# Patient Record
Sex: MALE | Race: BLACK OR AFRICAN AMERICAN | Employment: OTHER | RURAL
[De-identification: names, ages, dates, MRNs, and addresses within clinical notes are randomized per-mention and may not be internally consistent; named-entity substitution may affect disease eponyms.]

---

## 2017-01-11 ENCOUNTER — OFFICE VISIT (OUTPATIENT)
Dept: CARDIOLOGY CLINIC | Age: 77
End: 2017-01-11

## 2017-01-11 VITALS
WEIGHT: 270 LBS | TEMPERATURE: 97.8 F | OXYGEN SATURATION: 97 % | HEIGHT: 74 IN | BODY MASS INDEX: 34.65 KG/M2 | DIASTOLIC BLOOD PRESSURE: 71 MMHG | SYSTOLIC BLOOD PRESSURE: 150 MMHG | HEART RATE: 53 BPM | RESPIRATION RATE: 20 BRPM

## 2017-01-11 DIAGNOSIS — I10 ESSENTIAL HYPERTENSION: Chronic | ICD-10-CM

## 2017-01-11 DIAGNOSIS — E78.00 HYPERCHOLESTEROLEMIA: Chronic | ICD-10-CM

## 2017-01-11 DIAGNOSIS — I73.9 PVD (PERIPHERAL VASCULAR DISEASE) (HCC): Chronic | ICD-10-CM

## 2017-01-11 DIAGNOSIS — I48.0 PAF (PAROXYSMAL ATRIAL FIBRILLATION) (HCC): Primary | Chronic | ICD-10-CM

## 2017-01-11 DIAGNOSIS — N18.3 CKD (CHRONIC KIDNEY DISEASE), STAGE 3 (MODERATE): Chronic | ICD-10-CM

## 2017-01-11 DIAGNOSIS — J44.9 CHRONIC OBSTRUCTIVE PULMONARY DISEASE, UNSPECIFIED COPD TYPE (HCC): Chronic | ICD-10-CM

## 2017-01-11 NOTE — PROGRESS NOTES
Dictation on: 01/11/2017 11:13 AM by: Arminda Vu [[id number]]     Nickolas Romolaly      1940   Mariela Tim MD  Date of Visit- 1/11/2017         [place] Office,  PCP=Vin Kimbrough MD     [facility] [address] [place] [address] [facility] and V[facility] [place]. HPI:   Diego Carpenter is a 67 y.o. male       Assessment/Plans:  1. PAF (paroxysmal atrial fibrillation) ([facility]    2. PVD (peripheral vascular disease) ([facility]    3. Essential hypertension    4. Hypercholesterolemia    5. CKD (chronic kidney disease), stage 3 (moderate)    6. Chronic obstructive pulmonary disease, unspecified COPD type [facility])               Future Appointments  Date Time Provider Elisha Sandoval   5/12/2017 9:30 AM [facility]   7/19/2017 8:00 AM JASPER HASSAN [facility] AMITA SCHED   7/19/2017 9:20 AM Mariela Tim MD [facility] AMITA SCHED       Cardiac History:   No specialty comments available. ROS:Cardiac complete  as above. Respiratory as above with no wheezing or hemoptysis.    He denies  symptoms of unusual weight loss , fevers,  BRBPR, hematuria,  or recent stroke    Past Medical History   Diagnosis Date    Acute on chronic diastolic congestive heart failure ([facility] 4/27/2015    Arthritis 7/11/2011    Blackhead 6/7/2010    Cancer (HCC)      prostate    Chronic diastolic heart failure (HCC) 8/26/2015    CKD (chronic kidney disease) 11/23/2015    Diabetes ([facility]     Gout, unspecified     Hypercholesterolemia     Hypertension     Inguinal lymphadenopathy 2/18/2014    Leg pain     PAF (paroxysmal atrial fibrillation) (HCC)     PAF (paroxysmal atrial fibrillation) (HCC)     PVD (peripheral vascular disease) ([facility])       Exam and Labs:  Visit Vitals    /71 (BP 1 Location: Right arm, BP Patient Position: Sitting)    Pulse (!) 53    Temp 97.8 °F (36.6 °C) (Oral)    Resp 20    Ht 6' 2\" (1.88 m)    Wt 270 lb (122.5 kg)    SpO2 97%    BMI 34.67 kg/m2 Constitutional:  NAD, comfortable , moist mucous membranesHENT: Head: NC,ATEyes: No scleral icterus. Neck:  Neck supple. No JVD present. No tracheal deviation,mass  Chest: Effort normal & normal respiratory excursion     Lungs:breath sounds normal. No stridor. distress, wheezes or  Rales. Heart:normal rate, regular rhythm, normal S1, S2, no murmurs, rubs, clicks or gallops , PMI non displaced. Edema: Edema is none. Extremities:  no clubbing or cyanosis. Abdominal:  no abnormal distension. Neurological: alert, conversant and oriented . Skin: Skin is not cold. No obvious systemic rash noted. Not diaphoretic. No erythema. Psychiatric:  Grossly normal mood and affect.   Behavior appears normal.     Lab Results   Component Value Date/Time    Cholesterol, total 120 12/12/2016 09:42 AM    HDL Cholesterol 34 12/12/2016 09:42 AM    LDL, calculated 74 12/12/2016 09:42 AM    Triglyceride 58 12/12/2016 09:42 AM    CHOL/HDL Ratio 3.7 10/07/2010 09:27 AM     Lab Results   Component Value Date/Time    Sodium 145 12/12/2016 09:42 AM    Potassium 4.1 12/12/2016 09:42 AM    Chloride 100 12/12/2016 09:42 AM    CO2 26 12/12/2016 09:42 AM    Anion gap 12 10/07/2010 09:27 AM    Glucose 116 12/12/2016 09:42 AM    BUN 12 12/12/2016 09:42 AM    Creatinine 1.32 12/12/2016 09:42 AM    BUN/Creatinine ratio 9 12/12/2016 09:42 AM    GFR est AA 60 12/12/2016 09:42 AM    GFR est non-AA 52 12/12/2016 09:42 AM    Calcium 9.7 12/12/2016 09:42 AM      Wt Readings from Last 3 Encounters:   01/11/17 270 lb (122.5 kg)   12/14/16 265 lb 9.6 oz (120.5 kg)   12/12/16 264 lb (119.7 kg)    BP Readings from Last 3 Encounters:   01/11/17 150/71   12/14/16 163/73   12/12/16 113/59        Current Outpatient Prescriptions   Medication Sig    FOLBEE PLUS tab tablet TAKE ONE TABLET BY MOUTH ONCE DAILY    glipiZIDE (GLUCOTROL) 5 mg tablet INCREASE TO 2 TABLETS BY MOUTH 20 MINUTES BEFORE BREAKFAST AND 2 TABLET TWENTY MINUTES BEFORE DINNER    labetalol (NORMODYNE) 200 mg tablet Take 0.5 Tabs by mouth two (2) times a day for 30 days. Indications: Hypertension    cloNIDine HCl (CATAPRES) 0.3 mg tablet Take 1 Tab by mouth three (3) times daily.  diphenhydrAMINE (SLEEP AID, DIPHENHYDRAMINE,) 25 mg tablet Take 1 Tab by mouth every six (6) hours as needed for Sleep.  ferrous sulfate (IRON) 325 mg (65 mg iron) tablet Take 1 Tab by mouth Daily (before breakfast).  fluticasone (FLONASE) 50 mcg/actuation nasal spray 1 spray BL daily    dabigatran etexilate (PRADAXA) 150 mg capsule Take 1 Cap by mouth two (2) times a day.  albuterol (PROVENTIL HFA, VENTOLIN HFA, PROAIR HFA) 90 mcg/actuation inhaler Take 2 Puffs by inhalation every six (6) hours as needed for Wheezing for up to 360 days. Please use generic that is covered    HYDROcodone-acetaminophen (NORCO)  mg tablet Take 1 Tab by mouth every six (6) hours as needed for Pain.  baclofen (LIORESAL) 10 mg tablet Take 1 Tab by mouth three (3) times daily. Muscle relaxer.  tamsulosin (FLOMAX) 0.4 mg capsule TAKE ONE CAPSULE BY MOUTH ONCE DAILY    CALCIUM CARB-MAG OXIDE-ZINC OX PO Take  by mouth.  ezetimibe (ZETIA) 10 mg tablet TAKE ONE TABLET BY MOUTH NIGHTLY    montelukast (SINGULAIR) 10 mg tablet Take 1 Tab by mouth daily.  omeprazole (PRILOSEC) 20 mg capsule Take 1 Cap by mouth daily. [facility]    Fat [employer] TOUCH DELICA 33 gauge misc USE TO CHECK BLOOD SUGAR TWICE DAILY    amLODIPine (NORVASC) 10 mg tablet Take 1 Tab by mouth daily. (Patient taking differently: Take 5 mg by mouth daily.)    bumetanide (BUMEX) 2 mg tablet Take 1 Tab by mouth two (2) times a day.  lovastatin (MEVACOR) 40 mg tablet TAKE 1 TABLET BY MOUTH NIGHTLY    allopurinol (ZYLOPRIM) 100 mg tablet TAKE TWO TABLETS BY MOUTH ONCE DAILY TO PREVENT GOUT    magnesium oxide (MAG-OX) 400 mg tablet Take 1 Tab by mouth daily.  gabapentin (NEURONTIN) 300 mg capsule Take 1 Cap by mouth three (3) times daily.     hydrALAZINE (APRESOLINE) 50 mg tablet Take 1 Tab by mouth three (3) times daily.  ONETOUCH VERIO strip USE TO CHECK BLOOD SUGAR TWICE DAILY    metFORMIN (GLUCOPHAGE) 500 mg tablet TAKE ONE TABLET BY MOUTH THREE TIMES DAILY WITH MEALS    guaiFENesin SR (MUCINEX) 600 mg SR tablet Take 1 Tab by mouth daily.  potassium chloride (K-DUR) 10 mEq tablet Take 1 Tab by mouth three (3) times daily. (Patient taking differently: Take 10 mEq by mouth two (2) times a day.)    colchicine (COLCRYS) 0.6 mg tablet Take 1 Tab by mouth two (2) times a day. For acute gout.  melatonin 3 mg tablet Take 1 Tab by mouth nightly.  aspirin 81 mg chewable tablet Take 81 mg by mouth daily.  benzoyl peroxide 5 % topical gel Apply  to affected area nightly. apply to affected area as directed    metolazone (ZAROXOLYN) 2.5 mg tablet One tablet on Monday and thursday morning to remove excess fluid.  cholecalciferol, vitamin D3, (VITAMIN D3) 2,000 unit Tab Take  by mouth. No current facility-administered medications for this visit. Past Surgical History   Procedure Laterality Date    Hx orthopaedic       back and left shoulder x2    Colonoscopy N/A 9/22/2016     COLONOSCOPY performed by Karl Panchal MD at [address] [facility] Hx=  reports that he quit smoking about 13 years ago. His smoking use included Cigarettes. He has never used smokeless tobacco. He reports that he does not drink alcohol or use illicit drugs. Family Hx= family history includes Cancer in his paternal grandfather and sister; Hypertension in an other family member. Impression see above.

## 2017-01-11 NOTE — PROGRESS NOTES
Patient identified times 2. Informed of needed appointment. Transferred to [facility] for scheduling.  Appointment date will be mailed to verified address per patient request.

## 2017-01-11 NOTE — Clinical Note
Subjective:  Mr. Calixto Londono was seen by me a month ago. He was wheelchair bound with mild shortness of breath and found to have some bradycardia. He has a history of what is now persistent atrial fibrillation. His echo in 2015 had shown normal LV function. He has some kidney disease. At that time a month ago I had him cut back on his Labetalol. He says he feels fine, has no complaints. Denies any chest pain, chest pressure, shortness of breath, swelling in the feet, *** or passing out. He did see his kidney specialist with [facility] in [place]. They recommended he stop his Metolazone, take a half a tablet of the 10 mg of Norvasc daily, take the potassium twice a day as 10 mEq instead of three times a day and stop his vitamin D. He has a history of a dilated IVC and left atrial enlargement on previous echocardiogram and he's had reasonable vascular studies. Physical Examination: 
EXTREMITIES:  1+ lower extremity edema of the mid shins. CHEST:  Clear lungs. CV:  Irregularly irregular rhythm without murmur, rub or gallop. Impression/Plan: 1. Persistent atrial fibrillation, seems to have reasonable rate control and feels pretty well today, is not symptomatic. Had a normal ejection fraction in the past and is continuing on Pradaxa as a blood thinner to prevent any stroke for him. Would continue current management, do not feel he is a candidate to go back to cardioversion unless he becomes more symptomatic. 2. Normal left ventricular systolic function. Suspect some diastolic renal dysfunction related to renal disease. 3. CKD, followed by nephrologist. 
4. Hypertension, slightly elevated. Will defer management to kidney specialist.  Norvasc was decreased. 5. Hypokalemia. Potassium as above. 6. Dyslipidemia, on statin, which is Mevacor.  
7. Overall from a cardiac point of view seems to be doing fairly well, the atrial fibrillation is tolerated, and I will see him back in six months. NAVI BARRON      1940 Felipe Coronado MD  Date of Visit- 1/11/2017 [place] Barber,  PCP=Vin Osborne MD  
 
[facility] and [facility] [place]. HPI:   Penny Contreras is a 67 y.o. male Assessment/Plans: 1. PAF (paroxysmal atrial fibrillation) ([address] 2. PVD (peripheral vascular disease) ([address] 3. Essential hypertension 4. Hypercholesterolemia 5. CKD (chronic kidney disease), stage 3 (moderate) 6. Chronic obstructive pulmonary disease, unspecified COPD type ([address] Future Appointments Date Time Provider Elisha Sandoval 5/12/2017 9:30 AM Fifi Ruby MD [facility] AMITA SCHED  
7/19/2017 8:00 AM JASPER HASSAN AMITA SCHED  
7/19/2017 9:20 AM Felipe Coronado MD [address] [place] [address] Cardiac History: No specialty comments available. ROS:Cardiac complete  as above. Respiratory as above with no wheezing or hemoptysis. He denies  symptoms of unusual weight loss , fevers,  BRBPR, hematuria,  or recent stroke Past Medical History Diagnosis Date  Acute on chronic diastolic congestive heart failure ([address] 4/27/2015  Arthritis 7/11/2011  Blackhead 6/7/2010  Cancer [facility])   
  prostate  Chronic diastolic heart failure ([address] 8/26/2015  CKD (chronic kidney disease) 11/23/2015  Diabetes ([address]  Gout, unspecified  Hypercholesterolemia  Hypertension  Inguinal lymphadenopathy 2/18/2014  Leg pain  PAF (paroxysmal atrial fibrillation) ([address]  PAF (paroxysmal atrial fibrillation) ([address]  PVD (peripheral vascular disease) ([address] Exam and Labs: 
Visit Vitals  /71 (BP 1 Location: Right arm, BP Patient Position: Sitting)  Pulse (!) 53  Temp 97.8 °F (36.6 °C) (Oral)  Resp 20  
 Ht 6' 2\" (1.88 m)  Wt 270 lb (122.5 kg)  SpO2 97%  BMI 34.67 kg/m2 Constitutional:  NAD, comfortable , moist mucous membranesHENT: Head: NC,ATEyes: No scleral icterus. Neck:  Neck supple. No JVD present. No tracheal deviation,mass  Chest: Effort normal & normal respiratory excursion Lungs:breath sounds normal. No stridor. distress, wheezes or  Rales. Heart:normal rate, regular rhythm, normal S1, S2, no murmurs, rubs, clicks or gallops , PMI non displaced. Edema: Edema is none. Extremities:  no clubbing or cyanosis. Abdominal:  no abnormal distension. Neurological: alert, conversant and oriented . Skin: Skin is not cold. No obvious systemic rash noted. Not diaphoretic. No erythema. Psychiatric:  Grossly normal mood and affect. Behavior appears normal.  
 
Lab Results Component Value Date/Time Cholesterol, total 120 12/12/2016 09:42 AM  
 HDL Cholesterol 34 12/12/2016 09:42 AM  
 LDL, calculated 74 12/12/2016 09:42 AM  
 Triglyceride 58 12/12/2016 09:42 AM  
 CHOL/HDL Ratio 3.7 10/07/2010 09:27 AM  
 
Lab Results Component Value Date/Time Sodium 145 12/12/2016 09:42 AM  
 Potassium 4.1 12/12/2016 09:42 AM  
 Chloride 100 12/12/2016 09:42 AM  
 CO2 26 12/12/2016 09:42 AM  
 Anion gap 12 10/07/2010 09:27 AM  
 Glucose 116 12/12/2016 09:42 AM  
 BUN 12 12/12/2016 09:42 AM  
 Creatinine 1.32 12/12/2016 09:42 AM  
 BUN/Creatinine ratio 9 12/12/2016 09:42 AM  
 GFR est AA 60 12/12/2016 09:42 AM  
 GFR est non-AA 52 12/12/2016 09:42 AM  
 Calcium 9.7 12/12/2016 09:42 AM  
  
Wt Readings from Last 3 Encounters:  
01/11/17 270 lb (122.5 kg) 12/14/16 265 lb 9.6 oz (120.5 kg) 12/12/16 264 lb (119.7 kg) BP Readings from Last 3 Encounters:  
01/11/17 150/71  
12/14/16 163/73  
12/12/16 113/59 Current Outpatient Prescriptions Medication Sig  FOLBEE PLUS tab tablet TAKE ONE TABLET BY MOUTH ONCE DAILY  glipiZIDE (GLUCOTROL) 5 mg tablet INCREASE TO 2 TABLETS BY MOUTH 20 MINUTES BEFORE BREAKFAST AND 2 TABLET TWENTY MINUTES BEFORE DINNER  
  labetalol (NORMODYNE) 200 mg tablet Take 0.5 Tabs by mouth two (2) times a day for 30 days. Indications: Hypertension  cloNIDine HCl (CATAPRES) 0.3 mg tablet Take 1 Tab by mouth three (3) times daily.  diphenhydrAMINE (SLEEP AID, DIPHENHYDRAMINE,) 25 mg tablet Take 1 Tab by mouth every six (6) hours as needed for Sleep.  ferrous sulfate (IRON) 325 mg (65 mg iron) tablet Take 1 Tab by mouth Daily (before breakfast).  fluticasone (FLONASE) 50 mcg/actuation nasal spray 1 spray BL daily  dabigatran etexilate (PRADAXA) 150 mg capsule Take 1 Cap by mouth two (2) times a day.  albuterol (PROVENTIL HFA, VENTOLIN HFA, PROAIR HFA) 90 mcg/actuation inhaler Take 2 Puffs by inhalation every six (6) hours as needed for Wheezing for up to 360 days. Please use generic that is covered  HYDROcodone-acetaminophen (NORCO)  mg tablet Take 1 Tab by mouth every six (6) hours as needed for Pain.  baclofen (LIORESAL) 10 mg tablet Take 1 Tab by mouth three (3) times daily. Muscle relaxer.  tamsulosin (FLOMAX) 0.4 mg capsule TAKE ONE CAPSULE BY MOUTH ONCE DAILY  CALCIUM CARB-MAG OXIDE-ZINC OX PO Take  by mouth.  ezetimibe (ZETIA) 10 mg tablet TAKE ONE TABLET BY MOUTH NIGHTLY  montelukast (SINGULAIR) 10 mg tablet Take 1 Tab by mouth daily.  omeprazole (PRILOSEC) 20 mg capsule Take 1 Cap by mouth daily. [facility] TOUCH DELICA 33 gauge misc USE TO CHECK BLOOD SUGAR TWICE DAILY  amLODIPine (NORVASC) 10 mg tablet Take 1 Tab by mouth daily. (Patient taking differently: Take 5 mg by mouth daily.)  bumetanide (BUMEX) 2 mg tablet Take 1 Tab by mouth two (2) times a day.  lovastatin (MEVACOR) 40 mg tablet TAKE 1 TABLET BY MOUTH NIGHTLY  allopurinol (ZYLOPRIM) 100 mg tablet TAKE TWO TABLETS BY MOUTH ONCE DAILY TO PREVENT GOUT  
 magnesium oxide (MAG-OX) 400 mg tablet Take 1 Tab by mouth daily.   
 gabapentin (NEURONTIN) 300 mg capsule Take 1 Cap by mouth three (3) times daily.  hydrALAZINE (APRESOLINE) 50 mg tablet Take 1 Tab by mouth three (3) times daily.  ONETOUCH VERIO strip USE TO CHECK BLOOD SUGAR TWICE DAILY  metFORMIN (GLUCOPHAGE) 500 mg tablet TAKE ONE TABLET BY MOUTH THREE TIMES DAILY WITH MEALS  guaiFENesin SR (MUCINEX) 600 mg SR tablet Take 1 Tab by mouth daily.  potassium chloride (K-DUR) 10 mEq tablet Take 1 Tab by mouth three (3) times daily. (Patient taking differently: Take 10 mEq by mouth two (2) times a day.)  colchicine (COLCRYS) 0.6 mg tablet Take 1 Tab by mouth two (2) times a day. For acute gout.  melatonin 3 mg tablet Take 1 Tab by mouth nightly.  aspirin 81 mg chewable tablet Take 81 mg by mouth daily.  benzoyl peroxide 5 % topical gel Apply  to affected area nightly. apply to affected area as directed  metolazone (ZAROXOLYN) 2.5 mg tablet One tablet on Monday and thursday morning to remove excess fluid.  cholecalciferol, vitamin D3, (VITAMIN D3) 2,000 unit Tab Take  by mouth. No current facility-administered medications for this visit. Past Surgical History Procedure Laterality Date  Hx orthopaedic    
  back and left shoulder x2  Colonoscopy N/A 9/22/2016 COLONOSCOPY performed by Pj Sultana MD at [address] [place] [address] Social Hx=  reports that he quit smoking about 13 years ago. His smoking use included Cigarettes. He has never used smokeless tobacco. He reports that he does not drink alcohol or use illicit drugs. Family Hx= family history includes Cancer in his paternal grandfather and sister; Hypertension in an other family member. Impression see above.

## 2017-01-11 NOTE — PROGRESS NOTES
Reviewed record in preparation for visit and have obtained necessary documentation. Patient did not bring medications to visit for review. Information provided on Advanced Directive, Living Will. Health Maintenance Due   Topic Date Due    EYE EXAM RETINAL OR DILATED Q1  12/15/2016   Body mass index is 34.67 kg/(m^2).

## 2017-01-11 NOTE — PROGRESS NOTES
Subjective:  Mr. Karin Ma was seen by me a month ago. He was wheelchair bound with mild shortness of breath and found to have some bradycardia. He has a history of what is now persistent atrial fibrillation. His echo in 2015 had shown normal LV function. He has some kidney disease. At that time a month ago I had him cut back on his Labetalol. He says he feels fine, has no complaints. Denies any chest pain, chest pressure, shortness of breath, swelling in the feet, *** or passing out. He did see his kidney specialist with [facility] in [place]. They recommended he stop his Metolazone, take a half a tablet of the 10 mg of Norvasc daily, take the potassium twice a day as 10 mEq instead of three times a day and stop his vitamin D. He has a history of a dilated IVC and left atrial enlargement on previous echocardiogram and he's had reasonable vascular studies. Physical Examination:  EXTREMITIES:  1+ lower extremity edema of the mid shins. CHEST:  Clear lungs. CV:  Irregularly irregular rhythm without murmur, rub or gallop. Impression/Plan:  1. Persistent atrial fibrillation, seems to have reasonable rate control and feels pretty well today, is not symptomatic. Had a normal ejection fraction in the past and is continuing on Pradaxa as a blood thinner to prevent any stroke for him. Would continue current management, do not feel he is a candidate to go back to cardioversion unless he becomes more symptomatic. 2. Normal left ventricular systolic function. Suspect some diastolic renal dysfunction related to renal disease. 3. CKD, followed by nephrologist.  4. Hypertension, slightly elevated. Will defer management to kidney specialist.  Norvasc was decreased. 5. Hypokalemia. Potassium as above. 6. Dyslipidemia, on statin, which is Mevacor.   7. Overall from a cardiac point of view seems to be doing fairly well, the atrial fibrillation is tolerated, and I will see him back in six months.

## 2017-01-24 ENCOUNTER — TELEPHONE (OUTPATIENT)
Dept: FAMILY MEDICINE CLINIC | Age: 77
End: 2017-01-24

## 2017-01-24 DIAGNOSIS — G89.29 CHRONIC LEFT-SIDED LOW BACK PAIN WITHOUT SCIATICA: ICD-10-CM

## 2017-01-24 DIAGNOSIS — M54.50 CHRONIC LEFT-SIDED LOW BACK PAIN WITHOUT SCIATICA: ICD-10-CM

## 2017-01-24 DIAGNOSIS — M51.36 DDD (DEGENERATIVE DISC DISEASE), LUMBAR: Chronic | ICD-10-CM

## 2017-01-24 RX ORDER — HYDROCODONE BITARTRATE AND ACETAMINOPHEN 10; 325 MG/1; MG/1
1 TABLET ORAL
Qty: 90 TAB | Refills: 0 | Status: SHIPPED | OUTPATIENT
Start: 2017-01-24 | End: 2017-02-28 | Stop reason: SDUPTHER

## 2017-01-25 RX ORDER — LANCETS
EACH MISCELLANEOUS
Qty: 100 EACH | Refills: 6 | Status: SHIPPED | OUTPATIENT
Start: 2017-01-25 | End: 2019-07-18 | Stop reason: SDUPTHER

## 2017-01-25 RX ORDER — BLOOD-GLUCOSE METER
EACH MISCELLANEOUS
Qty: 1 EACH | Refills: 0 | Status: SHIPPED | OUTPATIENT
Start: 2017-01-25 | End: 2019-07-18 | Stop reason: SDUPTHER

## 2017-02-07 ENCOUNTER — OFFICE VISIT (OUTPATIENT)
Dept: FAMILY MEDICINE CLINIC | Age: 77
End: 2017-02-07

## 2017-02-07 VITALS
TEMPERATURE: 98 F | DIASTOLIC BLOOD PRESSURE: 76 MMHG | WEIGHT: 269 LBS | BODY MASS INDEX: 34.52 KG/M2 | OXYGEN SATURATION: 97 % | HEIGHT: 74 IN | RESPIRATION RATE: 16 BRPM | SYSTOLIC BLOOD PRESSURE: 174 MMHG | HEART RATE: 67 BPM

## 2017-02-07 DIAGNOSIS — M25.572 ACUTE LEFT ANKLE PAIN: Primary | ICD-10-CM

## 2017-02-07 DIAGNOSIS — E13.42 OTHER SPECIFIED DIABETES MELLITUS WITH DIABETIC POLYNEUROPATHY (HCC): Chronic | ICD-10-CM

## 2017-02-07 DIAGNOSIS — I10 ESSENTIAL HYPERTENSION: Chronic | ICD-10-CM

## 2017-02-07 LAB — HBA1C MFR BLD HPLC: 7.2 %

## 2017-02-07 RX ORDER — PREDNISONE 10 MG/1
TABLET ORAL
Qty: 21 TAB | Refills: 0 | Status: SHIPPED | OUTPATIENT
Start: 2017-02-07 | End: 2017-05-12 | Stop reason: ALTCHOICE

## 2017-02-07 NOTE — PROGRESS NOTES
Patient: Hammad Abbasi MRN: [id number]  SSN: [id number]    YOB: 1940  Age: 67 y.o. Sex: male      Chief Complaint   Patient presents with    Ankle Pain     Jean Pierre Dubon is a 67 y.o. male who complains of pain of the left ankle several day(s) ago. Denies acute injury. Symptoms have been constant since onset. Prior history of related problems: no prior problems with this area in the past. Patient with hx of DM2, HTN and gout. Says pain different from that experienced with gout. BP elevated today. Patient describes pain as 10/10. He is taking daily opioid for chronic back pain. BP Readings from Last 3 Encounters:   02/07/17 174/76   01/11/17 150/71   12/14/16 163/73       Medications:     Current Outpatient Prescriptions   Medication Sig    predniSONE (STERAPRED DS) 10 mg dose pack See administration instruction per 10mg dose pack    Blood-Glucose Meter (TRUE METRIX GLUCOSE METER) misc Use as Directed    glucose blood VI test strips (TRUE METRIX GLUCOSE TEST STRIP) strip Test 2 times daily Dx Coed E11.65    Lancets misc Test 2 times daily Dx Code E11.65    HYDROcodone-acetaminophen (NORCO)  mg tablet Take 1 Tab by mouth every six (6) hours as needed for Pain.  FOLBEE PLUS tab tablet TAKE ONE TABLET BY MOUTH ONCE DAILY    glipiZIDE (GLUCOTROL) 5 mg tablet INCREASE TO 2 TABLETS BY MOUTH 20 MINUTES BEFORE BREAKFAST AND 2 TABLET TWENTY MINUTES BEFORE DINNER    cloNIDine HCl (CATAPRES) 0.3 mg tablet Take 1 Tab by mouth three (3) times daily.  diphenhydrAMINE (SLEEP AID, DIPHENHYDRAMINE,) 25 mg tablet Take 1 Tab by mouth every six (6) hours as needed for Sleep.  ferrous sulfate (IRON) 325 mg (65 mg iron) tablet Take 1 Tab by mouth Daily (before breakfast).  fluticasone (FLONASE) 50 mcg/actuation nasal spray 1 spray BL daily    dabigatran etexilate (PRADAXA) 150 mg capsule Take 1 Cap by mouth two (2) times a day.     albuterol (PROVENTIL HFA, VENTOLIN HFA, PROAIR HFA) 90 mcg/actuation inhaler Take 2 Puffs by inhalation every six (6) hours as needed for Wheezing for up to 360 days. Please use generic that is covered    baclofen (LIORESAL) 10 mg tablet Take 1 Tab by mouth three (3) times daily. Muscle relaxer.  tamsulosin (FLOMAX) 0.4 mg capsule TAKE ONE CAPSULE BY MOUTH ONCE DAILY    CALCIUM CARB-MAG OXIDE-ZINC OX PO Take  by mouth.  ezetimibe (ZETIA) 10 mg tablet TAKE ONE TABLET BY MOUTH NIGHTLY    montelukast (SINGULAIR) 10 mg tablet Take 1 Tab by mouth daily.  omeprazole (PRILOSEC) 20 mg capsule Take 1 Cap by mouth daily. Fax to [facility]    amLODIPine (NORVASC) 10 mg tablet Take 1 Tab by mouth daily. (Patient taking differently: Take 5 mg by mouth daily.)    bumetanide (BUMEX) 2 mg tablet Take 1 Tab by mouth two (2) times a day.  lovastatin (MEVACOR) 40 mg tablet TAKE 1 TABLET BY MOUTH NIGHTLY    allopurinol (ZYLOPRIM) 100 mg tablet TAKE TWO TABLETS BY MOUTH ONCE DAILY TO PREVENT GOUT    magnesium oxide (MAG-OX) 400 mg tablet Take 1 Tab by mouth daily.  gabapentin (NEURONTIN) 300 mg capsule Take 1 Cap by mouth three (3) times daily.  hydrALAZINE (APRESOLINE) 50 mg tablet Take 1 Tab by mouth three (3) times daily.  metFORMIN (GLUCOPHAGE) 500 mg tablet TAKE ONE TABLET BY MOUTH THREE TIMES DAILY WITH MEALS    guaiFENesin SR (MUCINEX) 600 mg SR tablet Take 1 Tab by mouth daily.  metolazone (ZAROXOLYN) 2.5 mg tablet One tablet on Monday and thursday morning to remove excess fluid.  potassium chloride (K-DUR) 10 mEq tablet Take 1 Tab by mouth three (3) times daily. (Patient taking differently: Take 10 mEq by mouth two (2) times a day.)    colchicine (COLCRYS) 0.6 mg tablet Take 1 Tab by mouth two (2) times a day. For acute gout.  melatonin 3 mg tablet Take 1 Tab by mouth nightly.  aspirin 81 mg chewable tablet Take 81 mg by mouth daily.  cholecalciferol, vitamin D3, (VITAMIN D3) 2,000 unit Tab Take  by mouth.     benzoyl peroxide 5 % topical gel Apply  to affected area nightly. apply to affected area as directed     No current facility-administered medications for this visit. Problem List:     Patient Active Problem List    Diagnosis Date Noted    History of colon polyps 05/02/2016    Prostate cancer ([address] 05/02/2016    Idiopathic gout 05/02/2016    Essential hypertension with goal blood pressure less than 130/85 05/02/2016    CKD (chronic kidney disease) 11/23/2015    Chronic diastolic heart failure ([address] 08/26/2015    COPD (chronic obstructive pulmonary disease) ([address] 04/27/2015    Left upper lobe pneumonia 04/27/2015    Diabetes with neurologic complications ([address] 09/01/6683    Chronic radicular pain of lower back 07/21/2014    Neuropathy 07/16/2013    DDD (degenerative disc disease), lumbar 07/16/2013    Prostate CA ([address] 07/16/2013    Tubulovillous adenoma polyp of colon 07/16/2013    Hoarse voice quality 06/20/2012    Edema 01/12/2012    PAF (paroxysmal atrial fibrillation) ([address]     Unspecified arthropathy, ankle and foot 07/14/2011    Arthritis 07/11/2011    Gout, unspecified     PVD (peripheral vascular disease) ([address]     Hypertension     Hypercholesterolemia     Leg pain     Chronic pain 05/06/2010       Medical History:     Past Medical History   Diagnosis Date    Acute on chronic diastolic congestive heart failure ([address] 4/27/2015    Arthritis 7/11/2011    Blackhead 6/7/2010    Cancer (HCC)      prostate    Chronic diastolic heart failure ([address] 8/26/2015    CKD (chronic kidney disease) 11/23/2015    Diabetes ([address]     Gout, unspecified     Hypercholesterolemia     Hypertension     Inguinal lymphadenopathy 2/18/2014    Leg pain     PAF (paroxysmal atrial fibrillation) (HCC)     PAF (paroxysmal atrial fibrillation) (HCC)     PVD (peripheral vascular disease) (HCC)        Allergies:      Allergies   Allergen Reactions    Indocin [Indomethacin Sodium] Unknown (comments)    Lisinopril Angioedema     Dxed in hospital.    Losartan Other (comments)     Face and throat swelling. Surgical History:     Past Surgical History   Procedure Laterality Date    Hx orthopaedic       back and left shoulder x2    Colonoscopy N/A 9/22/2016     COLONOSCOPY performed by Mickey Michel MD at [address] [place] [address] History:     Social History     Social History    Marital status: SINGLE     Spouse name: N/A    Number of children: N/A    Years of education: N/A     Social History Main Topics    Smoking status: Former Smoker     Types: Cigarettes     Quit date: 6/20/2003    Smokeless tobacco: Never Used    Alcohol use No    Drug use: No    Sexual activity: Not Asked     Other Topics Concern    None     Social History Narrative       Review of Symptoms:  Constitutional: Negative for fever, chills, fatigue, malaise  Skin: Negative for rash or lesion  CV: Negative for chest pain or palpitations  Resp: Negative for cough, wheezing or SOB  Gastrointestinal: Negative for nausea or abdominal pain  Musculoskeletal: see HPI  Neurological: Negative for weakness or paresthesia      Vitals:    02/07/17 1003   BP: 174/76   Pulse: 67   Resp: 16   Temp: 98 °F (36.7 °C)   TempSrc: Oral   SpO2: 97%   Weight: 269 lb (122 kg)   Height: 6' 2\" (1.88 m)       Physical Examination:  General: Well developed, well nourished, in no acute distress  Skin: Warm and dry, no rash or lesion appreciated  Head: Normocephalic, atraumatic  Eyes: Sclera clear, EOMI  Neck: Normal range of motion  Respiratory: symmetrical, unlabored effort  Cardiovascular: Regular rate and rhythm, 2+ distal pulses  Extremities: Left ankle: No erythema, edema, or bruising. lateral and anterior TTP  Neurological: Normal strength and sensation. No focal deficits  Psychological: Active, alert and oriented. Affect appropriate     X-ray: Degenerative changes in the midfoot. López Llanes was seen today for ankle pain.     Diagnoses and all orders for this visit:    Acute left ankle pain  -     XR ANKLE LT MIN 3 V; Future  -     predniSONE (STERAPRED DS) 10 mg dose pack; See administration instruction per 10mg dose pack    Essential hypertension    Other specified diabetes mellitus with diabetic polyneuropathy (HCC)  -     AMB POC HEMOGLOBIN A1C  -     COLLECTION CAPILLARY BLOOD SPECIMEN        PLAN:  Rest the injured area as much as practical, apply ice packs, elevate the injured limb  Patient advised to log blood pressures at home 1-2 times daily and bring to next visit. Call office as soon as possible if BP's over 140/90 on multiple occasions or with symptoms of dizziness, chest pain, shortness of breath, headache or ankle swelling. Monitor BS closely while on steroid. Discussed expected course, resolution and possible complications of diagnosis in detail with patient. Goals of treatment  were discussed. All of the patient's questions were addressed. The patient expresses understanding and agreement with our plan of care. The patient has received an after-visit summary and questions were answered concerning future plans. I have discussed medication side effects and warnings with the patient as well. Follow-up Disposition:  Return in about 2 weeks (around 2/21/2017), or if symptoms worsen or fail to improve.

## 2017-02-07 NOTE — PROGRESS NOTES
Reviewed record in preparation for visit and have necessary documentation      Body mass index is 34.54 kg/(m^2). Health Maintenance Due   Topic Date Due    EYE EXAM RETINAL OR DILATED Q1  12/15/2016     Not due til dec.  This yr for eye exam

## 2017-02-07 NOTE — MR AVS SNAPSHOT
Visit Information Date & Time Provider Department Dept. Phone Encounter #  
 2/7/2017 10:00 AM Taty Krause, [address] [facility] [id number] Your Appointments 5/12/2017  9:30 AM  
ROUTINE CARE with Nita Steve MD  
[facility] Appt Note: 4 mnth fu est pt for refills fasting HTn  
 [facility] [address] [facility]   
[phone]  
  
   
 [address] [facility]  
  
    
 7/19/2017  8:00 AM  
ECHO CARDIOGRAMS 2D with ECHOJASPER  
[facility] ([facility] SCHEDULING) Appt Note: 6 month f/u with EKG [per Dr. Lacey Krause [address] [facility] [address] [address] [facility]  
[phone]  
  
   
 [address] [facility] [address] [facility] [address]  
  
    
 7/19/2017  9:20 AM  
ESTABLISHED PATIENT with Vielka Arias MD  
[facility] ([facility]) Appt Note: 6 month f/u with EKG [per Dr. Lacey Krause [address] [facility] [address] [facility]  
[address] [place] [address] [address] Upcoming Health Maintenance Date Due  
 EYE EXAM RETINAL OR DILATED Q1 12/15/2016 MEDICARE YEARLY EXAM 3/2/2017 HEMOGLOBIN A1C Q6M 6/12/2017 FOOT EXAM Q1 9/28/2017 MICROALBUMIN Q1 9/28/2017 LIPID PANEL Q1 12/12/2017 GLAUCOMA SCREENING Q2Y 12/15/2017 DTaP/Tdap/Td series (2 - Td) 8/16/2023 Allergies as of 2/7/2017  Review Complete On: 2/7/2017 By: Lamar Ramirez Severity Noted Reaction Type Reactions Indocin [Indomethacin Sodium]  06/05/2010    Unknown (comments) Lisinopril  04/17/2013    Angioedema Dxed in hospital.  
 Losartan  01/22/2015    Other (comments) Face and throat swelling. Current Immunizations  Reviewed on 12/12/2016 Name Date Influenza Vaccine 10/7/2015, 10/9/2014, 9/26/2013 Influenza Vaccine (Quad) PF 9/28/2016 Influenza Vaccine Split 10/11/2012, 11/23/2011, 10/7/2010 Influenza Vaccine Whole 12/14/2009 Pneumococcal Conjugate (PCV-13) 8/12/2015 Pneumococcal Polysaccharide (PPSV-23) 10/20/2014, 12/7/2012 Tdap 8/16/2013 Zoster Vaccine, Live 5/26/2015 Not reviewed this visit You Were Diagnosed With   
  
 Codes Comments Acute left ankle pain    -  Primary ICD-10-CM: M25.572 ICD-9-CM: 719.47 Other specified diabetes mellitus with diabetic polyneuropathy [facility]     ICD-10-CM: E13.42 
ICD-9-CM: 250.60, 357.2 Vitals BP Pulse Temp Resp Height(growth percentile) Weight(growth percentile) 174/76 67 98 °F (36.7 °C) (Oral) 16 6' 2\" (1.88 m) 269 lb (122 kg) SpO2 BMI Smoking Status 97% 34.54 kg/m2 Former Smoker Vitals History BMI and BSA Data Body Mass Index Body Surface Area 34.54 kg/m 2 2.52 m 2 Preferred Pharmacy Pharmacy Name Phone [facility] [address] [place] [address] [phone] Your Updated Medication List  
  
   
This list is accurate as of: 2/7/17 11:56 AM.  Always use your most recent med list.  
  
  
  
  
 albuterol 90 mcg/actuation inhaler Commonly known as:  PROVENTIL HFA, VENTOLIN HFA, PROAIR HFA Take 2 Puffs by inhalation every six (6) hours as needed for Wheezing for up to 360 days. Please use generic that is covered  
  
 allopurinol 100 mg tablet Commonly known as:  ZYLOPRIM  
TAKE TWO TABLETS BY MOUTH ONCE DAILY TO PREVENT GOUT  
  
 amLODIPine 10 mg tablet Commonly known as:  Guevara Tanmay Take 1 Tab by mouth daily. aspirin 81 mg chewable tablet Take 81 mg by mouth daily. baclofen 10 mg tablet Commonly known as:  LIORESAL Take 1 Tab by mouth three (3) times daily. Muscle relaxer. benzoyl peroxide 5 % topical gel Apply  to affected area nightly. apply to affected area as directed Blood-Glucose Meter Misc Commonly known as:  TRUE METRIX GLUCOSE METER Use as Directed  
  
 bumetanide 2 mg tablet Commonly known as:  Reshma Oas Take 1 Tab by mouth two (2) times a day. CALCIUM CARB-MAG OXIDE-ZINC OX PO Take  by mouth.  
  
 cloNIDine HCl 0.3 mg tablet Commonly known as:  CATAPRES Take 1 Tab by mouth three (3) times daily. colchicine 0.6 mg tablet Commonly known as:  COLCRYS Take 1 Tab by mouth two (2) times a day. For acute gout. dabigatran etexilate 150 mg capsule Commonly known as:  PRADAXA Take 1 Cap by mouth two (2) times a day.  
  
 ezetimibe 10 mg tablet Commonly known as:  Levell Antes TAKE ONE TABLET BY MOUTH NIGHTLY  
  
 fluticasone 50 mcg/actuation nasal spray Commonly known as:  FLONASE  
1 spray BL daily FOLBEE PLUS Tab tablet Generic drug:  b complex-vitamin c-folic acid 5mg TAKE ONE TABLET BY MOUTH ONCE DAILY  
  
 gabapentin 300 mg capsule Commonly known as:  NEURONTIN Take 1 Cap by mouth three (3) times daily. glipiZIDE 5 mg tablet Commonly known as:  Suzanne Weeks INCREASE TO 2 TABLETS BY MOUTH 20 MINUTES BEFORE BREAKFAST AND 2 TABLET TWENTY MINUTES BEFORE DINNER  
  
 glucose blood VI test strips strip Commonly known as:  TRUE METRIX GLUCOSE TEST STRIP Test 2 times daily Dx Coed E11.65  
  
 guaiFENesin  mg SR tablet Commonly known as:  Luis & Luis Take 1 Tab by mouth daily. hydrALAZINE 50 mg tablet Commonly known as:  APRESOLINE Take 1 Tab by mouth three (3) times daily. HYDROcodone-acetaminophen  mg tablet Commonly known as:  Maribeth Oyster Take 1 Tab by mouth every six (6) hours as needed for Pain. Iron 325 mg (65 mg iron) tablet Generic drug:  ferrous sulfate Take 1 Tab by mouth Daily (before breakfast). Lancets Misc Test 2 times daily Dx Code E11.65  
  
 lovastatin 40 mg tablet Commonly known as:  MEVACOR  
TAKE 1 TABLET BY MOUTH NIGHTLY  
  
 magnesium oxide 400 mg tablet Commonly known as:  MAG-OX Take 1 Tab by mouth daily. melatonin 3 mg tablet Take 1 Tab by mouth nightly. metFORMIN 500 mg tablet Commonly known as:  GLUCOPHAGE  
TAKE ONE TABLET BY MOUTH THREE TIMES DAILY WITH MEALS  
  
 metOLazone 2.5 mg tablet Commonly known as:  Zana Rana One tablet on Monday and thursday morning to remove excess fluid. montelukast 10 mg tablet Commonly known as:  SINGULAIR Take 1 Tab by mouth daily. omeprazole 20 mg capsule Commonly known as:  PRILOSEC Take 1 Cap by mouth daily. Fax to [facility]  
  
 potassium chloride 10 mEq tablet Commonly known as:  K-DUR Take 1 Tab by mouth three (3) times daily. predniSONE 10 mg dose pack Commonly known as:  STERAPRED DS See administration instruction per 10mg dose pack Sleep Aid (diphenhydrAMINE) 25 mg tablet Generic drug:  diphenhydrAMINE Take 1 Tab by mouth every six (6) hours as needed for Sleep.  
  
 tamsulosin 0.4 mg capsule Commonly known as:  FLOMAX TAKE ONE CAPSULE BY MOUTH ONCE DAILY  
  
 VITAMIN D3 2,000 unit Tab Generic drug:  cholecalciferol (vitamin D3) Take  by mouth. Prescriptions Sent to Pharmacy Refills  
 predniSONE (STERAPRED DS) 10 mg dose pack 0 Sig: See administration instruction per 10mg dose pack Class: Normal  
 Pharmacy: [address] [place] [address] #: [phone] We Performed the Following AMB POC HEMOGLOBIN A1C [32415 CPT(R)] COLLECTION CAPILLARY BLOOD SPECIMEN [00036 CPT(R)] To-Do List   
 02/07/2017 Imaging:  XR ANKLE LT MIN 3 V Introducing hospitals & HEALTH SERVICES! Kraig Bermudez introduces [employer] patient portal. Now you can access parts of your medical record, email your doctor's office, and request medication refills online.    
 
1. In your internet browser, go to [email or web address] 2. Click on the First Time User? Click Here link in the Sign In box. You will see the New Member Sign Up page. 3. Enter your [employer] Access Code exactly as it appears below. You will not need to use this code after youve completed the sign-up process. If you do not sign up before the expiration date, you must request a new code. · [employer] Access Code: [email or web address] Expires: 3/12/2017  8:38 AM 
 
4. Enter the last four digits of your Social Security Number (xxxx) and Date of Birth (mm/dd/yyyy) as indicated and click Submit. You will be taken to the next sign-up page. 5. Create a Compufirst ID. This will be your [employer] login ID and cannot be changed, so think of one that is secure and easy to remember. 6. Create a [employer] password. You can change your password at any time. 7. Enter your Password Reset Question and Answer. This can be used at a later time if you forget your password. 8. Enter your e-mail address. You will receive e-mail notification when new information is available in [address] 9. Click Sign Up. You can now view and download portions of your medical record. 10. Click the Download Summary menu link to download a portable copy of your medical information. If you have questions, please visit the Frequently Asked Questions section of the Comp[employer] website. Remember, [employer] is NOT to be used for urgent needs. For medical emergencies, dial 911. Now available from your iPhone and Android! Please provide this summary of care documentation to your next provider. Your primary care clinician is listed as [address]. If you have any questions after today's visit, please call [phone].

## 2017-02-28 ENCOUNTER — TELEPHONE (OUTPATIENT)
Dept: FAMILY MEDICINE CLINIC | Age: 77
End: 2017-02-28

## 2017-02-28 DIAGNOSIS — M51.36 DDD (DEGENERATIVE DISC DISEASE), LUMBAR: Chronic | ICD-10-CM

## 2017-02-28 DIAGNOSIS — M54.50 CHRONIC LEFT-SIDED LOW BACK PAIN WITHOUT SCIATICA: ICD-10-CM

## 2017-02-28 DIAGNOSIS — G89.29 CHRONIC LEFT-SIDED LOW BACK PAIN WITHOUT SCIATICA: ICD-10-CM

## 2017-02-28 RX ORDER — HYDROCODONE BITARTRATE AND ACETAMINOPHEN 10; 325 MG/1; MG/1
1 TABLET ORAL
Qty: 90 TAB | Refills: 0 | Status: SHIPPED | OUTPATIENT
Start: 2017-02-28 | End: 2017-04-03 | Stop reason: SDUPTHER

## 2017-02-28 NOTE — TELEPHONE ENCOUNTER
Refill request received from Marjorie. Last office visit was 2/7/17. She can be reached at [phone].

## 2017-04-03 DIAGNOSIS — G89.29 CHRONIC LEFT-SIDED LOW BACK PAIN WITHOUT SCIATICA: ICD-10-CM

## 2017-04-03 DIAGNOSIS — M54.50 CHRONIC LEFT-SIDED LOW BACK PAIN WITHOUT SCIATICA: ICD-10-CM

## 2017-04-03 DIAGNOSIS — M51.36 DDD (DEGENERATIVE DISC DISEASE), LUMBAR: Chronic | ICD-10-CM

## 2017-04-03 NOTE — TELEPHONE ENCOUNTER
Last office visit on 2/7/17 with Dr. Dimple Ferrer. Last labs on 12/12/16. Patient is due for a urine drug screen and for a new pain contract. Please advise.  Thank you

## 2017-04-04 RX ORDER — HYDROCODONE BITARTRATE AND ACETAMINOPHEN 10; 325 MG/1; MG/1
1 TABLET ORAL
Qty: 90 TAB | Refills: 0 | Status: SHIPPED | OUTPATIENT
Start: 2017-04-04 | End: 2017-05-12 | Stop reason: SDUPTHER

## 2017-05-12 ENCOUNTER — OFFICE VISIT (OUTPATIENT)
Dept: FAMILY MEDICINE CLINIC | Age: 77
End: 2017-05-12

## 2017-05-12 VITALS
TEMPERATURE: 97.6 F | DIASTOLIC BLOOD PRESSURE: 77 MMHG | HEART RATE: 49 BPM | RESPIRATION RATE: 20 BRPM | BODY MASS INDEX: 34.98 KG/M2 | HEIGHT: 74 IN | SYSTOLIC BLOOD PRESSURE: 169 MMHG | OXYGEN SATURATION: 97 % | WEIGHT: 272.6 LBS

## 2017-05-12 DIAGNOSIS — G89.29 CHRONIC LEFT-SIDED LOW BACK PAIN WITHOUT SCIATICA: ICD-10-CM

## 2017-05-12 DIAGNOSIS — M51.36 DDD (DEGENERATIVE DISC DISEASE), LUMBAR: Chronic | ICD-10-CM

## 2017-05-12 DIAGNOSIS — I48.19 PERSISTENT ATRIAL FIBRILLATION (HCC): ICD-10-CM

## 2017-05-12 DIAGNOSIS — I10 ESSENTIAL HYPERTENSION: Chronic | ICD-10-CM

## 2017-05-12 DIAGNOSIS — M54.50 CHRONIC LEFT-SIDED LOW BACK PAIN WITHOUT SCIATICA: ICD-10-CM

## 2017-05-12 DIAGNOSIS — E11.42 TYPE 2 DIABETES MELLITUS WITH DIABETIC POLYNEUROPATHY, WITHOUT LONG-TERM CURRENT USE OF INSULIN (HCC): Primary | Chronic | ICD-10-CM

## 2017-05-12 DIAGNOSIS — K59.03 DRUG-INDUCED CONSTIPATION: ICD-10-CM

## 2017-05-12 DIAGNOSIS — I50.32 CHRONIC DIASTOLIC HEART FAILURE (HCC): ICD-10-CM

## 2017-05-12 DIAGNOSIS — E78.00 HYPERCHOLESTEROLEMIA: Chronic | ICD-10-CM

## 2017-05-12 DIAGNOSIS — I10 ESSENTIAL HYPERTENSION WITH GOAL BLOOD PRESSURE LESS THAN 130/85: Chronic | ICD-10-CM

## 2017-05-12 DIAGNOSIS — E87.6 HYPOKALEMIA: ICD-10-CM

## 2017-05-12 RX ORDER — LABETALOL 200 MG/1
100 TABLET, FILM COATED ORAL 2 TIMES DAILY
Qty: 30 TAB | Refills: 0 | Status: SHIPPED | OUTPATIENT
Start: 2017-05-12 | End: 2017-07-07 | Stop reason: SDUPTHER

## 2017-05-12 RX ORDER — HYDROCODONE BITARTRATE AND ACETAMINOPHEN 10; 325 MG/1; MG/1
1 TABLET ORAL
Qty: 90 TAB | Refills: 0 | Status: SHIPPED | OUTPATIENT
Start: 2017-05-12 | End: 2017-06-19 | Stop reason: SDUPTHER

## 2017-05-12 RX ORDER — HYDRALAZINE HYDROCHLORIDE 50 MG/1
50 TABLET, FILM COATED ORAL 3 TIMES DAILY
Qty: 270 TAB | Refills: 3 | Status: SHIPPED | OUTPATIENT
Start: 2017-05-12 | End: 2018-01-15 | Stop reason: SDUPTHER

## 2017-05-12 RX ORDER — LANCETS 33 GAUGE
EACH MISCELLANEOUS
COMMUNITY
Start: 2017-02-05 | End: 2017-08-16 | Stop reason: SDUPTHER

## 2017-05-12 RX ORDER — LANOLIN ALCOHOL/MO/W.PET/CERES
400 CREAM (GRAM) TOPICAL DAILY
Qty: 90 TAB | Refills: 3 | Status: SHIPPED | OUTPATIENT
Start: 2017-05-12

## 2017-05-12 RX ORDER — ASPIRIN 81 MG
1 TABLET, DELAYED RELEASE (ENTERIC COATED) ORAL 2 TIMES DAILY
COMMUNITY
Start: 2017-05-12 | End: 2021-01-01

## 2017-05-12 NOTE — PROGRESS NOTES
Reviewed record in preparation for visit and have necessary documentation  Pt did not bring medication to office visit for review  Information was given to pt on Advanced Directives, Living Will  opportunity was given for questions  Goals that were addressed and/or need to be completed during or after this appointment include   Health Maintenance Due   Topic Date Due    MEDICARE YEARLY EXAM  03/02/2017

## 2017-05-12 NOTE — PATIENT INSTRUCTIONS
Learning About High Blood Pressure  What is high blood pressure? Blood pressure is a measure of how hard the blood pushes against the walls of your arteries. It's normal for blood pressure to go up and down throughout the day, but if it stays up, you have high blood pressure. Another name for high blood pressure is hypertension. Two numbers tell you your blood pressure. The first number is the systolic pressure. It shows how hard the blood pushes when your heart is pumping. The second number is the diastolic pressure. It shows how hard the blood pushes between heartbeats, when your heart is relaxed and filling with blood. A blood pressure of less than 120/80 (say \"120 over 80\") is ideal for an adult. High blood pressure is 140/90 or higher. You have high blood pressure if your top number is 140 or higher or your bottom number is 90 or higher, or both. Many people fall into the category in between, called prehypertension. People with prehypertension need to make lifestyle changes to bring their blood pressure down and help prevent or delay high blood pressure. What happens when you have high blood pressure? · Blood flows through your arteries with too much force. Over time, this damages the walls of your arteries. But you can't feel it. High blood pressure usually doesn't cause symptoms. · Fat and calcium start to build up in your arteries. This buildup is called plaque. Plaque makes your arteries narrower and stiffer. Blood can't flow through them as easily. · This lack of good blood flow starts to damage some of the organs in your body. This can lead to problems such as coronary artery disease and heart attack, heart failure, stroke, kidney failure, and eye damage. How can you prevent high blood pressure? · Stay at a healthy weight. · Try to limit how much sodium you eat to less than 2,300 milligrams (mg) a day.  If you limit your sodium to 1,500 mg a day, you can lower your blood pressure even more.  ¨ Buy foods that are labeled \"unsalted,\" \"sodium-free,\" or \"low-sodium. \" Foods labeled \"reduced-sodium\" and \"light sodium\" may still have too much sodium. ¨ Flavor your food with garlic, lemon juice, onion, vinegar, herbs, and spices instead of salt. Do not use soy sauce, steak sauce, onion salt, garlic salt, mustard, or ketchup on your food. ¨ Use less salt (or none) when recipes call for it. You can often use half the salt a recipe calls for without losing flavor. · Be physically active. Get at least 30 minutes of exercise on most days of the week. Walking is a good choice. You also may want to do other activities, such as running, swimming, cycling, or playing tennis or team sports. · Limit alcohol to 2 drinks a day for men and 1 drink a day for women. · Eat plenty of fruits, vegetables, and low-fat dairy products. Eat less saturated and total fats. How is high blood pressure treated? · Your doctor will suggest making lifestyle changes. For example, your doctor may ask you to eat healthy foods, quit smoking, lose extra weight, and be more active. · If lifestyle changes don't help enough or your blood pressure is very high, you will have to take medicine every day. Follow-up care is a key part of your treatment and safety. Be sure to make and go to all appointments, and call your doctor if you are having problems. It's also a good idea to know your test results and keep a list of the medicines you take. Where can you learn more? Go to http://otto-cindy.info/. Enter P501 in the search box to learn more about \"Learning About High Blood Pressure. \"  Current as of: March 23, 2016  Content Version: 11.2  © 1684-9187 [employer] Care instructions adapted under license by [employer] (which disclaims liability or warranty for this information).  If you have questions about a medical condition or this instruction, always ask your healthcare professional. [employer] disclaims any warranty or liability for your use of this information. Daniel Carl with [facility]  [address] [place] [address],  [address] [place], [address] [place] [address]  [phone]    Monitor blood pressure outside the office several times weekly at different times during the day and evening. Bring the record to me in 3 weeks for review.     Blood Pressure Record     Patient Name:  ______________________ :  ______________________    Date/Time BP Reading Pulse

## 2017-05-12 NOTE — MR AVS SNAPSHOT
Visit Information Date & Time Provider Department Dept. Phone Encounter #  
 5/12/2017  9:30 AM Geovanny Duke MD [address] [place] [id number] Follow-up Instructions Return in about 4 months (around 9/12/2017). Your Appointments 5/16/2017  9:00 AM  
ROUTINE CARE with Oscar Gunderson MD  
[facility] [address] [place] [address]) Appt Note: fu dm; patient confirmed appt; fu dm  
 [address] [facility] [address]  
[phone]  
  
   
 [address] [place] [address]  
  
    
 7/19/2017  8:00 AM  
ECHO CARDIOGRAMS 2D with ECHO, STFRANCIS  
[facility] ([facility]) Appt Note: 6 month f/u with EKG [per Dr. Soha Stauffer [address] [place] [address]  
[phone]  
  
   
 354 [facility] [place] [address] [place] [id number]  
  
    
 7/19/2017  9:20 AM  
ESTABLISHED PATIENT with Swati Dang MD  
[facility] ([address] [place] [address] Road) Appt Note: 6 month f/u with EKG [per Dr. Soha Stauffer [address] [place] [address] [address]  
[phone]  
  
   
 354 [facility] [place] [address] [place] [address] Street [address]  
  
    
 8/11/2017  3:05 PM  
Medicare Physical with Geovanny Duke MD  
704 [facility] ([address]) Appt Note: [facility]  
 2005 [address] [facility] [address] [address]  
[facility] [address] [place] [address] Street [address] Upcoming Health Maintenance Date Due  
 MEDICARE YEARLY EXAM 3/2/2017 INFLUENZA AGE 9 TO ADULT 8/1/2017 HEMOGLOBIN A1C Q6M 8/7/2017 FOOT EXAM Q1 9/28/2017 MICROALBUMIN Q1 9/28/2017 EYE EXAM RETINAL OR DILATED Q1 12/9/2017 LIPID PANEL Q1 12/12/2017 GLAUCOMA SCREENING Q2Y 12/9/2018 DTaP/Tdap/Td series (2 - Td) 8/16/2023 Allergies as of 5/12/2017  Review Complete On: 5/12/2017 By: Danna Carrasco LPN Severity Noted Reaction Type Reactions Indocin [Indomethacin Sodium]  06/05/2010    Unknown (comments) Lisinopril  04/17/2013    Angioedema Dxed in hospital.  
 Losartan  01/22/2015    Other (comments) Face and throat swelling. Current Immunizations  Reviewed on 12/12/2016 Name Date Influenza Vaccine 10/7/2015, 10/9/2014, 9/26/2013 Influenza Vaccine (Quad) PF 9/28/2016 Influenza Vaccine Split 10/11/2012, 11/23/2011, 10/7/2010 Influenza Vaccine Whole 12/14/2009 Pneumococcal Conjugate (PCV-13) 8/12/2015 Pneumococcal Polysaccharide (PPSV-23) 10/20/2014, 12/7/2012 Tdap 8/16/2013 Zoster Vaccine, Live 5/26/2015 Not reviewed this visit You Were Diagnosed With   
  
 Codes Comments Type 2 diabetes mellitus with diabetic polyneuropathy, without long-term current use of insulin (HCC)    -  Primary ICD-10-CM: E11.42 
ICD-9-CM: 250.60, 357.2 Persistent atrial fibrillation (HCC)     ICD-10-CM: I48.1 ICD-9-CM: 427.31 Essential hypertension     ICD-10-CM: I10 
ICD-9-CM: 401.9 Chronic diastolic heart failure (HCC)     ICD-10-CM: I50.32 
ICD-9-CM: 428.32 Essential hypertension with goal blood pressure less than 130/85     ICD-10-CM: I10 
ICD-9-CM: 401.9 Hypokalemia     ICD-10-CM: E87.6 ICD-9-CM: 276.8 Hypercholesterolemia     ICD-10-CM: E78.00 ICD-9-CM: 272.0   
 DDD (degenerative disc disease), lumbar     ICD-10-CM: M51.36 
ICD-9-CM: 722.52 Chronic left-sided low back pain without sciatica     ICD-10-CM: M54.5, G89.29 ICD-9-CM: 724.2, 338.29 Drug-induced constipation     ICD-10-CM: K59.03 
ICD-9-CM: 564.09, E980.5 Vitals BP Pulse Temp Resp Height(growth percentile) Weight(growth percentile) 158/71 (BP 1 Location: Right arm, BP Patient Position: Sitting) (!) 56 97.6 °F (36.4 °C) (Oral) 20 6' 2\" (1.88 m) 272 lb 9.6 oz (123.7 kg) SpO2 BMI Smoking Status 97% 35 kg/m2 Former Smoker Vitals History BMI and BSA Data Body Mass Index Body Surface Area 35 kg/m 2 2.54 m 2 Preferred Pharmacy Pharmacy Name Phone [facility] [address] [place] [address] [address] [place] [address] [phone] Your Updated Medication List  
  
   
This list is accurate as of: 5/12/17 10:08 AM.  Always use your most recent med list.  
  
  
  
  
 albuterol 90 mcg/actuation inhaler Commonly known as:  PROVENTIL HFA, VENTOLIN HFA, PROAIR HFA Take 2 Puffs by inhalation every six (6) hours as needed for Wheezing for up to 360 days. Please use generic that is covered  
  
 allopurinol 100 mg tablet Commonly known as:  ZYLOPRIM  
TAKE TWO TABLETS BY MOUTH ONCE DAILY TO PREVENT GOUT  
  
 amLODIPine 10 mg tablet Commonly known as:  Olga Medici Take 1 Tab by mouth daily. aspirin 81 mg chewable tablet Take 81 mg by mouth daily. baclofen 10 mg tablet Commonly known as:  LIORESAL Take 1 Tab by mouth three (3) times daily. Muscle relaxer. benzoyl peroxide 5 % topical gel Apply  to affected area nightly. apply to affected area as directed  
  
 bisacodyl 5 mg Tab Take  by mouth. Blood-Glucose Meter Misc Commonly known as:  TRUE METRIX GLUCOSE METER Use as Directed  
  
 bumetanide 2 mg tablet Commonly known as:  Terressa Haymaker Take 1 Tab by mouth two (2) times a day. CALCIUM CARB-MAG OXIDE-ZINC OX PO Take  by mouth.  
  
 cloNIDine HCl 0.3 mg tablet Commonly known as:  CATAPRES Take 1 Tab by mouth three (3) times daily. colchicine 0.6 mg tablet Commonly known as:  COLCRYS Take 1 Tab by mouth two (2) times a day. For acute gout. dabigatran etexilate 150 mg capsule Commonly known as:  PRADAXA Take 1 Cap by mouth two (2) times a day. docusate sodium 100 mg Tab Take  by mouth.  
  
 ezetimibe 10 mg tablet Commonly known as:  Sabiha Perez TAKE ONE TABLET BY MOUTH NIGHTLY  
  
 fluticasone 50 mcg/actuation nasal spray Commonly known as:  FLONASE  
1 spray BL daily FOLBEE PLUS Tab tablet Generic drug:  b complex-vitamin c-folic acid 5mg TAKE ONE TABLET BY MOUTH ONCE DAILY  
  
 gabapentin 300 mg capsule Commonly known as:  NEURONTIN  
TAKE ONE CAPSULE BY MOUTH THREE TIMES DAILY  
  
 glipiZIDE 5 mg tablet Commonly known as:  Slade Pears INCREASE TO 2 TABLETS BY MOUTH 20 MINUTES BEFORE BREAKFAST AND 2 TABLET TWENTY MINUTES BEFORE DINNER  
  
 glucose blood VI test strips strip Commonly known as:  TRUE METRIX GLUCOSE TEST STRIP Test 2 times daily Dx Coed E11.65  
  
 guaiFENesin  mg SR tablet Commonly known as:  Luis & Luis Take 1 Tab by mouth daily. hydrALAZINE 50 mg tablet Commonly known as:  APRESOLINE Take 1 Tab by mouth three (3) times daily. HYDROcodone-acetaminophen  mg tablet Commonly known as:  Mifflin Littler Take 1 Tab by mouth every six (6) hours as needed for Pain. Iron 325 mg (65 mg iron) tablet Generic drug:  ferrous sulfate Take 1 Tab by mouth Daily (before breakfast). labetalol 200 mg tablet Commonly known as:  Morene Darragh Take 0.5 Tabs by mouth two (2) times a day. Indications: hypertension * Lancets Misc Test 2 times daily Dx Code E11.65  
  
 * TRUEPLUS LANCETS 33 gauge Misc Generic drug:  lancets  
  
 lovastatin 40 mg tablet Commonly known as:  MEVACOR  
TAKE 1 TABLET BY MOUTH NIGHTLY  
  
 magnesium oxide 400 mg tablet Commonly known as:  MAG-OX Take 1 Tab by mouth daily. melatonin 3 mg tablet Take 1 Tab by mouth nightly. metFORMIN 500 mg tablet Commonly known as:  GLUCOPHAGE  
TAKE ONE TABLET BY MOUTH THREE TIMES DAILY WITH MEALS  
  
 montelukast 10 mg tablet Commonly known as:  SINGULAIR Take 1 Tab by mouth daily. omeprazole 20 mg capsule Commonly known as:  PRILOSEC Take 1 Cap by mouth daily. [facility]ax to [facility] potassium chloride 10 mEq tablet Commonly known as:  K-DUR Take 1 Tab by mouth three (3) times daily. Sleep Aid (diphenhydrAMINE) 25 mg tablet Generic drug:  diphenhydrAMINE Take 1 Tab by mouth every six (6) hours as needed for Sleep.  
  
 tamsulosin 0.4 mg capsule Commonly known as:  FLOMAX TAKE ONE CAPSULE BY MOUTH ONCE DAILY * Notice: This list has 2 medication(s) that are the same as other medications prescribed for you. Read the directions carefully, and ask your doctor or other care provider to review them with you. Prescriptions Printed Refills  
 hydrALAZINE (APRESOLINE) 50 mg tablet 3 Sig: Take 1 Tab by mouth three (3) times daily. Class: Print Route: Oral  
 HYDROcodone-acetaminophen (NORCO)  mg tablet 0 Sig: Take 1 Tab by mouth every six (6) hours as needed for Pain. Class: Print Route: Oral  
  
Prescriptions Sent to Pharmacy Refills  
 labetalol (NORMODYNE) 200 mg tablet 0 Sig: Take 0.5 Tabs by mouth two (2) times a day. Indications: hypertension Class: Normal  
 Pharmacy: [address] [place] [address] Ph #: [phone] Route: Oral  
 magnesium oxide (MAG-OX) 400 mg tablet 3 Sig: Take 1 Tab by mouth daily. Class: Normal  
 Pharmacy: [address] [place] [address] Ph #: [phone] Route: Oral  
  
We Performed the Following [address] [place] [address] MONITORING [[id number] Custom] HEMOGLOBIN A1C WITH EAG [47745 CPT(R)] HEMOGLOBIN [id number] CPT(R)] LIPID PANEL [35388 CPT(R)] METABOLIC PANEL, COMPREHENSIVE [51819 CPT(R)] MICROALBUMIN, UR, RAND W/ MICROALBUMIN/CREA RATIO [id number] CPT(R)] Follow-up Instructions Return in about 4 months (around 9/12/2017). Patient Instructions Learning About High Blood Pressure What is high blood pressure? Blood pressure is a measure of how hard the blood pushes against the walls of your arteries. It's normal for blood pressure to go up and down throughout the day, but if it stays up, you have high blood pressure. Another name for high blood pressure is hypertension. Two numbers tell you your blood pressure. The first number is the systolic pressure. It shows how hard the blood pushes when your heart is pumping. The second number is the diastolic pressure. It shows how hard the blood pushes between heartbeats, when your heart is relaxed and filling with blood. A blood pressure of less than 120/80 (say \"120 over 80\") is ideal for an adult. High blood pressure is 140/90 or higher. You have high blood pressure if your top number is 140 or higher or your bottom number is 90 or higher, or both. Many people fall into the category in between, called prehypertension. People with prehypertension need to make lifestyle changes to bring their blood pressure down and help prevent or delay high blood pressure. What happens when you have high blood pressure? · Blood flows through your arteries with too much force. Over time, this damages the walls of your arteries. But you can't feel it. High blood pressure usually doesn't cause symptoms. · Fat and calcium start to build up in your arteries. This buildup is called plaque. Plaque makes your arteries narrower and stiffer. Blood can't flow through them as easily. · This lack of good blood flow starts to damage some of the organs in your body. This can lead to problems such as coronary artery disease and heart attack, heart failure, stroke, kidney failure, and eye damage. How can you prevent high blood pressure? · Stay at a healthy weight. · Try to limit how much sodium you eat to less than 2,300 milligrams (mg) a day. If you limit your sodium to 1,500 mg a day, you can lower your blood pressure even more. ¨ Buy foods that are labeled \"unsalted,\" \"sodium-free,\" or \"low-sodium. \" Foods labeled \"reduced-sodium\" and \"light sodium\" may still have too much sodium. ¨ Flavor your food with garlic, lemon juice, onion, vinegar, herbs, and spices instead of salt. Do not use soy sauce, steak sauce, onion salt, garlic salt, mustard, or ketchup on your food. ¨ Use less salt (or none) when recipes call for it. You can often use half the salt a recipe calls for without losing flavor. · Be physically active. Get at least 30 minutes of exercise on most days of the week. Walking is a good choice. You also may want to do other activities, such as running, swimming, cycling, or playing tennis or team sports. · Limit alcohol to 2 drinks a day for men and 1 drink a day for women. · Eat plenty of fruits, vegetables, and low-fat dairy products. Eat less saturated and total fats. How is high blood pressure treated? · Your doctor will suggest making lifestyle changes. For example, your doctor may ask you to eat healthy foods, quit smoking, lose extra weight, and be more active. · If lifestyle changes don't help enough or your blood pressure is very high, you will have to take medicine every day. Follow-up care is a key part of your treatment and safety. Be sure to make and go to all appointments, and call your doctor if you are having problems. It's also a good idea to know your test results and keep a list of the medicines you take. Where can you learn more? Go to http://otto-cindy.info/. Enter P501 in the search box to learn more about \"Learning About High Blood Pressure. \" Current as of: March 23, 2016 Content Version: 11.2 © 4476-0475 [employer]. Care instructions adapted under license by [employer] (which disclaims liability or warranty for this information).  If you have questions about a medical condition or this instruction, always ask your healthcare professional. [employer] disclaims any warranty or liability for your use of this information. Cheryl 22 Affiliated with [address] [place] [address], [facility] [address]., Luisa Tobin [facility] 
[phone] Monitor blood pressure outside the office several times weekly at different times during the day and evening. Bring the record to me in 3 weeks for review. Blood Pressure Record Patient Name:  ______________________ :  ______________________ Date/Time BP Reading Pulse Introducing [facility]! Vivienne Cochran introduces [employer] patient portal. Now you can access parts of your medical record, email your doctor's office, and request medication refills online. 1. In your internet browser, go to [email or web address] 2. Click on the First Time User? Click Here link in the Sign In box. You will see the New Member Sign Up page. 3. Enter your [employer] Access Code exactly as it appears below. You will not need to use this code after youve completed the sign-up process. If you do not sign up before the expiration date, you must request a new code. · [employer] Access Code: [email or web address] Expires: 8/10/2017 10:08 AM 
 
4. Enter the last four digits of your Social Security Number (xxxx) and Date of Birth (mm/dd/yyyy) as indicated and click Submit. You will be taken to the next sign-up page. 5. Create a Tripbirdst ID. This will be your [employer]bird[employer] login ID and cannot be changed, so think of one that is secure and easy to remember. 6. Create a [employer] password. You can change your password at any time. 7. Enter your Password Reset Question and Answer. This can be used at a later time if you forget your password. 8. Enter your e-mail address. You will receive e-mail notification when new information is available in [address] 9. Click Sign Up. You can now view and download portions of your medical record. 10. Click the Download Summary menu link to download a portable copy of your medical information. If you have questions, please visit the Frequently Asked Questions section of the [employer] website. Remember, [employer] is NOT to be used for urgent needs. For medical emergencies, dial 911. Now available from your iPhone and Android! Please provide this summary of care documentation to your next provider. Your primary care clinician is listed as Πάν[address] 90. If you have any questions after today's visit, please call [phone].

## 2017-05-13 LAB — Lab: NORMAL

## 2017-05-13 NOTE — PROGRESS NOTES
Progress Note    Patient: Frank Carlos MRN: [id number]  SSN: [id number]    YOB: 1940  Age: 67 y.o. Sex: male        Chief Complaint   Patient presents with    Hypertension    Cholesterol Problem    Diabetes         Subjective:     Encounter Diagnoses   Name Primary?  Type 2 diabetes mellitus with diabetic polyneuropathy, without long-term current use of insulin ([address]): This patient is managed under a comprehensive plan of care for Diabetes. Overall the patient feels well with good energy level. Pertinent Labs:   Lab Results   Component Value Date/Time    Hemoglobin A1c 8.2 12/12/2016 09:42 AM    Hemoglobin A1c 7.5 09/28/2016 03:18 PM    Hemoglobin A1c 7.1 08/22/2016 08:36 AM      Body mass index is 35 kg/(m^2). Lab Results   Component Value Date/Time    LDL, calculated 74 12/12/2016 09:42 AM         Lab Results   Component Value Date/Time    Sodium 145 12/12/2016 09:42 AM    Potassium 4.1 12/12/2016 09:42 AM    Chloride 100 12/12/2016 09:42 AM    CO2 26 12/12/2016 09:42 AM    Anion gap 12 10/07/2010 09:27 AM    Glucose 116 12/12/2016 09:42 AM    BUN 12 12/12/2016 09:42 AM    Creatinine 1.32 12/12/2016 09:42 AM    BUN/Creatinine ratio 9 12/12/2016 09:42 AM    GFR est AA 60 12/12/2016 09:42 AM    GFR est non-AA 52 12/12/2016 09:42 AM    Calcium 9.7 12/12/2016 09:42 AM    AST (SGOT) 32 12/12/2016 09:42 AM    Alk. phosphatase 98 12/12/2016 09:42 AM    Protein, total 6.3 12/12/2016 09:42 AM    Albumin 4.0 12/12/2016 09:42 AM    Globulin 2.7 10/07/2010 09:27 AM    A-G Ratio 1.7 12/12/2016 09:42 AM    ALT (SGPT) 26 12/12/2016 09:42 AM     Lab Results   Component Value Date/Time    Microalb/Creat ratio (ug/mg creat.) 480.3 09/28/2016 03:18 PM      Frequency of home glucose testing:daily   Blood Sugar range at home: 130-160   Last eye exam: In past 12 months. Last foot exam: This year.    Polyuria, polyphagia or polydipsia: No   Retinopathy: No   Neuropathy SX: severe    Low blood sugar symptoms: No   Dietary compliance: poor   Medication compliance:Good   On ASA: Yes   Depression: No   CKD:no     Wt Readings from Last 3 Encounters:   05/12/17 272 lb 9.6 oz (123.7 kg)   02/07/17 269 lb (122 kg)   01/11/17 270 lb (122.5 kg)        History   Smoking Status    Former Smoker    Types: Cigarettes    Quit date: 6/20/2003   Smokeless Tobacco    Never Used       Diabetic Consultants: Dr. Bryant High  All the patient's questions regarding medications, diet and exercise were answered. Goal of A1C of less than 7.5% is our goal.   Our overall goal is to reduce or eliminate the long term consequences of poorly controlled diabetes. Yes    Persistent atrial fibrillation [facility]): Denies chest pain, edema, dyspnea or dizziness. Unaware of irregular heartbeats. No neurologic sx. Avoids caffeine.  Essential hypertension:  BP Readings from Last 3 Encounters:   05/12/17 169/77   02/07/17 174/76   01/11/17 150/71     The patient reports:  taking medications as instructed, no medication side effects noted, no TIA's, no chest pain on exertion, no dyspnea on exertion, no new swelling of ankles. Lab Results   Component Value Date/Time    Sodium 145 12/12/2016 09:42 AM    Potassium 4.1 12/12/2016 09:42 AM    Chloride 100 12/12/2016 09:42 AM    CO2 26 12/12/2016 09:42 AM    Anion gap 12 10/07/2010 09:27 AM    Glucose 116 12/12/2016 09:42 AM    BUN 12 12/12/2016 09:42 AM    Creatinine 1.32 12/12/2016 09:42 AM    BUN/Creatinine ratio 9 12/12/2016 09:42 AM    GFR est AA 60 12/12/2016 09:42 AM    GFR est non-AA 52 12/12/2016 09:42 AM    Calcium 9.7 12/12/2016 09:42 AM    Bilirubin, total 0.3 12/12/2016 09:42 AM    AST (SGOT) 32 12/12/2016 09:42 AM    Alk.  phosphatase 98 12/12/2016 09:42 AM    Protein, total 6.3 12/12/2016 09:42 AM    Albumin 4.0 12/12/2016 09:42 AM    Globulin 2.7 10/07/2010 09:27 AM    A-G Ratio 1.7 12/12/2016 09:42 AM    ALT (SGPT) 26 12/12/2016 09:42 AM     Our goal is to normalize the blood pressure to decrease the risks of strokes and heart attacks. The patient is in agreement with the plan.  Chronic diastolic heart failure ([address]): stable.  Essential hypertension with goal blood pressure less than 130/85:  BP Readings from Last 3 Encounters:   05/12/17 169/77   02/07/17 174/76   01/11/17 150/71     The patient reports:  taking medications as instructed, no medication side effects noted, no TIA's, no chest pain on exertion, notes stable dyspnea on exertion, no change, no swelling of ankles. Lab Results   Component Value Date/Time    Sodium 145 12/12/2016 09:42 AM    Potassium 4.1 12/12/2016 09:42 AM    Chloride 100 12/12/2016 09:42 AM    CO2 26 12/12/2016 09:42 AM    Anion gap 12 10/07/2010 09:27 AM    Glucose 116 12/12/2016 09:42 AM    BUN 12 12/12/2016 09:42 AM    Creatinine 1.32 12/12/2016 09:42 AM    BUN/Creatinine ratio 9 12/12/2016 09:42 AM    GFR est AA 60 12/12/2016 09:42 AM    GFR est non-AA 52 12/12/2016 09:42 AM    Calcium 9.7 12/12/2016 09:42 AM    Bilirubin, total 0.3 12/12/2016 09:42 AM    AST (SGOT) 32 12/12/2016 09:42 AM    Alk. phosphatase 98 12/12/2016 09:42 AM    Protein, total 6.3 12/12/2016 09:42 AM    Albumin 4.0 12/12/2016 09:42 AM    Globulin 2.7 10/07/2010 09:27 AM    A-G Ratio 1.7 12/12/2016 09:42 AM    ALT (SGPT) 26 12/12/2016 09:42 AM     Our goal is to normalize the blood pressure to decrease the risks of strokes and heart attacks. The patient is in agreement with the plan.  Hypokalemia: no sx.  Hypercholesterolemia:At goal.  Cardiovascular risks for him are: LDL goal is under 80  diabetic  hypertension  hyperlipidemia. Currently he takes Mevacor (lovastatin) , 40 mg.   Lab Results   Component Value Date/Time    Cholesterol, total 120 12/12/2016 09:42 AM    HDL Cholesterol 34 12/12/2016 09:42 AM    LDL, calculated 74 12/12/2016 09:42 AM    Triglyceride 58 12/12/2016 09:42 AM    CHOL/HDL Ratio 3.7 10/07/2010 09:27 AM     Lab Results Component Value Date/Time    ALT (SGPT) 26 12/12/2016 09:42 AM    AST (SGOT) 32 12/12/2016 09:42 AM    Alk. phosphatase 98 12/12/2016 09:42 AM    Bilirubin, total 0.3 12/12/2016 09:42 AM      Myalgias: No   Fatigue: No   Other side effects: no  Wt Readings from Last 3 Encounters:   05/12/17 272 lb 9.6 oz (123.7 kg)   02/07/17 269 lb (122 kg)   01/11/17 270 lb (122.5 kg)     The patient is aware of our goal to reduce or eliminate the long term problems (such as strokes and heart attacks) related to poorly controlled hyperlipidemia.  DDD (degenerative disc disease), lumbar:Crime severe pain with severe neuropathy, neuralgia and unstable back.  Chronic left-sided low back pain without sciatica: Chronic daily pain requiring narcotics.  Drug-induced constipation: Recommend stool softener and MiraLAX              Current and past medical information:    Current Medications after this visit[de-identified]   Current Outpatient Prescriptions   Medication Sig    TRUEPLUS LANCETS 33 gauge misc     labetalol (NORMODYNE) 200 mg tablet Take 0.5 Tabs by mouth two (2) times a day. Indications: hypertension    hydrALAZINE (APRESOLINE) 50 mg tablet Take 1 Tab by mouth three (3) times daily.  docusate sodium 100 mg tab Take  by mouth.  bisacodyl 5 mg tab Take  by mouth.  magnesium oxide (MAG-OX) 400 mg tablet Take 1 Tab by mouth daily.  HYDROcodone-acetaminophen (NORCO)  mg tablet Take 1 Tab by mouth every six (6) hours as needed for Pain.     gabapentin (NEURONTIN) 300 mg capsule TAKE ONE CAPSULE BY MOUTH THREE TIMES DAILY    metFORMIN (GLUCOPHAGE) 500 mg tablet TAKE ONE TABLET BY MOUTH THREE TIMES DAILY WITH MEALS    Blood-Glucose Meter (TRUE METRIX GLUCOSE METER) misc Use as Directed    glucose blood VI test strips (TRUE METRIX GLUCOSE TEST STRIP) strip Test 2 times daily Dx Coed E11.65    Lancets misc Test 2 times daily Dx Code E11.65    FOLBEE PLUS tab tablet TAKE ONE TABLET BY MOUTH ONCE DAILY    glipiZIDE (GLUCOTROL) 5 mg tablet INCREASE TO 2 TABLETS BY MOUTH 20 MINUTES BEFORE BREAKFAST AND 2 TABLET TWENTY MINUTES BEFORE DINNER    cloNIDine HCl (CATAPRES) 0.3 mg tablet Take 1 Tab by mouth three (3) times daily.  diphenhydrAMINE (SLEEP AID, DIPHENHYDRAMINE,) 25 mg tablet Take 1 Tab by mouth every six (6) hours as needed for Sleep.  ferrous sulfate (IRON) 325 mg (65 mg iron) tablet Take 1 Tab by mouth Daily (before breakfast).  fluticasone (FLONASE) 50 mcg/actuation nasal spray 1 spray BL daily    dabigatran etexilate (PRADAXA) 150 mg capsule Take 1 Cap by mouth two (2) times a day.  albuterol (PROVENTIL HFA, VENTOLIN HFA, PROAIR HFA) 90 mcg/actuation inhaler Take 2 Puffs by inhalation every six (6) hours as needed for Wheezing for up to 360 days. Please use generic that is covered    baclofen (LIORESAL) 10 mg tablet Take 1 Tab by mouth three (3) times daily. Muscle relaxer.  tamsulosin (FLOMAX) 0.4 mg capsule TAKE ONE CAPSULE BY MOUTH ONCE DAILY    ezetimibe (ZETIA) 10 mg tablet TAKE ONE TABLET BY MOUTH NIGHTLY    montelukast (SINGULAIR) 10 mg tablet Take 1 Tab by mouth daily.  omeprazole (PRILOSEC) 20 mg capsule Take 1 Cap by mouth daily. [facility]ax to [facility]    amLODIPine (NORVASC) 10 mg tablet Take 1 Tab by mouth daily. (Patient taking differently: Take 5 mg by mouth daily.)    bumetanide (BUMEX) 2 mg tablet Take 1 Tab by mouth two (2) times a day.  lovastatin (MEVACOR) 40 mg tablet TAKE 1 TABLET BY MOUTH NIGHTLY    allopurinol (ZYLOPRIM) 100 mg tablet TAKE TWO TABLETS BY MOUTH ONCE DAILY TO PREVENT GOUT    potassium chloride (K-DUR) 10 mEq tablet Take 1 Tab by mouth three (3) times daily. (Patient taking differently: Take 10 mEq by mouth two (2) times a day.)    colchicine (COLCRYS) 0.6 mg tablet Take 1 Tab by mouth two (2) times a day. For acute gout.  melatonin 3 mg tablet Take 1 Tab by mouth nightly.  aspirin 81 mg chewable tablet Take 81 mg by mouth daily.  benzoyl peroxide 5 % topical gel Apply  to affected area nightly. apply to affected area as directed    CALCIUM CARB-MAG OXIDE-ZINC OX PO Take  by mouth.  guaiFENesin SR (MUCINEX) 600 mg SR tablet Take 1 Tab by mouth daily. No current facility-administered medications for this visit.         Patient Active Problem List    Diagnosis Date Noted    History of colon polyps 05/02/2016    Prostate cancer ([facility] 05/02/2016    Idiopathic gout 05/02/2016    Essential hypertension with goal blood pressure less than 130/85 05/02/2016    CKD (chronic kidney disease) 11/23/2015    Chronic diastolic heart failure ([address] 08/26/2015    COPD (chronic obstructive pulmonary disease) ([facility] 04/27/2015    Left upper lobe pneumonia 04/27/2015    Diabetes with neurologic complications ([facility] 22/52/3101    Chronic radicular pain of lower back 07/21/2014    Neuropathy 07/16/2013    DDD (degenerative disc disease), lumbar 07/16/2013    Prostate CA ([facility] 07/16/2013    Tubulovillous adenoma polyp of colon 07/16/2013    Hoarse voice quality 06/20/2012    Edema 01/12/2012    PAF (paroxysmal atrial fibrillation) ([address]     Unspecified arthropathy, ankle and foot 07/14/2011    Arthritis 07/11/2011    Gout, unspecified     PVD (peripheral vascular disease) ([address]     Hypertension     Hypercholesterolemia     Leg pain     Chronic pain 05/06/2010       Past Medical History:   Diagnosis Date    Acute on chronic diastolic congestive heart failure ([address] 4/27/2015    Arthritis 7/11/2011    [place] 6/7/2010    Cancer ([address]     prostate    Chronic diastolic heart failure ([facility] 8/26/2015    CKD (chronic kidney disease) 11/23/2015    Diabetes ([address]     Gout, unspecified     Hypercholesterolemia     Hypertension     Inguinal lymphadenopathy 2/18/2014    Leg pain     PAF (paroxysmal atrial fibrillation) (HCC)     PAF (paroxysmal atrial fibrillation) ([address]     PVD (peripheral vascular disease) ([address] Allergies   Allergen Reactions    Indocin [Indomethacin Sodium] Unknown (comments)    Lisinopril Angioedema     Dxed in hospital.    Losartan Other (comments)     Face and throat swelling. Past Surgical History:   Procedure Laterality Date    COLONOSCOPY N/A 9/22/2016    COLONOSCOPY performed by Rahul [facility] at [facility] HX ORTHOPAEDIC      back and left shoulder x2       Social History     Social History    Marital status: SINGLE     Spouse name: N/A    Number of children: N/A    Years of education: N/A     Social History Main Topics    Smoking status: Former Smoker     Types: Cigarettes     Quit date: 6/20/2003    Smokeless tobacco: Never Used    Alcohol use No    Drug use: No    Sexual activity: Not Asked     Other Topics Concern    None     Social History Narrative       Review of Systems   Constitutional: Negative. Negative for chills, fever, malaise/fatigue and weight loss. HENT: Negative. Negative for hearing loss. Eyes: Negative. Negative for blurred vision and double vision. Respiratory: Negative. Negative for cough, hemoptysis, sputum production and shortness of breath. Cardiovascular: Negative. Negative for chest pain, palpitations and orthopnea. Gastrointestinal: Negative. Negative for abdominal pain, blood in stool, heartburn, nausea and vomiting. Genitourinary: Negative. Negative for dysuria, frequency and urgency. Musculoskeletal: Positive for back pain and joint pain. Negative for myalgias and neck pain. Unstable gait. Skin: Negative. Negative for rash. Neurological: Negative. Negative for dizziness, tingling, tremors, weakness and headaches. Endo/Heme/Allergies: Negative. Psychiatric/Behavioral: Negative. Negative for depression.         Objective:     Vitals:    05/12/17 0932 05/12/17 0954 05/12/17 1014   BP: 160/69 158/71 169/77   Pulse: (!) 56  (!) 49   Resp: 20     Temp: 97.6 °F (36.4 °C)     TempSrc: Oral     SpO2: 97% Weight: 272 lb 9.6 oz (123.7 kg)     Height: 6' 2\" (1.88 m)        Body mass index is 35 kg/(m^2). Physical Exam   Constitutional: He is oriented to person, place, and time and well-developed, well-nourished, and in no distress. Examined in wheelchair. HENT:   Head: Normocephalic and atraumatic. Mouth/Throat: Oropharynx is clear and moist.   Eyes: Right eye exhibits no discharge. Left eye exhibits no discharge. No scleral icterus. Neck: No tracheal deviation present. No thyromegaly present. No bruit. Cardiovascular: Normal rate, regular rhythm and normal heart sounds. Pulmonary/Chest: Effort normal.   Decreased breath sounds   Abdominal: Soft. Neurological: He is alert and oriented to person, place, and time. Skin: No rash noted. No erythema. Psychiatric: Mood and affect normal.   Nursing note and vitals reviewed. Health Maintenance Due   Topic Date Due    MEDICARE YEARLY EXAM  03/02/2017         Assessment and orders:       ICD-10-CM ICD-9-CM    1. Type 2 diabetes mellitus with diabetic polyneuropathy, without long-term current use of insulin (HCC)-Retest E11.42 250.60 TRUEPLUS LANCETS 33 gauge misc     357.2 HEMOGLOBIN A1C WITH EAG      LIPID PANEL      METABOLIC PANEL, COMPREHENSIVE      MICROALBUMIN, UR, RAND W/ MICROALBUMIN/CREA RATIO   2. Persistent atrial fibrillation (HCC)  -No symptoms I48.1 427.31 labetalol (NORMODYNE) 200 mg tablet      LIPID PANEL      METABOLIC PANEL, COMPREHENSIVE   3. Essential hypertension  -Uncontrolled even on hydralazine I10 401.9 labetalol (NORMODYNE) 200 mg tablet      HEMOGLOBIN   4. Chronic diastolic heart failure (HCC)-Compensated     I50.32 428. 32 hydrALAZINE (APRESOLINE) 50 mg tablet   5. Essential hypertension with goal blood pressure less than 130/85-  Not controlled I10 401.9 hydrALAZINE (APRESOLINE) 50 mg tablet      LIPID PANEL      METABOLIC PANEL, COMPREHENSIVE   6.  Hypokalemia E87.6 276.8 magnesium oxide (MAG-OX) 400 mg tablet METABOLIC PANEL, COMPREHENSIVE   7. Hypercholesterolemia E78.00 272.0 LIPID PANEL      METABOLIC PANEL, COMPREHENSIVE   8. DDD (degenerative disc disease), lumbar M51.36 722.52 HYDROcodone-acetaminophen (NORCO)  mg tablet      COMPLIANCE DRUG SCREEN/PRESCRIPTION MONITORING   9. Chronic left-sided low back pain without sciatica M54.5 724.2 HYDROcodone-acetaminophen (NORCO)  mg tablet    G89.29 338.29 COMPLIANCE DRUG SCREEN/PRESCRIPTION MONITORING   10. Drug-induced constipation K59.03 564.09 docusate sodium 100 mg tab     E980.5 bisacodyl 5 mg tab         Plan of care:  Discussed diagnoses in detail with patient. Medication risks/benefits/side effects discussed with patient. All of the patient's questions were addressed. The patient understands and agrees with our plan of care. The patient knows to call back if they are unsure of or forget any changes we discussed today or if the symptoms change. The patient received an After-Visit Summary which contains VS, orders, medication list and allergy list. This can be used as a \"mini-medical record\" should they have to seek medical care while out of town. Patient Care Team:  Eduarda Michelle MD as PCP - General (Family Practice)  ERAN Barroso (Orthopedic Surgery)  Jaz Ngo MD (Otolaryngology)  Nima Trejo MD (Endocrinology)  Monica Cantrell MD as Physician (Internal Medicine)  Treva Talamantes MD (Ophthalmology)  Juan Deleon MD (Podiatry)  Bony Echavarria MD (Cardiology)  Derrick Marrero RN as Nurse Navigator    Follow-up Disposition:  Return in about 4 months (around 9/12/2017).     Future Appointments  Date Time Provider Elisha Reneisti   5/16/2017 9:00 AM Nima Trejo MD E [facility] SCHED   7/19/2017 8:00 AM JASPER HASSAN [facility] SCHED   7/19/2017 9:20 AM MD ASIA Hoover [facility] SCHED   8/11/2017 3:05 PM Eduarda Michelle MD [facility] SCHED   10/11/2017 9:35 AM Eduarda Michelle MD [facility] SCHED       Signed By: Marbella Miller MD     May 12, 2017

## 2017-05-16 ENCOUNTER — OFFICE VISIT (OUTPATIENT)
Dept: ENDOCRINOLOGY | Age: 77
End: 2017-05-16

## 2017-05-16 VITALS
WEIGHT: 274 LBS | TEMPERATURE: 95.8 F | OXYGEN SATURATION: 99 % | RESPIRATION RATE: 20 BRPM | DIASTOLIC BLOOD PRESSURE: 75 MMHG | BODY MASS INDEX: 35.16 KG/M2 | HEART RATE: 57 BPM | SYSTOLIC BLOOD PRESSURE: 181 MMHG | HEIGHT: 74 IN

## 2017-05-16 DIAGNOSIS — R80.1 PERSISTENT PROTEINURIA: ICD-10-CM

## 2017-05-16 DIAGNOSIS — E78.2 MIXED HYPERLIPIDEMIA: ICD-10-CM

## 2017-05-16 DIAGNOSIS — I10 ESSENTIAL HYPERTENSION: ICD-10-CM

## 2017-05-16 NOTE — PROGRESS NOTES
Wt Readings from Last 3 Encounters:   05/16/17 274 lb (124.3 kg)   05/12/17 272 lb 9.6 oz (123.7 kg)   02/07/17 269 lb (122 kg)     Temp Readings from Last 3 Encounters:   05/16/17 95.8 °F (35.4 °C) (Oral)   05/12/17 97.6 °F (36.4 °C) (Oral)   02/07/17 98 °F (36.7 °C) (Oral)     BP Readings from Last 3 Encounters:   05/16/17 181/75   05/12/17 169/77   02/07/17 174/76     Pulse Readings from Last 3 Encounters:   05/16/17 (!) 57   05/12/17 (!) 49   02/07/17 67     Lab Results   Component Value Date/Time    Hemoglobin A1c 8.4 05/12/2017 12:28 PM    Hemoglobin A1c (POC) 7.2 02/07/2017 11:43 AM     Last Podiatry Dec 2016  Last Eye exam Dec 2016

## 2017-05-16 NOTE — PATIENT INSTRUCTIONS
Check blood sugars only twice a day by rotation as follows    First day - before b-fast and - before lunch  Second day- before dinner and  before bed time    After 2 days go back to same rotation      --------------------------------------------------------------------------------------------------    Metformin 500 mg 2 pills  Two  times a day  With meals       glipizide 5 mg  To 2 pills ,  twenty minutes  Before b-fast and to 2 pills  pill before dinner     -------------------------------------------------------------------------------------------------------------------------------------------------------------------------------------------------------------------      Do not skip meals  Do not eat in between meals    Reduce carbs- pasta, rice, potatoes, bread   Do not drink juices or sodas  Donot eat peanut butter     Do not eat sugar free cookies and cakes   Do not eat peaches, grapes, pineapples and oranges

## 2017-05-16 NOTE — PROGRESS NOTES
HISTORY OF PRESENT ILLNESS  Jean Pierre Pollack is a 65 y.o. male. HPI  Patient is here for  F/u after last visit of Type 2 diabetes mellitus from  May 2016   Gained  2 lbs       No hospitalizations    No med changes     Brought the log , has fasting  high sugars   Taking glipizide and metformin      Accompanied by his daughter               Prior history :   Referred : by PCP    H/o diabetes for 10 years     He has hearing issues   accomapnied by his daughter     Current A1C is 8.1 %   From jan 2015  and symptoms/problems include fluctutating sugars     Current diabetic medications include metformin   500 mg TID   He lives alone, manages his own meds .      Current monitoring regimen: home blood tests - daily  Home blood sugar records: trend: fluctuating a bit  Any episodes of hypoglycemia? no    Weight trend: fluctuating a bit  Prior visit with dietician: no  Current diet: \"unhealthy\" diet in general  Current exercise: walking    Known diabetic complications: retinopathy, nephropathy, peripheral neuropathy and cardiovascular disease  Cardiovascular risk factors: dyslipidemia, diabetes mellitus, obesity, sedentary life style, male gender, stress    Eye exam current (within one year): yes  ARA: no     Past Medical History:   Diagnosis Date    Acute on chronic diastolic congestive heart failure ([address] 4/27/2015    Arthritis 7/11/2011    Blackhead 6/7/2010    Cancer ([address]     prostate    Chronic diastolic heart failure ([address] 8/26/2015    CKD (chronic kidney disease) 11/23/2015    Diabetes ([address]     Gout, unspecified     Hypercholesterolemia     Hypertension     Inguinal lymphadenopathy 2/18/2014    Leg pain     PAF (paroxysmal atrial fibrillation) (HCC)     PAF (paroxysmal atrial fibrillation) ([address]     PVD (peripheral vascular disease) ([address]      Past Surgical History:   Procedure Laterality Date    COLONOSCOPY N/A 9/22/2016    COLONOSCOPY performed by Max Desai MD at [facility] back and left shoulder x2     Current Outpatient Prescriptions   Medication Sig    TRUEPLUS LANCETS 33 gauge misc     labetalol (NORMODYNE) 200 mg tablet Take 0.5 Tabs by mouth two (2) times a day. Indications: hypertension    hydrALAZINE (APRESOLINE) 50 mg tablet Take 1 Tab by mouth three (3) times daily.  docusate sodium 100 mg tab Take  by mouth.  bisacodyl 5 mg tab Take  by mouth.  magnesium oxide (MAG-OX) 400 mg tablet Take 1 Tab by mouth daily.  HYDROcodone-acetaminophen (NORCO)  mg tablet Take 1 Tab by mouth every six (6) hours as needed for Pain.  gabapentin (NEURONTIN) 300 mg capsule TAKE ONE CAPSULE BY MOUTH THREE TIMES DAILY    metFORMIN (GLUCOPHAGE) 500 mg tablet TAKE ONE TABLET BY MOUTH THREE TIMES DAILY WITH MEALS    Blood-Glucose Meter (TRUE METRIX GLUCOSE METER) misc Use as Directed    glucose blood VI test strips (TRUE METRIX GLUCOSE TEST STRIP) strip Test 2 times daily Dx Coed E11.65    Lancets misc Test 2 times daily Dx Code E11.65    FOLBEE PLUS tab tablet TAKE ONE TABLET BY MOUTH ONCE DAILY    glipiZIDE (GLUCOTROL) 5 mg tablet INCREASE TO 2 TABLETS BY MOUTH 20 MINUTES BEFORE BREAKFAST AND 2 TABLET TWENTY MINUTES BEFORE DINNER    cloNIDine HCl (CATAPRES) 0.3 mg tablet Take 1 Tab by mouth three (3) times daily.  diphenhydrAMINE (SLEEP AID, DIPHENHYDRAMINE,) 25 mg tablet Take 1 Tab by mouth every six (6) hours as needed for Sleep.  ferrous sulfate (IRON) 325 mg (65 mg iron) tablet Take 1 Tab by mouth Daily (before breakfast).  fluticasone (FLONASE) 50 mcg/actuation nasal spray 1 spray BL daily    dabigatran etexilate (PRADAXA) 150 mg capsule Take 1 Cap by mouth two (2) times a day.  albuterol (PROVENTIL HFA, VENTOLIN HFA, PROAIR HFA) 90 mcg/actuation inhaler Take 2 Puffs by inhalation every six (6) hours as needed for Wheezing for up to 360 days.  Please use generic that is covered    baclofen (LIORESAL) 10 mg tablet Take 1 Tab by mouth three (3) times daily. Muscle relaxer.  tamsulosin (FLOMAX) 0.4 mg capsule TAKE ONE CAPSULE BY MOUTH ONCE DAILY    CALCIUM CARB-MAG OXIDE-ZINC OX PO Take  by mouth.  ezetimibe (ZETIA) 10 mg tablet TAKE ONE TABLET BY MOUTH NIGHTLY    montelukast (SINGULAIR) 10 mg tablet Take 1 Tab by mouth daily.  omeprazole (PRILOSEC) 20 mg capsule Take 1 Cap by mouth daily. [facility]ax to [facility]    amLODIPine (NORVASC) 10 mg tablet Take 1 Tab by mouth daily. (Patient taking differently: Take 5 mg by mouth daily.)    bumetanide (BUMEX) 2 mg tablet Take 1 Tab by mouth two (2) times a day.  lovastatin (MEVACOR) 40 mg tablet TAKE 1 TABLET BY MOUTH NIGHTLY    allopurinol (ZYLOPRIM) 100 mg tablet TAKE TWO TABLETS BY MOUTH ONCE DAILY TO PREVENT GOUT    guaiFENesin SR (MUCINEX) 600 mg SR tablet Take 1 Tab by mouth daily.  potassium chloride (K-DUR) 10 mEq tablet Take 1 Tab by mouth three (3) times daily. (Patient taking differently: Take 10 mEq by mouth two (2) times a day.)    colchicine (COLCRYS) 0.6 mg tablet Take 1 Tab by mouth two (2) times a day. For acute gout.  melatonin 3 mg tablet Take 1 Tab by mouth nightly.  aspirin 81 mg chewable tablet Take 81 mg by mouth daily.  benzoyl peroxide 5 % topical gel Apply  to affected area nightly. apply to affected area as directed     No current facility-administered medications for this visit. Review of Systems   Constitutional: Positive for malaise/fatigue. HENT: Positive for hearing loss. Eyes: Positive for blurred vision. Negative for pain and redness. Respiratory: Negative. Cardiovascular: Negative for chest pain, palpitations and leg swelling. Gastrointestinal: Negative. Negative for constipation. Genitourinary: Negative. Musculoskeletal: Positive for myalgias. Skin: Negative. Neurological: Negative. Endo/Heme/Allergies: Negative. Psychiatric/Behavioral: Negative for depression and memory loss. The patient does not have insomnia. Physical Exam   Vitals reviewed. Constitutional: He is oriented to person, place, and time. He appears well-developed and well-nourished. Obese gentle man    HENT:   Head: Normocephalic. Eyes: Conjunctivae and EOM are normal. Pupils are equal, round, and reactive to light. Neck: Normal range of motion. Neck supple. No JVD present. No tracheal deviation present. No thyromegaly present. Cardiovascular: Normal rate, regular rhythm and normal heart sounds. Pulmonary/Chest: Effort normal and breath sounds normal.   Abdominal: Soft. Bowel sounds are normal.   Musculoskeletal: Normal range of motion. Lymphadenopathy:     He has no cervical adenopathy. Neurological: He is alert and oriented to person, place, and time. He has normal reflexes. Skin: Skin is warm. Psychiatric: He has a normal mood and affect.      Diabetic feet exam : May 2017     Rony Calix podiatrist     H/o partial or complete amputation of foot : n  H/o previous foot ulceration :n  H/o pre - ulcerative callus: yes  H/o peripheral neuropathy and callus: yes  H/o poor circulation     ARA   : yes,   Right side ARA  is less than 7   Foot deformity : crossing over of toes, second onto first toe, third onto second   Flat feet           Lab Results   Component Value Date/Time    Hemoglobin A1c 8.4 05/12/2017 12:28 PM    Hemoglobin A1c 8.2 12/12/2016 09:42 AM    Hemoglobin A1c 7.5 09/28/2016 03:18 PM    Glucose 192 05/12/2017 12:28 PM    Glucose (POC) 132 09/22/2016 09:07 AM    Microalb/Creat ratio (ug/mg creat.) 1073.2 05/12/2017 12:28 PM    LDL, calculated 88 05/12/2017 12:28 PM    Creatinine (POC) 1.4 01/02/2015 10:08 AM    Creatinine 1.27 05/12/2017 12:28 PM      Lab Results   Component Value Date/Time    Cholesterol, total 146 05/12/2017 12:28 PM    HDL Cholesterol 40 05/12/2017 12:28 PM    LDL, calculated 88 05/12/2017 12:28 PM    Triglyceride 89 05/12/2017 12:28 PM    CHOL/HDL Ratio 3.7 10/07/2010 09:27 AM       Lab Results Component Value Date/Time    ALT (SGPT) 31 05/12/2017 12:28 PM    AST (SGOT) 35 05/12/2017 12:28 PM    Alk. phosphatase 95 05/12/2017 12:28 PM    Bilirubin, total 0.5 05/12/2017 12:28 PM       Lab Results   Component Value Date/Time    GFR est AA 63 05/12/2017 12:28 PM    GFR est non-AA 54 05/12/2017 12:28 PM    Creatinine (POC) 1.4 01/02/2015 10:08 AM    Creatinine 1.27 05/12/2017 12:28 PM    BUN 12 05/12/2017 12:28 PM    Sodium 142 05/12/2017 12:28 PM    Potassium 3.9 05/12/2017 12:28 PM    Chloride 98 05/12/2017 12:28 PM    CO2 27 05/12/2017 12:28 PM                ASSESSMENT and PLAN    1. Type 2 DM, uncontrolled : a1c is    8.4 %      From may 2017     Compared to   7.2 %   From feb 2017     compared to   7%    From   May 2016  compared to   7.3 %    From nov 2015  Compared to  6 %  From Aug 2015 compared to 6.6 %   From  May 2015   Compared to 7.8 %  From  Feb 2015     Lost  glycemic control   Reviewed log , sugars are as high as 300 mg fasting     Discussed with daughter that he needs insulin   Gave 6 weeks time to seriously change the dietary habits   [place] is going to be on the family as insulin gets introduced and family's help and understanding is utmost needed     Continue on glucophage at 500 mg TID   and  glipizide to 10 mg and 10  Mg doses   Patient is advised about checking blood sugars 2 times a day and maintaining log book. Hypoglycemia management has been explained to the patient. 2. Hypoglycemia :  Educated on treating the hypoglycemia. Discussed Glipizide use and prescribed    3. HTN : continue on clonidine 0.3 mg tid , hydralazine 25 mg tid  ,  Labetalol  200 mg 2 cap bid,  and norvasc  10 mg a day   He is allergic to Losartan , developed Angioedema   He is allergic to lisinopril also     Proteinuria is worsening from lack of glycemic control   Patient is educated about importance of compliance with anti-hypertensives especially ARB/ACEI    4.   Dyslipidemia : LDL is good , on lovastatin 40 mg and  zetia 10 mg at night   I could nto stop zetia as the lipids are so well controlled    Patient is educated about benefits and adverse effects of statins and explained how benefits outweigh risk. 5. use of aspirin to prevent MI and TIA's discussed       6. Diabetic complications :     A. Retinopathy-YES   educated on this complication,  regular f/u with ophthalmologist encouraged    B. Nephropathy - YES     Stage 3 ckd from diuretic use for CAD and CHF    Proteinuria present   educated on this disease and indicated stages and its prognosis. Regular care with nephrologist encouraged    C. Peripheral Neuropathy - YES  , moderate  educated on this disease and indicated improvement with good and stable glycemic control    D.  PAD : YES    Right side is <7  By ARA /PVR   From  June 2014     E : CAD : YES  F/u with Dr. Edmond Bailey        Did the comprehensive foot exam today   I am treating the patient [facility] comprehensive plan of care for diabetes   The patient would benefit from diabetic foot wear to protect their feet       > 50 % of time is spent on counseling

## 2017-05-16 NOTE — MR AVS SNAPSHOT
Visit Information Date & Time Provider Department Dept. Phone Encounter #  
 5/16/2017 10:30 AM Corey Lopez MD [facility] [phone] [id number] Follow-up Instructions Return in about 6 weeks (around 6/27/2017). Your Appointments 7/19/2017  8:00 AM  
ECHO CARDIOGRAMS 2D with ECHO, STFRANCIS  
[facility] ([facility] SCHEDULING) Appt Note: 6 month f/u with EKG [per Dr. Lucie Choi [address] [place] [address] [facility]  
[phone]  
  
   
 [address] [place] [address] [address]  
  
    
 7/19/2017  9:20 AM  
ESTABLISHED PATIENT with Letty Mejia MD  
[facility] (Author Bertha) Appt Note: 6 month f/u with EKG [per Dr. Lucie Choi [address] [place] [address] [facility] [address] [address]  
[phone]  
  
   
 [address] [place] [address] [address]  
  
    
 8/11/2017  3:05 PM  
Medicare Physical with Lee Vargas MD  
[address] [place] [address] (Author Bertha) Appt Note: [facility]  
 [address] [facility] [address] [facility] [address]  
  
   
 [address] [address]  
  
    
 10/11/2017  9:35 AM  
ROUTINE CARE with Lee Vargas MD  
[address] [place] [address] Author Bertha) Appt Note: 4 month fup possible labs [address] [address]  
[place] [address] [place] [address] [address] [address] Upcoming Health Maintenance Date Due  
 MEDICARE YEARLY EXAM 3/2/2017 INFLUENZA AGE 9 TO ADULT 8/1/2017 FOOT EXAM Q1 9/28/2017 HEMOGLOBIN A1C Q6M 11/12/2017 EYE EXAM RETINAL OR DILATED Q1 12/9/2017 MICROALBUMIN Q1 5/12/2018 LIPID PANEL Q1 5/12/2018 GLAUCOMA SCREENING Q2Y 12/9/2018 DTaP/Tdap/Td series (2 - Td) 8/16/2023 Allergies as of 5/16/2017  Review Complete On: 5/16/2017 By: Genesis Oconnor MD  
  
 Severity Noted Reaction Type Reactions Indocin [Indomethacin Sodium]  06/05/2010    Unknown (comments) Lisinopril  04/17/2013    Angioedema Dxed in hospital.  
 Losartan  01/22/2015    Other (comments) Face and throat swelling. Current Immunizations  Reviewed on 12/12/2016 Name Date Influenza Vaccine 10/7/2015, 10/9/2014, 9/26/2013 Influenza Vaccine (Quad) PF 9/28/2016 Influenza Vaccine Split 10/11/2012, 11/23/2011, 10/7/2010 Influenza Vaccine Whole 12/14/2009 Pneumococcal Conjugate (PCV-13) 8/12/2015 Pneumococcal Polysaccharide (PPSV-23) 10/20/2014, 12/7/2012 Tdap 8/16/2013 Zoster Vaccine, Live 5/26/2015 Not reviewed this visit You Were Diagnosed With   
  
 Codes Comments Type 2 diabetes, uncontrolled, with neuropathy ([facility]    -  Primary ICD-10-CM: E11.40, E11.65 ICD-9-CM: 250.62, 357.2 Essential hypertension     ICD-10-CM: I10 
ICD-9-CM: 401.9 Persistent proteinuria     ICD-10-CM: R80.1 ICD-9-CM: 791.0 Mixed hyperlipidemia     ICD-10-CM: E78.2 ICD-9-CM: 272.2 Vitals BP Pulse Temp Resp Height(growth percentile) Weight(growth percentile) 181/75 (!) 57 95.8 °F (35.4 °C) (Oral) 20 6' 2\" (1.88 m) 274 lb (124.3 kg) SpO2 BMI Smoking Status 99% 35.18 kg/m2 Former Smoker Vitals History BMI and BSA Data Body Mass Index Body Surface Area  
 35.18 kg/m 2 2.55 m 2 Preferred Pharmacy Pharmacy Name Phone [address] [phone] Your Updated Medication List  
  
   
This list is accurate as of: 5/16/17 11:27 AM.  Always use your most recent med list.  
  
  
  
  
 albuterol 90 mcg/actuation inhaler Commonly known as:  PROVENTIL HFA, VENTOLIN HFA, PROAIR HFA Take 2 Puffs by inhalation every six (6) hours as needed for Wheezing for up to 360 days. Please use generic that is covered  
  
 allopurinol 100 mg tablet Commonly known as:  ZYLOPRIM  
TAKE TWO TABLETS BY MOUTH ONCE DAILY TO PREVENT GOUT  
  
 amLODIPine 10 mg tablet Commonly known as:  Ashby Bernard Take 1 Tab by mouth daily. aspirin 81 mg chewable tablet Take 81 mg by mouth daily. baclofen 10 mg tablet Commonly known as:  LIORESAL Take 1 Tab by mouth three (3) times daily. Muscle relaxer. benzoyl peroxide 5 % topical gel Apply  to affected area nightly. apply to affected area as directed  
  
 bisacodyl 5 mg Tab Take  by mouth. Blood-Glucose Meter Misc Commonly known as:  TRUE METRIX GLUCOSE METER Use as Directed  
  
 bumetanide 2 mg tablet Commonly known as:  Doc Epstein Take 1 Tab by mouth two (2) times a day. CALCIUM CARB-MAG OXIDE-ZINC OX PO Take  by mouth.  
  
 cloNIDine HCl 0.3 mg tablet Commonly known as:  CATAPRES Take 1 Tab by mouth three (3) times daily. colchicine 0.6 mg tablet Commonly known as:  COLCRYS Take 1 Tab by mouth two (2) times a day. For acute gout. dabigatran etexilate 150 mg capsule Commonly known as:  PRADAXA Take 1 Cap by mouth two (2) times a day. docusate sodium 100 mg Tab Take  by mouth.  
  
 ezetimibe 10 mg tablet Commonly known as:  [place] TAKE ONE TABLET BY MOUTH NIGHTLY  
  
 fluticasone 50 mcg/actuation nasal spray Commonly known as:  FLONASE  
1 spray BL daily FOLBEE PLUS Tab tablet Generic drug:  b complex-vitamin c-folic acid 5mg TAKE ONE TABLET BY MOUTH ONCE DAILY  
  
 gabapentin 300 mg capsule Commonly known as:  NEURONTIN  
TAKE ONE CAPSULE BY MOUTH THREE TIMES DAILY  
  
 glipiZIDE 5 mg tablet Commonly known as:  Chivo Guajardo INCREASE TO 2 TABLETS BY MOUTH 20 MINUTES BEFORE BREAKFAST AND 2 TABLET TWENTY MINUTES BEFORE DINNER  
  
 glucose blood VI test strips strip Commonly known as:  TRUE METRIX GLUCOSE TEST STRIP  
 Test 2 times daily Dx Coed E11.65  
  
 guaiFENesin  mg SR tablet Commonly known as:  Luis & Luis Take 1 Tab by mouth daily. hydrALAZINE 50 mg tablet Commonly known as:  APRESOLINE Take 1 Tab by mouth three (3) times daily. HYDROcodone-acetaminophen  mg tablet Commonly known as:  Elyn Barley Take 1 Tab by mouth every six (6) hours as needed for Pain. Iron 325 mg (65 mg iron) tablet Generic drug:  ferrous sulfate Take 1 Tab by mouth Daily (before breakfast). labetalol 200 mg tablet Commonly known as:  Lubertha Ashby Take 0.5 Tabs by mouth two (2) times a day. Indications: hypertension * Lancets Misc Test 2 times daily Dx Code E11.65  
  
 * TRUEPLUS LANCETS 33 gauge Misc Generic drug:  lancets  
  
 lovastatin 40 mg tablet Commonly known as:  MEVACOR  
TAKE 1 TABLET BY MOUTH NIGHTLY  
  
 magnesium oxide 400 mg tablet Commonly known as:  MAG-OX Take 1 Tab by mouth daily. melatonin 3 mg tablet Take 1 Tab by mouth nightly. metFORMIN 500 mg tablet Commonly known as:  GLUCOPHAGE  
TAKE ONE TABLET BY MOUTH THREE TIMES DAILY WITH MEALS  
  
 montelukast 10 mg tablet Commonly known as:  SINGULAIR Take 1 Tab by mouth daily. omeprazole 20 mg capsule Commonly known as:  PRILOSEC Take 1 Cap by mouth daily. Fax to [facility]  
  
 potassium chloride 10 mEq tablet Commonly known as:  K-DUR Take 1 Tab by mouth three (3) times daily. Sleep Aid (diphenhydrAMINE) 25 mg tablet Generic drug:  diphenhydrAMINE Take 1 Tab by mouth every six (6) hours as needed for Sleep.  
  
 tamsulosin 0.4 mg capsule Commonly known as:  FLOMAX TAKE ONE CAPSULE BY MOUTH ONCE DAILY * Notice: This list has 2 medication(s) that are the same as other medications prescribed for you. Read the directions carefully, and ask your doctor or other care provider to review them with you. Follow-up Instructions Return in about 6 weeks (around 6/27/2017). Patient Instructions Check blood sugars only twice a day by rotation as follows First day - before b-fast and - before lunch Second day- before dinner and  before bed time After 2 days go back to same rotation 
 
 
-------------------------------------------------------------------------------------------------- Metformin 500 mg 2 pills  Two  times a day  With meals  
 
 
glipizide 5 mg  To 2 pills ,  twenty minutes  Before b-fast and to 2 pills  pill before dinner  
 
------------------------------------------------------------------------------------------------------------------------------------------------------------------------------------------------------------------- Do not skip meals Do not eat in between meals Reduce carbs- pasta, rice, potatoes, bread Do not drink juices or sodas Donot eat peanut butter Do not eat sugar free cookies and cakes Do not eat peaches, grapes, pineapples and oranges Introducing hospitals & HEALTH SERVICES! Pedro Dent introduces [employer] patient portal. Now you can access parts of your medical record, email your doctor's office, and request medication refills online. 1. In your internet browser, go to [email or web address] 2. Click on the First Time User? Click Here link in the Sign In box. You will see the New Member Sign Up page. 3. Enter your [employer] Access Code exactly as it appears below. You will not need to use this code after youve completed the sign-up process. If you do not sign up before the expiration date, you must request a new code. · [employer] Access Code: [email or web address] Expires: 8/10/2017 10:08 AM 
 
4. Enter the last four digits of your Social Security Number (xxxx) and Date of Birth (mm/dd/yyyy) as indicated and click Submit. You will be taken to the next sign-up page. 5. Create a [employer] ID.  This will be your [employer] login ID and cannot be changed, so think of one that is secure and easy to remember. 6. Create a [employer] password. You can change your password at any time. 7. Enter your Password Reset Question and Answer. This can be used at a later time if you forget your password. 8. Enter your e-mail address. You will receive e-mail notification when new information is available in [address] 9. Click Sign Up. You can now view and download portions of your medical record. 10. Click the Download Summary menu link to download a portable copy of your medical information. If you have questions, please visit the Frequently Asked Questions section of the [employer] website. Remember, [employer] is NOT to be used for urgent needs. For medical emergencies, dial 911. Now available from your iPhone and Android! Please provide this summary of care documentation to your next provider. Your primary care clinician is listed as [address]. If you have any questions after today's visit, please call [phone].

## 2017-05-20 LAB
ALBUMIN SERPL-MCNC: 4.1 G/DL (ref 3.5–4.8)
ALBUMIN/CREAT UR: 1073.2 MG/G CREAT (ref 0–30)
ALBUMIN/GLOB SERPL: 1.8 {RATIO} (ref 1.2–2.2)
ALP SERPL-CCNC: 95 IU/L (ref 39–117)
ALT SERPL-CCNC: 31 IU/L (ref 0–44)
AST SERPL-CCNC: 35 IU/L (ref 0–40)
BILIRUB SERPL-MCNC: 0.5 MG/DL (ref 0–1.2)
BUN SERPL-MCNC: 12 MG/DL (ref 8–27)
BUN/CREAT SERPL: 9 (ref 10–24)
CALCIUM SERPL-MCNC: 9.5 MG/DL (ref 8.6–10.2)
CHLORIDE SERPL-SCNC: 98 MMOL/L (ref 96–106)
CHOLEST SERPL-MCNC: 146 MG/DL (ref 100–199)
CO2 SERPL-SCNC: 27 MMOL/L (ref 18–29)
CREAT SERPL-MCNC: 1.27 MG/DL (ref 0.76–1.27)
CREAT UR-MCNC: 273.5 MG/DL
DRUGS UR: NORMAL
EST. AVERAGE GLUCOSE BLD GHB EST-MCNC: 194 MG/DL
GLOBULIN SER CALC-MCNC: 2.3 G/DL (ref 1.5–4.5)
GLUCOSE SERPL-MCNC: 192 MG/DL (ref 65–99)
HBA1C MFR BLD: 8.4 % (ref 4.8–5.6)
HDLC SERPL-MCNC: 40 MG/DL
HGB BLD-MCNC: 13.3 G/DL (ref 12.6–17.7)
LDLC SERPL CALC-MCNC: 88 MG/DL (ref 0–99)
MICROALBUMIN UR-MCNC: 2935.3 UG/ML
POTASSIUM SERPL-SCNC: 3.9 MMOL/L (ref 3.5–5.2)
PROT SERPL-MCNC: 6.4 G/DL (ref 6–8.5)
SODIUM SERPL-SCNC: 142 MMOL/L (ref 134–144)
TRIGL SERPL-MCNC: 89 MG/DL (ref 0–149)
VLDLC SERPL CALC-MCNC: 18 MG/DL (ref 5–40)

## 2017-05-21 ENCOUNTER — DOCUMENTATION ONLY (OUTPATIENT)
Dept: FAMILY MEDICINE CLINIC | Age: 77
End: 2017-05-21

## 2017-06-19 DIAGNOSIS — M54.50 CHRONIC LEFT-SIDED LOW BACK PAIN WITHOUT SCIATICA: ICD-10-CM

## 2017-06-19 DIAGNOSIS — M51.36 DDD (DEGENERATIVE DISC DISEASE), LUMBAR: Chronic | ICD-10-CM

## 2017-06-19 DIAGNOSIS — G89.29 CHRONIC LEFT-SIDED LOW BACK PAIN WITHOUT SCIATICA: ICD-10-CM

## 2017-06-20 RX ORDER — HYDROCODONE BITARTRATE AND ACETAMINOPHEN 10; 325 MG/1; MG/1
1 TABLET ORAL
Qty: 90 TAB | Refills: 0 | Status: SHIPPED | OUTPATIENT
Start: 2017-06-20 | End: 2017-07-27 | Stop reason: SDUPTHER

## 2017-06-20 NOTE — TELEPHONE ENCOUNTER
Refill request for Hydrocodone received while PCP out of office. Pain Contract on file?: YES  Date: 4/7/2017  Provider: [address]: Not listed   reviewed and appropriate?: YES  Date of last UDS: 5/12/2017, appropriate based on pt's med list.     One month rx printed and will be available for pick-up at .

## 2017-06-27 ENCOUNTER — OFFICE VISIT (OUTPATIENT)
Dept: ENDOCRINOLOGY | Age: 77
End: 2017-06-27

## 2017-06-27 VITALS
DIASTOLIC BLOOD PRESSURE: 65 MMHG | TEMPERATURE: 98.1 F | WEIGHT: 279 LBS | HEIGHT: 74 IN | OXYGEN SATURATION: 97 % | RESPIRATION RATE: 20 BRPM | HEART RATE: 57 BPM | SYSTOLIC BLOOD PRESSURE: 156 MMHG | BODY MASS INDEX: 35.81 KG/M2

## 2017-06-27 DIAGNOSIS — E11.42 TYPE 2 DIABETES MELLITUS WITH DIABETIC POLYNEUROPATHY, WITHOUT LONG-TERM CURRENT USE OF INSULIN (HCC): Chronic | ICD-10-CM

## 2017-06-27 DIAGNOSIS — E11.42 TYPE 2 DIABETES MELLITUS WITH DIABETIC POLYNEUROPATHY, WITHOUT LONG-TERM CURRENT USE OF INSULIN (HCC): Primary | Chronic | ICD-10-CM

## 2017-06-27 DIAGNOSIS — I10 ESSENTIAL HYPERTENSION WITH GOAL BLOOD PRESSURE LESS THAN 130/85: Chronic | ICD-10-CM

## 2017-06-27 DIAGNOSIS — E78.2 MIXED HYPERLIPIDEMIA: ICD-10-CM

## 2017-06-27 RX ORDER — METFORMIN HYDROCHLORIDE 500 MG/1
TABLET ORAL
Qty: 360 TAB | Refills: 4 | Status: SHIPPED | OUTPATIENT
Start: 2017-06-27 | End: 2018-09-18 | Stop reason: SDUPTHER

## 2017-06-27 RX ORDER — BLOOD-GLUCOSE CONTROL, NORMAL
EACH MISCELLANEOUS
COMMUNITY
Start: 2017-05-18 | End: 2021-01-01

## 2017-06-27 RX ORDER — BLOOD GLUCOSE CONTROL HIGH,LOW
EACH MISCELLANEOUS
COMMUNITY
Start: 2017-05-18 | End: 2021-01-01

## 2017-06-27 NOTE — PROGRESS NOTES
Wt Readings from Last 3 Encounters:   06/27/17 279 lb (126.6 kg)   05/16/17 274 lb (124.3 kg)   05/12/17 272 lb 9.6 oz (123.7 kg)     Temp Readings from Last 3 Encounters:   06/27/17 98.1 °F (36.7 °C)   05/16/17 95.8 °F (35.4 °C) (Oral)   05/12/17 97.6 °F (36.4 °C) (Oral)     BP Readings from Last 3 Encounters:   06/27/17 156/65   05/16/17 181/75   05/12/17 169/77     Pulse Readings from Last 3 Encounters:   06/27/17 (!) 57   05/16/17 (!) 57   05/12/17 (!) 49     Lab Results   Component Value Date/Time    Hemoglobin A1c 8.4 05/12/2017 12:28 PM    Hemoglobin A1c (POC) 7.2 02/07/2017 11:43 AM     Last Podiatry June 2017  Last Eye exam Dec 2016

## 2017-06-27 NOTE — MR AVS SNAPSHOT
Visit Information Date & Time Provider Department Dept. Phone Encounter #  
 6/27/2017  9:30 AM Steve Carson MD [facility] [phone] [id number] Follow-up Instructions Return in about 2 months (around 8/27/2017). Your Appointments 7/19/2017  8:00 AM  
ECHO CARDIOGRAMS 2D with ECHO, STFRANCIS  
[facility] ([facility] SCHEDULING) Appt Note: 6 month f/u with EKG [per Dr. Elpidio Loya [address] [place] [address] [facility]  
[phone]  
  
   
 [address] [place] [address] [address]  
  
    
 7/19/2017  9:20 AM  
ESTABLISHED PATIENT with Unknown MD Fly  
[facility] ([facility]) Appt Note: 6 month f/u with EKG [per Dr. Elpidio Loya [address] [place] [address] [facility] [address]  
[phone]  
  
   
 [address] [place] [address] [address]  
  
    
 8/11/2017  3:05 PM  
Medicare Physical with Desmond Fraser MD  
[address] [facility] Appt Note: [facility]  
 [facility] [address]  
  
   
 [address] [facility] [address] [facility] [address] [address]  
  
    
 10/11/2017  9:35 AM  
ROUTINE CARE with Desmond Fraser MD  
[address] [place] [address]) Appt Note: 4 month fup possible labs [address] [facility] [address]  
[place] [address] [place] [address] [address] [address] Upcoming Health Maintenance Date Due  
 MEDICARE YEARLY EXAM 3/2/2017 INFLUENZA AGE 9 TO ADULT 8/1/2017 FOOT EXAM Q1 9/28/2017 HEMOGLOBIN A1C Q6M 11/12/2017 EYE EXAM RETINAL OR DILATED Q1 12/9/2017 MICROALBUMIN Q1 5/12/2018 LIPID PANEL Q1 5/12/2018 GLAUCOMA SCREENING Q2Y 12/9/2018 DTaP/Tdap/Td series (2 - Td) 8/16/2023 Allergies as of 6/27/2017  Review Complete On: 6/27/2017 By: Stan Johns MD  
  
 Severity Noted Reaction Type Reactions Indocin [Indomethacin Sodium]  06/05/2010    Unknown (comments) Lisinopril  04/17/2013    Angioedema Dxed in hospital.  
 Losartan  01/22/2015    Other (comments) Face and throat swelling. Current Immunizations  Reviewed on 12/12/2016 Name Date Influenza Vaccine 10/7/2015, 10/9/2014, 9/26/2013 Influenza Vaccine (Quad) PF 9/28/2016 Influenza Vaccine Split 10/11/2012, 11/23/2011, 10/7/2010 Influenza Vaccine Whole 12/14/2009 Pneumococcal Conjugate (PCV-13) 8/12/2015 Pneumococcal Polysaccharide (PPSV-23) 10/20/2014, 12/7/2012 Tdap 8/16/2013 Zoster Vaccine, Live 5/26/2015 Not reviewed this visit You Were Diagnosed With   
  
 Codes Comments Type 2 diabetes mellitus with diabetic polyneuropathy, without long-term current use of insulin (HCC)    -  Primary ICD-10-CM: E11.42 
ICD-9-CM: 250.60, 357.2 Essential hypertension with goal blood pressure less than 130/85     ICD-10-CM: I10 
ICD-9-CM: 401.9 Vitals BP Pulse Temp Resp Height(growth percentile) Weight(growth percentile) 156/65 (!) 57 98.1 °F (36.7 °C) 20 6' 2\" (1.88 m) 279 lb (126.6 kg) SpO2 BMI Smoking Status 97% 35.82 kg/m2 Former Smoker BMI and BSA Data Body Mass Index Body Surface Area  
 35.82 kg/m 2 2.57 m 2 [id number] [address] [facility] [address] [phone] Your Updated Medication List  
  
   
This list is accurate as of: 6/27/17  9:51 AM.  Always use your most recent med list.  
  
  
  
  
 ACCU-CHEK SHAUNA CONTROL SOLN Soln Generic drug:  Blood Glucose Control High&Low  
  
 albuterol 90 mcg/actuation inhaler Commonly known as:  PROVENTIL HFA, VENTOLIN HFA, PROAIR HFA Take 2 Puffs by inhalation every six (6) hours as needed for Wheezing for up to 360 days. Please use generic that is covered  
  
 allopurinol 100 mg tablet Commonly known as:  ZYLOPRIM  
TAKE TWO TABLETS BY MOUTH ONCE DAILY TO PREVENT GOUT  
  
 amLODIPine 10 mg tablet Commonly known as:  Rigoberto Staggers Take 1 Tab by mouth daily. aspirin 81 mg chewable tablet Take 81 mg by mouth daily. baclofen 10 mg tablet Commonly known as:  LIORESAL Take 1 Tab by mouth three (3) times daily. Muscle relaxer. benzoyl peroxide 5 % topical gel Apply  to affected area nightly. apply to affected area as directed  
  
 bisacodyl 5 mg Tab Take  by mouth. Blood-Glucose Meter Misc Commonly known as:  TRUE METRIX GLUCOSE METER Use as Directed  
  
 bumetanide 2 mg tablet Commonly known as:  Sammye Redder Take 1 Tab by mouth two (2) times a day. CALCIUM CARB-MAG OXIDE-ZINC OX PO Take  by mouth.  
  
 cloNIDine HCl 0.3 mg tablet Commonly known as:  CATAPRES Take 1 Tab by mouth three (3) times daily. colchicine 0.6 mg tablet Commonly known as:  COLCRYS Take 1 Tab by mouth two (2) times a day. For acute gout. dabigatran etexilate 150 mg capsule Commonly known as:  PRADAXA Take 1 Cap by mouth two (2) times a day. docusate sodium 100 mg Tab Take  by mouth.  
  
 empagliflozin 10 mg tablet Commonly known as:  Lisha Rice Take 1 Tab by mouth daily. ezetimibe 10 mg tablet Commonly known as:  Rodrickdameon Tian TAKE ONE TABLET BY MOUTH NIGHTLY  
  
 fluticasone 50 mcg/actuation nasal spray Commonly known as:  FLONASE  
1 spray BL daily FOLBEE PLUS Tab tablet Generic drug:  b complex-vitamin c-folic acid 5mg TAKE ONE TABLET BY MOUTH ONCE DAILY  
  
 gabapentin 300 mg capsule Commonly known as:  NEURONTIN  
TAKE ONE CAPSULE BY MOUTH THREE TIMES DAILY  
  
 glipiZIDE 5 mg tablet Commonly known as:  Ace Castalia INCREASE TO 2 TABLETS BY MOUTH 20 MINUTES BEFORE BREAKFAST AND 2 TABLET TWENTY MINUTES BEFORE DINNER  
  
 glucose blood VI test strips strip Commonly known as:  TRUE METRIX GLUCOSE TEST STRIP Test 2 times daily Dx Coed E11.65  
  
 guaiFENesin  mg SR tablet Commonly known as:  Luis & Luis Take 1 Tab by mouth daily. hydrALAZINE 50 mg tablet Commonly known as:  APRESOLINE Take 1 Tab by mouth three (3) times daily. HYDROcodone-acetaminophen  mg tablet Commonly known as:  Mandan Boyd Take 1 Tab by mouth every six (6) hours as needed for Pain. Iron 325 mg (65 mg iron) tablet Generic drug:  ferrous sulfate Take 1 Tab by mouth Daily (before breakfast). labetalol 200 mg tablet Commonly known as:  Rosi Severe Take 0.5 Tabs by mouth two (2) times a day. Indications: hypertension * Lancets Misc Test 2 times daily Dx Code E11.65  
  
 * TRUEPLUS LANCETS 33 gauge Misc Generic drug:  lancets * lancets 30 gauge Misc  
  
 lovastatin 40 mg tablet Commonly known as:  MEVACOR  
TAKE 1 TABLET BY MOUTH NIGHTLY  
  
 magnesium oxide 400 mg tablet Commonly known as:  MAG-OX Take 1 Tab by mouth daily. melatonin 3 mg tablet Take 1 Tab by mouth nightly. metFORMIN 500 mg tablet Commonly known as:  GLUCOPHAGE  
TAKE ONE TABLET BY MOUTH THREE TIMES DAILY WITH MEALS  
  
 montelukast 10 mg tablet Commonly known as:  SINGULAIR Take 1 Tab by mouth daily. omeprazole 20 mg capsule Commonly known as:  PRILOSEC Take 1 Cap by mouth daily. Fax to [facility]  
  
 potassium chloride 10 mEq tablet Commonly known as:  K-DUR Take 1 Tab by mouth three (3) times daily. Sleep Aid (diphenhydrAMINE) 25 mg tablet Generic drug:  diphenhydrAMINE Take 1 Tab by mouth every six (6) hours as needed for Sleep.  
  
 tamsulosin 0.4 mg capsule Commonly known as:  FLOMAX TAKE ONE CAPSULE BY MOUTH ONCE DAILY VIRT-FRANCISCO PLUS 5 mg Tab Generic drug:  folic acid-B complex,C no.17 * Notice:   This list has 3 medication(s) that are the same as other medications prescribed for you. Read the directions carefully, and ask your doctor or other care provider to review them with you. Prescriptions Sent to Pharmacy Refills  
 empagliflozin (JARDIANCE) 10 mg tablet 4 Sig: Take 1 Tab by mouth daily. Class: Normal  
 Pharmacy: [address] [place] [address], [address] [place] [address] #: [phone] Route: Oral  
  
Follow-up Instructions Return in about 2 months (around 8/27/2017). Patient Instructions Check blood sugars only twice a day by rotation as follows First day - before b-fast and - before lunch Second day- before dinner and  before bed time After 2 days go back to same rotation 
 
 
-------------------------------------------------------------------------------------------------- 
 
START on jardiance 10 mg a day Metformin 500 mg 2 pills  Two  times a day  With meals  
 
 
glipizide 5 mg  To 2 pills ,  twenty minutes  Before b-fast and to 2 pills  pill before dinner  
 
------------------------------------------------------------------------------------------------------------------------------------------------------------------------------------------------------------------- Do not skip meals Do not eat in between meals Reduce carbs- pasta, rice, potatoes, bread Do not drink juices or sodas Donot eat peanut butter Do not eat sugar free cookies and cakes Do not eat peaches, grapes, pineapples and oranges Introducing \[facility]\"" HEALTH SERVICES! [facility] introduces [employer] patient portal. Now you can access parts of your medical record, email your doctor's office, and request medication refills online. 1. In your internet browser, go to [email or web address] 2. Click on the First Time User? Click Here link in the Sign In box. You will see the New Member Sign Up page. 3. Enter your [employer] Access Code exactly as it appears below.  You will not need to use this code after youve completed the sign-up process. If you do not sign up before the expiration date, you must request a new code. · [employer] Access Code: [email or web address] Expires: 8/10/2017 10:08 AM 
 
4. Enter the last four digits of your Social Security Number (xxxx) and Date of Birth (mm/dd/yyyy) as indicated and click Submit. You will be taken to the next sign-up page. 5. Create a [employer] ID. This will be your [employer] login ID and cannot be changed, so think of one that is secure and easy to remember. 6. Create a [employer] password. You can change your password at any time. 7. Enter your Password Reset Question and Answer. This can be used at a later time if you forget your password. 8. Enter your e-mail address. You will receive e-mail notification when new information is available in [address] 9. Click Sign Up. You can now view and download portions of your medical record. 10. Click the Download Summary menu link to download a portable copy of your medical information. If you have questions, please visit the Frequently Asked Questions section of the [employer] website. Remember, [employer] is NOT to be used for urgent needs. For medical emergencies, dial 911. Now available from your iPhone and Android! Please provide this summary of care documentation to your next provider. Your primary care clinician is listed as [address]. If you have any questions after today's visit, please call [phone].

## 2017-06-27 NOTE — PATIENT INSTRUCTIONS
Check blood sugars only twice a day by rotation as follows    First day - before b-fast and - before lunch  Second day- before dinner and  before bed time    After 2 days go back to same rotation      --------------------------------------------------------------------------------------------------    START on jardiance 10 mg a day       Metformin 500 mg 2 pills  Two  times a day  With meals       glipizide 5 mg  To 2 pills ,  twenty minutes  Before b-fast and to 2 pills  pill before dinner     -------------------------------------------------------------------------------------------------------------------------------------------------------------------------------------------------------------------      Do not skip meals  Do not eat in between meals    Reduce carbs- pasta, rice, potatoes, bread   Do not drink juices or sodas  Donot eat peanut butter     Do not eat sugar free cookies and cakes   Do not eat peaches, grapes, pineapples and oranges

## 2017-06-27 NOTE — PROGRESS NOTES
HISTORY OF PRESENT ILLNESS  Jean Pierre Godoy is a 65 y.o. male. HPI  Patient is here for  6 week   F/u after last visit of Type 2 diabetes mellitus from  May 2017    He is told to come back with dietary changes while on current meds   No hospitalizations    No med changes     Brought the log , has better sugars, but gained 5 lbs   Not a great historian   Taking glipizide and metformin      NOT Accompanied by his daughter               Prior history :   Referred : by PCP    H/o diabetes for 10 years     He has hearing issues   accomapnied by his daughter     Current A1C is 8.1 %   From jan 2015  and symptoms/problems include fluctutating sugars     Current diabetic medications include metformin   500 mg TID   He lives alone, manages his own meds .      Current monitoring regimen: home blood tests - daily  Home blood sugar records: trend: fluctuating a bit  Any episodes of hypoglycemia? no    Weight trend: fluctuating a bit  Prior visit with dietician: no  Current diet: \"unhealthy\" diet in general  Current exercise: walking    Known diabetic complications: retinopathy, nephropathy, peripheral neuropathy and cardiovascular disease  Cardiovascular risk factors: dyslipidemia, diabetes mellitus, obesity, sedentary life style, male gender, stress    Eye exam current (within one year): yes  ARA: no     Past Medical History:   Diagnosis Date    Acute on chronic diastolic congestive heart failure ([address] 4/27/2015    Arthritis 7/11/2011    [place] 6/7/2010    Cancer (HCC)     prostate    Chronic diastolic heart failure ([address] 8/26/2015    CKD (chronic kidney disease) 11/23/2015    Diabetes ([address]     Gout, unspecified     Hypercholesterolemia     Hypertension     Inguinal lymphadenopathy 2/18/2014    Leg pain     PAF (paroxysmal atrial fibrillation) (HCC)     PAF (paroxysmal atrial fibrillation) (HCC)     PVD (peripheral vascular disease) ([address]      Past Surgical History:   Procedure Laterality Date    COLONOSCOPY N/A 9/22/2016    COLONOSCOPY performed by [facility]helet Torres MD at [facility]      back and left shoulder x2     Current Outpatient Prescriptions   Medication Sig    lancets 30 gauge misc     VIRT-FRANCISCO PLUS 5 mg tab     ACCU-CHEK SHAUNA CONTROL SOLN soln     HYDROcodone-acetaminophen (NORCO)  mg tablet Take 1 Tab by mouth every six (6) hours as needed for Pain.  TRUEPLUS LANCETS 33 gauge misc     labetalol (NORMODYNE) 200 mg tablet Take 0.5 Tabs by mouth two (2) times a day. Indications: hypertension    hydrALAZINE (APRESOLINE) 50 mg tablet Take 1 Tab by mouth three (3) times daily.  docusate sodium 100 mg tab Take  by mouth.  bisacodyl 5 mg tab Take  by mouth.  magnesium oxide (MAG-OX) 400 mg tablet Take 1 Tab by mouth daily.  gabapentin (NEURONTIN) 300 mg capsule TAKE ONE CAPSULE BY MOUTH THREE TIMES DAILY    metFORMIN (GLUCOPHAGE) 500 mg tablet TAKE ONE TABLET BY MOUTH THREE TIMES DAILY WITH MEALS    Blood-Glucose Meter (TRUE METRIX GLUCOSE METER) misc Use as Directed    glucose blood VI test strips (TRUE METRIX GLUCOSE TEST STRIP) strip Test 2 times daily Dx Coed E11.65    Lancets misc Test 2 times daily Dx Code E11.65    FOLBEE PLUS tab tablet TAKE ONE TABLET BY MOUTH ONCE DAILY    glipiZIDE (GLUCOTROL) 5 mg tablet INCREASE TO 2 TABLETS BY MOUTH 20 MINUTES BEFORE BREAKFAST AND 2 TABLET TWENTY MINUTES BEFORE DINNER    cloNIDine HCl (CATAPRES) 0.3 mg tablet Take 1 Tab by mouth three (3) times daily.  diphenhydrAMINE (SLEEP AID, DIPHENHYDRAMINE,) 25 mg tablet Take 1 Tab by mouth every six (6) hours as needed for Sleep.  ferrous sulfate (IRON) 325 mg (65 mg iron) tablet Take 1 Tab by mouth Daily (before breakfast).  fluticasone (FLONASE) 50 mcg/actuation nasal spray 1 spray BL daily    dabigatran etexilate (PRADAXA) 150 mg capsule Take 1 Cap by mouth two (2) times a day.     albuterol (PROVENTIL HFA, VENTOLIN HFA, PROAIR HFA) 90 mcg/actuation inhaler Take 2 Puffs by inhalation every six (6) hours as needed for Wheezing for up to 360 days. Please use generic that is covered    baclofen (LIORESAL) 10 mg tablet Take 1 Tab by mouth three (3) times daily. Muscle relaxer.  tamsulosin (FLOMAX) 0.4 mg capsule TAKE ONE CAPSULE BY MOUTH ONCE DAILY    CALCIUM CARB-MAG OXIDE-ZINC OX PO Take  by mouth.  ezetimibe (ZETIA) 10 mg tablet TAKE ONE TABLET BY MOUTH NIGHTLY    montelukast (SINGULAIR) 10 mg tablet Take 1 Tab by mouth daily.  omeprazole (PRILOSEC) 20 mg capsule Take 1 Cap by mouth daily. Fax to [facility]    amLODIPine (NORVASC) 10 mg tablet Take 1 Tab by mouth daily. (Patient taking differently: Take 5 mg by mouth daily.)    bumetanide (BUMEX) 2 mg tablet Take 1 Tab by mouth two (2) times a day.  lovastatin (MEVACOR) 40 mg tablet TAKE 1 TABLET BY MOUTH NIGHTLY    allopurinol (ZYLOPRIM) 100 mg tablet TAKE TWO TABLETS BY MOUTH ONCE DAILY TO PREVENT GOUT    guaiFENesin SR (MUCINEX) 600 mg SR tablet Take 1 Tab by mouth daily.  potassium chloride (K-DUR) 10 mEq tablet Take 1 Tab by mouth three (3) times daily. (Patient taking differently: Take 10 mEq by mouth two (2) times a day.)    colchicine (COLCRYS) 0.6 mg tablet Take 1 Tab by mouth two (2) times a day. For acute gout.  melatonin 3 mg tablet Take 1 Tab by mouth nightly.  aspirin 81 mg chewable tablet Take 81 mg by mouth daily.  benzoyl peroxide 5 % topical gel Apply  to affected area nightly. apply to affected area as directed     No current facility-administered medications for this visit. Review of Systems   Constitutional: Positive for malaise/fatigue. HENT: Positive for hearing loss. Eyes: Positive for blurred vision. Negative for pain and redness. Respiratory: Negative. Cardiovascular: Negative for chest pain, palpitations and leg swelling. Gastrointestinal: Negative. Negative for constipation. Genitourinary: Negative.     Musculoskeletal: Positive for myalgias. Skin: Negative. Neurological: Negative. Endo/Heme/Allergies: Negative. Psychiatric/Behavioral: Negative for depression and memory loss. The patient does not have insomnia. Physical Exam   Vitals reviewed. Constitutional: He is oriented to person, place, and time. He appears well-developed and well-nourished. Obese gentle man    HENT:   Head: Normocephalic. Eyes: Conjunctivae and EOM are normal. Pupils are equal, round, and reactive to light. Neck: Normal range of motion. Neck supple. No JVD present. No tracheal deviation present. No thyromegaly present. Cardiovascular: Normal rate, regular rhythm and normal heart sounds. Pulmonary/Chest: Effort normal and breath sounds normal.   Abdominal: Soft. Bowel sounds are normal.   Musculoskeletal: Normal range of motion. Lymphadenopathy:     He has no cervical adenopathy. Neurological: He is alert and oriented to person, place, and time. He has normal reflexes. Skin: Skin is warm. Psychiatric: He has a normal mood and affect.      Diabetic feet exam : May 2017     Norbert Stringer podiatrist     H/o partial or complete amputation of foot : n  H/o previous foot ulceration :n  H/o pre - ulcerative callus: yes  H/o peripheral neuropathy and callus: yes  H/o poor circulation     ARA   : yes,   Right side ARA  is less than 7   Foot deformity : crossing over of toes, second onto first toe, third onto second   Flat feet           Lab Results   Component Value Date/Time    Hemoglobin A1c 8.4 05/12/2017 12:28 PM    Hemoglobin A1c 8.2 12/12/2016 09:42 AM    Hemoglobin A1c 7.5 09/28/2016 03:18 PM    Glucose 192 05/12/2017 12:28 PM    Glucose (POC) 132 09/22/2016 09:07 AM    Microalb/Creat ratio (ug/mg creat.) 1073.2 05/12/2017 12:28 PM    LDL, calculated 88 05/12/2017 12:28 PM    Creatinine (POC) 1.4 01/02/2015 10:08 AM    Creatinine 1.27 05/12/2017 12:28 PM      Lab Results   Component Value Date/Time    Cholesterol, total 146 05/12/2017 12:28 PM    HDL Cholesterol 40 05/12/2017 12:28 PM    LDL, calculated 88 05/12/2017 12:28 PM    Triglyceride 89 05/12/2017 12:28 PM    CHOL/HDL Ratio 3.7 10/07/2010 09:27 AM       Lab Results   Component Value Date/Time    ALT (SGPT) 31 05/12/2017 12:28 PM    AST (SGOT) 35 05/12/2017 12:28 PM    Alk. phosphatase 95 05/12/2017 12:28 PM    Bilirubin, total 0.5 05/12/2017 12:28 PM       Lab Results   Component Value Date/Time    GFR est AA 63 05/12/2017 12:28 PM    GFR est non-AA 54 05/12/2017 12:28 PM    Creatinine (POC) 1.4 01/02/2015 10:08 AM    Creatinine 1.27 05/12/2017 12:28 PM    BUN 12 05/12/2017 12:28 PM    Sodium 142 05/12/2017 12:28 PM    Potassium 3.9 05/12/2017 12:28 PM    Chloride 98 05/12/2017 12:28 PM    CO2 27 05/12/2017 12:28 PM                ASSESSMENT and PLAN    1. Type 2 DM, uncontrolled : a1c is    8.4 %      From may 2017     Compared to   7.2 %   From feb 2017     compared to   7%    From   May 2016  compared to   7.3 %    From nov 2015  Compared to  6 %  From Aug 2015 compared to 6.6 %   From  May 2015   Compared to 7.8 %  From  Feb 2015     Losing glycemic control   Reviewed log , sugars are down to 160s compared to 300 mg fasting     Discussed with daughter that he needs insulin   Gave 6 weeks time to seriously change the dietary habits     Will do jardiance 10  Mg a day   [place] is going to be on the family as insulin gets introduced and family's help and understanding is utmost needed     Continue on glucophage at 500 mg TID   and  glipizide to 10 mg bid dose   Patient is advised about checking blood sugars 2 times a day and maintaining log book. Hypoglycemia management has been explained to the patient. 2. Hypoglycemia :  Educated on treating the hypoglycemia. Discussed Glipizide use and prescribed    3.  HTN : continue on clonidine 0.3 mg tid , hydralazine 25 mg tid  ,  Labetalol  200 mg 2 cap bid,  and norvasc  10 mg a day   He is allergic to Losartan , developed Angioedema   He is allergic to lisinopril also     Proteinuria is worsening from lack of glycemic control   Patient is educated about importance of compliance with anti-hypertensives especially ARB/ACEI    4. Dyslipidemia : LDL is good , on lovastatin 40 mg and  zetia 10 mg at night   I could nto stop zetia as the lipids are so well controlled    Patient is educated about benefits and adverse effects of statins and explained how benefits outweigh risk. 5. use of aspirin to prevent MI and TIA's discussed       6. Diabetic complications :     A. Retinopathy-YES   educated on this complication,  regular f/u with ophthalmologist encouraged    B. Nephropathy - YES     Stage 3 ckd from diuretic use for CAD and CHF    Proteinuria present   educated on this disease and indicated stages and its prognosis. Regular care with nephrologist encouraged    C. Peripheral Neuropathy - YES  , moderate  educated on this disease and indicated improvement with good and stable glycemic control    D.  PAD : YES    Right side is <7  By ARA /PVR   From  June 2014     E : CAD : YES  F/u with Dr. Mariah Forbes            > 50 % of time is spent on counseling

## 2017-07-03 DIAGNOSIS — E78.00 HYPERCHOLESTEROLEMIA: Chronic | ICD-10-CM

## 2017-07-04 RX ORDER — EZETIMIBE 10 MG/1
TABLET ORAL
Qty: 30 TAB | Refills: 6 | Status: SHIPPED | OUTPATIENT
Start: 2017-07-04 | End: 2017-07-14 | Stop reason: SDUPTHER

## 2017-07-07 DIAGNOSIS — I48.19 PERSISTENT ATRIAL FIBRILLATION (HCC): ICD-10-CM

## 2017-07-07 DIAGNOSIS — I10 ESSENTIAL HYPERTENSION: Chronic | ICD-10-CM

## 2017-07-08 RX ORDER — LABETALOL 200 MG/1
100 TABLET, FILM COATED ORAL 2 TIMES DAILY
Qty: 30 TAB | Refills: 0 | Status: SHIPPED | OUTPATIENT
Start: 2017-07-08 | End: 2017-08-14 | Stop reason: SDUPTHER

## 2017-07-11 ENCOUNTER — TELEPHONE (OUTPATIENT)
Dept: FAMILY MEDICINE CLINIC | Age: 77
End: 2017-07-11

## 2017-07-11 RX ORDER — LOVASTATIN 40 MG/1
TABLET ORAL
Qty: 90 TAB | Refills: 0 | Status: SHIPPED | OUTPATIENT
Start: 2017-07-11 | End: 2017-10-11 | Stop reason: SDUPTHER

## 2017-07-11 NOTE — TELEPHONE ENCOUNTER
Please contact [address] for [place] Gallegos is contraindicated for bronchial spactic  [place] [phone]

## 2017-07-12 NOTE — TELEPHONE ENCOUNTER
Advised [facility] pharmacist, Seth, per Dr. Mary Schafer, \" He does have a history of COPD listed in the chart but at least per our chart, does not have any medications he is taking for COPD, so I think the benefit of this medication in protecting his heart and blood pressure is much higher than the potential risk of bronchospasm. There is no indication in the chart that he has not been tolerating it well\". Seth expressed understanding.

## 2017-07-14 RX ORDER — EZETIMIBE 10 MG/1
10 TABLET ORAL DAILY
Qty: 30 TAB | Refills: 5 | Status: SHIPPED | OUTPATIENT
Start: 2017-07-14 | End: 2018-07-15 | Stop reason: SDUPTHER

## 2017-07-27 DIAGNOSIS — M54.50 CHRONIC LEFT-SIDED LOW BACK PAIN WITHOUT SCIATICA: ICD-10-CM

## 2017-07-27 DIAGNOSIS — G89.29 CHRONIC LEFT-SIDED LOW BACK PAIN WITHOUT SCIATICA: ICD-10-CM

## 2017-07-27 DIAGNOSIS — M51.36 DDD (DEGENERATIVE DISC DISEASE), LUMBAR: Chronic | ICD-10-CM

## 2017-07-27 NOTE — TELEPHONE ENCOUNTER
Darrian Marquis, pt's daughter, requests refill of Rx \"Hydrocodone\" for pickup at [facility] in am.  Best contact number [phone]. Message taken by the Call Service.   Last office visit was 5/12/17

## 2017-07-28 RX ORDER — HYDROCODONE BITARTRATE AND ACETAMINOPHEN 10; 325 MG/1; MG/1
1 TABLET ORAL
Qty: 90 TAB | Refills: 0 | Status: SHIPPED | OUTPATIENT
Start: 2017-07-28 | End: 2017-09-05 | Stop reason: SDUPTHER

## 2017-07-28 NOTE — TELEPHONE ENCOUNTER
Rx request for Norco received while pt's PCP out of the office. Pain Contract on file?: YES  Date: 4/7/17  Provider: Yadira Fields PMP reviewed and appropriate?: YES  Date of last UDS: 5/12/17, reviewed and appropriate for what he is being prescribed. Rx printed and will be available for pick-up at the .

## 2017-08-07 ENCOUNTER — TELEPHONE (OUTPATIENT)
Dept: FAMILY MEDICINE CLINIC | Age: 77
End: 2017-08-07

## 2017-08-07 NOTE — TELEPHONE ENCOUNTER
Kathi REID [facility] Front Office [place]                     [facility] is requesting a call back in regards to pt back brace authorization. Best contact [phone].  Jessika Navarro)

## 2017-08-10 RX ORDER — ALLOPURINOL 100 MG/1
TABLET ORAL
Qty: 180 TAB | Refills: 3 | Status: SHIPPED | OUTPATIENT
Start: 2017-08-10 | End: 2017-11-22 | Stop reason: SDUPTHER

## 2017-08-14 ENCOUNTER — TELEPHONE (OUTPATIENT)
Dept: FAMILY MEDICINE CLINIC | Age: 77
End: 2017-08-14

## 2017-08-14 DIAGNOSIS — I48.19 PERSISTENT ATRIAL FIBRILLATION (HCC): ICD-10-CM

## 2017-08-14 DIAGNOSIS — I10 ESSENTIAL HYPERTENSION: Chronic | ICD-10-CM

## 2017-08-14 RX ORDER — LABETALOL 200 MG/1
TABLET, FILM COATED ORAL
Qty: 30 TAB | Refills: 0 | Status: SHIPPED | OUTPATIENT
Start: 2017-08-14 | End: 2017-09-18 | Stop reason: SDUPTHER

## 2017-08-14 NOTE — TELEPHONE ENCOUNTER
Received forms and refaxed them to [facility]. Per Dr. Zaria Sheppard: Lidocaine  And Prilocaine Cream 2.5% denied.  Patient can buy stronger patches over the counter

## 2017-08-14 NOTE — TELEPHONE ENCOUNTER
A request for Lidocaine/Prilocaine Creme was faxed on 08/04/2017 and this am.  Please contact [facility] at [phone]

## 2017-08-16 ENCOUNTER — OFFICE VISIT (OUTPATIENT)
Dept: FAMILY MEDICINE CLINIC | Age: 77
End: 2017-08-16

## 2017-08-16 VITALS
DIASTOLIC BLOOD PRESSURE: 74 MMHG | WEIGHT: 266.8 LBS | TEMPERATURE: 98.4 F | RESPIRATION RATE: 22 BRPM | SYSTOLIC BLOOD PRESSURE: 162 MMHG | HEART RATE: 62 BPM | HEIGHT: 74 IN | OXYGEN SATURATION: 96 % | BODY MASS INDEX: 34.24 KG/M2

## 2017-08-16 DIAGNOSIS — Z00.00 MEDICARE ANNUAL WELLNESS VISIT, SUBSEQUENT: Primary | ICD-10-CM

## 2017-08-16 DIAGNOSIS — J44.9 CHRONIC OBSTRUCTIVE PULMONARY DISEASE, UNSPECIFIED COPD TYPE (HCC): Chronic | ICD-10-CM

## 2017-08-16 DIAGNOSIS — Z13.39 SCREENING FOR ALCOHOLISM: ICD-10-CM

## 2017-08-16 RX ORDER — FLUTICASONE PROPIONATE 50 MCG
SPRAY, SUSPENSION (ML) NASAL
Qty: 3 BOTTLE | Refills: 3 | Status: SHIPPED | OUTPATIENT
Start: 2017-08-16 | End: 2018-07-02 | Stop reason: SDUPTHER

## 2017-08-16 RX ORDER — ALBUTEROL SULFATE 90 UG/1
2 AEROSOL, METERED RESPIRATORY (INHALATION)
Qty: 1 INHALER | Refills: 11 | Status: SHIPPED | OUTPATIENT
Start: 2017-08-16 | End: 2018-07-02 | Stop reason: SDUPTHER

## 2017-08-16 NOTE — PATIENT INSTRUCTIONS
Medicare Part B Preventive Services Limitations Recommendation Scheduled   Bone Mass Measurement  (age 72 & older, biennial) Requires diagnosis related to osteoporosis or estrogen deficiency. Biennial benefit unless patient has history of long-term glucocorticoid tx or baseline is needed because initial test was by other method Last:    A DEXA scan is recommended after you turn 72years of age to determine your risk for osteoporosis Next:     Discuss with your physician if this screening is appropriate for you. Colorectal Cancer Screening  -Fecal occult blood test (annual)  -Flexible sigmoidoscopy (5y)  -Screening colonoscopy (10y)  -Barium Enema  Last: 9/22/16    Every 5-10 years depending on your colonoscopy result starting at age 48 years Next: 9/2019   Glaucoma Screening (no USPSTF recommendation) Diabetes mellitus, family history, , age 48 or over,  American, age 72 or over Last: 12/9/16    Every year Next: 12/2017   Prostate Cancer Screening (annually up to age 76)  - Digital rectal exam (ODILON)  - Prostate specific antigen (PSA) Annually (age 48 or over), ODILON not paid separately when covered E/M service is provided on same date Last:56 Next:      Discuss with   your doctor if this screening is appropriate for you. Cardiovascular Screening Blood Tests (every 5 years)  Total cholesterol, HDL, Triglycerides Order as a panel if possible Last: 5/2017    Every Year Next: 5/2018 or as recommended by your physician   Diabetes Screening Tests (at least every 3 years, Medicare covers annually or at 6-month intervals for prediabetic patients)    Fasting blood sugar (FBS) or glucose tolerance test (GTT) Patient must be diagnosed with one of the following:  -Hypertension, Dyslipidemia, obesity, previous impaired FBS or GTT  Or any two of the following: overweight, FH of diabetes, age ? 72, history of gestational diabetes, birth of baby weighing more than 9 pounds Last: 5/2017    Diabetic: Every 3-6 months depending on your result    Non-diabetic: Every 3 years Next: Every 3-6 months depending on your result   Diabetes Self-Management Training (DSMT) (no USPSTF recommendation) Requires referral by treating physician for patient with diabetes or renal disease. 10 hours of initial DSMT session of no less than 30 minutes each in a continuous 12-month period. 2 hours of follow-up DSMT in subsequent years. Last: unknown Talk to your doctor if you are interested in a refresher course. We have a diabetes educator in the office if you are interested   Medical Nutrition Therapy (MNT) (for diabetes or renal disease not recommended schedule) Requires referral by treating physician for patient with diabetes or renal disease. Can be provided in same year as diabetes self-management training (DSMT), and CMS recommends medical nutrition therapy take place after DSMT. Up to 3 hours for initial year and 2 hours in subsequent years. Last: NA Talk to your doctor if you are interested in a refresher course. Seasonal Influenza Vaccination (annually)  Last: 2016    Every Fall       Please get one this Fall. Pneumococcal Vaccination (once after 65)  Last:  Pneumovax:  2012, 2014  Prevnar-13:  2015 Complete   Shingles Vaccination  Last: 2015    A shingles vaccine is recommended once in a lifetime after age 61 Complete   Tetanus, Diphtheria and Pertussis (Tdap) Vaccination Booster One Booster as an adult and then tetanus every 10 years or as indicated Last: 2013 Complete   Hepatitis B Vaccinations (if medium/high risk) Medium/high risk factors:  End-stage renal disease,  Hemophiliacs who received Factor VIII or IX concentrates, Clients of institutions for the mentally retarded, Persons who live in the same house as a HepB virus carrier, Homosexual men, Illicit injectable drug abusers.  Last: NA Next: NA   Counseling to Prevent Tobacco Use (up to 8 sessions per year)  - Counseling greater than 3 and up to 10 minutes  - Counseling greater than 10 minutes Patients must be asymptomatic of tobacco-related conditions to receive as preventive service Last: NA Next: NA   Human Immunodeficiency Virus (HIV) Screening (annually for increased risk patients)  HIV-1 and HIV-2 by EIA, JAROCHO, rapid antibody test, or oral mucosa transudate Patient must be at increased risk for HIV infection per USPSTF guidelines or pregnant. Tests covered annually for patients at increased risk. Pregnant patients may receive up to 3 test during pregnancy. Last: NA Next: NA   Ultrasound Screening for Abdominal Aortic Aneurysm (AAA) once Patient must be referred through IPPE and not have had a screening for abdominal aortic aneurysm before under medicare.  Limited to patients who meet onf of the following criteria:  -Men who are 73-68 years old and have smoked more than 100 cigarettes in their lifetime  -Anyone with a FH of AAA  -Anyone recommended for screening by the USPSFTF As recommended by your PCP or Specialist   As recommended by your PCP or Specialist     NA = Not Applicable; NI= Not Indicated

## 2017-08-16 NOTE — MR AVS SNAPSHOT
Visit Information Date & Time Provider Department Dept. Phone Encounter #  
 8/16/2017  2:00 PM Darreld Standard [facility] [phone] [id number] Your Appointments 8/29/2017 10:00 AM  
ROUTINE CARE with Shawna Altamirano MD  
[facility] [address] [place] [address]) Appt Note: 2mo fu dm  
 [address] [address]  
[phone]  
  
   
 [phone]  
  
    
 9/6/2017  9:00 AM  
ECHO CARDIOGRAMS 2D with ECHO, STFRANCIS  
[facility] ([facility] SCHEDULING) Appt Note: 6 month f/u with EKG [per Dr. Lola Ayala pt's daughter r/s from 7/19 to 9/6  
 [address] [facility] [address] [place] [phone]  
  
   
 [address] [facility] [address]  
  
    
 9/6/2017 10:00 AM  
ESTABLISHED PATIENT with Sylvie Roberts MD  
[facility] ([address] [place] [address]) Appt Note: 6 month f/u with EKG [per Dr. Damien Dozier afib; 6 month f/u with EKG [per Dr. Lola Ayala pt's daughter r/s from 7/19 to 9/6  
 [address] [facility] 600 [facility] [id number] [phone]  
  
   
 [address] [address]  
  
    
 10/11/2017  9:35 AM  
ROUTINE CARE with Claudia Marie MD  
[address] [facility] [address] [facility]) Appt Note: 4 month fup possible labs [address] [facility] [address] [address]  
[facility] [address] [address] Upcoming Health Maintenance Date Due  
 MEDICARE YEARLY EXAM 3/2/2017 INFLUENZA AGE 9 TO ADULT 8/1/2017 FOOT EXAM Q1 9/28/2017 HEMOGLOBIN A1C Q6M 11/12/2017 EYE EXAM RETINAL OR DILATED Q1 12/9/2017 MICROALBUMIN Q1 5/12/2018 LIPID PANEL Q1 5/12/2018 GLAUCOMA SCREENING Q2Y 12/9/2018 DTaP/Tdap/Td series (2 - Td) 8/16/2023 Allergies as of 8/16/2017  Review Complete On: 8/16/2017 By: Emeterio Saleh Severity Noted Reaction Type Reactions Indocin [Indomethacin Sodium]  06/05/2010    Unknown (comments) Lisinopril  04/17/2013    Angioedema Dxed in hospital.  
 Losartan  01/22/2015    Other (comments) Face and throat swelling. Current Immunizations  Reviewed on 12/12/2016 Name Date Influenza Vaccine 10/7/2015, 10/9/2014, 9/26/2013 Influenza Vaccine (Quad) PF 9/28/2016 Influenza Vaccine Split 10/11/2012, 11/23/2011, 10/7/2010 Influenza Vaccine Whole 12/14/2009 Pneumococcal Conjugate (PCV-13) 8/12/2015 Pneumococcal Polysaccharide (PPSV-23) 10/20/2014, 12/7/2012 Tdap 8/16/2013 Zoster Vaccine, Live 5/26/2015 Not reviewed this visit Vitals BP Pulse Temp Resp Height(growth percentile) Weight(growth percentile) 162/74 62 98.4 °F (36.9 °C) (Oral) 22 6' 2\" (1.88 m) 266 lb 12.8 oz (121 kg) SpO2 BMI Smoking Status 96% 34.26 kg/m2 Former Smoker Vitals History BMI and BSA Data Body Mass Index Body Surface Area  
 34.26 kg/m 2 2.51 m 2 Preferred Pharmacy Pharmacy Name Phone [facility] [address] [place] [address] [phone] Your Updated Medication List  
  
   
This list is accurate as of: 8/16/17  2:24 PM.  Always use your most recent med list.  
  
  
  
  
 ACCU-CHEK SHAUNA CONTROL SOLN Soln Generic drug:  Blood Glucose Control High&Low  
  
 albuterol 90 mcg/actuation inhaler Commonly known as:  PROVENTIL HFA, VENTOLIN HFA, PROAIR HFA Take 2 Puffs by inhalation every six (6) hours as needed for Wheezing for up to 360 days. Please use generic that is covered  
  
 allopurinol 100 mg tablet Commonly known as:  ZYLOPRIM  
TAKE TWO TABLETS BY MOUTH ONCE DAILY TO  PREVENT  GOUT  
  
 amLODIPine 10 mg tablet Commonly known as:  Keshav Lute Take 1 Tab by mouth daily. aspirin 81 mg chewable tablet Take 81 mg by mouth daily. baclofen 10 mg tablet Commonly known as:  LIORESAL Take 1 Tab by mouth three (3) times daily. Muscle relaxer. benzoyl peroxide 5 % topical gel Apply  to affected area nightly. apply to affected area as directed Blood-Glucose Meter Misc Commonly known as:  TRUE METRIX GLUCOSE METER Use as Directed  
  
 bumetanide 2 mg tablet Commonly known as:  Shirlean Ledesma Take 1 Tab by mouth two (2) times a day. cloNIDine HCl 0.3 mg tablet Commonly known as:  CATAPRES Take 1 Tab by mouth three (3) times daily. colchicine 0.6 mg tablet Commonly known as:  COLCRYS Take 1 Tab by mouth two (2) times a day. For acute gout. dabigatran etexilate 150 mg capsule Commonly known as:  PRADAXA Take 1 Cap by mouth two (2) times a day. docusate sodium 100 mg Tab Take 1 Tab by mouth two (2) times a day. empagliflozin 10 mg tablet Commonly known as:  Juancarlos Kebede Take 1 Tab by mouth daily. ezetimibe 10 mg tablet Commonly known as:  Jarett Zavaleta Take 1 Tab by mouth daily. fluticasone 50 mcg/actuation nasal spray Commonly known as:  FLONASE  
1 spray BL daily FOLBEE PLUS Tab tablet Generic drug:  b complex-vitamin c-folic acid 5mg TAKE ONE TABLET BY MOUTH ONCE DAILY  
  
 gabapentin 300 mg capsule Commonly known as:  NEURONTIN  
TAKE ONE CAPSULE BY MOUTH THREE TIMES DAILY  
  
 glipiZIDE 5 mg tablet Commonly known as:  Oscar Rios INCREASE TO 2 TABLETS BY MOUTH 20 MINUTES BEFORE BREAKFAST AND 2 TABLET TWENTY MINUTES BEFORE DINNER  
  
 glucose blood VI test strips strip Commonly known as:  TRUE METRIX GLUCOSE TEST STRIP Test 2 times daily Dx Coed E11.65  
  
 hydrALAZINE 50 mg tablet Commonly known as:  APRESOLINE Take 1 Tab by mouth three (3) times daily. HYDROcodone-acetaminophen  mg tablet Commonly known as:  Lynne Vieyra  
 Take 1 Tab by mouth every six (6) hours as needed for Pain. Iron 325 mg (65 mg iron) tablet Generic drug:  ferrous sulfate Take 1 Tab by mouth Daily (before breakfast). labetalol 200 mg tablet Commonly known as:  NORMODYNE  
TAKE ONE-HALF TABLET BY MOUTH TWICE DAILY FOR HYPERTENSION * Lancets Misc Test 2 times daily Dx Code E11.65  
  
 * lancets 30 gauge Misc  
  
 lovastatin 40 mg tablet Commonly known as:  MEVACOR  
TAKE 1 TABLET BY MOUTH NIGHTLY  
  
 magnesium oxide 400 mg tablet Commonly known as:  MAG-OX Take 1 Tab by mouth daily. melatonin 3 mg tablet Take 10 mg by mouth nightly. metFORMIN 500 mg tablet Commonly known as:  GLUCOPHAGE Increase to two TABLETS twice daily with meals  
  
 montelukast 10 mg tablet Commonly known as:  SINGULAIR Take 1 Tab by mouth daily. omeprazole 20 mg capsule Commonly known as:  PRILOSEC Take 1 Cap by mouth daily. Fax to [facility]  
  
 potassium chloride 10 mEq tablet Commonly known as:  K-DUR Take 1 Tab by mouth three (3) times daily. tamsulosin 0.4 mg capsule Commonly known as:  FLOMAX TAKE ONE CAPSULE BY MOUTH ONCE DAILY * Notice: This list has 2 medication(s) that are the same as other medications prescribed for you. Read the directions carefully, and ask your doctor or other care provider to review them with you. Patient Instructions Medicare Part B Preventive Services Limitations Recommendation Scheduled Bone Mass Measurement 
(age 72 & older, biennial) Requires diagnosis related to osteoporosis or estrogen deficiency. Biennial benefit unless patient has history of long-term glucocorticoid tx or baseline is needed because initial test was by other method Last: A DEXA scan is recommended after you turn 72years of age to determine your risk for osteoporosis Next:  
 
Discuss with your physician if this screening is appropriate for you. Colorectal Cancer Screening -Fecal occult blood test (annual) -Flexible sigmoidoscopy (5y) 
-Screening colonoscopy (10y) -Barium Enema  Last: 9/22/16 Every 5-10 years depending on your colonoscopy result starting at age 48 years Next: 9/2019 Glaucoma Screening (no USPSTF recommendation) Diabetes mellitus, family history, , age 48 or over,  American, age 72 or over Last: 12/9/16 Every year Next: 12/2017 Prostate Cancer Screening (annually up to age 76) - Digital rectal exam (ODILON) - Prostate specific antigen (PSA) Annually (age 48 or over), ODILON not paid separately when covered E/M service is provided on same date Last:56 Next:   
 
Discuss with   your doctor if this screening is appropriate for you. Cardiovascular Screening Blood Tests (every 5 years) Total cholesterol, HDL, Triglycerides Order as a panel if possible Last: 5/2017 Every Year Next: 5/2018 or as recommended by your physician Diabetes Screening Tests (at least every 3 years, Medicare covers annually or at 6-month intervals for prediabetic patients) Fasting blood sugar (FBS) or glucose tolerance test (GTT) Patient must be diagnosed with one of the following: 
-Hypertension, Dyslipidemia, obesity, previous impaired FBS or GTT 
Or any two of the following: overweight, FH of diabetes, age ? 72, history of gestational diabetes, birth of baby weighing more than 9 pounds Last: 5/2017 Diabetic: Every 3-6 months depending on your result Non-diabetic: Every 3 years Next: Every 3-6 months depending on your result Diabetes Self-Management Training (DSMT) (no USPSTF recommendation) Requires referral by treating physician for patient with diabetes or renal disease. 10 hours of initial DSMT session of no less than 30 minutes each in a continuous 12-month period. 2 hours of follow-up DSMT in subsequent years. Last: unknown Talk to your doctor if you are interested in a refresher course.  We have a diabetes educator in the office if you are interested Medical Nutrition Therapy (MNT) (for diabetes or renal disease not recommended schedule) Requires referral by treating physician for patient with diabetes or renal disease. Can be provided in same year as diabetes self-management training (DSMT), and CMS recommends medical nutrition therapy take place after DSMT. Up to 3 hours for initial year and 2 hours in subsequent years. Last: NA Talk to your doctor if you are interested in a refresher course. Seasonal Influenza Vaccination (annually)  Last: 2016 Every Fall Please get one this Fall. Pneumococcal Vaccination (once after 65)  Last: 
Pneumovax: 
2012, 2014 Prevnar-13: 
2015 Complete Shingles Vaccination  Last: 2015 A shingles vaccine is recommended once in a lifetime after age 61 Complete Tetanus, Diphtheria and Pertussis (Tdap) Vaccination Booster One Booster as an adult and then tetanus every 10 years or as indicated Last: 2013 Complete Hepatitis B Vaccinations (if medium/high risk) Medium/high risk factors:  End-stage renal disease, Hemophiliacs who received Factor VIII or IX concentrates, Clients of institutions for the mentally retarded, Persons who live in the same house as a HepB virus carrier, Homosexual men, Illicit injectable drug abusers. Last: NA Next: NA  
Counseling to Prevent Tobacco Use (up to 8 sessions per year) - Counseling greater than 3 and up to 10 minutes - Counseling greater than 10 minutes Patients must be asymptomatic of tobacco-related conditions to receive as preventive service Last: NA Next: NA Human Immunodeficiency Virus (HIV) Screening (annually for increased risk patients) HIV-1 and HIV-2 by EIA, JAROCHO, rapid antibody test, or oral mucosa transudate Patient must be at increased risk for HIV infection per USPSTF guidelines or pregnant. Tests covered annually for patients at increased risk. Pregnant patients may receive up to 3 test during pregnancy.  Last: NA Next: NA  
 Ultrasound Screening for Abdominal Aortic Aneurysm (AAA) once Patient must be referred through IPPE and not have had a screening for abdominal aortic aneurysm before under medicare. Limited to patients who meet onf of the following criteria: 
-Men who are 73-68 years old and have smoked more than 100 cigarettes in their lifetime 
-Anyone with a FH of AAA 
-Anyone recommended for screening by the USPSFTF As recommended by your PCP or Specialist 
 As recommended by your PCP or Specialist 
  
NA = Not Applicable; NI= Not Indicated Introducing [facility] SERVICES! [employer] introduces Giferent patient portal. Now you can access parts of your medical record, email your doctor's office, and request medication refills online. 1. In your internet browser, go to [email or web address] 2. Click on the First Time User? Click Here link in the Sign In box. You will see the New Member Sign Up page. 3. Enter your Giferent Access Code exactly as it appears below. You will not need to use this code after youve completed the sign-up process. If you do not sign up before the expiration date, you must request a new code. · Giferent Access Code: [email or web address] Expires: 11/14/2017  2:23 PM 
 
4. Enter the last four digits of your Social Security Number (xxxx) and Date of Birth (mm/dd/yyyy) as indicated and click Submit. You will be taken to the next sign-up page. 5. Create a Giferent ID. This will be your Giferent login ID and cannot be changed, so think of one that is secure and easy to remember. 6. Create a Giferent password. You can change your password at any time. 7. Enter your Password Reset Question and Answer. This can be used at a later time if you forget your password. 8. Enter your e-mail address. You will receive e-mail notification when new information is available in [address] 9. Click Sign Up. You can now view and download portions of your medical record. 10. Click the Download Summary menu link to download a portable copy of your medical information. If you have questions, please visit the Frequently Asked Questions section of the #waywire website. Remember, #waywire is NOT to be used for urgent needs. For medical emergencies, dial 911. Now available from your iPhone and Android! Please provide this summary of care documentation to your next provider. Your primary care clinician is listed as Π[address] 90. If you have any questions after today's visit, please call [phone].

## 2017-08-16 NOTE — PROGRESS NOTES
Good numbers when checks sugars- 111, 112, this morning 114  Nurse comes once a week and said his blood sugars look great  Watching what you eat  Finally found some bologna he likes- at night he eats 1.5 bologna sandwiches  Drinks unsweetened iced tea, water      Jardiance 10mg once daily  Glipizide 10mg twice daily    Dr. Tico Odell referred Garo Rojo, 1940, a 65 y.o. male for a Medicare Annual Wellness Visit (AWV) and diabetes education. Diabetes:  -patient brought in all of his medication and knew exactly how he was taking each one  -says his numbers have improved recently when he checks his sugars -- fastings have been 111, 112, 114 this morning  -has a nurse that comes and helps him once a week and states that she said his blood sugars were looking great  -says he's been working hard on watching what he eats  -finally found a bologna he likes and eats 1.5 sandwiches of bologna every night  -only drinks unsweetened iced tea and water    Diabetes Medications:  -Jardiance 10mg once daily  -Glipizde 10mg twice daily     ROS:   Patient denies any hyper or hypoglycemic signs/symptoms. Also denies CP, SOB, or changes in feet. This is a Subsequent Medicare Annual Wellness Visit providing Personalized Prevention Plan Services (PPPS) (Performed 12 months after initial AWV and PPPS )    I have reviewed the patient's medical history in detail and updated the computerized patient record.      History     Past Medical History:   Diagnosis Date    Acute on chronic diastolic congestive heart failure ([address] 4/27/2015    Arthritis 7/11/2011    Blackhead 6/7/2010    Cancer ([address]     prostate    Chronic diastolic heart failure ([address] 8/26/2015    CKD (chronic kidney disease) 11/23/2015    Diabetes [facility]     Gout, unspecified     Hypercholesterolemia     Hypertension     Inguinal lymphadenopathy 2/18/2014    Leg pain     PAF (paroxysmal atrial fibrillation) (HCC)     [facility]     PVD (peripheral vascular disease) ([facility]       Past Surgical History:   Procedure Laterality Date    COLONOSCOPY N/A 9/22/2016    COLON[facility] performed [facility] Uche [facility]th [facility]      back and left shoulder x2     Current Outpatient Prescriptions   Medication Sig Dispense Refill    labetalol (NORMODYNE) 200 mg tablet TAKE ONE-HALF TABLET BY MOUTH TWICE DAILY FOR HYPERTENSION 30 Tab 0    allopurinol (ZYLOPRIM) 100 mg tablet TAKE TWO TABLETS BY MOUTH ONCE DAILY TO  PREVENT  GOUT (Patient taking differently: Take 1 tablet by mouth every day) 180 Tab 3    HYDROcodone-acetaminophen (NORCO)  mg tablet Take 1 Tab by mouth every six (6) hours as needed for Pain. 90 Tab 0    ezetimibe (ZETIA) 10 mg tablet Take 1 Tab by mouth daily. 30 Tab 5    lovastatin (MEVACOR) 40 mg tablet TAKE 1 TABLET BY MOUTH NIGHTLY 90 Tab 0    lancets 30 gauge misc       ACCU-CHEK SHAUNA CONTROL SOLN soln       empagliflozin (JARDIANCE) 10 mg tablet Take 1 Tab by mouth daily. 90 Tab 4    metFORMIN (GLUCOPHAGE) 500 mg tablet Increase to two TABLETS twice daily with meals 360 Tab 4    hydrALAZINE (APRESOLINE) 50 mg tablet Take 1 Tab by mouth three (3) times daily. 270 Tab 3    docusate sodium 100 mg tab Take 1 Tab by mouth two (2) times a day.  magnesium oxide (MAG-OX) 400 mg tablet Take 1 Tab by mouth daily.  90 Tab 3    gabapentin (NEURONTIN) 300 mg capsule TAKE ONE CAPSULE BY MOUTH THREE TIMES DAILY 270 Cap 2    Blood-Glucose Meter (TRUE METRIX GLUCOSE METER) misc Use as Directed 1 Each 0    glucose blood VI test strips (TRUE METRIX GLUCOSE TEST STRIP) strip Test 2 times daily Dx Coed E11.65 100 Strip 6    Lancets misc Test 2 times daily Dx Code E11.65 100 Each 6    FOLBEE PLUS tab tablet TAKE ONE TABLET BY MOUTH ONCE DAILY 90 Tab 3    glipiZIDE (GLUCOTROL) 5 mg tablet INCREASE TO 2 TABLETS BY MOUTH 20 MINUTES BEFORE BREAKFAST AND 2 TABLET TWENTY MINUTES BEFORE DINNER 360 Tab 3    cloNIDine HCl (CATAPRES) 0.3 mg tablet Take 1 Tab by mouth three (3) times daily. 270 Tab 1    ferrous sulfate (IRON) 325 mg (65 mg iron) tablet Take 1 Tab by mouth Daily (before breakfast).  dabigatran etexilate (PRADAXA) 150 mg capsule Take 1 Cap by mouth two (2) times a day. 180 Cap 3    baclofen (LIORESAL) 10 mg tablet Take 1 Tab by mouth three (3) times daily. Muscle relaxer. 270 Tab 3    tamsulosin (FLOMAX) 0.4 mg capsule TAKE ONE CAPSULE BY MOUTH ONCE DAILY 90 Cap 3    montelukast (SINGULAIR) 10 mg tablet Take 1 Tab by mouth daily. 90 Tab 3    omeprazole (PRILOSEC) 20 mg capsule Take 1 Cap by mouth daily. [facility] Cap 3    amLODIPine (NORVASC) 10 mg tablet Take 1 Tab by mouth daily. (Patient taking differently: Take 5 mg by mouth daily.) 90 Tab 1    bumetanide (BUMEX) 2 mg tablet Take 1 Tab by mouth two (2) times a day. 180 Tab 1    potassium chloride (K-DUR) 10 mEq tablet Take 1 Tab by mouth three (3) times daily. (Patient taking differently: Take 10 mEq by mouth two (2) times a day.) 270 Tab 3    colchicine (COLCRYS) 0.6 mg tablet Take 1 Tab by mouth two (2) times a day. For acute gout. (Patient taking differently: Take 0.6 mg by mouth daily. For acute gout.) 180 Tab 3    melatonin 3 mg tablet Take 10 mg by mouth nightly.  aspirin 81 mg chewable tablet Take 81 mg by mouth daily.  benzoyl peroxide 5 % topical gel Apply  to affected area nightly. apply to affected area as directed 60 g 0    fluticasone (FLONASE) 50 mcg/actuation nasal spray 1 spray BL daily 3 Bottle 3    albuterol (PROVENTIL HFA, VENTOLIN HFA, PROAIR HFA) 90 mcg/actuation inhaler Take 2 Puffs by inhalation every six (6) hours as needed for Wheezing for up to 360 days.  Please use generic that is covered 1 Inhaler 11     Allergies   Allergen Reactions    Indocin [Indomethacin Sodium] Unknown (comments)    Lisinopril Angioedema     Dxed in hospital.    Losartan Other (comments)     Face and throat swelling. Family History   Problem Relation Age of Onset    Cancer Paternal Grandfather      colon    Hypertension Other      son     Social History   Substance Use Topics    Smoking status: Former Smoker     Types: Cigarettes     Quit date: 6/20/2003    Smokeless tobacco: Never Used    Alcohol use 0.0 oz/week     0 Standard drinks or equivalent per week      Comment: 2 drinks/month     Patient Active Problem List   Diagnosis Code    Chronic pain G89.29    Gout, unspecified M10.9    PVD [facility]peripheral vascular disease) [facility] I73.9    Hypertension I10    Hypercholesterolemia E78.00    Leg pain M79.606    Arthritis M19.90    Unspecified arthropathy, ankle and foot M12.9    PAF (paroxysmal atrial fibrillation) ([facility]) I48.0    Edema R60.9    Hoarse voice quality R49.0    Neuropathy ([facility]) G62.9    DDD (degenerative disc disease), lumbar M51.36    Prostate CA (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    Diabetes with neurologic complications ([facility]) L58.37    Chronic radicular pain of lower back M54.16, G89.29    COPD (chronic obstructive pulmonary disease) ([facility]) J44.9    Left upper lobe pneumonia (HCC) J18.1    Chronic diastolic heart failure ([facility]) I50.32    CKD (chronic kidney disease) N18.9    History of colon polyps Z86.010    Prostate cancer ([facility]) C61    Idiopathic gout M10.00    Essential hypertension with goal blood pressure less than 130/85 I10       Depression Risk Factor Screening:     PHQ over the last two weeks 8/16/2017   PHQ Not Done -   Little interest or pleasure in doing things Not at all   Feeling down, depressed or hopeless Not at all   Total Score PHQ 2 0     Alcohol Risk Factor Screening: On any occasion during the past 3 months, have you had more than 4 drinks containing alcohol? No    Do you average more than 14 drinks per week?   No        Functional Ability and Level of Safety:     Hearing Loss   normal-to-mild    Activities of Daily Living Self-care. Requires assistance with: no ADLs    Fall Risk   Fall Risk Assessment, last 12 mths 8/16/2017   Able to walk? Yes   Fall in past 12 months? No     Abuse Screen   Patient is not abused    Review of Systems   Not required    Physical Examination     Evaluation of Cognitive Function:  Mood/affect:  happy  Appearance: age appropriate and casually dressed  Family member/caregiver input: none present    No exam performed today, not required for AWV. Patient Care Team:  Princess Hairston MD as PCP - General (Family Practice)  ERAN Horta (Orthopedic Surgery)  uLcio Day MD (Otolaryngology)  Cayla Floyd MD (Endocrinology)  Leslie Brambila MD as Physician (Internal Medicine)  [place] Aguilar MD (Ophthalmology)  Niru Esteves MD (Podiatry)  Anupama Adrian MD (Cardiology)  Rachel Velarde RN as Nurse Navigator    Advice/Referrals/Counseling   Education and counseling provided:  End-of-Life planning (with patient's consent)  Pneumococcal Vaccine  Influenza Vaccine  Prostate cancer screening tests (PSA, covered annually)  Colorectal cancer screening tests  Cardiovascular screening blood test  Screening for glaucoma  Diabetes screening test  Tdap and Zostavax      Assessment/Plan       ICD-10-CM ICD-9-CM    1. Medicare annual wellness visit, subsequent Z00.00 V70.0    2. Screening for alcoholism Z13.89 V79.1    3. BMI 34.0-34.9,adult Z68.34 V85.34      4. Medicare Annual Wellness Visit:  ----Immunizations: Patient confirmed the following records of vaccinations are correct and current.   Immunization History   Administered Date(s) Administered    Influenza Vaccine 09/26/2013, 10/09/2014, 10/07/2015    Influenza Vaccine (Quad) PF 09/28/2016    Influenza Vaccine Split 10/07/2010, 11/23/2011, 10/11/2012    Influenza Vaccine Whole 12/14/2009    Pneumococcal Conjugate (PCV-13) 08/12/2015    Pneumococcal Polysaccharide (PPSV-23) 12/07/2012, 10/20/2014    Tdap 08/16/2013    Zoster Vaccine, Live 05/26/2015   Patient is up to date on all vaccines. Encouraged him to get a flu shot this fall.     ----Screenings: See chart in patient instructions for specific information. ADL's, Fall Risk, Depression Screening and Abuse screening information is reported above. Patient is up to date on all screenings at this time.     ----Medication Reconciliation was performed today and the following changes were made:  Medications Discontinued During This Encounter   Medication Reason    guaiFENesin SR (MUCINEX) 600 mg SR tablet Not A Current Medication    bisacodyl 5 mg tab Not A Current Medication    CALCIUM CARB-MAG OXIDE-ZINC OX PO Not A Current Medication    diphenhydrAMINE (SLEEP AID, DIPHENHYDRAMINE,) 25 mg tablet Not A Current Medication    TRUEPLUS LANCETS 33 gauge misc Duplicate Order    VIRT-FRANCISCO PLUS 5 mg tab Not A Current Medication   Docusate and Colcrys directions were updated. Patient states that he needs a refill on his albuterol inhaler and flonase nasal spray- refill request were sent to Dr. Paris Macario. ----Advanced Care Plan: on file      5. Diabetes: a1c not at goal <7% however fasting blood sugars per patient are all in goal range . Patient states he is working hard to watch what he is eating and how much he is eating. His home-health nurse has even commented on how well his sugars are doing. Encouraged him to continue watching his diet and taking his medications. Patient verbalized understanding of information presented. Answered all of the patient's questions. AVS handed and reviewed with patient which includes a Medicare Wellness Preventative Screening Table and patient specific information. Notification of recommendations will be sent to Dr. Jennie Newton for review.     Karlie Toney, PharmD, BCPS, CDE

## 2017-08-16 NOTE — PROGRESS NOTES
Health Maintenance Due   Topic Date Due    MEDICARE YEARLY EXAM  03/02/2017    INFLUENZA AGE 9 TO ADULT  08/01/2017       Diabetic Bundle:  LDL-88  A1C-8.4  BP-  Smoking?no  Anticoagulation medication?  yes  Eye exam dilated?good  Foot exam?good

## 2017-09-05 DIAGNOSIS — I10 ESSENTIAL HYPERTENSION: Chronic | ICD-10-CM

## 2017-09-05 DIAGNOSIS — M51.36 DDD (DEGENERATIVE DISC DISEASE), LUMBAR: Chronic | ICD-10-CM

## 2017-09-05 DIAGNOSIS — M54.50 CHRONIC LEFT-SIDED LOW BACK PAIN WITHOUT SCIATICA: ICD-10-CM

## 2017-09-05 DIAGNOSIS — G89.29 CHRONIC LEFT-SIDED LOW BACK PAIN WITHOUT SCIATICA: ICD-10-CM

## 2017-09-05 RX ORDER — CLONIDINE HYDROCHLORIDE 0.3 MG/1
TABLET ORAL
Qty: 270 TAB | Refills: 1 | Status: SHIPPED | OUTPATIENT
Start: 2017-09-05 | End: 2018-03-26 | Stop reason: SDUPTHER

## 2017-09-05 RX ORDER — HYDROCODONE BITARTRATE AND ACETAMINOPHEN 10; 325 MG/1; MG/1
1 TABLET ORAL
Qty: 90 TAB | Refills: 0 | Status: SHIPPED | OUTPATIENT
Start: 2017-09-05 | End: 2017-10-03 | Stop reason: SDUPTHER

## 2017-09-05 NOTE — TELEPHONE ENCOUNTER
Last office visit on 5/12/17, Date of last UDS: 5/12/17, [address] on file. Please advise, thank you.

## 2017-09-06 ENCOUNTER — OFFICE VISIT (OUTPATIENT)
Dept: CARDIOLOGY CLINIC | Age: 77
End: 2017-09-06

## 2017-09-06 ENCOUNTER — CLINICAL SUPPORT (OUTPATIENT)
Dept: CARDIOLOGY CLINIC | Age: 77
End: 2017-09-06

## 2017-09-06 VITALS
HEIGHT: 74 IN | BODY MASS INDEX: 33.88 KG/M2 | WEIGHT: 264 LBS | SYSTOLIC BLOOD PRESSURE: 168 MMHG | DIASTOLIC BLOOD PRESSURE: 80 MMHG | OXYGEN SATURATION: 98 % | RESPIRATION RATE: 16 BRPM | HEART RATE: 59 BPM

## 2017-09-06 DIAGNOSIS — I48.0 PAF (PAROXYSMAL ATRIAL FIBRILLATION) (HCC): Primary | Chronic | ICD-10-CM

## 2017-09-06 DIAGNOSIS — I10 HYPERTENSION, ESSENTIAL: ICD-10-CM

## 2017-09-06 DIAGNOSIS — N18.9 CKD (CHRONIC KIDNEY DISEASE), UNSPECIFIED STAGE: Chronic | ICD-10-CM

## 2017-09-06 DIAGNOSIS — E11.42 TYPE 2 DIABETES MELLITUS WITH DIABETIC POLYNEUROPATHY, WITHOUT LONG-TERM CURRENT USE OF INSULIN (HCC): Chronic | ICD-10-CM

## 2017-09-06 DIAGNOSIS — I48.0 PAROXYSMAL ATRIAL FIBRILLATION (HCC): Primary | ICD-10-CM

## 2017-09-06 DIAGNOSIS — E78.00 HYPERCHOLESTEROLEMIA: Chronic | ICD-10-CM

## 2017-09-06 DIAGNOSIS — I51.89 DIASTOLIC DYSFUNCTION: ICD-10-CM

## 2017-09-06 NOTE — PROGRESS NOTES
Arnie Sheldon     1940       Johnny Winkler MD, [facility]  Date of Visit-9/6/2017   PCP is Dinah Pond MD   [facility] [place]  [facility]  HPI:  Arnie Sheldon is a 65 y.o. male     Follow-up of PAF, with Echo today. EKG with Atrial Fibrillation at 59, with PRWP and diffuse, non-specific T wave abnormalities. Echo today is generally unremarkable. EF is normal.  There is mild chamber enlargement. The patient reports feeling well today, with no problems with SOB or chest pain. Edema is baseline. He continues to take Clonidine, Labetalol, and Hydralizine. He checks his blood pressure at home sometimes, and believes that it generally runs higher than 140. Denies chest pain, syncope or shortness of breath at rest, has no tachycardia, palpitations or sense of arrhythmia. Assessment/Plan:       1. Persistent A Fib. Good rate control, Echo confirms normal EF today. Continues on Pradaxa for stroke prophylaxis  , no bleeding  --I dont feel strongly about aspirin since on [facility]  --RRR on exam due to likely near normal R-R    2. Normal LV systolic function. No significant valve disease. Likely mild diastolic dysfunction. 3.  Malignant HTN, on multiple meds. Advised weight loss. Already on a lot of meds , he need to work on this, thus far down 6 #  ---clonidine, labetalol (high dose) ,hydralzine, amlodipine. bumex  4. CKD-renal fu     5. Dyslipidemia & DM cardiovascular disease risk factors on Jardiance    6. PVD- no current complaints, continue ASA  ---June 2015 [place] 0.39 right and 0.73 on left    Follow-up 1 year at [facility]. Future Appointments  Date Time Provider Elisha Devii   10/11/2017 9:35 AM Dinah Pond MD [facility] AMITA SCHED   11/1/2017 11:45 AM Anup Conteh MD CDE AMITA SCHED   9/12/2018 10:00 AM Darwin Brady MD [facility] AMITA SCHED       Impression:   1.  PAF (paroxysmal atrial fibrillation) ([address] 2. Hypertension, essential    3. Diastolic dysfunction    4. Hypercholesterolemia    5. CKD (chronic kidney disease), unspecified stage    6. Type 2 diabetes mellitus with diabetic polyneuropathy, without long-term current use of insulin [facility])       Cardiac History:   December 2011  Holter- PAF , afib about 4% showed some high rates at night  --rates highly fluctuated on holter, up to 156, will start metoprolol 50 mg daily  Nuke-his nuke showed no ischemia  Echo with good fx but some LVH and LAE   [facility]- ARA 0.92, >1.0    ECHO 7-98-04=[place]  55 % diastolic dysfunction  2+ LAE and TR , 1+ MR, iVC dilated     ROS-except as noted above. . A complete cardiac and respiratory are reviewed and negative except as above ; Resp-denies wheezing  or productive cough,. Const- No unusual weight loss or fever; Neuro-no recent seizure or CVA ; Past Medical History:   Diagnosis Date    Acute on chronic diastolic congestive heart failure ([facility] 4/27/2015    Arthritis 7/11/2011    Blackhead 6/7/2010    Cancer (HCC)     prostate    Chronic diastolic heart failure ([facility] 8/26/2015    CKD (chronic kidney disease) 11/23/2015    Diabetes ([facility]     Gout, unspecified     Hypercholesterolemia     Hypertension     Inguinal lymphadenopathy 2/18/2014    Leg pain     PAF (paroxysmal atrial fibrillation) (HCC)     PAF (paroxysmal atrial fibrillation) (HCC)     PVD (peripheral vascular disease) ([facility]       Social Hx= reports that he quit smoking about 14 years ago. His smoking use included Cigarettes. He has never used smokeless tobacco. He reports that he drinks alcohol. He reports that he does not use illicit drugs. Exam and Labs: There were no vitals taken for this visit. Constitutional:  NAD, comfortable  Head: NC,AT. Eyes: No scleral icterus. Neck:  Neck supple. No JVD present. Throat: moist mucous membranes. Chest: Effort normal & normal respiratory excursion . Neurological: alert, conversant and oriented .  Skin: Skin is not cold. No obvious systemic rash noted. Not diaphoretic. No erythema. Psychiatric:  Grossly normal mood and affect. Behavior appears normal. Extremities:  no clubbing or cyanosis. Abdomen: non distended    Lungs:breath sounds normal. No stridor. distress, wheezes or  Rales. Heart:    normal rate, regular rhythm, normal S1, S2, no murmurs, rubs, clicks or gallops , PMI non displaced. Edema: Edema is none. Lab Results   Component Value Date/Time    Cholesterol, total 146 05/12/2017 12:28 PM    HDL Cholesterol 40 05/12/2017 12:28 PM    LDL, calculated 88 05/12/2017 12:28 PM    Triglyceride 89 05/12/2017 12:28 PM    CHOL/HDL Ratio 3.7 10/07/2010 09:27 AM     No results found for this or any previous visit. Lab Results   Component Value Date/Time    Sodium 142 05/12/2017 12:28 PM    Potassium 3.9 05/12/2017 12:28 PM    Chloride 98 05/12/2017 12:28 PM    CO2 27 05/12/2017 12:28 PM    Anion gap 12 10/07/2010 09:27 AM    Glucose 192 05/12/2017 12:28 PM    BUN 12 05/12/2017 12:28 PM    Creatinine 1.27 05/12/2017 12:28 PM    BUN/Creatinine ratio 9 05/12/2017 12:28 PM    GFR est AA 63 05/12/2017 12:28 PM    GFR est non-AA 54 05/12/2017 12:28 PM    Calcium 9.5 05/12/2017 12:28 PM      Wt Readings from Last 3 Encounters:   08/16/17 266 lb 12.8 oz (121 kg)   06/27/17 279 lb (126.6 kg)   05/16/17 274 lb (124.3 kg)      BP Readings from Last 3 Encounters:   08/16/17 162/74   06/27/17 156/65   05/16/17 181/75        Current Outpatient Prescriptions   Medication Sig    cloNIDine HCl (CATAPRES) 0.3 mg tablet TAKE ONE TABLET BY MOUTH THREE TIMES DAILY    HYDROcodone-acetaminophen (NORCO)  mg tablet Take 1 Tab by mouth every six (6) hours as needed for Pain.  fluticasone (FLONASE) 50 mcg/actuation nasal spray 1 spray BL daily    albuterol (PROVENTIL HFA, VENTOLIN HFA, PROAIR HFA) 90 mcg/actuation inhaler Take 2 Puffs by inhalation every six (6) hours as needed for Wheezing for up to 360 days.  Please use generic that is covered    labetalol (NORMODYNE) 200 mg tablet TAKE ONE-HALF TABLET BY MOUTH TWICE DAILY FOR HYPERTENSION    allopurinol (ZYLOPRIM) 100 mg tablet TAKE TWO TABLETS BY MOUTH ONCE DAILY TO  PREVENT  GOUT (Patient taking differently: Take 1 tablet by mouth every day)    ezetimibe (ZETIA) 10 mg tablet Take 1 Tab by mouth daily.  lovastatin (MEVACOR) 40 mg tablet TAKE 1 TABLET BY MOUTH NIGHTLY    lancets 30 gauge misc     ACCU-CHEK SHAUNA CONTROL SOLN soln     empagliflozin (JARDIANCE) 10 mg tablet Take 1 Tab by mouth daily.  metFORMIN (GLUCOPHAGE) 500 mg tablet Increase to two TABLETS twice daily with meals    hydrALAZINE (APRESOLINE) 50 mg tablet Take 1 Tab by mouth three (3) times daily.  docusate sodium 100 mg tab Take 1 Tab by mouth two (2) times a day.  magnesium oxide (MAG-OX) 400 mg tablet Take 1 Tab by mouth daily.  gabapentin (NEURONTIN) 300 mg capsule TAKE ONE CAPSULE BY MOUTH THREE TIMES DAILY    Blood-Glucose Meter (TRUE METRIX GLUCOSE METER) misc Use as Directed    glucose blood VI test strips (TRUE METRIX GLUCOSE TEST STRIP) strip Test 2 times daily Dx Coed E11.65    Lancets misc Test 2 times daily Dx Code E11.65    FOLBEE PLUS tab tablet TAKE ONE TABLET BY MOUTH ONCE DAILY    glipiZIDE (GLUCOTROL) 5 mg tablet INCREASE TO 2 TABLETS BY MOUTH 20 MINUTES BEFORE BREAKFAST AND 2 TABLET TWENTY MINUTES BEFORE DINNER    ferrous sulfate (IRON) 325 mg (65 mg iron) tablet Take 1 Tab by mouth Daily (before breakfast).  dabigatran etexilate (PRADAXA) 150 mg capsule Take 1 Cap by mouth two (2) times a day.  baclofen (LIORESAL) 10 mg tablet Take 1 Tab by mouth three (3) times daily. Muscle relaxer.  tamsulosin (FLOMAX) 0.4 mg capsule TAKE ONE CAPSULE BY MOUTH ONCE DAILY    montelukast (SINGULAIR) 10 mg tablet Take 1 Tab by mouth daily.  omeprazole (PRILOSEC) 20 mg capsule Take 1 Cap by mouth daily.  [facility]ax to [facility]    amLODIPine (NORVASC) 10 mg tablet Take 1 Tab by mouth daily. (Patient taking differently: Take 5 mg by mouth daily.)    bumetanide (BUMEX) 2 mg tablet Take 1 Tab by mouth two (2) times a day.  potassium chloride (K-DUR) 10 mEq tablet Take 1 Tab by mouth three (3) times daily. (Patient taking differently: Take 10 mEq by mouth two (2) times a day.)    colchicine (COLCRYS) 0.6 mg tablet Take 1 Tab by mouth two (2) times a day. For acute gout. (Patient taking differently: Take 0.6 mg by mouth daily. For acute gout.)    melatonin 3 mg tablet Take 10 mg by mouth nightly.  aspirin 81 mg chewable tablet Take 81 mg by mouth daily.  benzoyl peroxide 5 % topical gel Apply  to affected area nightly. apply to affected area as directed     No current facility-administered medications for this visit. Impression see above.     Written by Maribell Daley ([address]) as dictated by Dr. Rahul Duncan.

## 2017-09-06 NOTE — MR AVS SNAPSHOT
Visit Information Date & Time Provider Department Dept. Phone Encounter #  
 9/6/2017 10:00 AM Esther Lara MD [facility] [phone] [id number] Your Appointments 10/11/2017  9:35 AM  
ROUTINE CARE with Feroz Gautam MD  
[address] [facility]) Appt Note: 4 month fup possible labs 2005 A [facility] [place] [address] [facility] [phone]  
  
   
 [address] [address]  
  
    
 11/1/2017 11:45 AM  
ROUTINE CARE with Claudene Maffucci, MD  
[facility]) Appt Note: 2mo fu dm; 2mo fu dm pt rep. r/s appt. ..lws  
 [address] [facility] [address]  
[phone]  
  
   
 [phone]  
  
    
 9/12/2018 10:00 AM  
ESTABLISHED PATIENT with Esther Lara MD  
[facility] ([facility]) Appt Note: annual  
 [address]  [address] [phone]  
  
   
 [address] [place] [address] Upcoming Health Maintenance Date Due INFLUENZA AGE 9 TO ADULT 8/1/2017 FOOT EXAM Q1 9/28/2017 HEMOGLOBIN A1C Q6M 11/12/2017 EYE EXAM RETINAL OR DILATED Q1 12/9/2017 MICROALBUMIN Q1 5/12/2018 LIPID PANEL Q1 5/12/2018 MEDICARE YEARLY EXAM 8/17/2018 GLAUCOMA SCREENING Q2Y 12/9/2018 DTaP/Tdap/Td series (2 - Td) 8/16/2023 Allergies as of 9/6/2017  Review Complete On: 9/6/2017 By: Jeanie Day Severity Noted Reaction Type Reactions Indocin [Indomethacin Sodium]  06/05/2010    Unknown (comments) Lisinopril  04/17/2013    Angioedema Dxed in hospital.  
 Losartan  01/22/2015    Other (comments) Face and throat swelling. Current Immunizations  Reviewed on 12/12/2016 Name Date Influenza Vaccine 10/7/2015, 10/9/2014, 9/26/2013 Influenza Vaccine (Quad) PF 9/28/2016 Influenza Vaccine Split 10/11/2012, 11/23/2011, 10/7/2010 Influenza Vaccine Whole 12/14/2009 Pneumococcal Conjugate (PCV-13) 8/12/2015 Pneumococcal Polysaccharide (PPSV-23) 10/20/2014, 12/7/2012 Tdap 8/16/2013 Zoster Vaccine, Live 5/26/2015 Not reviewed this visit You Were Diagnosed With   
  
 Codes Comments Essential hypertension with goal blood pressure less than 130/85    -  Primary ICD-10-CM: I10 
ICD-9-CM: 401.9 Vitals BP Pulse Resp Height(growth percentile) Weight(growth percentile) SpO2  
 168/80 (BP 1 Location: Left arm, BP Patient Position: Sitting) (!) 59 16 6' 2\" (1.88 m) 264 lb (119.7 kg) 98% BMI Smoking Status 33.9 kg/m2 Former Smoker Vitals History BMI and BSA Data Body Mass Index Body Surface Area 33.9 kg/m 2 2.5 m 2 Preferred Pharmacy Pharmacy Name Phone [facility] [address] [place] [address] [phone] Your Updated Medication List  
  
   
This list is accurate as of: 9/6/17 12:04 PM.  Always use your most recent med list.  
  
  
  
  
 ACCU-CHEK SHAUNA CONTROL SOLN Soln Generic drug:  Blood Glucose Control High&Low  
  
 albuterol 90 mcg/actuation inhaler Commonly known as:  PROVENTIL HFA, VENTOLIN HFA, PROAIR HFA Take 2 Puffs by inhalation every six (6) hours as needed for Wheezing for up to 360 days. Please use generic that is covered  
  
 allopurinol 100 mg tablet Commonly known as:  ZYLOPRIM  
TAKE TWO TABLETS BY MOUTH ONCE DAILY TO  PREVENT  GOUT  
  
 amLODIPine 10 mg tablet Commonly known as:  Keshav Lute Take 1 Tab by mouth daily. aspirin 81 mg chewable tablet Take 81 mg by mouth daily. baclofen 10 mg tablet Commonly known as:  LIORESAL Take 1 Tab by mouth three (3) times daily. Muscle relaxer. benzoyl peroxide 5 % topical gel Apply  to affected area nightly. apply to affected area as directed Blood-Glucose Meter Misc Commonly known as:  TRUE METRIX GLUCOSE METER Use as Directed  
  
 bumetanide 2 mg tablet Commonly known as:  Ilene Rule Take 1 Tab by mouth two (2) times a day. cloNIDine HCl 0.3 mg tablet Commonly known as:  CATAPRES  
TAKE ONE TABLET BY MOUTH THREE TIMES DAILY  
  
 colchicine 0.6 mg tablet Commonly known as:  COLCRYS Take 1 Tab by mouth two (2) times a day. For acute gout. dabigatran etexilate 150 mg capsule Commonly known as:  PRADAXA Take 1 Cap by mouth two (2) times a day. docusate sodium 100 mg Tab Take 1 Tab by mouth two (2) times a day. empagliflozin 10 mg tablet Commonly known as:  Benjamin Hill Take 1 Tab by mouth daily. ezetimibe 10 mg tablet Commonly known as:  Arlen Jurado Take 1 Tab by mouth daily. fluticasone 50 mcg/actuation nasal spray Commonly known as:  FLONASE  
1 spray BL daily FOLBEE PLUS Tab tablet Generic drug:  b complex-vitamin c-folic acid 5mg TAKE ONE TABLET BY MOUTH ONCE DAILY  
  
 gabapentin 300 mg capsule Commonly known as:  NEURONTIN  
TAKE ONE CAPSULE BY MOUTH THREE TIMES DAILY  
  
 glipiZIDE 5 mg tablet Commonly known as:  Maye Leary INCREASE TO 2 TABLETS BY MOUTH 20 MINUTES BEFORE BREAKFAST AND 2 TABLET TWENTY MINUTES BEFORE DINNER  
  
 glucose blood VI test strips strip Commonly known as:  TRUE METRIX GLUCOSE TEST STRIP Test 2 times daily Dx Coed E11.65  
  
 hydrALAZINE 50 mg tablet Commonly known as:  APRESOLINE Take 1 Tab by mouth three (3) times daily. HYDROcodone-acetaminophen  mg tablet Commonly known as:  [place] Collar Take 1 Tab by mouth every six (6) hours as needed for Pain. Iron 325 mg (65 mg iron) tablet Generic drug:  ferrous sulfate Take 1 Tab by mouth Daily (before breakfast). labetalol 200 mg tablet Commonly known as:  NORMODYNE  
TAKE ONE-HALF TABLET BY MOUTH TWICE DAILY FOR HYPERTENSION * Lancets Misc Test 2 times daily Dx Code E11.65  
  
 * lancets 30 gauge Misc  
  
 lovastatin 40 mg tablet Commonly known as:  MEVACOR  
TAKE 1 TABLET BY MOUTH NIGHTLY  
  
 magnesium oxide 400 mg tablet Commonly known as:  MAG-OX Take 1 Tab by mouth daily. melatonin 3 mg tablet Take 10 mg by mouth nightly. metFORMIN 500 mg tablet Commonly known as:  GLUCOPHAGE Increase to two TABLETS twice daily with meals  
  
 montelukast 10 mg tablet Commonly known as:  SINGULAIR Take 1 Tab by mouth daily. omeprazole 20 mg capsule Commonly known as:  PRILOSEC Take 1 Cap by mouth daily. Fax to [facility]  
  
 potassium chloride 10 mEq tablet Commonly known as:  K-DUR Take 1 Tab by mouth three (3) times daily. tamsulosin 0.4 mg capsule Commonly known as:  FLOMAX TAKE ONE CAPSULE BY MOUTH ONCE DAILY * Notice: This list has 2 medication(s) that are the same as other medications prescribed for you. Read the directions carefully, and ask your doctor or other care provider to review them with you. We Performed the Following AMB POC EKG ROUTINE W/ 12 LEADS, INTER & REP [58178 CPT(R)] Patient Instructions Please schedule a 1 year follow up visit with Dr. Susana Torres in [place]. [facility]! Halle Bernabe introduces [employer] patient portal. Now you can access parts of your medical record, email your doctor's office, and request medication refills online. 1. In your internet browser, go to [email or web address] 2. Click on the First Time User? Click Here link in the Sign In box. You will see the New Member Sign Up page. 3. Enter your [employer] Access Code exactly as it appears below. You will not need to use this code after youve completed the sign-up process. If you do not sign up before the expiration date, you must request a new code. · [employer] Access Code: [email or web address] Expires: 11/14/2017  2:23 PM 
 
 4. Enter the last four digits of your Social Security Number (xxxx) and Date of Birth (mm/dd/yyyy) as indicated and click Submit. You will be taken to the next sign-up page. 5. Create a [employer] ID. This will be your [employer] login ID and cannot be changed, so think of one that is secure and easy to remember. 6. Create a [employer] password. You can change your password at any time. 7. Enter your Password Reset Question and Answer. This can be used at a later time if you forget your password. 8. Enter your e-mail address. You will receive e-mail notification when new information is available in [address] 9. Click Sign Up. You can now view and download portions of your medical record. 10. Click the Download Summary menu link to download a portable copy of your medical information. If you have questions, please visit the Frequently Asked Questions section of the [employer] website. Remember, [employer] is NOT to be used for urgent needs. For medical emergencies, dial 911. Now available from your iPhone and Android! Please provide this summary of care documentation to your next provider. Your primary care clinician is listed as [address]. If you have any questions after today's visit, please call [phone].

## 2017-09-18 DIAGNOSIS — I10 ESSENTIAL HYPERTENSION: Chronic | ICD-10-CM

## 2017-09-18 DIAGNOSIS — I48.19 PERSISTENT ATRIAL FIBRILLATION (HCC): ICD-10-CM

## 2017-09-19 RX ORDER — LABETALOL 200 MG/1
TABLET, FILM COATED ORAL
Qty: 90 TAB | Refills: 3 | Status: SHIPPED | OUTPATIENT
Start: 2017-09-19 | End: 2018-10-01 | Stop reason: SDUPTHER

## 2017-09-20 ENCOUNTER — TELEPHONE (OUTPATIENT)
Dept: FAMILY MEDICINE CLINIC | Age: 77
End: 2017-09-20

## 2017-09-20 NOTE — TELEPHONE ENCOUNTER
[facility] called regarding the order for Lidocaine cream that they sent. Please call with status of that at  [phone].

## 2017-09-20 NOTE — TELEPHONE ENCOUNTER
Phone call to patient to see if he is really using this medication and if he would like to use [facility] [employer]. Patients states that he would like to use [facility] in [place], not [facility] [employer]. He states that he already has two containers of Lidocaine at his house now that he has not used.  He is not sure of the strength of the medication but stated that he would bring one by the office Friday for us to put in his medication list.

## 2017-09-28 ENCOUNTER — TELEPHONE (OUTPATIENT)
Dept: FAMILY MEDICINE CLINIC | Age: 77
End: 2017-09-28

## 2017-09-28 NOTE — TELEPHONE ENCOUNTER
Leatha with [employer] is looking for paperwork for back & knee brace for Mr Pj Snell  Please contact her at

## 2017-10-02 ENCOUNTER — TELEPHONE (OUTPATIENT)
Dept: FAMILY MEDICINE CLINIC | Age: 77
End: 2017-10-02

## 2017-10-02 NOTE — TELEPHONE ENCOUNTER
----- Message from Royal Jenkins sent at 10/2/2017  4:27 PM EDT -----  Regarding: Dr. Yakelin Carlson is requesting a call back with the status of the request for a back and knee brace that was sent to the office on 9/28/17.  ([phone]

## 2017-10-02 NOTE — TELEPHONE ENCOUNTER
Returned phone call to [phone], no answer. Patient will need an appointment to discuss getting a back or knee brace.

## 2017-10-03 DIAGNOSIS — M51.36 DDD (DEGENERATIVE DISC DISEASE), LUMBAR: Chronic | ICD-10-CM

## 2017-10-03 DIAGNOSIS — M54.50 CHRONIC LEFT-SIDED LOW BACK PAIN WITHOUT SCIATICA: ICD-10-CM

## 2017-10-03 DIAGNOSIS — G89.29 CHRONIC LEFT-SIDED LOW BACK PAIN WITHOUT SCIATICA: ICD-10-CM

## 2017-10-03 RX ORDER — HYDROCODONE BITARTRATE AND ACETAMINOPHEN 10; 325 MG/1; MG/1
1 TABLET ORAL
Qty: 90 TAB | Refills: 0 | OUTPATIENT
Start: 2017-10-03

## 2017-10-03 RX ORDER — HYDROCODONE BITARTRATE AND ACETAMINOPHEN 10; 325 MG/1; MG/1
1 TABLET ORAL
Qty: 90 TAB | Refills: 0 | Status: SHIPPED | OUTPATIENT
Start: 2017-10-03 | End: 2017-11-16 | Stop reason: SDUPTHER

## 2017-10-03 NOTE — TELEPHONE ENCOUNTER
Appt scheduled for next week. Rx request for Norco 10/325mg received while pt's PCP out of the office. Pain Contract on file?: YES  Date: 4/7/17  Provider: Belen Stafford   reviewed and appropriate?: YES  Date of last UDS: 5/12/17, reviewed and appropriate    Rx printed and will be available for pick-up at the .

## 2017-10-04 ENCOUNTER — OFFICE VISIT (OUTPATIENT)
Dept: FAMILY MEDICINE CLINIC | Age: 77
End: 2017-10-04

## 2017-10-04 VITALS
SYSTOLIC BLOOD PRESSURE: 148 MMHG | OXYGEN SATURATION: 97 % | RESPIRATION RATE: 20 BRPM | BODY MASS INDEX: 34.65 KG/M2 | WEIGHT: 270 LBS | TEMPERATURE: 96.7 F | HEIGHT: 74 IN | DIASTOLIC BLOOD PRESSURE: 66 MMHG | HEART RATE: 60 BPM

## 2017-10-04 DIAGNOSIS — E11.42 TYPE 2 DIABETES MELLITUS WITH DIABETIC POLYNEUROPATHY, WITHOUT LONG-TERM CURRENT USE OF INSULIN (HCC): Chronic | ICD-10-CM

## 2017-10-04 DIAGNOSIS — Z23 ENCOUNTER FOR IMMUNIZATION: Primary | ICD-10-CM

## 2017-10-04 DIAGNOSIS — I10 ESSENTIAL HYPERTENSION WITH GOAL BLOOD PRESSURE LESS THAN 130/85: Chronic | ICD-10-CM

## 2017-10-04 NOTE — Clinical Note
Miguel requested an adherence check in on several medications-- everything looks good. When he sees you on October 11, may consider prescribing Amlodipine 5mg daily since that's how he's taking it and that should eliminate the adherence issue with [facility]. Thank you!

## 2017-10-04 NOTE — PROGRESS NOTES
CC: Medication Review and Adherence Check-in ([employer])    HPI: Patient was here a couple months ago for an annual wellness visit and we reviewed all of his medications. I called him to do an adherence check-in and the patient stated that he would prefer to come in to review his medications. Adherence Medication to Check on:  Labetalol 200mg - 1/2 tablet twice daily. Prescription in [facility] had patient taking 1 full tablet 3 times a day. I updated the medication in Outcomes and patient has the correct directions on his bottle. Zetia 10mg - take 1 tablet daily. Prescription in [facility] is correct and is also correct on patients bottle. Per the fill dates, patient has been taking this correctly  Amlodipine 10mg - 1/2 tablet daily - prescription in [facility] says patient is taking a full tablet every day. Updated the prescription in Outcomes and will contact the physician to have the directions updated on the prescription. Checks sugars in the morning - 134, 119  No low blood sugars in 50's or 60's like he was having before    Diabetes Medications  Metformin 500mg 2 tabs twice a day  Jardiance 10mg once daily  Glipizide 5mg 2 tablets twice    A/P:  1) Medication Adherence: patient appears to be adherent with all medications. He is very organized with his medications and can correctly identify and tell you how he is taking each prescription. The prescription for Amlodipine needs to be updated to a 5mg/day prescription vs the 10mg/day prescription since the patient is only taking 1/2 a tablet daily now. Will send this info to Dr. Helen Riggins to address at his routine follow-up on October 11.    2) Diabetes: patients last a1c was not at goal <7% (possibly 8% given patients age and issues with hypoglycemia). Patient has appt to follow up with endocrinologist 11/1/17. He states that majority of fasting blood sugars are in range of  and that he has not had any lows (50's or 60's) recently.     3) Health Maintenance: patient is due for a flu shot. High dose flu shot given today without issue by RN. Notification of recommendations will be sent to Dr. [facility] for review. Patient verbalized understanding of information presented. Answered all of the patient's questions. AVS handed and reviewed with patient.     Johan Perez, JorgeD, BCPS, CDE

## 2017-10-04 NOTE — PATIENT INSTRUCTIONS
Influenza Virus Vaccine (By injection)   Influenza Virus Vaccine (in-floo-EN-za VYE-leona VAX-een)  Helps prevent infection with influenza (flu) virus. Brand Name(s): Afluria [id number] Formula, Sarita Haskins 7859-4902 Formula, FluLaval Quadrivalent 5162-1989 Formula, Fluad 6114-8543 Formula, Fluarix Quadrivalent 0849-1730 Formula, Flublok 1268-6461 Formula, Flucelvax Quadrivalent 7841-7790 Formula, Fluvirin 9289-6229 Formula, Fluzone High-Dose 7829-7388 Formula, Fluzone Intradermal Quadrivalent 2585-4683 Formula, Fluzone Quadrivalent 0375-0685 Formula, Medical Provider Single Use EZ Flu Shot 4841-9689, Medical Provider Single Use EZ Flu Shot 9615-1520   There may be other brand names for this medicine. When This Medicine Should Not Be Used: This vaccine is not right for everyone. You should not receive it if you had an allergic reaction to flu vaccine. If you are allergic to eggs, tell the caregiver who is going to give you the injection. Some brands of this vaccine contain egg proteins and could cause an allergic reaction. How to Use This Medicine:   Injectable  · The vaccine is given as a shot into a muscle or into your skin, usually in the shoulder area. The shot could be given in the thigh for babies and young children. · A nurse or other health provider will give you this medicine. · Read and follow the patient instructions that come with this medicine. Talk to your doctor or pharmacist if you have any questions. · A child who is younger than 5years old and who has not had a flu shot before may need 2 shots. The second shot should be given about 1 month after the first.  · Missed dose: Most people need only 1 dose of the vaccine. If your child needs a second dose, it is important for the vaccine to be given on schedule. If you must cancel an appointment, make a new one right away.   Drugs and Foods to Avoid:   Ask your doctor or pharmacist before using any other medicine, including over-the-counter medicines, vitamins, and herbal products. · Tell your doctor if you are using a medicine or treatment that weakens your immune system, such as a steroid, radiation, or cancer treatment. This vaccine may not work as well if you are also using these medicines. However, your doctor may still want you to get the vaccine because it can give you some protection. Warnings While Using This Medicine:   · Tell your doctor if you are pregnant or breastfeeding or if you have a weak immune system. · Tell your doctor if you ever had an unusual reaction to a flu shot, such as Guillain-Barré syndrome, or if you are allergic to latex. · The flu vaccine may not protect everyone who receives it. This vaccine will not treat flu symptoms if you have already been infected with the virus. Possible Side Effects While Using This Medicine:   Call your doctor right away if you notice any of these side effects:  · Allergic reaction: Itching or hives, swelling in your face or hands, swelling or tingling in your mouth or throat, chest tightness, trouble breathing  · Fainting, dizziness, or lightheadedness  · Fever over 103 degrees F  · Seizures  · Severe muscle weakness  If you notice these less serious side effects, talk with your doctor:   · Headache, muscle pain, tiredness  · Irritability or crying (in a child)  · Redness, pain, swelling, soreness, or a lump where the shot was given  If you notice other side effects that you think are caused by this medicine, tell your doctor. Call your doctor for medical advice about side effects. You may report side effects to FDA at 2-269-FDA-9034  © 2017 [address] Information is for End User's use only and may not be sold, redistributed or otherwise used for commercial purposes. The above information is an  only. It is not intended as medical advice for individual conditions or treatments.  Talk to your doctor, nurse or pharmacist before following any medical regimen to see if it is safe and effective for you. Keep up the good work with taking all of your medications! I will update [employer] and let them know how you are taking your medication.

## 2017-10-04 NOTE — PROGRESS NOTES
Reviewed record in preparation for visit and have necessary documentation  Pt did not bring medication to office visit for review    Goals that were addressed and/or need to be completed during or after this appointment include   Health Maintenance Due   Topic Date Due    INFLUENZA AGE 9 TO ADULT  08/01/2017    FOOT EXAM Q1  09/28/2017

## 2017-10-11 ENCOUNTER — OFFICE VISIT (OUTPATIENT)
Dept: FAMILY MEDICINE CLINIC | Age: 77
End: 2017-10-11

## 2017-10-11 ENCOUNTER — TELEPHONE (OUTPATIENT)
Dept: FAMILY MEDICINE CLINIC | Age: 77
End: 2017-10-11

## 2017-10-11 VITALS
RESPIRATION RATE: 20 BRPM | SYSTOLIC BLOOD PRESSURE: 148 MMHG | HEIGHT: 74 IN | WEIGHT: 267 LBS | HEART RATE: 57 BPM | TEMPERATURE: 97.9 F | OXYGEN SATURATION: 96 % | BODY MASS INDEX: 34.27 KG/M2 | DIASTOLIC BLOOD PRESSURE: 62 MMHG

## 2017-10-11 DIAGNOSIS — K21.9 GASTROESOPHAGEAL REFLUX DISEASE WITHOUT ESOPHAGITIS: ICD-10-CM

## 2017-10-11 DIAGNOSIS — E87.6 HYPOKALEMIA: ICD-10-CM

## 2017-10-11 DIAGNOSIS — I10 ESSENTIAL HYPERTENSION WITH GOAL BLOOD PRESSURE LESS THAN 130/85: Chronic | ICD-10-CM

## 2017-10-11 DIAGNOSIS — E78.00 HYPERCHOLESTEROLEMIA: Chronic | ICD-10-CM

## 2017-10-11 DIAGNOSIS — N18.9 CHRONIC KIDNEY DISEASE, UNSPECIFIED CKD STAGE: Chronic | ICD-10-CM

## 2017-10-11 DIAGNOSIS — G62.9 NEUROPATHY: Chronic | ICD-10-CM

## 2017-10-11 DIAGNOSIS — J44.9 CHRONIC OBSTRUCTIVE PULMONARY DISEASE, UNSPECIFIED COPD TYPE (HCC): Chronic | ICD-10-CM

## 2017-10-11 DIAGNOSIS — M10.30 GOUT DUE TO RENAL IMPAIRMENT, UNSPECIFIED CHRONICITY, UNSPECIFIED SITE: ICD-10-CM

## 2017-10-11 DIAGNOSIS — E11.42 TYPE 2 DIABETES MELLITUS WITH DIABETIC POLYNEUROPATHY, WITHOUT LONG-TERM CURRENT USE OF INSULIN (HCC): Chronic | ICD-10-CM

## 2017-10-11 DIAGNOSIS — I48.0 PAF (PAROXYSMAL ATRIAL FIBRILLATION) (HCC): Primary | Chronic | ICD-10-CM

## 2017-10-11 DIAGNOSIS — N18.30 CHRONIC KIDNEY DISEASE, STAGE III (MODERATE) (HCC): ICD-10-CM

## 2017-10-11 DIAGNOSIS — M51.36 DDD (DEGENERATIVE DISC DISEASE), LUMBAR: Chronic | ICD-10-CM

## 2017-10-11 RX ORDER — MONTELUKAST SODIUM 10 MG/1
10 TABLET ORAL DAILY
Qty: 90 TAB | Refills: 3 | Status: SHIPPED | OUTPATIENT
Start: 2017-10-11 | End: 2018-09-05 | Stop reason: SDUPTHER

## 2017-10-11 RX ORDER — BUMETANIDE 2 MG/1
2 TABLET ORAL 2 TIMES DAILY
Qty: 180 TAB | Refills: 1 | Status: SHIPPED | OUTPATIENT
Start: 2017-10-11 | End: 2019-01-07 | Stop reason: SDUPTHER

## 2017-10-11 RX ORDER — LOVASTATIN 40 MG/1
40 TABLET ORAL
Qty: 90 TAB | Refills: 3 | Status: SHIPPED | OUTPATIENT
Start: 2017-10-11 | End: 2018-12-18 | Stop reason: SDUPTHER

## 2017-10-11 RX ORDER — TAMSULOSIN HYDROCHLORIDE 0.4 MG/1
0.4 CAPSULE ORAL DAILY
Qty: 90 CAP | Refills: 3 | Status: SHIPPED | OUTPATIENT
Start: 2017-10-11 | End: 2019-01-07 | Stop reason: SDUPTHER

## 2017-10-11 RX ORDER — POTASSIUM CHLORIDE 750 MG/1
10 TABLET, EXTENDED RELEASE ORAL 2 TIMES DAILY
Qty: 180 TAB | Refills: 3 | Status: SHIPPED | OUTPATIENT
Start: 2017-10-11 | End: 2018-09-06 | Stop reason: SDUPTHER

## 2017-10-11 RX ORDER — OMEPRAZOLE 20 MG/1
20 CAPSULE, DELAYED RELEASE ORAL DAILY
Qty: 90 CAP | Refills: 3 | Status: SHIPPED | OUTPATIENT
Start: 2017-10-11 | End: 2019-01-07 | Stop reason: SDUPTHER

## 2017-10-11 NOTE — PROGRESS NOTES
Reviewed record in preparation for visit and have necessary documentation  Pt did not bring medication to office visit for review    Goals that were addressed and/or need to be completed during or after this appointment include   Health Maintenance Due   Topic Date Due    FOOT EXAM Q1  09/28/2017

## 2017-10-11 NOTE — TELEPHONE ENCOUNTER
----- Message from Melvin Perdue sent at 10/11/2017  1:06 PM EDT -----  Regarding: Dr. Chandra Cedillo with [facility] is requesting the status of a back and knee brace prior authorization requested via fax on 06/19/17 and 10/10/17. Best contact number is ([phone], no pt reference number.

## 2017-10-11 NOTE — MR AVS SNAPSHOT
Visit Information Date & Time Provider Department Dept. Phone Encounter #  
 10/11/2017  9:35 AM Delfin Echavarria MD  [facility] [place] [id number] Follow-up Instructions Return in about 3 months (around 1/11/2018). Your Appointments 11/1/2017 11:45 AM  
ROUTINE CARE with Arnaud Minor MD  
Care Diabetes [facility] Endocrinology [facility]) Appt Note: 2mo fu dm; 2mo fu dm pt rep. r/s appt. ..lws  
 [address] [facility] [address]  
[phone]  
  
   
 [phone]  
  
    
 9/12/2018 10:00 AM  
ESTABLISHED PATIENT with Grant Hardwick MD  
[facility] ([facility]) Appt Note: annual  
 [address]  [address] [facility] [address]  
  
   
 [address] [address] Upcoming Health Maintenance Date Due HEMOGLOBIN A1C Q6M 11/12/2017 EYE EXAM RETINAL OR DILATED Q1 12/9/2017 MICROALBUMIN Q1 5/12/2018 LIPID PANEL Q1 5/12/2018 MEDICARE YEARLY EXAM 8/17/2018 FOOT EXAM Q1 10/11/2018 GLAUCOMA SCREENING Q2Y 12/9/2018 DTaP/Tdap/Td series (2 - Td) 8/16/2023 Allergies as of 10/11/2017  Review Complete On: 10/11/2017 By: Ruffin Gaucher, LPN Severity Noted Reaction Type Reactions Indocin [Indomethacin Sodium]  06/05/2010    Unknown (comments) Lisinopril  04/17/2013    Angioedema Dxed in hospital.  
 Losartan  01/22/2015    Other (comments) Face and throat swelling. Current Immunizations  Reviewed on 12/12/2016 Name Date Influenza High Dose Vaccine PF 10/4/2017 Influenza Vaccine 10/7/2015, 10/9/2014, 9/26/2013 Influenza Vaccine (Quad) PF 9/28/2016 Influenza Vaccine Split 10/11/2012, 11/23/2011, 10/7/2010 Influenza Vaccine Whole 12/14/2009 Pneumococcal Conjugate (PCV-13) 8/12/2015 Pneumococcal Polysaccharide (PPSV-23) 10/20/2014, 12/7/2012 Tdap 8/16/2013 Zoster Vaccine, Live 5/26/2015 Not reviewed this visit You Were Diagnosed With   
  
 Codes Comments PAF (paroxysmal atrial fibrillation) ([facility] [[email or web address] 75.)    -  Primary ICD-10-CM: I48.0 ICD-9-CM: 427.31 Type 2 diabetes mellitus with diabetic polyneuropathy, without long-term current use of insulin (HCC)     ICD-10-CM: E11.42 
ICD-9-CM: 250.60, 357.2 Essential hypertension with goal blood pressure less than 130/85     ICD-10-CM: I10 
ICD-9-CM: 401.9 Chronic obstructive pulmonary disease, unspecified COPD type ([facility] [[email or web address]] 75.)     ICD-10-CM: J44.9 ICD-9-CM: 949 Gastroesophageal reflux disease without esophagitis     ICD-10-CM: K21.9 ICD-9-CM: 530.81 Hypokalemia     ICD-10-CM: E87.6 ICD-9-CM: 276.8 Hypercholesterolemia     ICD-10-CM: E78.00 ICD-9-CM: 272.0 Neuropathy     ICD-10-CM: G62.9 ICD-9-CM: 355.9 DDD (degenerative disc disease), lumbar     ICD-10-CM: M51.36 
ICD-9-CM: 722.52 Chronic kidney disease, unspecified CKD stage     ICD-10-CM: N18.9 ICD-9-CM: 585.9 Gout due to renal impairment, unspecified chronicity, unspecified site     ICD-10-CM: M10.30 ICD-9-CM: 274.10 Chronic kidney disease, stage III (moderate)     ICD-10-CM: N18.3 ICD-9-CM: 535. 3 Vitals BP Pulse Temp Resp Height(growth percentile) Weight(growth percentile) 147/71 (BP 1 Location: Right arm, BP Patient Position: Sitting) 62 97.9 °F (36.6 °C) (Oral) 20 6' 2\" (1.88 m) 267 lb (121.1 kg) SpO2 BMI Smoking Status 96% 34.28 kg/m2 Former Smoker Vitals History BMI and BSA Data Body Mass Index Body Surface Area  
 34.28 kg/m 2 2.51 m 2 Preferred Pharmacy Pharmacy Name Phone [facility] [address] [place] [address] [address] [phone] Your Updated Medication List  
  
   
This list is accurate as of: 10/11/17 10:06 AM.  Always use your most recent med list.  
  
  
  
  
 ACCU-CHEK SHAUNA CONTROL SOLN Soln Generic drug:  Blood Glucose Control High&Low  
  
 albuterol 90 mcg/actuation inhaler Commonly known as:  PROVENTIL HFA, VENTOLIN HFA, PROAIR HFA Take 2 Puffs by inhalation every six (6) hours as needed for Wheezing for up to 360 days. Please use generic that is covered  
  
 allopurinol 100 mg tablet Commonly known as:  ZYLOPRIM  
TAKE TWO TABLETS BY MOUTH ONCE DAILY TO  PREVENT  GOUT  
  
 amLODIPine 10 mg tablet Commonly known as:  Myriam Fraction Take 1 Tab by mouth daily. aspirin 81 mg chewable tablet Take 81 mg by mouth daily. baclofen 10 mg tablet Commonly known as:  LIORESAL Take 1 Tab by mouth three (3) times daily. Muscle relaxer. benzoyl peroxide 5 % topical gel Apply  to affected area nightly. apply to affected area as directed Blood-Glucose Meter Misc Commonly known as:  TRUE METRIX GLUCOSE METER Use as Directed  
  
 bumetanide 2 mg tablet Commonly known as:  Angelita Montez Take 1 Tab by mouth two (2) times a day. cloNIDine HCl 0.3 mg tablet Commonly known as:  CATAPRES  
TAKE ONE TABLET BY MOUTH THREE TIMES DAILY  
  
 colchicine 0.6 mg tablet Commonly known as:  COLCRYS Take 1 Tab by mouth two (2) times a day. For acute gout. dabigatran etexilate 150 mg capsule Commonly known as:  PRADAXA Take 1 Cap by mouth two (2) times a day. docusate sodium 100 mg Tab Take 1 Tab by mouth two (2) times a day. empagliflozin 10 mg tablet Commonly known as:  Governor Lyndsey Take 1 Tab by mouth daily. ezetimibe 10 mg tablet Commonly known as:  Gerardine Maryland Take 1 Tab by mouth daily. fluticasone 50 mcg/actuation nasal spray Commonly known as:  FLONASE  
1 spray BL daily FOLBEE PLUS Tab tablet Generic drug:  b complex-vitamin c-folic acid 5mg TAKE ONE TABLET BY MOUTH ONCE DAILY  
  
 gabapentin 300 mg capsule Commonly known as:  NEURONTIN  
TAKE ONE CAPSULE BY MOUTH THREE TIMES DAILY  
  
 glipiZIDE 5 mg tablet Commonly known as:  Jazz Velazquez INCREASE TO 2 TABLETS BY MOUTH 20 MINUTES BEFORE BREAKFAST AND 2 TABLET TWENTY MINUTES BEFORE DINNER  
  
 glucose blood VI test strips strip Commonly known as:  TRUE METRIX GLUCOSE TEST STRIP Test 2 times daily Dx Coed E11.65  
  
 hydrALAZINE 50 mg tablet Commonly known as:  APRESOLINE Take 1 Tab by mouth three (3) times daily. HYDROcodone-acetaminophen  mg tablet Commonly known as:  Zen Herrlich Take 1 Tab by mouth every six (6) hours as needed for Pain. [place] prescription for Dr. Balwinder Hope. Iron 325 mg (65 mg iron) tablet Generic drug:  ferrous sulfate Take 1 Tab by mouth Daily (before breakfast). labetalol 200 mg tablet Commonly known as:  NORMODYNE  
TAKE ONE-HALF TABLET BY MOUTH TWICE DAILY FOR HYPERTENSION * Lancets Misc Test 2 times daily Dx Code E11.65  
  
 * lancets 30 gauge Misc  
  
 lovastatin 40 mg tablet Commonly known as:  MEVACOR Take 1 Tab by mouth nightly.  
  
 magnesium oxide 400 mg tablet Commonly known as:  MAG-OX Take 1 Tab by mouth daily. melatonin 3 mg tablet Take 10 mg by mouth nightly. metFORMIN 500 mg tablet Commonly known as:  GLUCOPHAGE Increase to two TABLETS twice daily with meals  
  
 montelukast 10 mg tablet Commonly known as:  SINGULAIR Take 1 Tab by mouth daily. omeprazole 20 mg capsule Commonly known as:  PRILOSEC Take 1 Cap by mouth daily. Fax to [facility]  
  
 potassium chloride 10 mEq tablet Commonly known as:  K-DUR Take 1 Tab by mouth two (2) times a day. Indications: hypokalemia  
  
 tamsulosin 0.4 mg capsule Commonly known as:  FLOMAX Take 1 Cap by mouth daily. * Notice: This list has 2 medication(s) that are the same as other medications prescribed for you. Read the directions carefully, and ask your doctor or other care provider to review them with you. Prescriptions Sent to Pharmacy Refills empagliflozin (JARDIANCE) 10 mg tablet 0 Sig: Take 1 Tab by mouth daily. Class: Normal  
 Pharmacy: [address] [place] [address]. [address] [place] [address] Ph #: [phone] Route: Oral  
 montelukast (SINGULAIR) 10 mg tablet 3 Sig: Take 1 Tab by mouth daily. Class: Normal  
 Pharmacy: [address] [place] [address] [address] [place] [address] Ph #: [phone] Route: Oral  
 tamsulosin (FLOMAX) 0.4 mg capsule 3 Sig: Take 1 Cap by mouth daily. Class: Normal  
 Pharmacy: [address] [place] [address] [address] [place] [address] Ph #: [phone] Route: Oral  
 lovastatin (MEVACOR) 40 mg tablet 3 Sig: Take 1 Tab by mouth nightly. Class: Normal  
 Pharmacy: [address] [place] [address]. [address] [place] [address] Ph #: [phone] Route: Oral  
 bumetanide (BUMEX) 2 mg tablet 1 Sig: Take 1 Tab by mouth two (2) times a day. Class: Normal  
 Pharmacy: [facility] [place] [address] Ph #: [phone] Route: Oral  
 omeprazole (PRILOSEC) 20 mg capsule 3 Sig: Take 1 Cap by mouth daily. [facility] to [facility] Class: Normal  
 Pharmacy: [facility] [place] [address] Ph #: [phone] Route: Oral  
 potassium chloride (K-DUR) 10 mEq tablet 3 Sig: Take 1 Tab by mouth two (2) times a day. Indications: hypokalemia Class: Normal  
 Pharmacy: [facility] [place] [address] Ph #: [phone] Route: Oral  
  
We Performed the Following HEMOGLOBIN A1C WITH EAG [99671 CPT(R)] HEMOGLOBIN C202616 CPT(R)]  DIABETES FOOT EXAM [7 Custom] LIPID PANEL [21202 CPT(R)] METABOLIC PANEL, COMPREHENSIVE [17286 CPT(R)] MICROALBUMIN, UR, RAND W/ MICROALBUMIN/CREA RATIO J9961059 CPT(R)] PHOSPHORUS [79703 CPT(R)] PROT+CREATU (RANDOM) J8750824 CPT(R)] URIC ACID H915845 CPT(R)] VITAMIN D, 25 HYDROXY [id number] CPT(R)] Follow-up Instructions Return in about 3 months (around 1/11/2018). Patient Instructions Diabetes Foot Health: Care Instructions Your Care Instructions When you have diabetes, your feet need extra care and attention. Diabetes can damage the nerve endings and blood vessels in your feet, making you less likely to notice when your feet are injured. Diabetes also limits your body's ability to fight infection and get blood to areas that need it. If you get a minor foot injury, it could become an ulcer or a serious infection. With good foot care, you can prevent most of these problems. Caring for your feet can be quick and easy. Most of the care can be done when you are bathing or getting ready for bed. Follow-up care is a key part of your treatment and safety. Be sure to make and go to all appointments, and call your doctor if you are having problems. Its also a good idea to know your test results and keep a list of the medicines you take. How can you care for yourself at home? · Keep your blood sugar close to normal by watching what and how much you eat, monitoring blood sugar, taking medicines if prescribed, and getting regular exercise. · Do not smoke. Smoking affects blood flow and can make foot problems worse. If you need help quitting, talk to your doctor about stop-smoking programs and medicines. These can increase your chances of quitting for good. · Eat a diet that is low in fats. High fat intake can cause fat to build up in your blood vessels and decrease blood flow. · Inspect your feet daily for blisters, cuts, cracks, or sores. If you cannot see well, use a mirror or have someone help you. · Take care of your feet: 
[facility] AUTHORITY your feet every day. Use warm (not hot) water. Check the water temperature with your wrists or other part of your body, not your feet. ¨ Dry your feet well. Pat them dry.  Do not rub the skin on your feet too hard. Dry well between your toes. If the skin on your feet stays moist, bacteria or a fungus can grow, which can lead to infection. ¨ Keep your skin soft. Use moisturizing skin cream to keep the skin on your feet soft and prevent calluses and cracks. But do not put the cream between your toes, and stop using any cream that causes a rash. ¨ Clean underneath your toenails carefully. Do not use a sharp object to clean underneath your toenails. Use the blunt end of a nail file or other rounded tool. ¨ Trim and file your toenails straight across to prevent ingrown toenails. Use a nail clipper, not scissors. Use an emery board to smooth the edges. · Change socks daily. Socks without seams are best, because seams often rub the feet. You can find socks for people with diabetes from specialty catalogs. · Look inside your shoes every day for things like gravel or torn linings, which could cause blisters or sores. · Buy shoes that fit well: 
¨ Look for shoes that have plenty of space around the toes. This helps prevent bunions and blisters. ¨ Try on shoes while wearing the kind of socks you will usually wear with the shoes. ¨ Avoid plastic shoes. They may rub your feet and cause blisters. Good shoes should be made of materials that are flexible and breathable, such as leather or cloth. ¨ Break in new shoes slowly by wearing them for no more than an hour a day for several days. Take extra time to check your feet for red areas, blisters, or other problems after you wear new shoes. · Do not go barefoot. Do not wear sandals, and do not wear shoes with very thin soles. Thin soles are easy to puncture. They also do not protect your feet from hot pavement or cold weather. · Have your doctor check your feet during each visit. If you have a foot problem, see your doctor. Do not try to treat an early foot problem at home.  Home remedies or treatments that you can buy without a prescription (such as corn removers) can be harmful. · Always get early treatment for foot problems. A minor irritation can lead to a major problem if not properly cared for early. When should you call for help? Call your doctor now or seek immediate medical care if: 
· You have a foot sore, an ulcer or break in the skin that is not healing after 4 days, bleeding corns or calluses, or an ingrown toenail. · You have blue or black areas, which can mean bruising or blood flow problems. · You have peeling skin or tiny blisters between your toes or cracking or oozing of the skin. · You have a fever for more than 24 hours and a foot sore. · You have new numbness or tingling in your feet that does not go away after you move your feet or change positions. · You have unexplained or unusual swelling of the foot or ankle. Watch closely for changes in your health, and be sure to contact your doctor if: 
· You cannot do proper foot care. Where can you learn more? Go to http://otto-cindy.info/. Enter A739 in the search box to learn more about \"Diabetes Foot Health: Care Instructions. \" Current as of: March 13, 2017 Content Version: 11.3 © 6307-5885 [employer]. Care instructions adapted under license by [employer] (which disclaims liability or warranty for this information). If you have questions about a medical condition or this instruction, always ask your healthcare professional. [address] [place] [address] any warranty or liability for your use of this information. Please provide this summary of care documentation to your next provider. Your primary care clinician is listed as [address]. If you have any questions after today's visit, please call [phone].

## 2017-10-11 NOTE — PATIENT INSTRUCTIONS
Diabetes Foot Health: Care Instructions  Your Care Instructions    When you have diabetes, your feet need extra care and attention. Diabetes can damage the nerve endings and blood vessels in your feet, making you less likely to notice when your feet are injured. Diabetes also limits your body's ability to fight infection and get blood to areas that need it. If you get a minor foot injury, it could become an ulcer or a serious infection. With good foot care, you can prevent most of these problems. Caring for your feet can be quick and easy. Most of the care can be done when you are bathing or getting ready for bed. Follow-up care is a key part of your treatment and safety. Be sure to make and go to all appointments, and call your doctor if you are having problems. Its also a good idea to know your test results and keep a list of the medicines you take. How can you care for yourself at home? · Keep your blood sugar close to normal by watching what and how much you eat, monitoring blood sugar, taking medicines if prescribed, and getting regular exercise. · Do not smoke. Smoking affects blood flow and can make foot problems worse. If you need help quitting, talk to your doctor about stop-smoking programs and medicines. These can increase your chances of quitting for good. · Eat a diet that is low in fats. High fat intake can cause fat to build up in your blood vessels and decrease blood flow. · Inspect your feet daily for blisters, cuts, cracks, or sores. If you cannot see well, use a mirror or have someone help you. · Take care of your feet:  [facility] AUTHOR[facility] your feet every day. Use warm (not hot) water. Check the water temperature with your wrists or other part of your body, not your feet. ¨ Dry your feet well. Pat them dry. Do not rub the skin on your feet too hard. Dry well between your toes. If the skin on your feet stays moist, bacteria or a fungus can grow, which can lead to infection.   ¨ Keep your skin soft. Use moisturizing skin cream to keep the skin on your feet soft and prevent calluses and cracks. But do not put the cream between your toes, and stop using any cream that causes a rash. ¨ Clean underneath your toenails carefully. Do not use a sharp object to clean underneath your toenails. Use the blunt end of a nail file or other rounded tool. ¨ Trim and file your toenails straight across to prevent ingrown toenails. Use a nail clipper, not scissors. Use an emery board to smooth the edges. · Change socks daily. Socks without seams are best, because seams often rub the feet. You can find socks for people with diabetes from specialty catalogs. · Look inside your shoes every day for things like gravel or torn linings, which could cause blisters or sores. · Buy shoes that fit well:  ¨ Look for shoes that have plenty of space around the toes. This helps prevent bunions and blisters. ¨ Try on shoes while wearing the kind of socks you will usually wear with the shoes. ¨ Avoid plastic shoes. They may rub your feet and cause blisters. Good shoes should be made of materials that are flexible and breathable, such as leather or cloth. ¨ Break in new shoes slowly by wearing them for no more than an hour a day for several days. Take extra time to check your feet for red areas, blisters, or other problems after you wear new shoes. · Do not go barefoot. Do not wear sandals, and do not wear shoes with very thin soles. Thin soles are easy to puncture. They also do not protect your feet from hot pavement or cold weather. · Have your doctor check your feet during each visit. If you have a foot problem, see your doctor. Do not try to treat an early foot problem at home. Home remedies or treatments that you can buy without a prescription (such as corn removers) can be harmful. · Always get early treatment for foot problems. A minor irritation can lead to a major problem if not properly cared for early.   When should you call for help? Call your doctor now or seek immediate medical care if:  · You have a foot sore, an ulcer or break in the skin that is not healing after 4 days, bleeding corns or calluses, or an ingrown toenail. · You have blue or black areas, which can mean bruising or blood flow problems. · You have peeling skin or tiny blisters between your toes or cracking or oozing of the skin. · You have a fever for more than 24 hours and a foot sore. · You have new numbness or tingling in your feet that does not go away after you move your feet or change positions. · You have unexplained or unusual swelling of the foot or ankle. Watch closely for changes in your health, and be sure to contact your doctor if:  · You cannot do proper foot care. Where can you learn more? Go to http://otto-cindy.info/. Enter A739 in the search box to learn more about \"Diabetes Foot Health: Care Instructions. \"  Current as of: March 13, 2017  Content Version: 11.3  © 0496-6713 [facility]. Care instructions adapted under license by [employer] (which disclaims liability or warranty for this information). If you have questions about a medical condition or this instruction, always ask your healthcare professional. [address] any warranty or liability for your use of this information.

## 2017-10-12 LAB
25(OH)D3+25(OH)D2 SERPL-MCNC: 21.3 NG/ML (ref 30–100)
ALBUMIN SERPL-MCNC: 4.2 G/DL (ref 3.5–4.8)
ALBUMIN/CREAT UR: 227.5 MG/G CREAT (ref 0–30)
ALBUMIN/GLOB SERPL: 1.8 {RATIO} (ref 1.2–2.2)
ALP SERPL-CCNC: 92 IU/L (ref 39–117)
ALT SERPL-CCNC: 28 IU/L (ref 0–44)
AST SERPL-CCNC: 28 IU/L (ref 0–40)
BILIRUB SERPL-MCNC: 0.6 MG/DL (ref 0–1.2)
BUN SERPL-MCNC: 9 MG/DL (ref 8–27)
BUN/CREAT SERPL: 7 (ref 10–24)
CALCIUM SERPL-MCNC: 9.8 MG/DL (ref 8.6–10.2)
CHLORIDE SERPL-SCNC: 100 MMOL/L (ref 96–106)
CHOLEST SERPL-MCNC: 132 MG/DL (ref 100–199)
CO2 SERPL-SCNC: 24 MMOL/L (ref 18–29)
CREAT SERPL-MCNC: 1.29 MG/DL (ref 0.76–1.27)
CREAT UR-MCNC: 110 MG/DL
CREATININE, EXTERNAL: 1.29
EST. AVERAGE GLUCOSE BLD GHB EST-MCNC: 134 MG/DL
GLOBULIN SER CALC-MCNC: 2.3 G/DL (ref 1.5–4.5)
GLUCOSE SERPL-MCNC: 133 MG/DL (ref 65–99)
HBA1C MFR BLD HPLC: 6.3 %
HBA1C MFR BLD: 6.3 % (ref 4.8–5.6)
HDLC SERPL-MCNC: 38 MG/DL
HGB BLD-MCNC: 12.6 G/DL (ref 12.6–17.7)
INTERPRETATION: NORMAL
LDL-C, EXTERNAL: 70
LDLC SERPL CALC-MCNC: 70 MG/DL (ref 0–99)
Lab: NORMAL
MICROALBUMIN UR TEST STR-MCNC: 250.3 MG/DL
MICROALBUMIN UR-MCNC: 250.3 UG/ML
PHOSPHATE SERPL-MCNC: 3.1 MG/DL (ref 2.5–4.5)
POTASSIUM SERPL-SCNC: 4.6 MMOL/L (ref 3.5–5.2)
PROT SERPL-MCNC: 6.5 G/DL (ref 6–8.5)
PROT UR-MCNC: 50.6 MG/DL
PROT/CREAT UR: 460 MG/G CREAT (ref 0–200)
SODIUM SERPL-SCNC: 143 MMOL/L (ref 134–144)
TRIGL SERPL-MCNC: 122 MG/DL (ref 0–149)
URATE SERPL-MCNC: 5.4 MG/DL (ref 3.7–8.6)
VLDLC SERPL CALC-MCNC: 24 MG/DL (ref 5–40)

## 2017-10-12 NOTE — PROGRESS NOTES
Progress Note    Patient: Anthony Menon MRN: [id number]  SSN: [id number]    YOB: 1940  Age: 65 y.o. Sex: male          Subjective:   Multiple complex medical problems necessitating extensive decision making, prolonged chart review and prolonged OV. Encounter Diagnoses   Name Primary?  PAF (paroxysmal atrial fibrillation) ([facility] [place] [facility]): He occasionally has irregular heartbeats that he can feel. He has no chest pain and no increased shortness of breath when these occur. Yes    Type 2 diabetes mellitus with diabetic polyneuropathy, without long-term current use of insulin ([facility] [place] [facility].): A1c is much better than last time. This patient is managed under a comprehensive plan of care for Diabetes. Overall the patient feels well with good energy level. Pertinent Labs:   Lab Results   Component Value Date/Time    Hemoglobin A1c 6.3 10/11/2017 02:51 PM    Hemoglobin A1c 8.4 05/12/2017 12:28 PM    Hemoglobin A1c 8.2 12/12/2016 09:42 AM      Body mass index is 34.28 kg/(m^2). Lab Results   Component Value Date/Time    LDL, calculated 70 10/11/2017 02:51 PM         Lab Results   Component Value Date/Time    Sodium 142 05/12/2017 12:28 PM    Potassium 3.9 05/12/2017 12:28 PM    Chloride 98 05/12/2017 12:28 PM    CO2 24 10/11/2017 02:51 PM    Anion gap 12 10/07/2010 09:27 AM    Glucose 133 10/11/2017 02:51 PM    BUN 9 10/11/2017 02:51 PM    Creatinine 1.29 10/11/2017 02:51 PM    BUN/Creatinine ratio 7 10/11/2017 02:51 PM    GFR est AA 61 10/11/2017 02:51 PM    GFR est non-AA 53 10/11/2017 02:51 PM    Calcium 9.8 10/11/2017 02:51 PM    AST (SGOT) 28 10/11/2017 02:51 PM    Alk.  phosphatase 92 10/11/2017 02:51 PM    Protein, total 6.5 10/11/2017 02:51 PM    Albumin 4.2 10/11/2017 02:51 PM    Globulin 2.7 10/07/2010 09:27 AM    A-G Ratio 1.8 10/11/2017 02:51 PM    ALT (SGPT) 28 10/11/2017 02:51 PM     Lab Results   Component Value Date/Time    Microalb/Creat ratio (ug/mg creat.) 227.5 10/11/2017 02:51 PM Frequency of home glucose testing: He says his blood  sugars have been less than 120 in general    Last eye exam: In past 12 months. Last foot exam: This year. Polyuria, polyphagia or polydipsia: No   Retinopathy: No   Neuropathy SX: No    Low blood sugar symptoms: No   Dietary compliance: Good   Medication compliance:Good   On ASA: Yes   Depression: No   CKD:no     Wt Readings from Last 3 Encounters:   10/11/17 267 lb (121.1 kg)   10/04/17 270 lb (122.5 kg)   09/06/17 264 lb (119.7 kg)        History   Smoking Status    Former Smoker    Types: Cigarettes    Quit date: 6/20/2003   Smokeless Tobacco    Never Used       Diabetic Consultants:Dr. Steven Blankenship. All the patient's questions regarding medications, diet and exercise were answered. Goal of A1C of less than 7.5% is our goal.   Our overall goal is to reduce or eliminate the long term consequences of poorly controlled diabetes.  Essential hypertension with goal blood pressure less than 130/85: Blood pressure is elevated today. BP Readings from Last 3 Encounters:   10/11/17 148/62   10/04/17 148/66   09/06/17 168/80     The patient reports:  taking medications as instructed, no medication side effects noted, no TIA's, no chest pain on exertion, no dyspnea on exertion, no swelling of ankles. Lab Results   Component Value Date/Time    Sodium 142 05/12/2017 12:28 PM    Potassium 3.9 05/12/2017 12:28 PM    Chloride 98 05/12/2017 12:28 PM    CO2 24 10/11/2017 02:51 PM    Anion gap 12 10/07/2010 09:27 AM    Glucose 133 10/11/2017 02:51 PM    BUN 9 10/11/2017 02:51 PM    Creatinine 1.29 10/11/2017 02:51 PM    BUN/Creatinine ratio 7 10/11/2017 02:51 PM    GFR est AA 61 10/11/2017 02:51 PM    GFR est non-AA 53 10/11/2017 02:51 PM    Calcium 9.8 10/11/2017 02:51 PM    Bilirubin, total 0.6 10/11/2017 02:51 PM    AST (SGOT) 28 10/11/2017 02:51 PM    Alk.  phosphatase 92 10/11/2017 02:51 PM    Protein, total 6.5 10/11/2017 02:51 PM    Albumin 4.2 10/11/2017 02:51 PM    Globulin 2.7 10/07/2010 09:27 AM    A-G Ratio 1.8 10/11/2017 02:51 PM    ALT (SGPT) 28 10/11/2017 02:51 PM     Our goal is to normalize the blood pressure to decrease the risks of strokes and heart attacks. The patient is in agreement with the plan.  Chronic obstructive pulmonary disease, unspecified COPD type ([facility]):  SpO2 Readings from Last 3 Encounters:   10/11/17 96%   10/04/17 97%   09/06/17 98%     Oxygen: He currently is not on home oxygen therapy. Symptoms: chronic dyspnea: severity = moderate: course of sx: gradually worsening. Patient does not smoke cigarettes. Compliant to medications.  Gastroesophageal reflux disease without esophagitis:  Current control of Symptoms:good  Hiatal Hernia:no  Current Medications:omeprazole  The patient has no history melena or bright red blood in the stools. The patient avoids high dose aspirin and NSAID therapy. The patient is aware of diet changes needed, elevating the head of the bed and appropriate use of antacids.  Hypokalemia:  Lab Results   Component Value Date/Time    Sodium 142 05/12/2017 12:28 PM    Potassium 3.9 05/12/2017 12:28 PM    Chloride 98 05/12/2017 12:28 PM    CO2 24 10/11/2017 02:51 PM    Anion gap 12 10/07/2010 09:27 AM    Glucose 133 10/11/2017 02:51 PM    BUN 9 10/11/2017 02:51 PM    Creatinine 1.29 10/11/2017 02:51 PM    BUN/Creatinine ratio 7 10/11/2017 02:51 PM    GFR est AA 61 10/11/2017 02:51 PM    GFR est non-AA 53 10/11/2017 02:51 PM    Calcium 9.8 10/11/2017 02:51 PM          Hypercholesterolemia:  Cardiovascular risks for him are: LDL goal is under 80  diabetic  hypertension  hyperlipidemia. Currently he takes Mevacor (lovastatin) , 40 mg.   Lab Results   Component Value Date/Time    Cholesterol, total 132 10/11/2017 02:51 PM    HDL Cholesterol 38 10/11/2017 02:51 PM    LDL, calculated 70 10/11/2017 02:51 PM    Triglyceride 122 10/11/2017 02:51 PM    CHOL/HDL Ratio 3.7 10/07/2010 09:27 AM     Lab Results   Component Value Date/Time    ALT (SGPT) 28 10/11/2017 02:51 PM    AST (SGOT) 28 10/11/2017 02:51 PM    Alk. phosphatase 92 10/11/2017 02:51 PM    Bilirubin, total 0.6 10/11/2017 02:51 PM      Myalgias: No   Fatigue: No   Other side effects: no  Wt Readings from Last 3 Encounters:   10/11/17 267 lb (121.1 kg)   10/04/17 270 lb (122.5 kg)   09/06/17 264 lb (119.7 kg)     The patient is aware of our goal to reduce or eliminate the long term problems (such as strokes and heart attacks) related to poorly controlled hyperlipidemia.  Neuropathy:severe, worst on left foot by testing today.  DDD (degenerative disc disease), lumbar: severe. He can barely walk with a cane. His gait is very abnormal and he has chronic low back pain. He takes hydrocodone for this chronic pain.  Chronic kidney disease, unspecified CKD stage 3: Blood work today shows his kidney function is improved back to low normal.  Nephrologist:   Lab Results   Component Value Date/Time    GFR est AA 61 10/11/2017 02:51 PM    GFR est non-AA 53 10/11/2017 02:51 PM    Creatinine (POC) 1.4 01/02/2015 10:08 AM    Creatinine 1.29 10/11/2017 02:51 PM    BUN 9 10/11/2017 02:51 PM    Sodium 142 05/12/2017 12:28 PM    Potassium 3.9 05/12/2017 12:28 PM    Chloride 98 05/12/2017 12:28 PM    CO2 24 10/11/2017 02:51 PM            Gout due to renal impairment, unspecified chronicity, unspecified site: He has not had any significant gout recently. Lab Results   Component Value Date/Time    Uric acid 5.4 10/11/2017 02:51 PM                   Current and past medical information:    Current Medications after this visit[de-identified]   Current Outpatient Prescriptions   Medication Sig    empagliflozin (JARDIANCE) 10 mg tablet Take 1 Tab by mouth daily.  montelukast (SINGULAIR) 10 mg tablet Take 1 Tab by mouth daily.  tamsulosin (FLOMAX) 0.4 mg capsule Take 1 Cap by mouth daily.     lovastatin (MEVACOR) 40 mg tablet Take 1 Tab by mouth nightly.  bumetanide (BUMEX) 2 mg tablet Take 1 Tab by mouth two (2) times a day.  omeprazole (PRILOSEC) 20 mg capsule Take 1 Cap by mouth daily. Fax to [facility]    potassium chloride (K-DUR) 10 mEq tablet Take 1 Tab by mouth two (2) times a day. Indications: hypokalemia    HYDROcodone-acetaminophen (NORCO)  mg tablet Take 1 Tab by mouth every six (6) hours as needed for Pain. Bridging prescription for Dr. Stephon Castillo.  labetalol (NORMODYNE) 200 mg tablet TAKE ONE-HALF TABLET BY MOUTH TWICE DAILY FOR HYPERTENSION    cloNIDine HCl (CATAPRES) 0.3 mg tablet TAKE ONE TABLET BY MOUTH THREE TIMES DAILY    fluticasone (FLONASE) 50 mcg/actuation nasal spray 1 spray BL daily    albuterol (PROVENTIL HFA, VENTOLIN HFA, PROAIR HFA) 90 mcg/actuation inhaler Take 2 Puffs by inhalation every six (6) hours as needed for Wheezing for up to 360 days. Please use generic that is covered    allopurinol (ZYLOPRIM) 100 mg tablet TAKE TWO TABLETS BY MOUTH ONCE DAILY TO  PREVENT  GOUT (Patient taking differently: Take 1 tablet by mouth every day)    ezetimibe (ZETIA) 10 mg tablet Take 1 Tab by mouth daily.  lancets 30 gauge misc     ACCU-CHEK SHAUNA CONTROL SOLN soln     metFORMIN (GLUCOPHAGE) 500 mg tablet Increase to two TABLETS twice daily with meals    hydrALAZINE (APRESOLINE) 50 mg tablet Take 1 Tab by mouth three (3) times daily.  docusate sodium 100 mg tab Take 1 Tab by mouth two (2) times a day.  magnesium oxide (MAG-OX) 400 mg tablet Take 1 Tab by mouth daily.     gabapentin (NEURONTIN) 300 mg capsule TAKE ONE CAPSULE BY MOUTH THREE TIMES DAILY    Blood-Glucose Meter (TRUE METRIX GLUCOSE METER) misc Use as Directed    glucose blood VI test strips (TRUE METRIX GLUCOSE TEST STRIP) strip Test 2 times daily Dx Coed E11.65    Lancets misc Test 2 times daily Dx Code E11.65    FOLBEE PLUS tab tablet TAKE ONE TABLET BY MOUTH ONCE DAILY    glipiZIDE (GLUCOTROL) 5 mg tablet INCREASE TO 2 TABLETS BY MOUTH 20 MINUTES BEFORE BREAKFAST AND 2 TABLET TWENTY MINUTES BEFORE DINNER    ferrous sulfate (IRON) 325 mg (65 mg iron) tablet Take 1 Tab by mouth Daily (before breakfast).  dabigatran etexilate (PRADAXA) 150 mg capsule Take 1 Cap by mouth two (2) times a day.  baclofen (LIORESAL) 10 mg tablet Take 1 Tab by mouth three (3) times daily. Muscle relaxer.  amLODIPine (NORVASC) 10 mg tablet Take 1 Tab by mouth daily. (Patient taking differently: Take 5 mg by mouth daily.)    colchicine (COLCRYS) 0.6 mg tablet Take 1 Tab by mouth two (2) times a day. For acute gout. (Patient taking differently: Take 0.6 mg by mouth daily. For acute gout.)    melatonin 3 mg tablet Take 10 mg by mouth nightly.  aspirin 81 mg chewable tablet Take 81 mg by mouth daily.  benzoyl peroxide 5 % topical gel Apply  to affected area nightly. apply to affected area as directed     No current facility-administered medications for this visit.         Patient Active Problem List    Diagnosis Date Noted    History of colon polyps 05/02/2016    Prostate cancer ([address] 05/02/2016    Idiopathic gout 05/02/2016    Essential hypertension with goal blood pressure less than 130/85 05/02/2016    CKD (chronic kidney disease) 11/23/2015    COPD (chronic obstructive pulmonary disease) ([address] 04/27/2015    Left upper lobe pneumonia ([address] 04/27/2015    Diabetes with neurologic complications ([facility] [address] [facility] 42/78/1951    Chronic radicular pain of lower back 07/21/2014    Neuropathy 07/16/2013    DDD (degenerative disc disease), lumbar 07/16/2013    Prostate CA ([address] 07/16/2013    Tubulovillous adenoma polyp of colon 07/16/2013    Hoarse voice quality 06/20/2012    Edema 01/12/2012    PAF (paroxysmal atrial fibrillation) ([address]     Unspecified arthropathy, ankle and foot 07/14/2011    Arthritis 07/11/2011    Gout, unspecified     PVD (peripheral vascular disease) ([address]     Hypertension     Hypercholesterolemia     Leg pain     Chronic pain 05/06/2010       Past Medical History:   Diagnosis Date    Acute on chronic diastolic congestive heart failure ([facility] 4/27/2015    Arthritis 7/11/2011    [place] 6/7/2010    Cancer (HCC)     prostate    Chronic diastolic heart failure ([facility] 8/26/2015    CKD (chronic kidney disease) 11/23/2015    Diabetes ([facility]     Gout, unspecified     Hypercholesterolemia     Hypertension     Inguinal lymphadenopathy 2/18/2014    Leg pain     PAF (paroxysmal atrial fibrillation) (HCC)     PAF (paroxysmal atrial fibrillation) (HCC)     PVD (peripheral vascular disease) (HCC)        Allergies   Allergen Reactions    Indocin [Indomethacin Sodium] Unknown (comments)    Lisinopril Angioedema     Dxed in hospital.    Losartan Other (comments)     Face and throat swelling. Past Surgical History:   Procedure Laterality Date    COLONOSCOPY N/A 9/22/2016    COLONOSCOPY performed by Gabbi Ceron MD at [address] [place] [address] HX ORTHOPAEDIC      back and left shoulder x2       Social History     Social History    Marital status: SINGLE     Spouse name: N/A    Number of children: N/A    Years of education: N/A     Social History Main Topics    Smoking status: Former Smoker     Types: Cigarettes     Quit date: 6/20/2003    Smokeless tobacco: Never Used    Alcohol use 0.0 oz/week     0 Standard drinks or equivalent per week      Comment: 2 drinks/month    Drug use: No    Sexual activity: Not Asked     Other Topics Concern    None     Social History Narrative       Review of Systems   Constitutional: Negative. Negative for chills, fever, malaise/fatigue and weight loss. HENT: Negative. Negative for hearing loss. Eyes: Negative. Negative for blurred vision and double vision. Respiratory: Positive for shortness of breath. Negative for cough, hemoptysis and sputum production. Dyspnea on exertion only. Saturations at  rest are good. Cardiovascular: Positive for leg swelling.  Negative for chest pain, palpitations and orthopnea. He has mild edema. Gastrointestinal: Negative. Negative for abdominal pain, blood in stool, heartburn, nausea and vomiting. Genitourinary: Negative. Negative for dysuria, frequency and urgency. Musculoskeletal: Positive for back pain and joint pain. Negative for falls, myalgias and neck pain. Skin: Negative. Negative for rash. Neurological: Positive for sensory change. Negative for dizziness, tingling, tremors, weakness and headaches. Endo/Heme/Allergies: Negative. Psychiatric/Behavioral: Negative. Negative for depression. Objective:     Vitals:    10/11/17 0936 10/11/17 1008   BP: 147/71 148/62   Pulse: 62 (!) 57   Resp: 20    Temp: 97.9 °F (36.6 °C)    TempSrc: Oral    SpO2: 96%    Weight: 267 lb (121.1 kg)    Height: 6' 2\" (1.88 m)       Body mass index is 34.28 kg/(m^2). Physical Exam   Constitutional: He is oriented to person, place, and time and well-developed, well-nourished, and in no distress. HENT:   Head: Normocephalic and atraumatic. Mouth/Throat: Oropharynx is clear and moist.   Eyes: Right eye exhibits no discharge. Left eye exhibits no discharge. No scleral icterus. Neck: No tracheal deviation present. No thyromegaly present. No bruit. Cardiovascular: Normal rate, regular rhythm and normal heart sounds. Pulmonary/Chest: Effort normal and breath sounds normal.   Decreased breath sounds. Abdominal: Soft. Neurological: He is alert and oriented to person, place, and time. Diabetic foot exam:  Sensation: Cannot feel monofilament on the left foot. He can only feel the monofilament in the posterior aspect of his right foot. He is to make sure he has night lights in his room so that he does not lose his balance in the dark. Calluses or corns: no  Pulses: Hard to feel but feet are warm  Fungal nails: Yes     Skin: No rash noted. No erythema.    Psychiatric: Mood and affect normal.   Nursing note and vitals reviewed. Health Maintenance Due   Topic Date Due    HEMOGLOBIN A1C Q6M  11/12/2017         Assessment and orders:       ICD-10-CM ICD-9-CM    1. PAF (paroxysmal atrial fibrillation) ([facility])-adequate control by history     I48.0 427.31    2. Type 2 diabetes mellitus with diabetic polyneuropathy, without long-term current use of insulin ([facility])-improved E11.42 250.60 empagliflozin (JARDIANCE) 10 mg tablet     357.2 HM DIABETES FOOT EXAM      HEMOGLOBIN A1C WITH EAG      METABOLIC PANEL, COMPREHENSIVE      MICROALBUMIN, UR, RAND W/ MICROALBUMIN/CREA RATIO   3. Essential hypertension with goal blood pressure less than 130/85-blood pressures are above goal. I10 401.9 empagliflozin (JARDIANCE) 10 mg tablet      LIPID PANEL      METABOLIC PANEL, COMPREHENSIVE   4. Chronic obstructive pulmonary disease, unspecified COPD type ([facility])-symptoms are stable   J44.9 496 montelukast (SINGULAIR) 10 mg tablet   5. Gastroesophageal reflux disease without esophagitis-symptoms are stable K21.9 530.81 omeprazole (PRILOSEC) 20 mg capsule      HEMOGLOBIN   6. Hypokalemia-refilled medication E87.6 276.8 potassium chloride (K-DUR) 10 mEq tablet      METABOLIC PANEL, COMPREHENSIVE   7. Hypercholesterolemia-LDL at goal E78.00 272.0 LIPID PANEL      METABOLIC PANEL, COMPREHENSIVE   8. Neuropathy-dense foot neuropathy by exam   G62.9 355.9    9. DDD (degenerative disc disease), lumbar-severe radiculopathy and chronic pain   M51.36 722.52    10. Chronic kidney disease, unspecified CKD stage-improved back to a GFR right at 60 X19.6 110.0 METABOLIC PANEL, COMPREHENSIVE      URIC ACID      HEMOGLOBIN      PROT+CREATU (RANDOM)      PHOSPHORUS      VITAMIN D, 25 HYDROXY   11. Gout due to renal impairment, unspecified chronicity, unspecified site-controlled   M10.30 274.10 URIC ACID   12. Chronic kidney disease, stage III (moderate)-vitamin D deficiency  Lab Results               VITAMIN D, 25-HYDROXY 21.3 10/11/2017 02:51 PM      N18.3 585. 3 VITAMIN D, 25 HYDROXY         Plan of care:  Discussed diagnoses in detail with patient. Medication risks/benefits/side effects discussed with patient. All of the patient's questions were addressed. The patient understands and agrees with our plan of care. The patient knows to call back if they are unsure of or forget any changes we discussed today or if the symptoms change. The patient received an After-Visit Summary which contains VS, orders, medication list and allergy list. This can be used as a \"mini-medical record\" should they have to seek medical care while out of town. Patient Care Team:  Cande Grey MD as PCP - General (Family Practice)  ERAN Reyna (Orthopedic Surgery)  Will Grover MD (Otolaryngology)  Paris Keene MD (Endocrinology)  Harry Branch MD as Physician (Internal Medicine)  Walter Rincon MD (Ophthalmology)  Nikita Wilcox MD (Podiatry)  Janeen Stevenson MD (Cardiology)  Jj Diaz RN as Nurse Navigator    Follow-up Disposition:  Return in about 3 months (around 1/11/2018).     Future Appointments  Date Time Provider Elisha Devii   11/1/2017 11:45 AM Paris Keene MD CDE [facility] SCHED   1/10/2018 9:35 AM Cande Grey MD [facility] SCHED   9/12/2018 10:00 AM Janeen Stevenson MD [id number]       Signed By: Cande Grey MD     October 12, 2017

## 2017-11-16 DIAGNOSIS — M54.50 CHRONIC LEFT-SIDED LOW BACK PAIN WITHOUT SCIATICA: ICD-10-CM

## 2017-11-16 DIAGNOSIS — G89.29 CHRONIC LEFT-SIDED LOW BACK PAIN WITHOUT SCIATICA: ICD-10-CM

## 2017-11-16 DIAGNOSIS — M51.36 DDD (DEGENERATIVE DISC DISEASE), LUMBAR: Chronic | ICD-10-CM

## 2017-11-16 RX ORDER — HYDROCODONE BITARTRATE AND ACETAMINOPHEN 10; 325 MG/1; MG/1
1 TABLET ORAL
Qty: 90 TAB | Refills: 0 | Status: SHIPPED | OUTPATIENT
Start: 2017-11-16 | End: 2017-12-26 | Stop reason: SDUPTHER

## 2017-11-16 NOTE — TELEPHONE ENCOUNTER
Refill request received from daughter, Regina Piper Last office visit was 10/11/17. The patient can be reached at   [phone].

## 2017-11-22 ENCOUNTER — OFFICE VISIT (OUTPATIENT)
Dept: FAMILY MEDICINE CLINIC | Age: 77
End: 2017-11-22

## 2017-11-22 ENCOUNTER — TELEPHONE (OUTPATIENT)
Dept: FAMILY MEDICINE CLINIC | Age: 77
End: 2017-11-22

## 2017-11-22 VITALS
TEMPERATURE: 98.2 F | HEIGHT: 74 IN | BODY MASS INDEX: 31.83 KG/M2 | WEIGHT: 248 LBS | RESPIRATION RATE: 20 BRPM | OXYGEN SATURATION: 99 % | SYSTOLIC BLOOD PRESSURE: 127 MMHG | DIASTOLIC BLOOD PRESSURE: 68 MMHG | HEART RATE: 63 BPM

## 2017-11-22 DIAGNOSIS — N18.9 CHRONIC KIDNEY DISEASE, UNSPECIFIED CKD STAGE: Chronic | ICD-10-CM

## 2017-11-22 DIAGNOSIS — I48.0 PAF (PAROXYSMAL ATRIAL FIBRILLATION) (HCC): Chronic | ICD-10-CM

## 2017-11-22 DIAGNOSIS — I10 ESSENTIAL HYPERTENSION WITH GOAL BLOOD PRESSURE LESS THAN 130/85: Chronic | ICD-10-CM

## 2017-11-22 DIAGNOSIS — M1A.39X0 CHRONIC GOUT DUE TO RENAL IMPAIRMENT OF MULTIPLE SITES WITHOUT TOPHUS: ICD-10-CM

## 2017-11-22 DIAGNOSIS — I73.9 PVD (PERIPHERAL VASCULAR DISEASE) (HCC): Chronic | ICD-10-CM

## 2017-11-22 DIAGNOSIS — E78.00 HYPERCHOLESTEROLEMIA: Chronic | ICD-10-CM

## 2017-11-22 DIAGNOSIS — M51.36 DDD (DEGENERATIVE DISC DISEASE), LUMBAR: Chronic | ICD-10-CM

## 2017-11-22 DIAGNOSIS — I10 ESSENTIAL HYPERTENSION: Chronic | ICD-10-CM

## 2017-11-22 DIAGNOSIS — M25.551 RIGHT HIP PAIN: ICD-10-CM

## 2017-11-22 DIAGNOSIS — J44.9 CHRONIC OBSTRUCTIVE PULMONARY DISEASE, UNSPECIFIED COPD TYPE (HCC): Chronic | ICD-10-CM

## 2017-11-22 DIAGNOSIS — E11.42 TYPE 2 DIABETES MELLITUS WITH DIABETIC POLYNEUROPATHY, WITHOUT LONG-TERM CURRENT USE OF INSULIN (HCC): Primary | Chronic | ICD-10-CM

## 2017-11-22 RX ORDER — COLCHICINE 0.6 MG/1
0.6 TABLET ORAL DAILY
Qty: 90 TAB | Refills: 3 | Status: SHIPPED | OUTPATIENT
Start: 2017-11-22 | End: 2018-12-04 | Stop reason: SDUPTHER

## 2017-11-22 RX ORDER — ALLOPURINOL 300 MG/1
300 TABLET ORAL DAILY
Qty: 30 TAB | Refills: 5 | Status: SHIPPED | OUTPATIENT
Start: 2017-11-22 | End: 2018-06-25 | Stop reason: SDUPTHER

## 2017-11-22 RX ORDER — PREDNISONE 5 MG/1
TABLET ORAL
Qty: 21 TAB | Refills: 0 | Status: SHIPPED | OUTPATIENT
Start: 2017-11-22 | End: 2018-07-02 | Stop reason: ALTCHOICE

## 2017-11-22 NOTE — MR AVS SNAPSHOT
Visit Information Date & Time Provider Department Dept. Phone Encounter #  
 11/22/2017  9:55 AM Blake Lee MD 7 [facility] [id number] Follow-up Instructions Return in about 3 months (around 2/22/2018), or if symptoms worsen or fail to improve. Your Appointments 11/22/2017  9:55 AM  
ROUTINE CARE with Blake Lee MD  
[facility] [address]) Appt Note: gout in his feet and ankle [address] [facility] [address]   
[phone]  
  
   
 [facility] [address]  
  
    
 1/10/2018  9:35 AM  
ROUTINE CARE with Blake Lee MD  
[facility] ([address]) Appt Note: 3 mof /u-PAF-Diabetes [address] [facility] [address]   
[phone]  
  
    
 9/12/2018 10:00 AM  
ESTABLISHED PATIENT with Marina Shah MD  
[facility] ([facility]) Appt Note: annual  
 [address]  [address] [place] [address] Upcoming Health Maintenance Date Due  
 EYE EXAM RETINAL OR DILATED Q1 12/9/2017 HEMOGLOBIN A1C Q6M 4/12/2018 MEDICARE YEARLY EXAM 8/17/2018 FOOT EXAM Q1 10/11/2018 MICROALBUMIN Q1 10/12/2018 LIPID PANEL Q1 10/12/2018 GLAUCOMA SCREENING Q2Y 12/9/2018 DTaP/Tdap/Td series (2 - Td) 8/16/2023 Allergies as of 11/22/2017  Review Complete On: 11/22/2017 By: Trina Sullivan LPN Severity Noted Reaction Type Reactions Indocin [Indomethacin Sodium]  06/05/2010    Unknown (comments) Lisinopril  04/17/2013    Angioedema Dxed in hospital.  
 Losartan  01/22/2015    Other (comments) Face and throat swelling. Current Immunizations  Reviewed on 12/12/2016 Name Date Influenza High Dose Vaccine PF 10/4/2017 Influenza Vaccine 10/7/2015, 10/9/2014, 9/26/2013 Influenza Vaccine (Quad) PF 9/28/2016 Influenza Vaccine Split 10/11/2012, 11/23/2011, 10/7/2010 Influenza Vaccine Whole 12/14/2009 Pneumococcal Conjugate (PCV-13) 8/12/2015 Pneumococcal Polysaccharide (PPSV-23) 10/20/2014, 12/7/2012 Tdap 8/16/2013 Zoster Vaccine, Live 5/26/2015 Not reviewed this visit You Were Diagnosed With   
  
 Codes Comments Type 2 diabetes mellitus with diabetic polyneuropathy, without long-term current use of insulin (HCC)    -  Primary ICD-10-CM: E11.42 
ICD-9-CM: 250.60, 357.2 Chronic obstructive pulmonary disease, unspecified COPD type ([facility]     ICD-10-CM: J44.9 ICD-9-CM: 635 PAF (paroxysmal atrial fibrillation) ([facility])     ICD-10-CM: I48.0 ICD-9-CM: 427.31 PVD (peripheral vascular disease) ([facility].)     ICD-10-CM: I73.9 ICD-9-CM: 443.9 Essential hypertension with goal blood pressure less than 130/85     ICD-10-CM: I10 
ICD-9-CM: 401.9 Chronic kidney disease, unspecified CKD stage     ICD-10-CM: N18.9 ICD-9-CM: 585.9 DDD (degenerative disc disease), lumbar     ICD-10-CM: M51.36 
ICD-9-CM: 722.52 Chronic gout due to renal impairment of multiple sites without tophus     ICD-10-CM: M1A. 87 Oriana Hassan ICD-9-CM: 274.02 Right hip pain     ICD-10-CM: M25.551 ICD-9-CM: 719.45 Essential hypertension     ICD-10-CM: I10 
ICD-9-CM: 401.9 Hypercholesterolemia     ICD-10-CM: E78.00 ICD-9-CM: 272.0 Vitals BP Pulse Temp Resp Height(growth percentile) Weight(growth percentile) 127/68 (BP 1 Location: Right arm, BP Patient Position: Sitting) 63 98.2 °F (36.8 °C) (Oral) 20 6' 2\" (1.88 m) 248 lb (112.5 kg) SpO2 BMI Smoking Status 99% 31.84 kg/m2 Former Smoker Vitals History BMI and BSA Data Body Mass Index Body Surface Area  
 31.84 kg/m 2 2.42 m 2 Preferred Pharmacy Pharmacy Name Phone [facility] [address] [address] [phone] Your Updated Medication List  
  
   
This list is accurate as of: 11/22/17  9:50 AM.  Always use your most recent med list.  
  
  
  
  
 ACCU-CHEK SHAUNA CONTROL SOLN Soln Generic drug:  Blood Glucose Control High&Low  
  
 albuterol 90 mcg/actuation inhaler Commonly known as:  PROVENTIL HFA, VENTOLIN HFA, PROAIR HFA Take 2 Puffs by inhalation every six (6) hours as needed for Wheezing for up to 360 days. Please use generic that is covered  
  
 allopurinol 300 mg tablet Commonly known as:  Gaylan Elders Take 1 Tab by mouth daily. amLODIPine 10 mg tablet Commonly known as:  Nicole Darting TAKE ONE TABLET BY MOUTH ONCE DAILY  
  
 aspirin 81 mg chewable tablet Take 81 mg by mouth daily. baclofen 10 mg tablet Commonly known as:  LIORESAL Take 1 Tab by mouth three (3) times daily. Muscle relaxer. benzoyl peroxide 5 % topical gel Apply  to affected area nightly. apply to affected area as directed Blood-Glucose Meter Misc Commonly known as:  TRUE METRIX GLUCOSE METER Use as Directed  
  
 bumetanide 2 mg tablet Commonly known as:  Arletta Leah Take 1 Tab by mouth two (2) times a day. cloNIDine HCl 0.3 mg tablet Commonly known as:  CATAPRES  
TAKE ONE TABLET BY MOUTH THREE TIMES DAILY  
  
 colchicine 0.6 mg tablet Commonly known as:  COLCRYS Take 1 Tab by mouth two (2) times a day. For acute gout. dabigatran etexilate 150 mg capsule Commonly known as:  PRADAXA Take 1 Cap by mouth two (2) times a day. docusate sodium 100 mg Tab Take 1 Tab by mouth two (2) times a day. empagliflozin 10 mg tablet Commonly known as:  Mickiel Del Take 1 Tab by mouth daily. ezetimibe 10 mg tablet Commonly known as:  Sincere Sellar Take 1 Tab by mouth daily. fluticasone 50 mcg/actuation nasal spray Commonly known as:  FLONASE  
1 spray BL daily FOLBEE PLUS Tab tablet Generic drug:  b complex-vitamin c-folic acid 5mg TAKE ONE TABLET BY MOUTH ONCE DAILY  
  
 gabapentin 300 mg capsule Commonly known as:  NEURONTIN  
TAKE ONE CAPSULE BY MOUTH THREE TIMES DAILY  
  
 glipiZIDE 5 mg tablet Commonly known as:  Amarjit Elliott INCREASE TO 2 TABLETS BY MOUTH 20 MINUTES BEFORE BREAKFAST AND 2 TABLET TWENTY MINUTES BEFORE DINNER  
  
 glucose blood VI test strips strip Commonly known as:  TRUE METRIX GLUCOSE TEST STRIP Test 2 times daily Dx Coed E11.65  
  
 hydrALAZINE 50 mg tablet Commonly known as:  APRESOLINE Take 1 Tab by mouth three (3) times daily. HYDROcodone-acetaminophen  mg tablet Commonly known as:  Annamary Zaira Take 1 Tab by mouth every six (6) hours as needed for Pain. [place] prescription for Dr. Wendy Schmid. Iron 325 mg (65 mg iron) tablet Generic drug:  ferrous sulfate Take 1 Tab by mouth Daily (before breakfast). labetalol 200 mg tablet Commonly known as:  NORMODYNE  
TAKE ONE-HALF TABLET BY MOUTH TWICE DAILY FOR HYPERTENSION * Lancets Misc Test 2 times daily Dx Code E11.65  
  
 * lancets 30 gauge Misc  
  
 lovastatin 40 mg tablet Commonly known as:  MEVACOR Take 1 Tab by mouth nightly.  
  
 magnesium oxide 400 mg tablet Commonly known as:  MAG-OX Take 1 Tab by mouth daily. melatonin 3 mg tablet Take 10 mg by mouth nightly. metFORMIN 500 mg tablet Commonly known as:  GLUCOPHAGE Increase to two TABLETS twice daily with meals  
  
 montelukast 10 mg tablet Commonly known as:  SINGULAIR Take 1 Tab by mouth daily. omeprazole 20 mg capsule Commonly known as:  PRILOSEC Take 1 Cap by mouth daily. Fax to [facility]  
  
 potassium chloride 10 mEq tablet Commonly known as:  K-DUR Take 1 Tab by mouth two (2) times a day. Indications: hypokalemia  
  
 predniSONE 5 mg dose pack Commonly known as:  STERAPRED See administration instruction per 5mg dose pack  
  
 tamsulosin 0.4 mg capsule Commonly known as:  FLOMAX Take 1 Cap by mouth daily. * Notice: This list has 2 medication(s) that are the same as other medications prescribed for you. Read the directions carefully, and ask your doctor or other care provider to review them with you. Prescriptions Sent to Pharmacy Refills  
 allopurinol (ZYLOPRIM) 300 mg tablet 5 Sig: Take 1 Tab by mouth daily. Class: Normal  
 Pharmacy: [address], [place] [address] Ph #: [phone] Route: Oral  
 predniSONE (STERAPRED) 5 mg dose pack 0 Sig: See administration instruction per 5mg dose pack Class: Normal  
 Pharmacy: [address], [place] [address] Ph #: [phone] We Performed the Following HEMOGLOBIN A1C WITH EAG [50500 CPT(R)] LIPID PANEL [86400 CPT(R)] METABOLIC PANEL, COMPREHENSIVE [65690 CPT(R)] URIC ACID [id number] CPT(R)] Follow-up Instructions Return in about 3 months (around 2/22/2018), or if symptoms worsen or fail to improve. To-Do List   
 11/22/2017 Imaging:  XR HIP RT W OR WO PELV 2-3 VWS Patient Instructions Purine-Restricted Diet: Care Instructions Your Care Instructions Purines are substances that are found in some foods. Your body turns purines into uric acid. High levels of uric acid can cause gout, which is a form of arthritis that causes pain and inflammation in joints. You may be able to help control the amount of uric acid in your body by limiting high-purine foods in your diet. Follow-up care is a key part of your treatment and safety. Be sure to make and go to all appointments, and call your doctor if you are having problems. It's also a good idea to know your test results and keep a list of the medicines you take. How can you care for yourself at home? · Plan your meals and snacks around foods that are low in purines and are safe for you to eat. These foods include: ¨ Green vegetables and tomatoes. ¨ Fruits. ¨ Whole-grain breads, rice, and cereals. ¨ Eggs, peanut butter, and nuts. ¨ Low-fat milk, cheese, and other milk products. ¨ Popcorn. ¨ Gelatin desserts, chocolate, cocoa, and cakes and sweets, in small amounts. · You can eat certain foods that are medium-high in purines, but eat them only once in a while. These foods include: ¨ Legumes, such as dried beans and dried peas. You can have 1 cup cooked legumes each day. ¨ Asparagus, cauliflower, spinach, mushrooms, and green peas. ¨ Fish and seafood (other than very high-purine seafood). ¨ Oatmeal, wheat bran, and wheat germ. · Limit very high-purine foods, including: ¨ Organ meats, such as liver, kidneys, sweetbreads, and brains. ¨ Meats, including oden, beef, pork, and lamb. ¨ Game meats and any other meats in large amounts. ¨ Anchovies, sardines, herring, mackerel, and scallops. ¨ Gravy. ¨ Beer. Where can you learn more? Go to http://otto-cindy.info/. Enter F448 in the search box to learn more about \"Purine-Restricted Diet: Care Instructions. \" Current as of: May 12, 2017 Content Version: 11.4 © 9360-6569 [employer]. Care instructions adapted under license by [employer] (which disclaims liability or warranty for this information). If you have questions about a medical condition or this instruction, always ask your healthcare professional. [place] [address] any warranty or liability for your use of this information. Please provide this summary of care documentation to your next provider. Your primary care clinician is listed as [address]. If you have any questions after today's visit, please call [phone].

## 2017-11-22 NOTE — PROGRESS NOTES
Progress Note    Patient: Ramón Rodgers MRN: [id number]  SSN: [id number]    YOB: 1940  Age: 65 y.o. Sex: male        Chief Complaint   Patient presents with    Gout     both feet pain     Multiple diverse medical problems necessitating extensive decision making, prolonged chart review, medication reconciliation and prolonged OV. Subjective:     Encounter Diagnoses   Name Primary?  Type 2 diabetes mellitus with diabetic polyneuropathy, without long-term current use of insulin ([facility] [address]): This patient is managed under a comprehensive plan of care for Diabetes. Overall the patient feels well with good energy level. Pertinent Labs:   Lab Results   Component Value Date/Time    Hemoglobin A1c 6.3 10/11/2017 02:51 PM    Hemoglobin A1c 8.4 05/12/2017 12:28 PM    Hemoglobin A1c 8.2 12/12/2016 09:42 AM    Hemoglobin A1c, External 6.3 10/12/2017      Body mass index is 31.84 kg/(m^2). Lab Results   Component Value Date/Time    LDL, calculated 70 10/11/2017 02:51 PM         Lab Results   Component Value Date/Time    Sodium 143 10/11/2017 02:51 PM    Potassium 4.6 10/11/2017 02:51 PM    Chloride 100 10/11/2017 02:51 PM    CO2 24 10/11/2017 02:51 PM    Anion gap 12 10/07/2010 09:27 AM    Glucose 133 10/11/2017 02:51 PM    BUN 9 10/11/2017 02:51 PM    Creatinine 1.29 10/11/2017 02:51 PM    BUN/Creatinine ratio 7 10/11/2017 02:51 PM    GFR est AA 61 10/11/2017 02:51 PM    GFR est non-AA 53 10/11/2017 02:51 PM    Calcium 9.8 10/11/2017 02:51 PM    AST (SGOT) 28 10/11/2017 02:51 PM    Alk.  phosphatase 92 10/11/2017 02:51 PM    Protein, total 6.5 10/11/2017 02:51 PM    Albumin 4.2 10/11/2017 02:51 PM    Globulin 2.7 10/07/2010 09:27 AM    A-G Ratio 1.8 10/11/2017 02:51 PM    ALT (SGPT) 28 10/11/2017 02:51 PM     Lab Results   Component Value Date/Time    Microalb/Creat ratio (ug/mg creat.) 227.5 10/11/2017 02:51 PM      Frequency of home glucose testing:daily   Blood Sugar range at home: 130 range. Last eye exam: due, asked him to get a copy for me. Last foot exam: This year. Polyuria, polyphagia or polydipsia: No   Retinopathy: No   Neuropathy SX: Yes   Low blood sugar symptoms: No   Dietary compliance: Good   Medication compliance:Good   On ASA: Yes   Depression: No   CKD: Yes     Wt Readings from Last 3 Encounters:   11/22/17 248 lb (112.5 kg)   10/11/17 267 lb (121.1 kg)   10/04/17 270 lb (122.5 kg)        History   Smoking Status    Former Smoker    Types: Cigarettes    Quit date: 6/20/2003   Smokeless Tobacco    Never Used       Diabetic Consultants:Dr. Jocelyn Valle  All the patient's questions regarding medications, diet and exercise were answered. Goal of A1C of less than 7.5% is our goal.   Our overall goal is to reduce or eliminate the long term consequences of poorly controlled diabetes. Yes    Chronic obstructive pulmonary disease, unspecified COPD type ([facility] [address]):  SpO2 Readings from Last 3 Encounters:   11/22/17 99%   10/11/17 96%   10/04/17 97%     Oxygen: He currently is not on home oxygen therapy. Symptoms: chronic dyspnea: severity = moderate: course of sx: stable. Patient does not smoke cigarettes. Compliant to medications.  PAF (paroxysmal atrial fibrillation) ([facility] [address]):  Denies chest pain, edema, dyspnea or dizziness. Unaware of irregular heartbeats. No neurologic sx. Avoids caffeine.  PVD (peripheral vascular disease) ([facility] [address]):  No sx of vascular insufficiency.  Essential hypertension with goal blood pressure less than 130/85:  BP Readings from Last 3 Encounters:   11/22/17 127/68   10/11/17 148/62   10/04/17 148/66     The patient reports:  taking medications as instructed, no medication side effects noted, no TIA's, no chest pain on exertion, but has swelling of ankles due to bilateral ankle gout.      Lab Results   Component Value Date/Time    Sodium 143 10/11/2017 02:51 PM    Potassium 4.6 10/11/2017 02:51 PM    Chloride 100 10/11/2017 02:51 PM    CO2 24 10/11/2017 02:51 PM    Anion gap 12 10/07/2010 09:27 AM    Glucose 133 10/11/2017 02:51 PM    BUN 9 10/11/2017 02:51 PM    Creatinine 1.29 10/11/2017 02:51 PM    BUN/Creatinine ratio 7 10/11/2017 02:51 PM    GFR est AA 61 10/11/2017 02:51 PM    GFR est non-AA 53 10/11/2017 02:51 PM    Calcium 9.8 10/11/2017 02:51 PM    Bilirubin, total 0.6 10/11/2017 02:51 PM    AST (SGOT) 28 10/11/2017 02:51 PM    Alk. phosphatase 92 10/11/2017 02:51 PM    Protein, total 6.5 10/11/2017 02:51 PM    Albumin 4.2 10/11/2017 02:51 PM    Globulin 2.7 10/07/2010 09:27 AM    A-G Ratio 1.8 10/11/2017 02:51 PM    ALT (SGPT) 28 10/11/2017 02:51 PM     Our goal is to normalize the blood pressure to decrease the risks of strokes and heart attacks. The patient is in agreement with the plan.  Chronic kidney disease, unspecified CKD stage: Kidney function has improved to stage II  Lab Results   Component Value Date/Time    GFR est AA 61 10/11/2017 02:51 PM    GFR est non-AA 53 10/11/2017 02:51 PM    Creatinine (POC) 1.4 01/02/2015 10:08 AM    Creatinine 1.29 10/11/2017 02:51 PM    BUN 9 10/11/2017 02:51 PM    Sodium 143 10/11/2017 02:51 PM    Potassium 4.6 10/11/2017 02:51 PM    Chloride 100 10/11/2017 02:51 PM    CO2 24 10/11/2017 02:51 PM            DDD (degenerative disc disease), lumbar:  He has trouble walking. He is in a wheelchair today. He has new onset right hip pain. This is in the area where it could be a radiculopathy or could be primary hip arthritis.  Chronic gout due to renal impairment of multiple sites without tophus: He has had a gout flare for the last 10 days but has not contacted me. This is probably related to him having run out of his colchicine. To address this I am increasing his allopurinol to 300 mg each morning and decreasing his colchicine to once daily for prevention. He will be given a prednisone Dosepak to help with the acute attack.          Right hip pain: See above            Hypercholesterolemia:  Cardiovascular risks for him are: LDL goal is under 80  diabetic  hypertension  hyperlipidemia  sedentary lifestyle. Currently he takes Mevacor (lovastatin) , 40 mg  Lab Results   Component Value Date/Time    Cholesterol, total 132 10/11/2017 02:51 PM    HDL Cholesterol 38 10/11/2017 02:51 PM    LDL, calculated 70 10/11/2017 02:51 PM    Triglyceride 122 10/11/2017 02:51 PM    CHOL/HDL Ratio 3.7 10/07/2010 09:27 AM     Lab Results   Component Value Date/Time    ALT (SGPT) 28 10/11/2017 02:51 PM    AST (SGOT) 28 10/11/2017 02:51 PM    Alk. phosphatase 92 10/11/2017 02:51 PM    Bilirubin, total 0.6 10/11/2017 02:51 PM      Myalgias: No   Fatigue: No   Other side effects: no  Wt Readings from Last 3 Encounters:   11/22/17 248 lb (112.5 kg)   10/11/17 267 lb (121.1 kg)   10/04/17 270 lb (122.5 kg)     The patient is aware of our goal to reduce or eliminate the long term problems (such as strokes and heart attacks) related to poorly controlled hyperlipidemia. Current and past medical information:    Current Medications after this visit[de-identified]   Current Outpatient Prescriptions   Medication Sig    allopurinol (ZYLOPRIM) 300 mg tablet Take 1 Tab by mouth daily.  predniSONE (STERAPRED) 5 mg dose pack See administration instruction per 5mg dose pack    colchicine (COLCRYS) 0.6 mg tablet Take 1 Tab by mouth daily. To prevent gout.  HYDROcodone-acetaminophen (NORCO)  mg tablet Take 1 Tab by mouth every six (6) hours as needed for Pain. Bridging prescription for Dr. Balwinder Hope.  amLODIPine (NORVASC) 10 mg tablet TAKE ONE TABLET BY MOUTH ONCE DAILY    empagliflozin (JARDIANCE) 10 mg tablet Take 1 Tab by mouth daily.  montelukast (SINGULAIR) 10 mg tablet Take 1 Tab by mouth daily.  tamsulosin (FLOMAX) 0.4 mg capsule Take 1 Cap by mouth daily.  lovastatin (MEVACOR) 40 mg tablet Take 1 Tab by mouth nightly.     bumetanide (BUMEX) 2 mg tablet Take 1 Tab by mouth two (2) times a day.    omeprazole (PRILOSEC) 20 mg capsule Take 1 Cap by mouth daily. [facility] [place]    potassium chloride (K-DUR) 10 mEq tablet Take 1 Tab by mouth two (2) times a day. Indications: hypokalemia    labetalol (NORMODYNE) 200 mg tablet TAKE ONE-HALF TABLET BY MOUTH TWICE DAILY FOR HYPERTENSION    cloNIDine HCl (CATAPRES) 0.3 mg tablet TAKE ONE TABLET BY MOUTH THREE TIMES DAILY    fluticasone (FLONASE) 50 mcg/actuation nasal spray 1 spray BL daily    albuterol (PROVENTIL HFA, VENTOLIN HFA, PROAIR HFA) 90 mcg/actuation inhaler Take 2 Puffs by inhalation every six (6) hours as needed for Wheezing for up to 360 days. Please use generic that is covered    ezetimibe (ZETIA) 10 mg tablet Take 1 Tab by mouth daily.  lancets 30 gauge misc     ACCU-CHEK SHAUNA CONTROL SOLN soln     metFORMIN (GLUCOPHAGE) 500 mg tablet Increase to two TABLETS twice daily with meals    hydrALAZINE (APRESOLINE) 50 mg tablet Take 1 Tab by mouth three (3) times daily.  docusate sodium 100 mg tab Take 1 Tab by mouth two (2) times a day.  magnesium oxide (MAG-OX) 400 mg tablet Take 1 Tab by mouth daily.  gabapentin (NEURONTIN) 300 mg capsule TAKE ONE CAPSULE BY MOUTH THREE TIMES DAILY    Blood-Glucose Meter (TRUE METRIX GLUCOSE METER) misc Use as Directed    glucose blood VI test strips (TRUE METRIX GLUCOSE TEST STRIP) strip Test 2 times daily Dx Coed E11.65    Lancets misc Test 2 times daily Dx Code E11.65    FOLBEE PLUS tab tablet TAKE ONE TABLET BY MOUTH ONCE DAILY    glipiZIDE (GLUCOTROL) 5 mg tablet INCREASE TO 2 TABLETS BY MOUTH 20 MINUTES BEFORE BREAKFAST AND 2 TABLET TWENTY MINUTES BEFORE DINNER    ferrous sulfate (IRON) 325 mg (65 mg iron) tablet Take 1 Tab by mouth Daily (before breakfast).  dabigatran etexilate (PRADAXA) 150 mg capsule Take 1 Cap by mouth two (2) times a day.  baclofen (LIORESAL) 10 mg tablet Take 1 Tab by mouth three (3) times daily. Muscle relaxer.     melatonin 3 mg tablet Take 10 mg by mouth nightly.  aspirin 81 mg chewable tablet Take 81 mg by mouth daily.  benzoyl peroxide 5 % topical gel Apply  to affected area nightly. apply to affected area as directed     No current facility-administered medications for this visit.         Patient Active Problem List    Diagnosis Date Noted    History of colon polyps 05/02/2016    Prostate cancer ([facility] [address] 05/02/2016    Idiopathic gout 05/02/2016    Essential hypertension with goal blood pressure less than 130/85 05/02/2016    CKD (chronic kidney disease) 11/23/2015    COPD (chronic obstructive pulmonary disease) ([facility] [address] 04/27/2015    Left upper lobe pneumonia ([facility] [address] 04/27/2015    Diabetes with neurologic complications [facility] 55/83/0713    Chronic radicular pain of lower back 07/21/2014    Neuropathy 07/16/2013    DDD (degenerative disc disease), lumbar 07/16/2013    Prostate CA ([facility] [address] 07/16/2013    Tubulovillous adenoma polyp of colon 07/16/2013    Hoarse voice quality 06/20/2012    Edema 01/12/2012    PAF (paroxysmal atrial fibrillation) ([facility] [address]     Unspecified arthropathy, ankle and foot 07/14/2011    Arthritis 07/11/2011    Gout, unspecified     PVD (peripheral vascular disease) ([facility] [address]     Hypertension     Hypercholesterolemia     Leg pain     Chronic pain 05/06/2010       Past Medical History:   Diagnosis Date    Acute on chronic diastolic congestive heart failure ([facility] [address] 4/27/2015    Arthritis 7/11/2011    [place] 6/7/2010    Cancer ([facility]     prostate    Chronic diastolic heart failure [facility] 8/26/2015    CKD (chronic kidney disease) 11/23/2015    Diabetes ([facility] [address]     Gout, unspecified     Hypercholesterolemia     Hypertension     Inguinal lymphadenopathy 2/18/2014    Leg pain     PAF (paroxysmal atrial fibrillation) (HCC)     PAF (paroxysmal atrial fibrillation) (HCC)     PVD (peripheral vascular disease) (HCC)        Allergies   Allergen Reactions    Indocin [Indomethacin Sodium] Unknown (comments)    Lisinopril Angioedema     Dxed in hospital.    Losartan Other (comments)     Face and throat swelling. Past Surgical History:   Procedure Laterality Date    COLONOSCOPY N/A 9/22/2016    [facility]      back and left shoulder x2       Social History     Social History    Marital status: SINGLE     Spouse name: N/A    Number of children: N/A    Years of education: N/A     Social History Main Topics    Smoking status: Former Smoker     Types: Cigarettes     Quit date: 6/20/2003    Smokeless tobacco: Never Used    Alcohol use 0.0 oz/week     0 Standard drinks or equivalent per week      Comment: 2 drinks/month    Drug use: No    Sexual activity: Not Asked     Other Topics Concern    None     Social History Narrative       Review of Systems   Constitutional: Negative. Negative for chills, fever, malaise/fatigue and weight loss. HENT: Negative. Negative for hearing loss. Eyes: Negative. Negative for blurred vision and double vision. Respiratory: Negative. Negative for cough, hemoptysis, sputum production and shortness of breath. Cardiovascular: Negative. Negative for chest pain, palpitations and orthopnea. Gastrointestinal: Negative. Negative for abdominal pain, blood in stool, heartburn, nausea and vomiting. Genitourinary: Negative. Negative for dysuria, frequency and urgency. Musculoskeletal: Positive for joint pain. Negative for back pain, myalgias and neck pain. Bilateral ankle gout. Severe degenerative disc disease with radicular pain. New hip pain. Skin: Negative. Negative for rash. Neurological: Negative. Negative for dizziness, tingling, tremors, weakness and headaches. Endo/Heme/Allergies: Negative. Psychiatric/Behavioral: Negative. Negative for depression.      Objective:     Vitals:    11/22/17 0934   BP: 127/68   Pulse: 63   Resp: 20   Temp: 98.2 °F (36.8 °C)   TempSrc: Oral   SpO2: 99%   Weight: 248 lb (112.5 kg)   Height: 6' 2\" (1.88 m)      Body mass index is 31.84 kg/(m^2). Physical Exam   Constitutional: He is oriented to person, place, and time and well-developed, well-nourished, and in no distress. HENT:   Head: Normocephalic and atraumatic. Mouth/Throat: Oropharynx is clear and moist.   Eyes: Right eye exhibits no discharge. Left eye exhibits no discharge. No scleral icterus. Neck: No tracheal deviation present. No thyromegaly present. No bruit. Cardiovascular: Normal rate, regular rhythm and normal heart sounds. Pulmonary/Chest: Effort normal and breath sounds normal.   Abdominal: Soft. Musculoskeletal: He exhibits edema and tenderness. Neurological: He is alert and oriented to person, place, and time. Skin: No rash noted. No erythema. Psychiatric: Mood and affect normal.   Nursing note and vitals reviewed. Health Maintenance Due   Topic Date Due    EYE EXAM RETINAL OR DILATED Q1  12/09/2017         Assessment and orders:       ICD-10-CM ICD-9-CM    1. Type 2 diabetes mellitus with diabetic polyneuropathy, without long-term current use of insulin ([facility] - recheck labs E11.42 250.60 HEMOGLOBIN A1C WITH EAG     357.2 LIPID PANEL      METABOLIC PANEL, COMPREHENSIVE   2. Chronic obstructive pulmonary disease, unspecified COPD type ([facility])-symptoms stable   J44.9 496    3. PAF (paroxysmal atrial fibrillation) ([facility])-heart regular without symptoms of palpitations   I48.0 427.31    4. PVD (peripheral vascular disease) ([facility])-no evidence for arterial insufficiency   I73.9 443.9    5. Essential hypertension with goal blood pressure less than 130/85   K32 977.2 METABOLIC PANEL, COMPREHENSIVE   6. Chronic kidney disease, unspecified CKD stage   F91.3 752.5 METABOLIC PANEL, COMPREHENSIVE   7. DDD (degenerative disc disease), lumbar-severe with chronic pain syndrome. M51.36 722.52    8. Chronic gout due to renal impairment of multiple sites without tophus-ran out of his colchicine. M1A.39X0 274.02 URIC ACID      colchicine (COLCRYS) 0.6 mg tablet   9. Right hip pain-new finding. Probably primary arthritis vs. back related   M25.551 719.45 XR HIP RT W OR WO PELV 2-3 VWS   10. Essential hypertension  -well-controlled I10 401.9 LIPID PANEL      METABOLIC PANEL, COMPREHENSIVE   11. Hypercholesterolemia-recheck E78.00 272.0 LIPID PANEL      METABOLIC PANEL, COMPREHENSIVE         Plan of care:  Discussed diagnoses in detail with patient. Medication risks/benefits/side effects discussed with patient. All of the patient's questions were addressed. The patient understands and agrees with our plan of care. The patient knows to call back if they are unsure of or forget any changes we discussed today or if the symptoms change. The patient received an After-Visit Summary which contains VS, orders, medication list and allergy list. This can be used as a \"mini-medical record\" should they have to seek medical care while out of town. Patient Care Team:  Karly Williamson MD as PCP - General (Family Practice)  ERAN Hoffman (Orthopedic Surgery)  Meriel Riedel, MD (Otolaryngology)  Sully Graves MD (Endocrinology)  Carmen Simon MD as Physician (Internal Medicine)  Teresa Medina MD (Ophthalmology)  Darwin Swain MD (Podiatry)  Bethany Foster MD (Cardiology)  Jami Hernandez RN as Nurse Navigator    Follow-up Disposition:  Return in about 3 months (around 2/22/2018), or if symptoms worsen or fail to improve.     Future Appointments  Date Time Provider Department Center   1/10/2018 9:35 AM Karly Williamson MD [facility] SCHED   9/12/2018 10:00 AM Bethany Foster MD [address]       Signed By: Karly Williamson MD     November 22, 2017

## 2017-11-22 NOTE — PATIENT INSTRUCTIONS
Purine-Restricted Diet: Care Instructions  Your Care Instructions    Purines are substances that are found in some foods. Your body turns purines into uric acid. High levels of uric acid can cause gout, which is a form of arthritis that causes pain and inflammation in joints. You may be able to help control the amount of uric acid in your body by limiting high-purine foods in your diet. Follow-up care is a key part of your treatment and safety. Be sure to make and go to all appointments, and call your doctor if you are having problems. It's also a good idea to know your test results and keep a list of the medicines you take. How can you care for yourself at home? · Plan your meals and snacks around foods that are low in purines and are safe for you to eat. These foods include:  ¨ Green vegetables and tomatoes. ¨ Fruits. ¨ Whole-grain breads, rice, and cereals. ¨ Eggs, peanut butter, and nuts. ¨ Low-fat milk, cheese, and other milk products. ¨ Popcorn. ¨ Gelatin desserts, chocolate, cocoa, and cakes and sweets, in small amounts. · You can eat certain foods that are medium-high in purines, but eat them only once in a while. These foods include:  ¨ Legumes, such as dried beans and dried peas. You can have 1 cup cooked legumes each day. ¨ Asparagus, cauliflower, spinach, mushrooms, and green peas. ¨ Fish and seafood (other than very high-purine seafood). ¨ Oatmeal, wheat bran, and wheat germ. · Limit very high-purine foods, including:  ¨ Organ meats, such as liver, kidneys, sweetbreads, and brains. ¨ Meats, including oden, beef, pork, and lamb. ¨ Game meats and any other meats in large amounts. ¨ Anchovies, sardines, herring, mackerel, and scallops. ¨ Gravy. ¨ Beer. Where can you learn more? Go to http://otto-cindy.info/. Enter F448 in the search box to learn more about \"Purine-Restricted Diet: Care Instructions. \"  Current as of:  May 12, 2017  Content Version: 11.4  © 5343-3521 [employer]. Care instructions adapted under license by [employer] (which disclaims liability or warranty for this information). If you have questions about a medical condition or this instruction, always ask your healthcare professional. [place] [address] any warranty or liability for your use of this information.

## 2017-11-22 NOTE — TELEPHONE ENCOUNTER
Informed patient per Dr. Dorota Mccoy:        Moderate to severe bilateral hip osteoarthritis.  He cannot take arthritis medication due to his kidney function. Samara Eason will just have to use his hydrocodone for pain. Patient verbalized understanding.

## 2017-11-23 LAB
ALBUMIN SERPL-MCNC: 4.3 G/DL (ref 3.5–4.8)
ALBUMIN/GLOB SERPL: 1.8 {RATIO} (ref 1.2–2.2)
ALP SERPL-CCNC: 128 IU/L (ref 39–117)
ALT SERPL-CCNC: 47 IU/L (ref 0–44)
AST SERPL-CCNC: 31 IU/L (ref 0–40)
BILIRUB SERPL-MCNC: 0.5 MG/DL (ref 0–1.2)
BUN SERPL-MCNC: 19 MG/DL (ref 8–27)
BUN/CREAT SERPL: 12 (ref 10–24)
CALCIUM SERPL-MCNC: 10 MG/DL (ref 8.6–10.2)
CHLORIDE SERPL-SCNC: 102 MMOL/L (ref 96–106)
CHOLEST SERPL-MCNC: 129 MG/DL (ref 100–199)
CO2 SERPL-SCNC: 27 MMOL/L (ref 18–29)
CREAT SERPL-MCNC: 1.56 MG/DL (ref 0.76–1.27)
EST. AVERAGE GLUCOSE BLD GHB EST-MCNC: 140 MG/DL
GFR SERPLBLD CREATININE-BSD FMLA CKD-EPI: 42 ML/MIN/1.73
GFR SERPLBLD CREATININE-BSD FMLA CKD-EPI: 48 ML/MIN/1.73
GLOBULIN SER CALC-MCNC: 2.4 G/DL (ref 1.5–4.5)
GLUCOSE SERPL-MCNC: 79 MG/DL (ref 65–99)
HBA1C MFR BLD: 6.5 % (ref 4.8–5.6)
HDLC SERPL-MCNC: 34 MG/DL
INTERPRETATION: NORMAL
LDLC SERPL CALC-MCNC: 78 MG/DL (ref 0–99)
Lab: NORMAL
POTASSIUM SERPL-SCNC: 3.9 MMOL/L (ref 3.5–5.2)
PROT SERPL-MCNC: 6.7 G/DL (ref 6–8.5)
SODIUM SERPL-SCNC: 145 MMOL/L (ref 134–144)
TRIGL SERPL-MCNC: 86 MG/DL (ref 0–149)
URATE SERPL-MCNC: 6.8 MG/DL (ref 3.7–8.6)
VLDLC SERPL CALC-MCNC: 17 MG/DL (ref 5–40)

## 2017-12-26 DIAGNOSIS — M54.50 CHRONIC LEFT-SIDED LOW BACK PAIN WITHOUT SCIATICA: ICD-10-CM

## 2017-12-26 DIAGNOSIS — M51.36 DDD (DEGENERATIVE DISC DISEASE), LUMBAR: Chronic | ICD-10-CM

## 2017-12-26 DIAGNOSIS — G89.29 CHRONIC LEFT-SIDED LOW BACK PAIN WITHOUT SCIATICA: ICD-10-CM

## 2017-12-27 RX ORDER — HYDROCODONE BITARTRATE AND ACETAMINOPHEN 10; 325 MG/1; MG/1
1 TABLET ORAL
Qty: 90 TAB | Refills: 0 | Status: SHIPPED | OUTPATIENT
Start: 2017-12-27 | End: 2018-01-24 | Stop reason: SDUPTHER

## 2018-01-10 ENCOUNTER — OFFICE VISIT (OUTPATIENT)
Dept: FAMILY MEDICINE CLINIC | Age: 78
End: 2018-01-10

## 2018-01-10 VITALS
HEART RATE: 69 BPM | OXYGEN SATURATION: 99 % | HEIGHT: 74 IN | TEMPERATURE: 97.4 F | BODY MASS INDEX: 33.24 KG/M2 | WEIGHT: 259 LBS | RESPIRATION RATE: 16 BRPM | DIASTOLIC BLOOD PRESSURE: 68 MMHG | SYSTOLIC BLOOD PRESSURE: 173 MMHG

## 2018-01-10 DIAGNOSIS — E11.21 TYPE 2 DIABETES MELLITUS WITH NEPHROPATHY (HCC): ICD-10-CM

## 2018-01-10 DIAGNOSIS — N18.30 STAGE 3 CHRONIC KIDNEY DISEASE (HCC): Chronic | ICD-10-CM

## 2018-01-10 DIAGNOSIS — G89.4 CHRONIC PAIN SYNDROME: Chronic | ICD-10-CM

## 2018-01-10 DIAGNOSIS — Z79.01 CHRONIC ANTICOAGULATION: ICD-10-CM

## 2018-01-10 DIAGNOSIS — J41.0 SIMPLE CHRONIC BRONCHITIS (HCC): Chronic | ICD-10-CM

## 2018-01-10 DIAGNOSIS — M17.11 PRIMARY OSTEOARTHRITIS OF RIGHT KNEE: ICD-10-CM

## 2018-01-10 DIAGNOSIS — E11.42 TYPE 2 DIABETES MELLITUS WITH DIABETIC POLYNEUROPATHY, WITHOUT LONG-TERM CURRENT USE OF INSULIN (HCC): Primary | Chronic | ICD-10-CM

## 2018-01-10 DIAGNOSIS — G62.9 NEUROPATHY: Chronic | ICD-10-CM

## 2018-01-10 DIAGNOSIS — I73.9 PVD (PERIPHERAL VASCULAR DISEASE) (HCC): Chronic | ICD-10-CM

## 2018-01-10 DIAGNOSIS — I10 ESSENTIAL HYPERTENSION WITH GOAL BLOOD PRESSURE LESS THAN 130/85: Chronic | ICD-10-CM

## 2018-01-10 DIAGNOSIS — R60.0 LOCALIZED EDEMA: Chronic | ICD-10-CM

## 2018-01-10 DIAGNOSIS — I48.0 PAF (PAROXYSMAL ATRIAL FIBRILLATION) (HCC): Chronic | ICD-10-CM

## 2018-01-10 DIAGNOSIS — M51.36 DDD (DEGENERATIVE DISC DISEASE), LUMBAR: Chronic | ICD-10-CM

## 2018-01-10 DIAGNOSIS — E78.00 HYPERCHOLESTEROLEMIA: Chronic | ICD-10-CM

## 2018-01-10 NOTE — PATIENT INSTRUCTIONS
Atrial Fibrillation: Care Instructions  Your Care Instructions    Atrial fibrillation is an irregular and often fast heartbeat. Treating this condition is important for several reasons. It can cause blood clots, which can travel from your heart to your brain and cause a stroke. If you have a fast heartbeat, you may feel lightheaded, dizzy, and weak. An irregular heartbeat can also increase your risk for heart failure. Atrial fibrillation is often the result of another heart condition, such as high blood pressure or coronary artery disease. Making changes to improve your heart condition will help you stay healthy and active. Follow-up care is a key part of your treatment and safety. Be sure to make and go to all appointments, and call your doctor if you are having problems. It's also a good idea to know your test results and keep a list of the medicines you take. How can you care for yourself at home? Medicines  ? · Take your medicines exactly as prescribed. Call your doctor if you think you are having a problem with your medicine. You will get more details on the specific medicines your doctor prescribes. ? · If your doctor has given you a blood thinner to prevent a stroke, be sure you get instructions about how to take your medicine safely. Blood thinners can cause serious bleeding problems. ? · Do not take any vitamins, over-the-counter drugs, or herbal products without talking to your doctor first.   ? Lifestyle changes  ? · Do not smoke. Smoking can increase your chance of a stroke and heart attack. If you need help quitting, talk to your doctor about stop-smoking programs and medicines. These can increase your chances of quitting for good. ? · Eat a heart-healthy diet. ? · Stay at a healthy weight. Lose weight if you need to.   ? · Limit alcohol to 2 drinks a day for men and 1 drink a day for women. Too much alcohol can cause health problems. ? · Avoid colds and flu.  Get a pneumococcal vaccine shot. If you have had one before, ask your doctor whether you need another dose. Get a flu shot every year. If you must be around people with colds or flu, wash your hands often. Activity  ? · If your doctor recommends it, get more exercise. Walking is a good choice. Bit by bit, increase the amount you walk every day. Try for at least 30 minutes on most days of the week. You also may want to swim, bike, or do other activities. Your doctor may suggest that you join a cardiac rehabilitation program so that you can have help increasing your physical activity safely. ? · Start light exercise if your doctor says it is okay. Even a small amount will help you get stronger, have more energy, and manage stress. Walking is an easy way to get exercise. Start out by walking a little more than you did in the hospital. Gradually increase the amount you walk. ? · When you exercise, watch for signs that your heart is working too hard. You are pushing too hard if you cannot talk while you are exercising. If you become short of breath or dizzy or have chest pain, sit down and rest immediately. ? · Check your pulse regularly. Place two fingers on the artery at the palm side of your wrist, in line with your thumb. If your heartbeat seems uneven or fast, talk to your doctor. When should you call for help? Call 911 anytime you think you may need emergency care. For example, call if:  ? · You have symptoms of a heart attack. These may include:  ¨ Chest pain or pressure, or a strange feeling in the chest.  ¨ Sweating. ¨ Shortness of breath. ¨ Nausea or vomiting. ¨ Pain, pressure, or a strange feeling in the back, neck, jaw, or upper belly or in one or both shoulders or arms. ¨ Lightheadedness or sudden weakness. ¨ A fast or irregular heartbeat. After you call 911, the  may tell you to chew 1 adult-strength or 2 to 4 low-dose aspirin. Wait for an ambulance. Do not try to drive yourself.    ? · You have symptoms of a stroke. These may include:  ¨ Sudden numbness, tingling, weakness, or loss of movement in your face, arm, or leg, especially on only one side of your body. ¨ Sudden vision changes. ¨ Sudden trouble speaking. ¨ Sudden confusion or trouble understanding simple statements. ¨ Sudden problems with walking or balance. ¨ A sudden, severe headache that is different from past headaches. ? · You passed out (lost consciousness). ?Call your doctor now or seek immediate medical care if:  ? · You have new or increased shortness of breath. ? · You feel dizzy or lightheaded, or you feel like you may faint. ? · Your heart rate becomes irregular. ? · You can feel your heart flutter in your chest or skip heartbeats. Tell your doctor if these symptoms are new or worse. ? Watch closely for changes in your health, and be sure to contact your doctor if you have any problems. Where can you learn more? Go to http://otto-cindy.info/. Enter U020 in the search box to learn more about \"Atrial Fibrillation: Care Instructions. \"  Current as of: September 21, 2016  Content Version: 11.4  © 8091-6598 [email or web address]. Care instructions adapted under license by [employer] (which disclaims liability or warranty for this information). If you have questions about a medical condition or this instruction, always ask your healthcare professional. Enoch Prim any warranty or liability for your use of this information.

## 2018-01-10 NOTE — MR AVS SNAPSHOT
Visit Information Date & Time Provider Department Dept. Phone Encounter #  
 1/10/2018  9:35 AM Elvia Dillon MD [address] [place] [address] [id number] Follow-up Instructions Return in about 3 months (around 4/10/2018), or if symptoms worsen or fail to improve. Your Appointments 9/12/2018 10:00 AM  
ESTABLISHED PATIENT with Cheri Quinn MD  
[facility] ([facility] SCHEDULING) Appt Note: annual  
 [address]  [address] [facility] [address]  
  
   
 [address] [place] [address] Upcoming Health Maintenance Date Due  
 EYE EXAM RETINAL OR DILATED Q1 12/9/2017 HEMOGLOBIN A1C Q6M 5/22/2018 MEDICARE YEARLY EXAM 8/17/2018 FOOT EXAM Q1 10/11/2018 MICROALBUMIN Q1 10/12/2018 LIPID PANEL Q1 11/22/2018 GLAUCOMA SCREENING Q2Y 12/9/2018 DTaP/Tdap/Td series (2 - Td) 8/16/2023 Allergies as of 1/10/2018  Review Complete On: 1/10/2018 By: Jolynn Mercado LPN Severity Noted Reaction Type Reactions Indocin [Indomethacin Sodium]  06/05/2010    Unknown (comments) Lisinopril  04/17/2013    Angioedema Dxed in hospital.  
 Losartan  01/22/2015    Other (comments) Face and throat swelling. Current Immunizations  Reviewed on 12/12/2016 Name Date Influenza High Dose Vaccine PF 10/4/2017 Influenza Vaccine 10/7/2015, 10/9/2014, 9/26/2013 Influenza Vaccine (Quad) PF 9/28/2016 Influenza Vaccine Split 10/11/2012, 11/23/2011, 10/7/2010 Influenza Vaccine Whole 12/14/2009 Pneumococcal Conjugate (PCV-13) 8/12/2015 Pneumococcal Polysaccharide (PPSV-23) 10/20/2014, 12/7/2012 Tdap 8/16/2013 Zoster Vaccine, Live 5/26/2015 Not reviewed this visit You Were Diagnosed With   
  
 Codes Comments  Type 2 diabetes mellitus with diabetic polyneuropathy, without long-term current use of insulin (HCC)    -  Primary ICD-10-CM: E11.42 
 ICD-9-CM: 250.60, 357.2 Type 2 diabetes mellitus with nephropathy (HCC)     ICD-10-CM: E11.21 
ICD-9-CM: 250.40, 583.81 Essential hypertension with goal blood pressure less than 130/85     ICD-10-CM: I10 
ICD-9-CM: 401.9 PAF (paroxysmal atrial fibrillation) ([facility])     ICD-10-CM: I48.0 ICD-9-CM: 427.31 PVD (peripheral vascular disease) ([facility]     ICD-10-CM: I73.9 ICD-9-CM: 443.9 Simple chronic bronchitis (HCC)     ICD-10-CM: J41.0 ICD-9-CM: 491.0 Stage 3 chronic kidney disease     ICD-10-CM: N18.3 ICD-9-CM: 851. 3 Neuropathy     ICD-10-CM: G62.9 ICD-9-CM: 355.9 DDD (degenerative disc disease), lumbar     ICD-10-CM: M51.36 
ICD-9-CM: 722.52 Primary osteoarthritis of right knee     ICD-10-CM: M17.11 ICD-9-CM: 715.16 Localized edema     ICD-10-CM: R60.0 ICD-9-CM: 332. 3 Hypercholesterolemia     ICD-10-CM: E78.00 ICD-9-CM: 272.0 Chronic pain syndrome     ICD-10-CM: G89.4 ICD-9-CM: 338. 4 Chronic anticoagulation     ICD-10-CM: Z79.01 
ICD-9-CM: V58.61 Vitals BP Pulse Temp Resp Height(growth percentile) Weight(growth percentile) 173/68 (BP 1 Location: Left arm, BP Patient Position: Sitting) 69 97.4 °F (36.3 °C) (Oral) 16 6' 2\" (1.88 m) 259 lb (117.5 kg) SpO2 BMI Smoking Status 99% 33.25 kg/m2 Former Smoker Vitals History BMI and BSA Data Body Mass Index Body Surface Area  
 33.25 kg/m 2 2.48 m 2 Preferred Pharmacy Pharmacy Name Phone [address] [facility] [address] [place] [address] [phone] Your Updated Medication List  
  
   
This list is accurate as of: 1/10/18 10:03 AM.  Always use your most recent med list.  
  
  
  
  
 ACCU-CHEK SHAUNA CONTROL SOLN Soln Generic drug:  Blood Glucose Control High&Low  
  
 albuterol 90 mcg/actuation inhaler Commonly known as:  PROVENTIL HFA, VENTOLIN HFA, PROAIR HFA Take 2 Puffs by inhalation every six (6) hours as needed for Wheezing for up to 360 days. Please use generic that is covered  
  
 allopurinol 300 mg tablet Commonly known as:  Kirsten Blankenship Take 1 Tab by mouth daily. amLODIPine 10 mg tablet Commonly known as:  Laly Krissy TAKE ONE TABLET BY MOUTH ONCE DAILY  
  
 aspirin 81 mg chewable tablet Take 81 mg by mouth daily. baclofen 10 mg tablet Commonly known as:  LIORESAL Take 1 Tab by mouth three (3) times daily. Muscle relaxer. benzoyl peroxide 5 % topical gel Apply  to affected area nightly. apply to affected area as directed Blood-Glucose Meter Misc Commonly known as:  TRUE METRIX GLUCOSE METER Use as Directed  
  
 bumetanide 2 mg tablet Commonly known as:  Dorotha Yo Take 1 Tab by mouth two (2) times a day. cloNIDine HCl 0.3 mg tablet Commonly known as:  CATAPRES  
TAKE ONE TABLET BY MOUTH THREE TIMES DAILY  
  
 colchicine 0.6 mg tablet Commonly known as:  COLCRYS Take 1 Tab by mouth daily. To prevent gout. dabigatran etexilate 150 mg capsule Commonly known as:  PRADAXA Take 1 Cap by mouth two (2) times a day. docusate sodium 100 mg Tab Take 1 Tab by mouth two (2) times a day. empagliflozin 10 mg tablet Commonly known as:  Jada Slates Take 1 Tab by mouth daily. ezetimibe 10 mg tablet Commonly known as:  Lunda Harps Take 1 Tab by mouth daily. fluticasone 50 mcg/actuation nasal spray Commonly known as:  FLONASE  
1 spray BL daily FOLBEE PLUS Tab tablet Generic drug:  b complex-vitamin c-folic acid 5mg TAKE ONE TABLET BY MOUTH ONCE DAILY  
  
 gabapentin 300 mg capsule Commonly known as:  NEURONTIN  
TAKE ONE CAPSULE BY MOUTH THREE TIMES DAILY  
  
 glipiZIDE 5 mg tablet Commonly known as:  Yanet Burch INCREASE TO 2 TABLETS BY MOUTH 20 MINUTES BEFORE BREAKFAST AND 2 TABLET TWENTY MINUTES BEFORE DINNER  
  
 glucose blood VI test strips strip Commonly known as:  TRUE METRIX GLUCOSE TEST STRIP Test 2 times daily Dx Coed E11.65  
  
 hydrALAZINE 50 mg tablet Commonly known as:  APRESOLINE Take 1 Tab by mouth three (3) times daily. HYDROcodone-acetaminophen  mg tablet Commonly known as:  Ren Norlander Take 1 Tab by mouth every six (6) hours as needed for Pain. [place] prescription for Dr. Ramesh Odell. Iron 325 mg (65 mg iron) tablet Generic drug:  ferrous sulfate Take 1 Tab by mouth Daily (before breakfast). labetalol 200 mg tablet Commonly known as:  NORMODYNE  
TAKE ONE-HALF TABLET BY MOUTH TWICE DAILY FOR HYPERTENSION * Lancets Misc Test 2 times daily Dx Code E11.65  
  
 * lancets 30 gauge Misc  
  
 lovastatin 40 mg tablet Commonly known as:  MEVACOR Take 1 Tab by mouth nightly.  
  
 magnesium oxide 400 mg tablet Commonly known as:  MAG-OX Take 1 Tab by mouth daily. melatonin 3 mg tablet Take 10 mg by mouth nightly. metFORMIN 500 mg tablet Commonly known as:  GLUCOPHAGE Increase to two TABLETS twice daily with meals  
  
 montelukast 10 mg tablet Commonly known as:  SINGULAIR Take 1 Tab by mouth daily. omeprazole 20 mg capsule Commonly known as:  PRILOSEC Take 1 Cap by mouth daily. Fax to [facility]  
  
 potassium chloride 10 mEq tablet Commonly known as:  K-DUR Take 1 Tab by mouth two (2) times a day. Indications: hypokalemia  
  
 predniSONE 5 mg dose pack Commonly known as:  STERAPRED See administration instruction per 5mg dose pack  
  
 tamsulosin 0.4 mg capsule Commonly known as:  FLOMAX Take 1 Cap by mouth daily. * Notice: This list has 2 medication(s) that are the same as other medications prescribed for you. Read the directions carefully, and ask your doctor or other care provider to review them with you. Prescriptions Sent to Pharmacy Refills  
 empagliflozin (JARDIANCE) 10 mg tablet 0 Sig: Take 1 Tab by mouth daily.   
 Class: Normal  
 Pharmacy: [facility] [address] [facility], [address] [facility] [place] [address]  #: [phone] Route: Oral  
  
We Performed the Following HEMOGLOBIN A1C WITH EAG [73898 CPT(R)] HEMOGLOBIN [id number] CPT(R)] LIPID PANEL [66122 CPT(R)] METABOLIC PANEL, COMPREHENSIVE [24916 CPT(R)] PHOSPHORUS [62113 CPT(R)] PROT+CREATU (RANDOM) [id number] CPT(R)] PTH INTACT [21916 CPT(R)] T4, FREE [id number] CPT(R)] Follow-up Instructions Return in about 3 months (around 4/10/2018), or if symptoms worsen or fail to improve. Patient Instructions Atrial Fibrillation: Care Instructions Your Care Instructions Atrial fibrillation is an irregular and often fast heartbeat. Treating this condition is important for several reasons. It can cause blood clots, which can travel from your heart to your brain and cause a stroke. If you have a fast heartbeat, you may feel lightheaded, dizzy, and weak. An irregular heartbeat can also increase your risk for heart failure. Atrial fibrillation is often the result of another heart condition, such as high blood pressure or coronary artery disease. Making changes to improve your heart condition will help you stay healthy and active. Follow-up care is a key part of your treatment and safety. Be sure to make and go to all appointments, and call your doctor if you are having problems. It's also a good idea to know your test results and keep a list of the medicines you take. How can you care for yourself at home? Medicines ? · Take your medicines exactly as prescribed. Call your doctor if you think you are having a problem with your medicine. You will get more details on the specific medicines your doctor prescribes. ? · If your doctor has given you a blood thinner to prevent a stroke, be sure you get instructions about how to take your medicine safely. Blood thinners can cause serious bleeding problems.   
? · Do not take any vitamins, over-the-counter drugs, or herbal products without talking to your doctor first.  
 ?Lifestyle changes ? · Do not smoke. Smoking can increase your chance of a stroke and heart attack. If you need help quitting, talk to your doctor about stop-smoking programs and medicines. These can increase your chances of quitting for good. ? · Eat a heart-healthy diet. ? · Stay at a healthy weight. Lose weight if you need to.  
? · Limit alcohol to 2 drinks a day for men and 1 drink a day for women. Too much alcohol can cause health problems. ? · Avoid colds and flu. Get a pneumococcal vaccine shot. If you have had one before, ask your doctor whether you need another dose. Get a flu shot every year. If you must be around people with colds or flu, wash your hands often. Activity ? · If your doctor recommends it, get more exercise. Walking is a good choice. Bit by bit, increase the amount you walk every day. Try for at least 30 minutes on most days of the week. You also may want to swim, bike, or do other activities. Your doctor may suggest that you join a cardiac rehabilitation program so that you can have help increasing your physical activity safely. ? · Start light exercise if your doctor says it is okay. Even a small amount will help you get stronger, have more energy, and manage stress. Walking is an easy way to get exercise. Start out by walking a little more than you did in the hospital. Gradually increase the amount you walk. ? · When you exercise, watch for signs that your heart is working too hard. You are pushing too hard if you cannot talk while you are exercising. If you become short of breath or dizzy or have chest pain, sit down and rest immediately. ? · Check your pulse regularly. Place two fingers on the artery at the palm side of your wrist, in line with your thumb. If your heartbeat seems uneven or fast, talk to your doctor. When should you call for help? Call 911 anytime you think you may need emergency care. For example, call if: ? · You have symptoms of a heart attack. These may include: ¨ Chest pain or pressure, or a strange feeling in the chest. 
¨ Sweating. ¨ Shortness of breath. ¨ Nausea or vomiting. ¨ Pain, pressure, or a strange feeling in the back, neck, jaw, or upper belly or in one or both shoulders or arms. ¨ Lightheadedness or sudden weakness. ¨ A fast or irregular heartbeat. After you call 911, the  may tell you to chew 1 adult-strength or 2 to 4 low-dose aspirin. Wait for an ambulance. Do not try to drive yourself. ? · You have symptoms of a stroke. These may include: 
¨ Sudden numbness, tingling, weakness, or loss of movement in your face, arm, or leg, especially on only one side of your body. ¨ Sudden vision changes. ¨ Sudden trouble speaking. ¨ Sudden confusion or trouble understanding simple statements. ¨ Sudden problems with walking or balance. ¨ A sudden, severe headache that is different from past headaches. ? · You passed out (lost consciousness). ?Call your doctor now or seek immediate medical care if: 
? · You have new or increased shortness of breath. ? · You feel dizzy or lightheaded, or you feel like you may faint. ? · Your heart rate becomes irregular. ? · You can feel your heart flutter in your chest or skip heartbeats. Tell your doctor if these symptoms are new or worse. ? Watch closely for changes in your health, and be sure to contact your doctor if you have any problems. Where can you learn more? Go to http://otto-cindy.info/. Enter U020 in the search box to learn more about \"Atrial Fibrillation: Care Instructions. \" Current as of: September 21, 2016 Content Version: 11.4 © 2640-1662 [employer]. Care instructions adapted under license by [employer] (which disclaims liability or warranty for this information).  If you have questions about a medical condition or this instruction, always ask your healthcare professional. [place] [address] any warranty or liability for your use of this information. Please provide this summary of care documentation to your next provider. Your primary care clinician is listed as [address]. If you have any questions after today's visit, please call [phone].

## 2018-01-10 NOTE — PROGRESS NOTES
Chief Complaint   Patient presents with    Cholesterol Problem    Hypertension    Labs     Fasting     Body mass index is 33.25 kg/(m^2). 1. Have you been to the ER, urgent care clinic since your last visit? Hospitalized since your last visit? No    2. Have you seen or consulted any other health care providers outside of the [address] [place] [address] since your last visit? Include any pap smears or colon screening.  No    Reviewed record in preparation for visit and have necessary documentation  Pt did not bring medication to office visit for review  Opportunity was given for questions  Goals that were addressed and/or need to be completed after this appointment include:     Health Maintenance Due   Topic Date Due    EYE EXAM RETINAL OR DILATED Q1  12/09/2017

## 2018-01-11 LAB
ALBUMIN SERPL-MCNC: 4.7 G/DL (ref 3.5–4.8)
ALBUMIN/GLOB SERPL: 1.8 {RATIO} (ref 1.2–2.2)
ALP SERPL-CCNC: 87 IU/L (ref 39–117)
ALT SERPL-CCNC: 23 IU/L (ref 0–44)
AST SERPL-CCNC: 32 IU/L (ref 0–40)
BILIRUB SERPL-MCNC: 0.8 MG/DL (ref 0–1.2)
BUN SERPL-MCNC: 10 MG/DL (ref 8–27)
BUN/CREAT SERPL: 8 (ref 10–24)
CALCIUM SERPL-MCNC: 10 MG/DL (ref 8.6–10.2)
CHLORIDE SERPL-SCNC: 100 MMOL/L (ref 96–106)
CHOLEST SERPL-MCNC: 129 MG/DL (ref 100–199)
CO2 SERPL-SCNC: 25 MMOL/L (ref 18–29)
CREAT SERPL-MCNC: 1.18 MG/DL (ref 0.76–1.27)
CREAT UR-MCNC: 23.6 MG/DL
EST. AVERAGE GLUCOSE BLD GHB EST-MCNC: 128 MG/DL
GLOBULIN SER CALC-MCNC: 2.6 G/DL (ref 1.5–4.5)
GLUCOSE SERPL-MCNC: 65 MG/DL (ref 65–99)
HBA1C MFR BLD: 6.1 % (ref 4.8–5.6)
HDLC SERPL-MCNC: 46 MG/DL
HGB BLD-MCNC: 14 G/DL (ref 13–17.7)
INTERPRETATION: NORMAL
LDLC SERPL CALC-MCNC: 68 MG/DL (ref 0–99)
Lab: NORMAL
PHOSPHATE SERPL-MCNC: 2.5 MG/DL (ref 2.5–4.5)
POTASSIUM SERPL-SCNC: 3.8 MMOL/L (ref 3.5–5.2)
PROT SERPL-MCNC: 7.3 G/DL (ref 6–8.5)
PROT UR-MCNC: 42.2 MG/DL
PROT/CREAT UR: 1788 MG/G CREAT (ref 0–200)
PTH-INTACT SERPL-MCNC: 45 PG/ML (ref 15–65)
SODIUM SERPL-SCNC: 146 MMOL/L (ref 134–144)
T4 FREE SERPL-MCNC: 1.22 NG/DL (ref 0.82–1.77)
TRIGL SERPL-MCNC: 73 MG/DL (ref 0–149)
VLDLC SERPL CALC-MCNC: 15 MG/DL (ref 5–40)

## 2018-01-11 NOTE — PROGRESS NOTES
Progress Note    Patient: Anastasia Hernández MRN: [id number]  SSN: [id number]    YOB: 1940  Age: 65 y.o. Sex: male        Chief Complaint   Patient presents with    Cholesterol Problem    Hypertension    Labs     Fasting         Subjective:     Encounter Diagnoses   Name Primary?  Type 2 diabetes mellitus with diabetic polyneuropathy, without long-term current use of insulin ([address]): This patient is managed under a comprehensive plan of care for Diabetes. Overall the patient feels well with good energy level. Pertinent Labs:   Lab Results   Component Value Date/Time    Hemoglobin A1c 6.5 11/22/2017 12:25 PM    Hemoglobin A1c 6.3 10/11/2017 02:51 PM    Hemoglobin A1c 8.4 05/12/2017 12:28 PM    Hemoglobin A1c, External 6.3 10/12/2017      Body mass index is 33.25 kg/(m^2). Lab Results   Component Value Date/Time    LDL, calculated 78 11/22/2017 12:25 PM         Lab Results   Component Value Date/Time    Sodium 145 11/22/2017 12:25 PM    Potassium 3.9 11/22/2017 12:25 PM    Chloride 102 11/22/2017 12:25 PM    CO2 27 11/22/2017 12:25 PM    Anion gap 12 10/07/2010 09:27 AM    Glucose 79 11/22/2017 12:25 PM    BUN 19 11/22/2017 12:25 PM    Creatinine 1.56 11/22/2017 12:25 PM    BUN/Creatinine ratio 12 11/22/2017 12:25 PM    GFR est AA 48 11/22/2017 12:25 PM    GFR est non-AA 42 11/22/2017 12:25 PM    Calcium 10.0 11/22/2017 12:25 PM    AST (SGOT) 31 11/22/2017 12:25 PM    Alk. phosphatase 128 11/22/2017 12:25 PM    Protein, total 6.7 11/22/2017 12:25 PM    Albumin 4.3 11/22/2017 12:25 PM    Globulin 2.7 10/07/2010 09:27 AM    A-G Ratio 1.8 11/22/2017 12:25 PM    ALT (SGPT) 47 11/22/2017 12:25 PM     Lab Results   Component Value Date/Time    Microalb/Creat ratio (ug/mg creat.) 227.5 10/11/2017 02:51 PM      Frequency of home glucose testing:daily   Blood Sugar range at home:    Last eye exam: In past 12 months. Last foot exam: This year.    Polyuria, polyphagia or polydipsia: No   Retinopathy: No   Neuropathy SX: No    Low blood sugar symptoms: No   Dietary compliance: Good   Medication compliance:Good   On ASA: Yes   Depression: No   CKD:no     Wt Readings from Last 3 Encounters:   01/10/18 259 lb (117.5 kg)   11/22/17 248 lb (112.5 kg)   10/11/17 267 lb (121.1 kg)        History   Smoking Status    Former Smoker    Types: Cigarettes    Quit date: 6/20/2003   Smokeless Tobacco    Never Used       Diabetic Consultants: All the patient's questions regarding medications, diet and exercise were answered. Goal of A1C of less than 7.5% is our goal.   Our overall goal is to reduce or eliminate the long term consequences of poorly controlled diabetes. Yes    Type 2 diabetes mellitus with nephropathy (HCC):see below         Essential hypertension with goal blood pressure less than 130/85:blood pressure is way high today, return in 1 week for his cuff comparison and intervention if needed. BP Readings from Last 3 Encounters:   01/10/18 173/68   11/22/17 127/68   10/11/17 148/62     The patient reports:  taking medications as instructed, no medication side effects noted, no TIA's, no chest pain on exertion, no dyspnea on exertion, noting swelling of ankles. Lab Results   Component Value Date/Time    Sodium 145 11/22/2017 12:25 PM    Potassium 3.9 11/22/2017 12:25 PM    Chloride 102 11/22/2017 12:25 PM    CO2 27 11/22/2017 12:25 PM    Anion gap 12 10/07/2010 09:27 AM    Glucose 79 11/22/2017 12:25 PM    BUN 19 11/22/2017 12:25 PM    Creatinine 1.56 11/22/2017 12:25 PM    BUN/Creatinine ratio 12 11/22/2017 12:25 PM    GFR est AA 48 11/22/2017 12:25 PM    GFR est non-AA 42 11/22/2017 12:25 PM    Calcium 10.0 11/22/2017 12:25 PM    Bilirubin, total 0.5 11/22/2017 12:25 PM    AST (SGOT) 31 11/22/2017 12:25 PM    Alk.  phosphatase 128 11/22/2017 12:25 PM    Protein, total 6.7 11/22/2017 12:25 PM    Albumin 4.3 11/22/2017 12:25 PM    Globulin 2.7 10/07/2010 09:27 AM    A-G Ratio 1.8 11/22/2017 12:25 PM ALT (SGPT) 47 11/22/2017 12:25 PM     Our goal is to normalize the blood pressure to decrease the risks of strokes and heart attacks. The patient is in agreement with the plan.  PAF (paroxysmal atrial fibrillation) ([facility]):  Denies chest pain, edema, dyspnea or dizziness. Unaware of irregular heartbeats. No neurologic sx. Avoids caffeine.  PVD (peripheral vascular disease) ([facility]): stable.  Simple chronic bronchitis ([facility]):  SpO2 Readings from Last 3 Encounters:   01/10/18 99%   11/22/17 99%   10/11/17 96%     Oxygen: He currently is not on home oxygen therapy. Symptoms: chronic dyspnea: severity = moderate: course of sx: stable  cough: severity: moderate: sputum : none. Patient does not smoke cigarettes. Compliant to medications.  Stage 3 chronic kidney disease:  Lab Results   Component Value Date/Time    GFR est AA 48 11/22/2017 12:25 PM    GFR est non-AA 42 11/22/2017 12:25 PM    Creatinine (POC) 1.4 01/02/2015 10:08 AM    Creatinine 1.56 11/22/2017 12:25 PM    BUN 19 11/22/2017 12:25 PM    Sodium 145 11/22/2017 12:25 PM    Potassium 3.9 11/22/2017 12:25 PM    Chloride 102 11/22/2017 12:25 PM    CO2 27 11/22/2017 12:25 PM            Neuropathy: severe, mainly on right from DJD.  DDD (degenerative disc disease), lumbar:severe pain, on hydrocodone. Low risk of diversion or misuse.  Primary osteoarthritis of right knee: Another source of pain         Localized edema: Lower legs.  Hypercholesterolemia:LDL at goal  Cardiovascular risks for him are: LDL goal is under 80  diabetic  hypertension  hyperlipidemia. Currently he takes Mevacor (lovastatin) , 40 mg.   Lab Results   Component Value Date/Time    Cholesterol, total 129 11/22/2017 12:25 PM    HDL Cholesterol 34 11/22/2017 12:25 PM    LDL, calculated 78 11/22/2017 12:25 PM    Triglyceride 86 11/22/2017 12:25 PM    CHOL/HDL Ratio 3.7 10/07/2010 09:27 AM     Lab Results   Component Value Date/Time    ALT (SGPT) 47 11/22/2017 12:25 PM    AST (SGOT) 31 11/22/2017 12:25 PM    Alk. phosphatase 128 11/22/2017 12:25 PM    Bilirubin, total 0.5 11/22/2017 12:25 PM      Myalgias: No   Fatigue: No   Other side effects: no  Wt Readings from Last 3 Encounters:   01/10/18 259 lb (117.5 kg)   11/22/17 248 lb (112.5 kg)   10/11/17 267 lb (121.1 kg)     The patient is aware of our goal to reduce or eliminate the long term problems (such as strokes and heart attacks) related to poorly controlled hyperlipidemia.  Chronic pain syndrome:from DDD, lumbar.  Chronic anticoagulation: on pradaxa and no side effects      Current and past medical information:    Current Medications after this visit[de-identified]   Current Outpatient Prescriptions   Medication Sig    empagliflozin (JARDIANCE) 10 mg tablet Take 1 Tab by mouth daily.  HYDROcodone-acetaminophen (NORCO)  mg tablet Take 1 Tab by mouth every six (6) hours as needed for Pain. Bridging prescription for Dr. Aletha Mckeon.  allopurinol (ZYLOPRIM) 300 mg tablet Take 1 Tab by mouth daily.  colchicine (COLCRYS) 0.6 mg tablet Take 1 Tab by mouth daily. To prevent gout.  amLODIPine (NORVASC) 10 mg tablet TAKE ONE TABLET BY MOUTH ONCE DAILY    montelukast (SINGULAIR) 10 mg tablet Take 1 Tab by mouth daily.  tamsulosin (FLOMAX) 0.4 mg capsule Take 1 Cap by mouth daily.  lovastatin (MEVACOR) 40 mg tablet Take 1 Tab by mouth nightly.  bumetanide (BUMEX) 2 mg tablet Take 1 Tab by mouth two (2) times a day.  omeprazole (PRILOSEC) 20 mg capsule Take 1 Cap by mouth daily. Fax to [facility]    potassium chloride (K-DUR) 10 mEq tablet Take 1 Tab by mouth two (2) times a day.  Indications: hypokalemia    labetalol (NORMODYNE) 200 mg tablet TAKE ONE-HALF TABLET BY MOUTH TWICE DAILY FOR HYPERTENSION    cloNIDine HCl (CATAPRES) 0.3 mg tablet TAKE ONE TABLET BY MOUTH THREE TIMES DAILY    fluticasone (FLONASE) 50 mcg/actuation nasal spray 1 spray BL daily    ezetimibe (ZETIA) 10 mg tablet Take 1 Tab by mouth daily.  lancets 30 gauge misc     ACCU-CHEK SHAUNA CONTROL SOLN soln     metFORMIN (GLUCOPHAGE) 500 mg tablet Increase to two TABLETS twice daily with meals    hydrALAZINE (APRESOLINE) 50 mg tablet Take 1 Tab by mouth three (3) times daily.  docusate sodium 100 mg tab Take 1 Tab by mouth two (2) times a day.  magnesium oxide (MAG-OX) 400 mg tablet Take 1 Tab by mouth daily.  gabapentin (NEURONTIN) 300 mg capsule TAKE ONE CAPSULE BY MOUTH THREE TIMES DAILY    Blood-Glucose Meter (TRUE METRIX GLUCOSE METER) misc Use as Directed    glucose blood VI test strips (TRUE METRIX GLUCOSE TEST STRIP) strip Test 2 times daily Dx Coed E11.65    Lancets misc Test 2 times daily Dx Code E11.65    FOLBEE PLUS tab tablet TAKE ONE TABLET BY MOUTH ONCE DAILY    glipiZIDE (GLUCOTROL) 5 mg tablet INCREASE TO 2 TABLETS BY MOUTH 20 MINUTES BEFORE BREAKFAST AND 2 TABLET TWENTY MINUTES BEFORE DINNER    ferrous sulfate (IRON) 325 mg (65 mg iron) tablet Take 1 Tab by mouth Daily (before breakfast).  dabigatran etexilate (PRADAXA) 150 mg capsule Take 1 Cap by mouth two (2) times a day.  baclofen (LIORESAL) 10 mg tablet Take 1 Tab by mouth three (3) times daily. Muscle relaxer.  melatonin 3 mg tablet Take 10 mg by mouth nightly.  aspirin 81 mg chewable tablet Take 81 mg by mouth daily.  predniSONE (STERAPRED) 5 mg dose pack See administration instruction per 5mg dose pack    albuterol (PROVENTIL HFA, VENTOLIN HFA, PROAIR HFA) 90 mcg/actuation inhaler Take 2 Puffs by inhalation every six (6) hours as needed for Wheezing for up to 360 days. Please use generic that is covered    benzoyl peroxide 5 % topical gel Apply  to affected area nightly. apply to affected area as directed     No current facility-administered medications for this visit.         Patient Active Problem List    Diagnosis Date Noted    Type 2 diabetes mellitus with nephropathy ([facility] 01/10/2018    History of colon polyps 05/02/2016    Prostate cancer ([facility] 05/02/2016    Idiopathic gout 05/02/2016    Essential hypertension with goal blood pressure less than 130/85 05/02/2016    CKD (chronic kidney disease) 11/23/2015    COPD (chronic obstructive pulmonary disease) ([address] 04/27/2015    Left upper lobe pneumonia ([facility] 04/27/2015    Diabetes with neurologic complications ([facility] 23/88/6700    Chronic radicular pain of lower back 07/21/2014    Neuropathy 07/16/2013    DDD (degenerative disc disease), lumbar 07/16/2013    Prostate CA ([facility] 07/16/2013    Tubulovillous adenoma polyp of colon 07/16/2013    Hoarse voice quality 06/20/2012    Edema 01/12/2012    PAF (paroxysmal atrial fibrillation) (HCC)     Unspecified arthropathy, ankle and foot 07/14/2011    Arthritis 07/11/2011    Gout, unspecified     PVD (peripheral vascular disease) ([facility]     Hypertension     Hypercholesterolemia     Leg pain     Chronic pain 05/06/2010       Past Medical History:   Diagnosis Date    Acute on chronic diastolic congestive heart failure ([address] 4/27/2015    Arthritis 7/11/2011    [place]head 6/7/2010    Cancer ([facility]     prostate    Chronic diastolic heart failure ([facility] 8/26/2015    CKD (chronic kidney disease) 11/23/2015    Diabetes ([facility]     Gout, unspecified     Hypercholesterolemia     Hypertension     Inguinal lymphadenopathy 2/18/2014    Leg pain     PAF (paroxysmal atrial fibrillation) (HCC)     PAF (paroxysmal atrial fibrillation) (HCC)     PVD (peripheral vascular disease) (HCC)        Allergies   Allergen Reactions    Indocin [Indomethacin Sodium] Unknown (comments)    Lisinopril Angioedema     Dxed in hospital.    Losartan Other (comments)     Face and throat swelling.        Past Surgical History:   Procedure Laterality Date    COLONOSCOPY N/A 9/22/2016    COLONOSCOPY performed by Yuan Francois MD at [address] [place] [address] HX ORTHOPAEDIC      back and left shoulder x2       Social History Social History    Marital status: SINGLE     Spouse name: N/A    Number of children: N/A    Years of education: N/A     Social History Main Topics    Smoking status: Former Smoker     Types: Cigarettes     Quit date: 6/20/2003    Smokeless tobacco: Never Used    Alcohol use 0.0 oz/week     0 Standard drinks or equivalent per week      Comment: 2 drinks/month    Drug use: No    Sexual activity: Not Asked     Other Topics Concern    None     Social History Narrative       Review of Systems   Constitutional: Positive for malaise/fatigue. Negative for chills, fever and weight loss. HENT: Negative. Negative for hearing loss. Eyes: Negative. Negative for blurred vision and double vision. Respiratory: Positive for cough and shortness of breath. Negative for hemoptysis, sputum production and wheezing. Cardiovascular: Negative. Negative for chest pain, palpitations and orthopnea. Gastrointestinal: Negative. Negative for abdominal pain, blood in stool, heartburn, nausea and vomiting. Genitourinary: Negative. Negative for dysuria, frequency and urgency. Musculoskeletal: Positive for back pain. Negative for myalgias and neck pain. Right knee gives away   Skin: Negative. Negative for rash. Neurological: Positive for weakness. Negative for dizziness, tingling, tremors and headaches. Endo/Heme/Allergies: Negative. Psychiatric/Behavioral: Negative. Negative for depression. Objective:     Vitals:    01/10/18 0921   BP: 173/68-repeat in one week, bring his cuff   Pulse: 69   Resp: 16   Temp: 97.4 °F (36.3 °C)   TempSrc: Oral   SpO2: 99%   Weight: 259 lb (117.5 kg)   Height: 6' 2\" (1.88 m)      Body mass index is 33.25 kg/(m^2). Physical Exam   Constitutional: He is oriented to person, place, and time and well-developed, well-nourished, and in no distress. Examined in a wheelchair. He can only walk a short distance. HENT:   Head: Normocephalic and atraumatic. Mouth/Throat: Oropharynx is clear and moist.   Eyes: Right eye exhibits no discharge. Left eye exhibits no discharge. No scleral icterus. Neck: No tracheal deviation present. No thyromegaly present. No bruit. Cardiovascular: Normal rate, regular rhythm and normal heart sounds. Pulmonary/Chest: Effort normal and breath sounds normal.   Abdominal: Soft. Musculoskeletal: He exhibits edema. Neurological: He is alert and oriented to person, place, and time. Skin: No rash noted. No erythema. Psychiatric: Mood and affect normal.   Nursing note and vitals reviewed. Health Maintenance Due   Topic Date Due    EYE EXAM RETINAL OR DILATED Q1  12/09/2017         Assessment and orders:   Diagnoses and all orders for this visit:    1. Type 2 diabetes mellitus with diabetic polyneuropathy, without long-term current use of insulin ([facility])  -     empagliflozin (JARDIANCE) 10 mg tablet; Take 1 Tab by mouth daily.  -     HEMOGLOBIN A1C WITH EAG  -     LIPID PANEL  -     METABOLIC PANEL, COMPREHENSIVE  -     T4, FREE    2. Type 2 diabetes mellitus with nephropathy ([facility])  -     HEMOGLOBIN A1C WITH EAG  -     LIPID PANEL  -     METABOLIC PANEL, COMPREHENSIVE  -     T4, FREE    3. Essential hypertension with goal blood pressure less than 130/85  -     empagliflozin (JARDIANCE) 10 mg tablet; Take 1 Tab by mouth daily.  -     METABOLIC PANEL, COMPREHENSIVE  -     T4, FREE    4. PAF (paroxysmal atrial fibrillation) ([facility])-no symptoms of TIA or stroke    5. PVD (peripheral vascular disease) ([facility])-stable    6. Simple chronic bronchitis ([facility])-stable chronic bronchitis    7. Stage 3 chronic kidney disease-retest  -     LIPID PANEL  -     METABOLIC PANEL, COMPREHENSIVE  -     HEMOGLOBIN  -     PTH INTACT  -     PROT+CREATU (RANDOM)  -     PHOSPHORUS    8. Neuropathy-worse in his right leg due to his back  -     HEMOGLOBIN A1C WITH EAG    9. DDD (degenerative disc disease), lumbar-severe    10.  Primary osteoarthritis of right knee-severe    11. Localized edema-may have to decreases dose of amlodipine and adjust other medicine    12. Hypercholesterolemia  -     LIPID PANEL  -     METABOLIC PANEL, COMPREHENSIVE    13. Chronic pain syndrome-treated with hydrocodone    14. Chronic anticoagulation-treated with Pradaxa  -     HEMOGLOBIN            Plan of care:  Discussed diagnoses in detail with patient. Medication risks/benefits/side effects discussed with patient. All of the patient's questions were addressed. The patient understands and agrees with our plan of care. The patient knows to call back if they are unsure of or forget any changes we discussed today or if the symptoms change. The patient received an After-Visit Summary which contains VS, orders, medication list and allergy list. This can be used as a \"mini-medical record\" should they have to seek medical care while out of town. Patient Care Team:  Robin Garcia MD as PCP - General (Family Practice)  ERAN Walls (Orthopedic Surgery)  John Hopkins MD (Otolaryngology)  Alana Pinedo MD (Endocrinology)  Krys Cruz MD as Physician (Internal Medicine)  Reese Anderson MD (Ophthalmology)  Jeanine Baker MD (Podiatry)  Kassandra Lee MD (Cardiology)  Lauren Kent, RN as Nurse Navigator    Follow-up Disposition:  Return in about 3 months (around 4/10/2018), or if symptoms worsen or fail to improve.     Future Appointments  Date Time Provider Department Center   1/17/2018 10:00 AM Robin Garcia MD [facility] SCHED   4/16/2018 8:00 AM Robin Garcia MD [id number] SCHED   9/12/2018 10:00 AM Kassandra Lee MD [id number]       Signed By: Robin Garcia MD     January 10, 2018

## 2018-01-15 ENCOUNTER — CLINICAL SUPPORT (OUTPATIENT)
Dept: FAMILY MEDICINE CLINIC | Age: 78
End: 2018-01-15

## 2018-01-15 VITALS — SYSTOLIC BLOOD PRESSURE: 159 MMHG | DIASTOLIC BLOOD PRESSURE: 57 MMHG

## 2018-01-15 DIAGNOSIS — I10 ESSENTIAL HYPERTENSION WITH GOAL BLOOD PRESSURE LESS THAN 130/85: Chronic | ICD-10-CM

## 2018-01-15 DIAGNOSIS — I50.32 CHRONIC DIASTOLIC HEART FAILURE (HCC): ICD-10-CM

## 2018-01-15 RX ORDER — HYDRALAZINE HYDROCHLORIDE 50 MG/1
50 TABLET, FILM COATED ORAL 4 TIMES DAILY
Qty: 360 TAB | Refills: 3 | Status: SHIPPED | OUTPATIENT
Start: 2018-01-15 | End: 2019-03-18 | Stop reason: SDUPTHER

## 2018-01-15 NOTE — PATIENT INSTRUCTIONS
Daniel Carl with [facility]  [address] [place] [address], [facility] [address] [facility], [address] [place] [address]  [phone]    Monitor blood pressure outside the office several times weekly at different times during the day and evening. Bring the record to me in 3 weeks for review.     Blood Pressure Record     Patient Name:  ______________________ :  ______________________    Date/Time BP Reading Pulse

## 2018-01-16 ENCOUNTER — TELEPHONE (OUTPATIENT)
Dept: FAMILY MEDICINE CLINIC | Age: 78
End: 2018-01-16

## 2018-01-16 NOTE — TELEPHONE ENCOUNTER
Per Dr. Mandy Rubi:    His blood pressure is still elevated today. I want him to increase his hydralazine to 50 mg 4 times daily. (Prescription sent to [facility] Should this not control his blood pressure he will have to go back and see Dr. Noah Jones.     Have him return blood pressure recordings beginning 4 days after he starts the increased dose for total of 2 weeks readings.

## 2018-01-16 NOTE — TELEPHONE ENCOUNTER
Spoke with patient and informed him per Dr. Aidee Light: \"His blood pressure is still elevated today.  I want him to increase his hydralazine to 50 mg 4 times daily. ([facility] ) Should this not control his blood pressure he will have to go back and see Dr. Antonio Montaño.  Melly Colon  Have him return blood pressure recordings beginning 4 days after he starts the increased dose for total of 2 weeks readings. \"      Patient verbalized understanding.

## 2018-01-24 DIAGNOSIS — G89.29 CHRONIC LEFT-SIDED LOW BACK PAIN WITHOUT SCIATICA: ICD-10-CM

## 2018-01-24 DIAGNOSIS — M51.36 DDD (DEGENERATIVE DISC DISEASE), LUMBAR: Chronic | ICD-10-CM

## 2018-01-24 DIAGNOSIS — M54.50 CHRONIC LEFT-SIDED LOW BACK PAIN WITHOUT SCIATICA: ICD-10-CM

## 2018-01-24 NOTE — TELEPHONE ENCOUNTER
Pt requesting hyrocodone script and for you to call when ready for . [facility] call [phone]     Message taken by Call Center

## 2018-01-24 NOTE — TELEPHONE ENCOUNTER
Please advise. [facility] [place] [facility], last drug screen on 5/12/17, Last office visit on 1/10/18.

## 2018-01-25 RX ORDER — HYDROCODONE BITARTRATE AND ACETAMINOPHEN 10; 325 MG/1; MG/1
1 TABLET ORAL
Qty: 90 TAB | Refills: 0 | Status: SHIPPED | OUTPATIENT
Start: 2018-01-25 | End: 2018-02-19 | Stop reason: SDUPTHER

## 2018-02-09 DIAGNOSIS — E11.42 TYPE 2 DIABETES MELLITUS WITH DIABETIC POLYNEUROPATHY, WITHOUT LONG-TERM CURRENT USE OF INSULIN (HCC): ICD-10-CM

## 2018-02-09 DIAGNOSIS — M51.36 DDD (DEGENERATIVE DISC DISEASE), LUMBAR: Chronic | ICD-10-CM

## 2018-02-09 RX ORDER — GLIPIZIDE 5 MG/1
TABLET ORAL
Qty: 360 TAB | Refills: 3 | Status: SHIPPED | OUTPATIENT
Start: 2018-02-09 | End: 2019-05-20 | Stop reason: SDUPTHER

## 2018-02-09 RX ORDER — BACLOFEN 10 MG/1
TABLET ORAL
Qty: 270 TAB | Refills: 3 | Status: SHIPPED | OUTPATIENT
Start: 2018-02-09 | End: 2019-04-16 | Stop reason: SDUPTHER

## 2018-02-19 DIAGNOSIS — G89.29 CHRONIC LEFT-SIDED LOW BACK PAIN WITHOUT SCIATICA: ICD-10-CM

## 2018-02-19 DIAGNOSIS — G89.4 CHRONIC PAIN SYNDROME: ICD-10-CM

## 2018-02-19 DIAGNOSIS — M51.36 DISC DEGENERATION, LUMBAR: Primary | ICD-10-CM

## 2018-02-19 DIAGNOSIS — M51.36 DDD (DEGENERATIVE DISC DISEASE), LUMBAR: Chronic | ICD-10-CM

## 2018-02-19 DIAGNOSIS — M54.50 CHRONIC LEFT-SIDED LOW BACK PAIN WITHOUT SCIATICA: ICD-10-CM

## 2018-02-19 RX ORDER — HYDROCODONE BITARTRATE AND ACETAMINOPHEN 10; 325 MG/1; MG/1
1 TABLET ORAL
Qty: 90 TAB | Refills: 0 | Status: SHIPPED | OUTPATIENT
Start: 2018-02-19 | End: 2018-03-20 | Stop reason: SDUPTHER

## 2018-02-19 NOTE — TELEPHONE ENCOUNTER
Refill request received from [facility].   Last office visit was 1/10/2018   Patient can be reached at [phone]

## 2018-02-21 ENCOUNTER — OFFICE VISIT (OUTPATIENT)
Dept: FAMILY MEDICINE CLINIC | Age: 78
End: 2018-02-21

## 2018-02-21 VITALS
TEMPERATURE: 96 F | HEIGHT: 74 IN | HEART RATE: 58 BPM | BODY MASS INDEX: 33.86 KG/M2 | RESPIRATION RATE: 20 BRPM | WEIGHT: 263.8 LBS | SYSTOLIC BLOOD PRESSURE: 118 MMHG | OXYGEN SATURATION: 98 % | DIASTOLIC BLOOD PRESSURE: 65 MMHG

## 2018-02-21 DIAGNOSIS — I10 ESSENTIAL HYPERTENSION: Primary | Chronic | ICD-10-CM

## 2018-02-21 DIAGNOSIS — G89.4 CHRONIC PAIN SYNDROME: Chronic | ICD-10-CM

## 2018-02-21 NOTE — PROGRESS NOTES
Reviewed record in preparation for visit and have necessary documentation  Pt did not bring medication to office visit for review    Goals that were addressed and/or need to be completed during or after this appointment include   Health Maintenance Due   Topic Date Due    EYE EXAM RETINAL OR DILATED Q1  12/09/2017

## 2018-02-21 NOTE — MR AVS SNAPSHOT
[facility] 
 
 
 [address] [id number] 
[phone] Patient: Remy Alexander MRN: [id number] RUBENS:[id number] Visit Information Date & Time Provider Department Dept. Phone Encounter #  
 2/21/2018 11:20 AM Glendy Nielson MD [address] [place] [address] [id number] Follow-up Instructions Return in about 7 weeks (around 4/10/2018). Your Appointments 4/16/2018  8:00 AM  
ROUTINE CARE with Miri Burton MD  
[address] [place] [address]) Appt Note: 3 mnth fu /est pt/refills/Diabetes/HTN  
 [address] [facility] [address] [place] [id number]  
  
    
 4/30/2018  1:30 PM  
ROUTINE CARE with Ana Baumann MD  
Care Diabetes & Endocrinology [facility]) Appt Note: F/U Routine DM  
 [address] [facility] [address]  
[phone]  
  
   
 [phone]  
  
    
 9/12/2018 10:00 AM  
ESTABLISHED PATIENT with Wei Fairchild MD  
[facility] ([facility]) Appt Note: annual  
 [address]  [address] [address]  
  
   
 [address] [place] [address] Upcoming Health Maintenance Date Due  
 EYE EXAM RETINAL OR DILATED Q1 12/9/2017 HEMOGLOBIN A1C Q6M 7/10/2018 MEDICARE YEARLY EXAM 8/17/2018 FOOT EXAM Q1 10/11/2018 MICROALBUMIN Q1 10/12/2018 GLAUCOMA SCREENING Q2Y 12/9/2018 LIPID PANEL Q1 1/10/2019 DTaP/Tdap/Td series (2 - Td) 8/16/2023 Allergies as of 2/21/2018  Review Complete On: 2/21/2018 By: Merary Tanner Severity Noted Reaction Type Reactions Indocin [Indomethacin Sodium]  06/05/2010    Unknown (comments) Lisinopril  04/17/2013    Angioedema Dxed in hospital.  
 Losartan  01/22/2015    Other (comments) Face and throat swelling. Current Immunizations  Reviewed on 12/12/2016 Name Date Influenza High Dose Vaccine PF 10/4/2017 Influenza Vaccine 10/7/2015, 10/9/2014, 9/26/2013 Influenza Vaccine (Quad) PF 9/28/2016 Influenza Vaccine Split 10/11/2012, 11/23/2011, 10/7/2010 Influenza Vaccine Whole 12/14/2009 Pneumococcal Conjugate (PCV-13) 8/12/2015 Pneumococcal Polysaccharide (PPSV-23) 10/20/2014, 12/7/2012 Tdap 8/16/2013 Zoster Vaccine, Live 5/26/2015 Not reviewed this visit You Were Diagnosed With   
  
 Codes Comments Essential hypertension    -  Primary ICD-10-CM: I10 
ICD-9-CM: 401.9 Chronic pain syndrome     ICD-10-CM: G89.4 ICD-9-CM: 338. 4 Vitals BP Pulse Temp Resp Height(growth percentile) Weight(growth percentile)  
 118/65 (BP 1 Location: Right arm, BP Patient Position: Sitting) (!) 58 96 °F (35.6 °C) (Oral) 20 6' 2\" (1.88 m) 263 lb 12.8 oz (119.7 kg) SpO2 BMI Smoking Status 98% 33.87 kg/m2 Former Smoker Vitals History BMI and BSA Data Body Mass Index Body Surface Area  
 33.87 kg/m 2 2.5 m 2 Preferred Pharmacy Pharmacy Name Phone [address] [address] [phone] Your Updated Medication List  
  
   
This list is accurate as of 2/21/18 11:59 AM.  Always use your most recent med list.  
  
  
  
  
 ACCU-CHEK SHAUNA CONTROL SOLN Soln Generic drug:  Blood Glucose Control High&Low  
  
 albuterol 90 mcg/actuation inhaler Commonly known as:  PROVENTIL HFA, VENTOLIN HFA, PROAIR HFA Take 2 Puffs by inhalation every six (6) hours as needed for Wheezing for up to 360 days. Please use generic that is covered  
  
 allopurinol 300 mg tablet Commonly known as:  Ashley Bankatherineter Take 1 Tab by mouth daily. amLODIPine 10 mg tablet Commonly known as:  Estephania Quinn TAKE ONE TABLET BY MOUTH ONCE DAILY  
  
 aspirin 81 mg chewable tablet Take 81 mg by mouth daily. baclofen 10 mg tablet Commonly known as:  LIORESAL  
TAKE ONE TABLET BY MOUTH THREE TIMES DAILY (MUSCLE  RELAXER) benzoyl peroxide 5 % topical gel Apply  to affected area nightly. apply to affected area as directed Blood-Glucose Meter Misc Commonly known as:  TRUE METRIX GLUCOSE METER Use as Directed  
  
 bumetanide 2 mg tablet Commonly known as:  Christia Ovens Take 1 Tab by mouth two (2) times a day. cloNIDine HCl 0.3 mg tablet Commonly known as:  CATAPRES  
TAKE ONE TABLET BY MOUTH THREE TIMES DAILY  
  
 colchicine 0.6 mg tablet Commonly known as:  COLCRYS Take 1 Tab by mouth daily. To prevent gout. docusate sodium 100 mg Tab Take 1 Tab by mouth two (2) times a day. empagliflozin 10 mg tablet Commonly known as:  Erin Miners Take 1 Tab by mouth daily. ezetimibe 10 mg tablet Commonly known as:  Robyn Generous Take 1 Tab by mouth daily. fluticasone 50 mcg/actuation nasal spray Commonly known as:  FLONASE  
1 spray BL daily FOLBEE PLUS Tab tablet Generic drug:  b complex-vitamin c-folic acid 5mg TAKE ONE TABLET BY MOUTH ONCE DAILY  
  
 gabapentin 300 mg capsule Commonly known as:  NEURONTIN  
TAKE ONE CAPSULE BY MOUTH THREE TIMES DAILY  
  
 glipiZIDE 5 mg tablet Commonly known as:  GLUCOTROL  
TAKE TWO TABLETS BY MOUTH 20 MINUTES BEFORE BREAKFAST AND TWO 20 MINUTES BEFORE SUPPER  
  
 glucose blood VI test strips strip Commonly known as:  TRUE METRIX GLUCOSE TEST STRIP Test 2 times daily Dx Coed E11.65  
  
 hydrALAZINE 50 mg tablet Commonly known as:  APRESOLINE Take 1 Tab by mouth four (4) times daily. Indications: hypertension HYDROcodone-acetaminophen  mg tablet Commonly known as:  Meaghan Punt Take 1 Tab by mouth every six (6) hours as needed for Pain. [place] prescription for Dr. Lyndon Webster. Iron 325 mg (65 mg iron) tablet Generic drug:  ferrous sulfate Take 1 Tab by mouth Daily (before breakfast). labetalol 200 mg tablet Commonly known as:  NORMODYNE  
TAKE ONE-HALF TABLET BY MOUTH TWICE DAILY FOR HYPERTENSION * Lancets Misc Test 2 times daily Dx Code E11.65  
  
 * lancets 30 gauge Misc  
  
 lovastatin 40 mg tablet Commonly known as:  MEVACOR Take 1 Tab by mouth nightly.  
  
 magnesium oxide 400 mg tablet Commonly known as:  MAG-OX Take 1 Tab by mouth daily. melatonin 3 mg tablet Take 10 mg by mouth nightly. metFORMIN 500 mg tablet Commonly known as:  GLUCOPHAGE Increase to two TABLETS twice daily with meals  
  
 montelukast 10 mg tablet Commonly known as:  SINGULAIR Take 1 Tab by mouth daily. omeprazole 20 mg capsule Commonly known as:  PRILOSEC Take 1 Cap by mouth daily. Fax to [facility]  
  
 potassium chloride 10 mEq tablet Commonly known as:  K-DUR Take 1 Tab by mouth two (2) times a day. Indications: hypokalemia PRADAXA 150 mg capsule Generic drug:  dabigatran etexilate TAKE ONE CAPSULE BY MOUTH TWICE DAILY predniSONE 5 mg dose pack Commonly known as:  STERAPRED See administration instruction per 5mg dose pack  
  
 tamsulosin 0.4 mg capsule Commonly known as:  FLOMAX Take 1 Cap by mouth daily. * Notice: This list has 2 medication(s) that are the same as other medications prescribed for you. Read the directions carefully, and ask your doctor or other care provider to review them with you. Follow-up Instructions Return in about 7 weeks (around 4/10/2018). Patient Instructions DASH Diet: Care Instructions Your Care Instructions The DASH diet is an eating plan that can help lower your blood pressure. DASH stands for Dietary Approaches to Stop Hypertension. Hypertension is high blood pressure. The DASH diet focuses on eating foods that are high in calcium, potassium, and magnesium. These nutrients can lower blood pressure.  The foods that are highest in these nutrients are fruits, vegetables, low-fat dairy products, nuts, seeds, and legumes. But taking calcium, potassium, and magnesium supplements instead of eating foods that are high in those nutrients does not have the same effect. The DASH diet also includes whole grains, fish, and poultry. The DASH diet is one of several lifestyle changes your doctor may recommend to lower your high blood pressure. Your doctor may also want you to decrease the amount of sodium in your diet. Lowering sodium while following the DASH diet can lower blood pressure even further than just the DASH diet alone. Follow-up care is a key part of your treatment and safety. Be sure to make and go to all appointments, and call your doctor if you are having problems. It's also a good idea to know your test results and keep a list of the medicines you take. How can you care for yourself at home? Following the DASH diet · Eat 4 to 5 servings of fruit each day. A serving is 1 medium-sized piece of fruit, ½ cup chopped or canned fruit, 1/4 cup dried fruit, or 4 ounces (½ cup) of fruit juice. Choose fruit more often than fruit juice. · Eat 4 to 5 servings of vegetables each day. A serving is 1 cup of lettuce or raw leafy vegetables, ½ cup of chopped or cooked vegetables, or 4 ounces (½ cup) of vegetable juice. Choose vegetables more often than vegetable juice. · Get 2 to 3 servings of low-fat and fat-free dairy each day. A serving is 8 ounces of milk, 1 cup of yogurt, or 1 ½ ounces of cheese. · Eat 6 to 8 servings of grains each day. A serving is 1 slice of bread, 1 ounce of dry cereal, or ½ cup of cooked rice, pasta, or cooked cereal. Try to choose whole-grain products as much as possible. · Limit lean meat, poultry, and fish to 2 servings each day. A serving is 3 ounces, about the size of a deck of cards. · Eat 4 to 5 servings of nuts, seeds, and legumes (cooked dried beans, lentils, and split peas) each week.  A serving is 1/3 cup of nuts, 2 tablespoons of seeds, or ½ cup of cooked beans or peas. · Limit fats and oils to 2 to 3 servings each day. A serving is 1 teaspoon of vegetable oil or 2 tablespoons of salad dressing. · Limit sweets and added sugars to 5 servings or less a week. A serving is 1 tablespoon jelly or jam, ½ cup sorbet, or 1 cup of lemonade. · Eat less than 2,300 milligrams (mg) of sodium a day. If you limit your sodium to 1,500 mg a day, you can lower your blood pressure even more. Tips for success · Start small. Do not try to make dramatic changes to your diet all at once. You might feel that you are missing out on your favorite foods and then be more likely to not follow the plan. Make small changes, and stick with them. Once those changes become habit, add a few more changes. · Try some of the following: ¨ Make it a goal to eat a fruit or vegetable at every meal and at snacks. This will make it easy to get the recommended amount of fruits and vegetables each day. ¨ Try yogurt topped with fruit and nuts for a snack or healthy dessert. ¨ Add lettuce, tomato, cucumber, and onion to sandwiches. ¨ Combine a ready-made pizza crust with low-fat mozzarella cheese and lots of vegetable toppings. Try using tomatoes, squash, spinach, broccoli, carrots, cauliflower, and onions. ¨ Have a variety of cut-up vegetables with a low-fat dip as an appetizer instead of chips and dip. ¨ Sprinkle sunflower seeds or chopped almonds over salads. Or try adding chopped walnuts or almonds to cooked vegetables. ¨ Try some vegetarian meals using beans and peas. Add garbanzo or kidney beans to salads. Make burritos and tacos with mashed coleman beans or black beans. Where can you learn more? Go to http://otto-cindy.info/. Enter Z424 in the search box to learn more about \"DASH Diet: Care Instructions. \" Current as of: September 21, 2016 Content Version: 11.4 © 9131-0017 [employer].  Care instructions adapted under license by [address] (which disclaims liability or warranty for this information). If you have questions about a medical condition or this instruction, always ask your healthcare professional. [address] any warranty or liability for your use of this information. Please provide this summary of care documentation to your next provider. Your primary care clinician is listed as [address]. If you have any questions after today's visit, please call [phone].

## 2018-02-21 NOTE — PATIENT INSTRUCTIONS

## 2018-02-22 NOTE — PROGRESS NOTES
Progress Note    Patient: Pam Vegas MRN: [id number]  SSN: [id number]    YOB: 1940  Age: 65 y.o. Sex: male        Chief Complaint   Patient presents with    Diabetes    Medication Refill    Hypertension     he is a 65y.o. year old male who presents for BP check and UDS as part of medication contract. Patient denies HA, dizziness, SOB, CP, abdominal pain, dysuria, acute myalgias or arthralgias. BP markedly improved today and at goal. Patient feeling good sans other complaints or concerns at this time. Encounter Diagnoses   Name Primary?     Essential hypertension Yes    Chronic pain syndrome      BP Readings from Last 3 Encounters:   02/21/18 118/65   01/15/18 159/57   01/10/18 173/68         Patient Active Problem List   Diagnosis Code    Chronic pain G89.29    Gout, unspecified M10.9    PVD (peripheral vascular disease) ([facility] I73.9    Hypertension I10    Hypercholesterolemia E78.00    Leg pain M79.606    Arthritis M19.90    Unspecified arthropathy, ankle and foot M19.079    PAF (paroxysmal atrial fibrillation) ([facility]) I48.0    Edema R60.9    Hoarse voice quality R49.0    Neuropathy G62.9    DDD (degenerative disc disease), lumbar M51.36    Prostate CA ([facility] C61    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    Diabetes with neurologic complications ([facility]) K21.48    Chronic radicular pain of lower back M54.16, G89.29    COPD (chronic obstructive pulmonary disease) ([facility]) J44.9    Left upper lobe pneumonia ([facility]) J18.1    CKD (chronic kidney disease) N18.9    History of colon polyps Z86.010    Prostate cancer ([facility]) C61    Idiopathic gout M10.00    Essential hypertension with goal blood pressure less than 130/85 I10    Type 2 diabetes mellitus with nephropathy ([facility] E11.21     Past Surgical History:   Procedure Laterality Date    COLONOSCOPY N/A 9/22/2016    COLONOSCOPY performed by Letty Mike MD at [facility]      back and left shoulder x2 Social History     Social History    Marital status: SINGLE     Spouse name: N/A    Number of children: N/A    Years of education: N/A     Occupational History    Not on file. Social History Main Topics    Smoking status: Former Smoker     Types: Cigarettes     Quit date: 6/20/2003    Smokeless tobacco: Never Used    Alcohol use 0.0 oz/week     0 Standard drinks or equivalent per week      Comment: 2 drinks/month    Drug use: No    Sexual activity: Not on file     Other Topics Concern    Not on file     Social History Narrative     Family History   Problem Relation Age of Onset    Cancer Paternal Grandfather      colon    Hypertension Other      son     Current Outpatient Prescriptions   Medication Sig    HYDROcodone-acetaminophen (NORCO)  mg tablet Take 1 Tab by mouth every six (6) hours as needed for Pain. [place] prescription for Dr. Justine Spence.  glipiZIDE (GLUCOTROL) 5 mg tablet TAKE TWO TABLETS BY MOUTH 20 MINUTES BEFORE BREAKFAST AND TWO 20 MINUTES BEFORE SUPPER    baclofen (LIORESAL) 10 mg tablet TAKE ONE TABLET BY MOUTH THREE TIMES DAILY (MUSCLE  RELAXER)    PRADAXA 150 mg capsule TAKE ONE CAPSULE BY MOUTH TWICE DAILY    hydrALAZINE (APRESOLINE) 50 mg tablet Take 1 Tab by mouth four (4) times daily. Indications: hypertension    empagliflozin (JARDIANCE) 10 mg tablet Take 1 Tab by mouth daily.  allopurinol (ZYLOPRIM) 300 mg tablet Take 1 Tab by mouth daily.  predniSONE (STERAPRED) 5 mg dose pack See administration instruction per 5mg dose pack    colchicine (COLCRYS) 0.6 mg tablet Take 1 Tab by mouth daily. To prevent gout.  amLODIPine (NORVASC) 10 mg tablet TAKE ONE TABLET BY MOUTH ONCE DAILY    montelukast (SINGULAIR) 10 mg tablet Take 1 Tab by mouth daily.  tamsulosin (FLOMAX) 0.4 mg capsule Take 1 Cap by mouth daily.  lovastatin (MEVACOR) 40 mg tablet Take 1 Tab by mouth nightly.  bumetanide (BUMEX) 2 mg tablet Take 1 Tab by mouth two (2) times a day.     omeprazole (PRILOSEC) 20 mg capsule Take 1 Cap by mouth daily. Fax to [facility]    potassium chloride (K-DUR) 10 mEq tablet Take 1 Tab by mouth two (2) times a day. Indications: hypokalemia    labetalol (NORMODYNE) 200 mg tablet TAKE ONE-HALF TABLET BY MOUTH TWICE DAILY FOR HYPERTENSION    cloNIDine HCl (CATAPRES) 0.3 mg tablet TAKE ONE TABLET BY MOUTH THREE TIMES DAILY    fluticasone (FLONASE) 50 mcg/actuation nasal spray 1 spray BL daily    albuterol (PROVENTIL HFA, VENTOLIN HFA, PROAIR HFA) 90 mcg/actuation inhaler Take 2 Puffs by inhalation every six (6) hours as needed for Wheezing for up to 360 days. Please use generic that is covered    ezetimibe (ZETIA) 10 mg tablet Take 1 Tab by mouth daily.  lancets 30 gauge misc     ACCU-CHEK SHAUNA CONTROL SOLN soln     metFORMIN (GLUCOPHAGE) 500 mg tablet Increase to two TABLETS twice daily with meals    docusate sodium 100 mg tab Take 1 Tab by mouth two (2) times a day.  magnesium oxide (MAG-OX) 400 mg tablet Take 1 Tab by mouth daily.  gabapentin (NEURONTIN) 300 mg capsule TAKE ONE CAPSULE BY MOUTH THREE TIMES DAILY    Blood-Glucose Meter (TRUE METRIX GLUCOSE METER) misc Use as Directed    glucose blood VI test strips (TRUE METRIX GLUCOSE TEST STRIP) strip Test 2 times daily Dx Coed E11.65    Lancets misc Test 2 times daily Dx Code E11.65    FOLBEE PLUS tab tablet TAKE ONE TABLET BY MOUTH ONCE DAILY    ferrous sulfate (IRON) 325 mg (65 mg iron) tablet Take 1 Tab by mouth Daily (before breakfast).  melatonin 3 mg tablet Take 10 mg by mouth nightly.  aspirin 81 mg chewable tablet Take 81 mg by mouth daily.  benzoyl peroxide 5 % topical gel Apply  to affected area nightly. apply to affected area as directed     No current facility-administered medications for this visit.       Allergies   Allergen Reactions    Indocin [Indomethacin Sodium] Unknown (comments)    Lisinopril Angioedema     Dxed in hospital.    Losartan Other (comments) Face and throat swelling. Review of Systems:  Constitutional: Negative for fatigue, malaise  Resp: Negative for cough, wheezing or SOB  CV: Negative for chest pain, dizziness or palpitations  GI: Negative for nausea or abdominal pain  MS: see HPI  Neuro: Negative for HA, weakness or paresthesia  Psych: Negative for depression or anxiety     Vitals:    02/21/18 1112 02/21/18 1137   BP: 156/73 118/65   Pulse: (!) 58    Resp: 20    Temp: 96 °F (35.6 °C)    TempSrc: Oral    SpO2: 98%    Weight: 263 lb 12.8 oz (119.7 kg)    Height: 6' 2\" (1.88 m)        Physical Examination:  General: Well developed, well nourished, in no acute distress  Head: Normocephalic, atraumatic  Eyes: Sclera clear, EOMI  Neck: Normal range of motion  Respiratory: symmetrical, unlabored effort  Cardiovascular: Regular rate and rhythm  Extremities: Full range of motion, normal gait  Neurologic: No focal deficits  Psych: Active, alert and oriented. Affect appropriate       ICD-10-CM ICD-9-CM    1. Essential hypertension I10 401.9 Continue current prescribed medications as written. 2. Chronic pain syndrome G89.4 338.4 UDS collected. I have discussed the diagnosis with the patient and the intended plan as seen in the above orders. The patient expresses understanding and agreement with our plan of care. All of the patient's questions were answered to apparent satisfaction. The patient has received an after-visit summary. The patient knows to call our office if there are any questions or concerns regarding diagnosis and treatment plans. I have discussed medication side effects and warnings with the patient as well. Follow-up Disposition:  Return in about 7 weeks (around 4/10/2018).

## 2018-02-27 RX ORDER — FOLIC ACID/VIT B COMPLEX AND C 5 MG
TABLET ORAL
Qty: 90 TAB | Refills: 3 | Status: SHIPPED | OUTPATIENT
Start: 2018-02-27 | End: 2019-03-18 | Stop reason: SDUPTHER

## 2018-02-28 LAB — DRUGS UR: NORMAL

## 2018-03-20 DIAGNOSIS — M51.36 DDD (DEGENERATIVE DISC DISEASE), LUMBAR: Chronic | ICD-10-CM

## 2018-03-20 DIAGNOSIS — M54.50 CHRONIC LEFT-SIDED LOW BACK PAIN WITHOUT SCIATICA: ICD-10-CM

## 2018-03-20 DIAGNOSIS — G89.29 CHRONIC LEFT-SIDED LOW BACK PAIN WITHOUT SCIATICA: ICD-10-CM

## 2018-03-20 RX ORDER — HYDROCODONE BITARTRATE AND ACETAMINOPHEN 10; 325 MG/1; MG/1
1 TABLET ORAL
Qty: 90 TAB | Refills: 0 | Status: SHIPPED | OUTPATIENT
Start: 2018-03-20 | End: 2018-04-23 | Stop reason: SDUPTHER

## 2018-04-16 ENCOUNTER — OFFICE VISIT (OUTPATIENT)
Dept: FAMILY MEDICINE CLINIC | Age: 78
End: 2018-04-16

## 2018-04-16 VITALS
HEART RATE: 59 BPM | WEIGHT: 258 LBS | OXYGEN SATURATION: 99 % | SYSTOLIC BLOOD PRESSURE: 152 MMHG | RESPIRATION RATE: 22 BRPM | TEMPERATURE: 97.6 F | DIASTOLIC BLOOD PRESSURE: 68 MMHG | BODY MASS INDEX: 33.11 KG/M2 | HEIGHT: 74 IN

## 2018-04-16 DIAGNOSIS — H93.13 TINNITUS OF BOTH EARS: Chronic | ICD-10-CM

## 2018-04-16 DIAGNOSIS — I73.9 PVD (PERIPHERAL VASCULAR DISEASE) (HCC): Chronic | ICD-10-CM

## 2018-04-16 DIAGNOSIS — J43.2 CENTRILOBULAR EMPHYSEMA (HCC): Primary | Chronic | ICD-10-CM

## 2018-04-16 DIAGNOSIS — E11.49 TYPE 2 DIABETES MELLITUS WITH OTHER NEUROLOGIC COMPLICATION, WITHOUT LONG-TERM CURRENT USE OF INSULIN (HCC): Chronic | ICD-10-CM

## 2018-04-16 DIAGNOSIS — I48.0 PAF (PAROXYSMAL ATRIAL FIBRILLATION) (HCC): Chronic | ICD-10-CM

## 2018-04-16 DIAGNOSIS — C61 PROSTATE CA (HCC): Chronic | ICD-10-CM

## 2018-04-16 DIAGNOSIS — I10 ESSENTIAL HYPERTENSION WITH GOAL BLOOD PRESSURE LESS THAN 130/85: Chronic | ICD-10-CM

## 2018-04-16 DIAGNOSIS — M51.36 DDD (DEGENERATIVE DISC DISEASE), LUMBAR: Chronic | ICD-10-CM

## 2018-04-16 DIAGNOSIS — G89.4 CHRONIC PAIN SYNDROME: Chronic | ICD-10-CM

## 2018-04-16 DIAGNOSIS — E13.49: Chronic | ICD-10-CM

## 2018-04-16 DIAGNOSIS — M10.00 IDIOPATHIC GOUT, UNSPECIFIED CHRONICITY, UNSPECIFIED SITE: Chronic | ICD-10-CM

## 2018-04-16 DIAGNOSIS — E78.00 HYPERCHOLESTEROLEMIA: Chronic | ICD-10-CM

## 2018-04-16 DIAGNOSIS — N18.30 STAGE 3 CHRONIC KIDNEY DISEASE (HCC): Chronic | ICD-10-CM

## 2018-04-16 PROBLEM — E11.9 DIABETES MELLITUS (HCC): Chronic | Status: ACTIVE | Noted: 2018-04-16

## 2018-04-16 NOTE — PROGRESS NOTES
Reviewed record in preparation for visit and have necessary documentation  Pt did not bring medication to office visit for review    Goals that were addressed and/or need to be completed during or after this appointment include   Health Maintenance Due   Topic Date Due    EYE EXAM RETINAL OR DILATED Q1  12/09/2017     Pt blood pressure cuff: 176/75

## 2018-04-16 NOTE — ASSESSMENT & PLAN NOTE
Stable, based on history, physical exam and review of pertinent labs, studies and medications; meds reconciled; continue current treatment plan. Key Antihyperglycemic Medications             glipiZIDE (GLUCOTROL) 5 mg tablet  (Taking) TAKE TWO TABLETS BY MOUTH 20 MINUTES BEFORE BREAKFAST AND TWO 20 MINUTES BEFORE SUPPER    empagliflozin (JARDIANCE) 10 mg tablet  (Taking) Take 1 Tab by mouth daily. metFORMIN (GLUCOPHAGE) 500 mg tablet  (Taking) Increase to two TABLETS twice daily with meals        Other Key Diabetic Medications             lovastatin (MEVACOR) 40 mg tablet  (Taking) Take 1 Tab by mouth nightly.    ezetimibe (ZETIA) 10 mg tablet  (Taking) Take 1 Tab by mouth daily.     gabapentin (NEURONTIN) 300 mg capsule  (Taking) TAKE ONE CAPSULE BY MOUTH THREE TIMES DAILY        Lab Results   Component Value Date/Time    Hemoglobin A1c 6.1 01/10/2018 10:15 AM    Hemoglobin A1c (POC) 7.2 02/07/2017 11:43 AM    Hemoglobin A1c, External 6.3 10/12/2017    Glucose 65 01/10/2018 10:15 AM    Creatinine 1.18 01/10/2018 10:15 AM    Creatinine, External 1.29 10/12/2017    Microalb/Creat ratio (ug/mg creat.) 227.5 10/11/2017 02:51 PM    Urine Microalbumin, External 250.3 10/12/2017    Cholesterol, total 129 01/10/2018 10:15 AM    HDL Cholesterol 46 01/10/2018 10:15 AM    LDL, calculated 68 01/10/2018 10:15 AM    LDL-C, External 70 10/12/2017    Triglyceride 73 01/10/2018 10:15 AM     Diabetic Foot and Eye Exam HM Status   Topic Date Due    Eye Exam  12/09/2017    Diabetic Foot Care  10/11/2018

## 2018-04-16 NOTE — ASSESSMENT & PLAN NOTE
resolved    Lab Results   Component Value Date/Time    HGB 14.0 01/10/2018 10:15 AM    Creatinine 1.18 01/10/2018 10:15 AM    BUN 10 01/10/2018 10:15 AM

## 2018-04-16 NOTE — ASSESSMENT & PLAN NOTE
This condition is managed by Specialist.  Key Peripheral Vascular Disease Meds             PRADAXA 150 mg capsule  (Taking) TAKE ONE CAPSULE BY MOUTH TWICE DAILY    hydrALAZINE (APRESOLINE) 50 mg tablet  (Taking) Take 1 Tab by mouth four (4) times daily. Indications: hypertension    lovastatin (MEVACOR) 40 mg tablet  (Taking) Take 1 Tab by mouth nightly.    ezetimibe (ZETIA) 10 mg tablet  (Taking) Take 1 Tab by mouth daily. aspirin 81 mg chewable tablet  (Taking) Take 81 mg by mouth daily.         Lab Results   Component Value Date/Time    HGB 14.0 01/10/2018 10:15 AM    Creatinine 1.18 01/10/2018 10:15 AM    Creatinine, External 1.29 10/12/2017    BUN 10 01/10/2018 10:15 AM    Cholesterol, total 129 01/10/2018 10:15 AM    HDL Cholesterol 46 01/10/2018 10:15 AM    LDL, calculated 68 01/10/2018 10:15 AM    LDL-C, External 70 10/12/2017    Triglyceride 73 01/10/2018 10:15 AM

## 2018-04-16 NOTE — PATIENT INSTRUCTIONS
DASH Diet: Care Instructions  Your Care Instructions    The DASH diet is an eating plan that can help lower your blood pressure. DASH stands for Dietary Approaches to Stop Hypertension. Hypertension is high blood pressure. The DASH diet focuses on eating foods that are high in calcium, potassium, and magnesium. These nutrients can lower blood pressure. The foods that are highest in these nutrients are fruits, vegetables, low-fat dairy products, nuts, seeds, and legumes. But taking calcium, potassium, and magnesium supplements instead of eating foods that are high in those nutrients does not have the same effect. The DASH diet also includes whole grains, fish, and poultry. The DASH diet is one of several lifestyle changes your doctor may recommend to lower your high blood pressure. Your doctor may also want you to decrease the amount of sodium in your diet. Lowering sodium while following the DASH diet can lower blood pressure even further than just the DASH diet alone. Follow-up care is a key part of your treatment and safety. Be sure to make and go to all appointments, and call your doctor if you are having problems. It's also a good idea to know your test results and keep a list of the medicines you take. How can you care for yourself at home? Following the DASH diet  · Eat 4 to 5 servings of fruit each day. A serving is 1 medium-sized piece of fruit, ½ cup chopped or canned fruit, 1/4 cup dried fruit, or 4 ounces (½ cup) of fruit juice. Choose fruit more often than fruit juice. · Eat 4 to 5 servings of vegetables each day. A serving is 1 cup of lettuce or raw leafy vegetables, ½ cup of chopped or cooked vegetables, or 4 ounces (½ cup) of vegetable juice. Choose vegetables more often than vegetable juice. · Get 2 to 3 servings of low-fat and fat-free dairy each day. A serving is 8 ounces of milk, 1 cup of yogurt, or 1 ½ ounces of cheese. · Eat 6 to 8 servings of grains each day.  A serving is 1 slice of bread, 1 ounce of dry cereal, or ½ cup of cooked rice, pasta, or cooked cereal. Try to choose whole-grain products as much as possible. · Limit lean meat, poultry, and fish to 2 servings each day. A serving is 3 ounces, about the size of a deck of cards. · Eat 4 to 5 servings of nuts, seeds, and legumes (cooked dried beans, lentils, and split peas) each week. A serving is 1/3 cup of nuts, 2 tablespoons of seeds, or ½ cup of cooked beans or peas. · Limit fats and oils to 2 to 3 servings each day. A serving is 1 teaspoon of vegetable oil or 2 tablespoons of salad dressing. · Limit sweets and added sugars to 5 servings or less a week. A serving is 1 tablespoon jelly or jam, ½ cup sorbet, or 1 cup of lemonade. · Eat less than 2,300 milligrams (mg) of sodium a day. If you limit your sodium to 1,500 mg a day, you can lower your blood pressure even more. Tips for success  · Start small. Do not try to make dramatic changes to your diet all at once. You might feel that you are missing out on your favorite foods and then be more likely to not follow the plan. Make small changes, and stick with them. Once those changes become habit, add a few more changes. · Try some of the following:  ¨ Make it a goal to eat a fruit or vegetable at every meal and at snacks. This will make it easy to get the recommended amount of fruits and vegetables each day. ¨ Try yogurt topped with fruit and nuts for a snack or healthy dessert. ¨ Add lettuce, tomato, cucumber, and onion to sandwiches. ¨ Combine a ready-made pizza crust with low-fat mozzarella cheese and lots of vegetable toppings. Try using tomatoes, squash, spinach, broccoli, carrots, cauliflower, and onions. ¨ Have a variety of cut-up vegetables with a low-fat dip as an appetizer instead of chips and dip. ¨ Sprinkle sunflower seeds or chopped almonds over salads. Or try adding chopped walnuts or almonds to cooked vegetables.   ¨ Try some vegetarian meals using beans and peas. Add garbanzo or kidney beans to salads. Make burritos and tacos with mashed coleman beans or black beans. Where can you learn more? Go to http://otto-cindy.info/. Enter T788 in the search box to learn more about \"DASH Diet: Care Instructions. \"  Current as of: 2016  Content Version: 11.4  © 7246-9597 [employer]. Care instructions adapted under license by [employer] (which disclaims liability or warranty for this information). If you have questions about a medical condition or this instruction, always ask your healthcare professional. [place] [address] any warranty or liability for your use of this information. Daniel Carl with [facility]  [address] [place] [address], [facility] [address] [facility], [address] [place] [address]  [phone]    Monitor blood pressure outside the office several times weekly at different times during the day and evening. Bring the record to me in 3 weeks for review.     Blood Pressure Record     Patient Name:  ______________________ :  ______________________    Date/Time BP Reading Pulse

## 2018-04-16 NOTE — ASSESSMENT & PLAN NOTE
This condition is managed by Specialist.  Key Oncology Meds     Patient is on no Oncologic meds. Key Pain Meds             HYDROcodone-acetaminophen (NORCO)  mg tablet  (Taking) Take 1 Tab by mouth every six (6) hours as needed for Pain. Bridging prescription for Dr. Meena Harper.         Lab Results   Component Value Date/Time    HGB 14.0 01/10/2018 10:15 AM    Creatinine 1.18 01/10/2018 10:15 AM    Creatinine, External 1.29 10/12/2017    BUN 10 01/10/2018 10:15 AM    ALT (SGPT) 23 01/10/2018 10:15 AM    AST (SGOT) 32 01/10/2018 10:15 AM    Albumin 4.7 01/10/2018 10:15 AM

## 2018-04-16 NOTE — ASSESSMENT & PLAN NOTE
Stable, based on history, physical exam and review of pertinent labs, studies and medications; meds reconciled; continue current treatment plan. Key COPD Medications             predniSONE (STERAPRED) 5 mg dose pack  (Taking) See administration instruction per 5mg dose pack    montelukast (SINGULAIR) 10 mg tablet  (Taking) Take 1 Tab by mouth daily. albuterol (PROVENTIL HFA, VENTOLIN HFA, PROAIR HFA) 90 mcg/actuation inhaler  (Taking) Take 2 Puffs by inhalation every six (6) hours as needed for Wheezing for up to 360 days.  Please use generic that is covered        Lab Results   Component Value Date/Time    HGB 14.0 01/10/2018 10:15 AM

## 2018-04-16 NOTE — MR AVS SNAPSHOT
Sameera Prather 
 
 
 2006 [address] [address] 
[phone] Patient: Collin Uriostegui MRN: [id number] FZX:[id number] Visit Information Date & Time Provider Department Dept. Phone Encounter #  
 4/16/2018  8:00 AM Marcelina Cook MD [facility] [id number] Follow-up Instructions Return in about 3 months (around 7/16/2018). Your Appointments 4/30/2018  1:30 PM  
ROUTINE CARE with Aydee Potter MD  
[facility] [address] [place] [address]) Appt Note: F/U Routine DM  
 [address] [place] [address]  
[phone]  
  
   
 [phone] [id number]  
  
    
 9/12/2018 10:00 AM  
ESTABLISHED PATIENT with Sun Ball MD  
[facility] ([address]) Appt Note: annual  
 [address]  [address]  
  
   
 [address] [place] [address] Upcoming Health Maintenance Date Due  
 EYE EXAM RETINAL OR DILATED Q1 12/9/2017 HEMOGLOBIN A1C Q6M 7/10/2018 MEDICARE YEARLY EXAM 8/17/2018 FOOT EXAM Q1 10/11/2018 MICROALBUMIN Q1 10/12/2018 GLAUCOMA SCREENING Q2Y 12/9/2018 LIPID PANEL Q1 1/10/2019 DTaP/Tdap/Td series (2 - Td) 8/16/2023 Allergies as of 4/16/2018  Review Complete On: 4/16/2018 By: Paz Enciso Severity Noted Reaction Type Reactions Indocin [Indomethacin Sodium]  06/05/2010    Unknown (comments) Lisinopril  04/17/2013    Angioedema Dxed in hospital.  
 Losartan  01/22/2015    Other (comments) Face and throat swelling. Current Immunizations  Reviewed on 12/12/2016 Name Date Influenza High Dose Vaccine PF 10/4/2017 Influenza Vaccine 10/7/2015, 10/9/2014, 9/26/2013 Influenza Vaccine (Quad) PF 9/28/2016 Influenza Vaccine Split 10/11/2012, 11/23/2011, 10/7/2010 Influenza Vaccine Whole 12/14/2009 Pneumococcal Conjugate (PCV-13) 8/12/2015 Pneumococcal Polysaccharide (PPSV-23) 10/20/2014, 12/7/2012 Tdap 8/16/2013 Zoster Vaccine, Live 5/26/2015 Not reviewed this visit You Were Diagnosed With   
  
 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 
ICD-9-CM: 250.60, 349.9 Essential hypertension with goal blood pressure less than 130/85     ICD-10-CM: I10 
ICD-9-CM: 401.9 Hypercholesterolemia     ICD-10-CM: E78.00 ICD-9-CM: 272.0 Stage 3 chronic kidney disease     ICD-10-CM: N18.3 ICD-9-CM: 585.3 DDD (degenerative disc disease), lumbar     ICD-10-CM: M51.36 
ICD-9-CM: 722.52 Idiopathic gout, unspecified chronicity, unspecified site     ICD-10-CM: M10.00 ICD-9-CM: 274.9 Chronic pain syndrome     ICD-10-CM: G89.4 ICD-9-CM: 338.4 Tinnitus of both ears     ICD-10-CM: H93.13 
ICD-9-CM: 388.30 Vitals BP Pulse Temp Resp Height(growth percentile) Weight(growth percentile) 171/68 (BP 1 Location: Left arm, BP Patient Position: Sitting) 66 97.6 °F (36.4 °C) (Oral) 22 6' 2\" (1.88 m) 258 lb (117 kg) SpO2 BMI Smoking Status 99% 33.13 kg/m2 Former Smoker Vitals History BMI and BSA Data Body Mass Index Body Surface Area  
 33.13 kg/m 2 2.47 m 2 Preferred Pharmacy Pharmacy Name Phone 500 [phone] Your Updated Medication List  
  
   
This list is accurate as of 4/16/18  8:24 AM.  Always use your most recent med list.  
  
  
  
  
 ACCU-CHEK SHAUNA CONTROL SOLN Soln Generic drug:  Blood Glucose Control High&Low  
  
 albuterol 90 mcg/actuation inhaler Commonly known as:  PROVENTIL HFA, VENTOLIN HFA, PROAIR HFA  
 Take 2 Puffs by inhalation every six (6) hours as needed for Wheezing for up to 360 days. Please use generic that is covered  
  
 allopurinol 300 mg tablet Commonly known as:  Alba Winter Take 1 Tab by mouth daily. amLODIPine 10 mg tablet Commonly known as:  Laurita Posada TAKE ONE TABLET BY MOUTH ONCE DAILY  
  
 aspirin 81 mg chewable tablet Take 81 mg by mouth daily. baclofen 10 mg tablet Commonly known as:  LIORESAL  
TAKE ONE TABLET BY MOUTH THREE TIMES DAILY (MUSCLE  RELAXER) benzoyl peroxide 5 % topical gel Apply  to affected area nightly. apply to affected area as directed Blood-Glucose Meter Misc Commonly known as:  TRUE METRIX GLUCOSE METER Use as Directed  
  
 bumetanide 2 mg tablet Commonly known as:  [place] Hora Take 1 Tab by mouth two (2) times a day. cloNIDine HCl 0.3 mg tablet Commonly known as:  CATAPRES  
TAKE ONE TABLET BY MOUTH THREE TIMES DAILY  
  
 colchicine 0.6 mg tablet Commonly known as:  COLCRYS Take 1 Tab by mouth daily. To prevent gout. docusate sodium 100 mg Tab Take 1 Tab by mouth two (2) times a day. empagliflozin 10 mg tablet Commonly known as:  Marie Milton Take 1 Tab by mouth daily. ezetimibe 10 mg tablet Commonly known as:  Estela De Anda Take 1 Tab by mouth daily. fluticasone 50 mcg/actuation nasal spray Commonly known as:  FLONASE  
1 spray BL daily FOLBEE PLUS Tab tablet Generic drug:  b complex-vitamin c-folic acid 5mg TAKE ONE TABLET BY MOUTH ONCE DAILY  
  
 gabapentin 300 mg capsule Commonly known as:  NEURONTIN  
TAKE ONE CAPSULE BY MOUTH THREE TIMES DAILY  
  
 glipiZIDE 5 mg tablet Commonly known as:  GLUCOTROL  
TAKE TWO TABLETS BY MOUTH 20 MINUTES BEFORE BREAKFAST AND TWO 20 MINUTES BEFORE SUPPER  
  
 glucose blood VI test strips strip Commonly known as:  TRUE METRIX GLUCOSE TEST STRIP Test 2 times daily Dx Coed E11.65  
  
 hydrALAZINE 50 mg tablet Commonly known as:  APRESOLINE Take 1 Tab by mouth four (4) times daily. Indications: hypertension HYDROcodone-acetaminophen  mg tablet Commonly known as:  Mckayla Fore Take 1 Tab by mouth every six (6) hours as needed for Pain. Bridging prescription for Dr. Yadira Fields. Iron 325 mg (65 mg iron) tablet Generic drug:  ferrous sulfate Take 1 Tab by mouth Daily (before breakfast). labetalol 200 mg tablet Commonly known as:  NORMODYNE  
TAKE ONE-HALF TABLET BY MOUTH TWICE DAILY FOR HYPERTENSION * Lancets Misc Test 2 times daily Dx Code E11.65  
  
 * lancets 30 gauge Misc  
  
 lovastatin 40 mg tablet Commonly known as:  MEVACOR Take 1 Tab by mouth nightly.  
  
 magnesium oxide 400 mg tablet Commonly known as:  MAG-OX Take 1 Tab by mouth daily. melatonin 3 mg tablet Take 10 mg by mouth nightly. metFORMIN 500 mg tablet Commonly known as:  GLUCOPHAGE Increase to two TABLETS twice daily with meals  
  
 montelukast 10 mg tablet Commonly known as:  SINGULAIR Take 1 Tab by mouth daily. omeprazole 20 mg capsule Commonly known as:  PRILOSEC Take 1 Cap by mouth daily. Fax to [facility]  
  
 potassium chloride 10 mEq tablet Commonly known as:  K-DUR Take 1 Tab by mouth two (2) times a day. Indications: hypokalemia PRADAXA 150 mg capsule Generic drug:  dabigatran etexilate TAKE ONE CAPSULE BY MOUTH TWICE DAILY predniSONE 5 mg dose pack Commonly known as:  STERAPRED See administration instruction per 5mg dose pack  
  
 tamsulosin 0.4 mg capsule Commonly known as:  FLOMAX Take 1 Cap by mouth daily. * Notice: This list has 2 medication(s) that are the same as other medications prescribed for you. Read the directions carefully, and ask your doctor or other care provider to review them with you. We Performed the Following HEMOGLOBIN A1C WITH EAG [40177 CPT(R)] HEMOGLOBIN [id number] CPT(R)] LIPID PANEL [33657 CPT(R)] METABOLIC PANEL, COMPREHENSIVE [67703 CPT(R)] PROT+CREATU (RANDOM) [id number] CPT(R)] TSH 3RD GENERATION [26377 CPT(R)] URIC ACID [id number] CPT(R)] Follow-up Instructions Return in about 3 months (around 7/16/2018). Patient Instructions DASH Diet: Care Instructions Your Care Instructions The DASH diet is an eating plan that can help lower your blood pressure. DASH stands for Dietary Approaches to Stop Hypertension. Hypertension is high blood pressure. The DASH diet focuses on eating foods that are high in calcium, potassium, and magnesium. These nutrients can lower blood pressure. The foods that are highest in these nutrients are fruits, vegetables, low-fat dairy products, nuts, seeds, and legumes. But taking calcium, potassium, and magnesium supplements instead of eating foods that are high in those nutrients does not have the same effect. The DASH diet also includes whole grains, fish, and poultry. The DASH diet is one of several lifestyle changes your doctor may recommend to lower your high blood pressure. Your doctor may also want you to decrease the amount of sodium in your diet. Lowering sodium while following the DASH diet can lower blood pressure even further than just the DASH diet alone. Follow-up care is a key part of your treatment and safety. Be sure to make and go to all appointments, and call your doctor if you are having problems. It's also a good idea to know your test results and keep a list of the medicines you take. How can you care for yourself at home? Following the DASH diet · Eat 4 to 5 servings of fruit each day. A serving is 1 medium-sized piece of fruit, ½ cup chopped or canned fruit, 1/4 cup dried fruit, or 4 ounces (½ cup) of fruit juice. Choose fruit more often than fruit juice. · Eat 4 to 5 servings of vegetables each day.  A serving is 1 cup of lettuce or raw leafy vegetables, ½ cup of chopped or cooked vegetables, or 4 ounces (½ cup) of vegetable juice. Choose vegetables more often than vegetable juice. · Get 2 to 3 servings of low-fat and fat-free dairy each day. A serving is 8 ounces of milk, 1 cup of yogurt, or 1 ½ ounces of cheese. · Eat 6 to 8 servings of grains each day. A serving is 1 slice of bread, 1 ounce of dry cereal, or ½ cup of cooked rice, pasta, or cooked cereal. Try to choose whole-grain products as much as possible. · Limit lean meat, poultry, and fish to 2 servings each day. A serving is 3 ounces, about the size of a deck of cards. · Eat 4 to 5 servings of nuts, seeds, and legumes (cooked dried beans, lentils, and split peas) each week. A serving is 1/3 cup of nuts, 2 tablespoons of seeds, or ½ cup of cooked beans or peas. · Limit fats and oils to 2 to 3 servings each day. A serving is 1 teaspoon of vegetable oil or 2 tablespoons of salad dressing. · Limit sweets and added sugars to 5 servings or less a week. A serving is 1 tablespoon jelly or jam, ½ cup sorbet, or 1 cup of lemonade. · Eat less than 2,300 milligrams (mg) of sodium a day. If you limit your sodium to 1,500 mg a day, you can lower your blood pressure even more. Tips for success · Start small. Do not try to make dramatic changes to your diet all at once. You might feel that you are missing out on your favorite foods and then be more likely to not follow the plan. Make small changes, and stick with them. Once those changes become habit, add a few more changes. · Try some of the following: ¨ Make it a goal to eat a fruit or vegetable at every meal and at snacks. This will make it easy to get the recommended amount of fruits and vegetables each day. ¨ Try yogurt topped with fruit and nuts for a snack or healthy dessert. ¨ Add lettuce, tomato, cucumber, and onion to sandwiches.  
¨ Combine a ready-made pizza crust with low-fat mozzarella cheese and lots of vegetable toppings. Try using tomatoes, squash, spinach, broccoli, carrots, cauliflower, and onions. ¨ Have a variety of cut-up vegetables with a low-fat dip as an appetizer instead of chips and dip. ¨ Sprinkle sunflower seeds or chopped almonds over salads. Or try adding chopped walnuts or almonds to cooked vegetables. ¨ Try some vegetarian meals using beans and peas. Add garbanzo or kidney beans to salads. Make burritos and tacos with mashed coleman beans or black beans. Where can you learn more? Go to http://otto-cindy.info/. Enter K264 in the search box to learn more about \"DASH Diet: Care Instructions. \" Current as of: 2016 Content Version: 11.4 © 7536-6144 [employer]. Care instructions adapted under license by [employer] (which disclaims liability or warranty for this information). If you have questions about a medical condition or this instruction, always ask your healthcare professional. [address]  any warranty or liability for your use of this information. Cheryl 22 Affiliated with [address] [place] [address]., [address] 
[phone] Monitor blood pressure outside the office several times weekly at different times during the day and evening. Bring the record to me in 3 weeks for review. Blood Pressure Record Patient Name:  ______________________ :  ______________________ Date/Time BP Reading Pulse Please provide this summary of care documentation to your next provider. Your primary care clinician is listed as [address]. If you have any questions after today's visit, please call [phone].

## 2018-04-16 NOTE — PROGRESS NOTES
Progress Note    Patient: Adam Salgado MRN: [id number]  SSN: [id number]    YOB: 1940  Age: 65 y.o. Sex: male        Chief Complaint   Patient presents with    Hypertension    Diabetes         Subjective:     Encounter Diagnoses   Name Primary?  Centrilobular emphysema ([facility] [address]):  SpO2 Readings from Last 3 Encounters:   04/16/18 99%   02/21/18 98%   01/10/18 99%     Oxygen: He currently is not on home oxygen therapy. Symptoms: chronic dyspnea: severity = moderate: course of sx: stable. Patient does not smoke cigarettes. Compliant to medications. 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 Overall the patient feels well with good energy level. Pertinent Labs:   Lab Results   Component Value Date/Time    Hemoglobin A1c 6.1 (H) 01/10/2018 10:15 AM    Hemoglobin A1c 6.5 (H) 11/22/2017 12:25 PM    Hemoglobin A1c 6.3 (H) 10/11/2017 02:51 PM    Hemoglobin A1c, External 6.3 10/12/2017      Body mass index is 33.13 kg/(m^2). Lab Results   Component Value Date/Time    LDL, calculated 68 01/10/2018 10:15 AM         Lab Results   Component Value Date/Time    Sodium 146 (H) 01/10/2018 10:15 AM    Potassium 3.8 01/10/2018 10:15 AM    Chloride 100 01/10/2018 10:15 AM    CO2 25 01/10/2018 10:15 AM    Anion gap 12 10/07/2010 09:27 AM    Glucose 65 01/10/2018 10:15 AM    BUN 10 01/10/2018 10:15 AM    Creatinine 1.18 01/10/2018 10:15 AM    BUN/Creatinine ratio 8 (L) 01/10/2018 10:15 AM    GFR est AA 68 01/10/2018 10:15 AM    GFR est non-AA 59 (L) 01/10/2018 10:15 AM    Calcium 10.0 01/10/2018 10:15 AM    AST (SGOT) 32 01/10/2018 10:15 AM    Alk.  phosphatase 87 01/10/2018 10:15 AM    Protein, total 7.3 01/10/2018 10:15 AM    Albumin 4.7 01/10/2018 10:15 AM    Globulin 2.7 10/07/2010 09:27 AM    A-G Ratio 1.8 01/10/2018 10:15 AM    ALT (SGPT) 23 01/10/2018 10:15 AM     Lab Results   Component Value Date/Time    Microalb/Creat ratio (ug/mg creat.) 227.5 (H) 10/11/2017 02:51 PM      Frequency of home glucose testing:daily   Blood Sugar range at home: Highest reading less than 150   Last eye exam: Overdue, daughter is to make appointment. Last foot exam: This year. Polyuria, polyphagia or polydipsia: No   Retinopathy: No   Neuropathy SX: Yes   Low blood sugar symptoms: No   Dietary compliance: Good   Medication compliance:Good   On ASA: Yes   Depression: No   CKD:no     Wt Readings from Last 3 Encounters:   04/16/18 258 lb (117 kg)   02/21/18 263 lb 12.8 oz (119.7 kg)   01/10/18 259 lb (117.5 kg)        History   Smoking Status    Former Smoker    Types: Cigarettes    Quit date: 6/20/2003   Smokeless Tobacco    Never Used       Diabetic Consultants:Nai  All the patient's questions regarding medications, diet and exercise were answered. Goal of A1C of less than 7.5% is our goal.   Our overall goal is to reduce or eliminate the long term consequences of poorly controlled diabetes.  Essential hypertension with goal blood pressure less than 130/85: Elevated today. He will monitor this at home. We calibrated his cuff today. He is to take 5 points off his systolic and diastolic on his readings from home. BP Readings from Last 3 Encounters:   04/16/18 152/68   02/21/18 118/65   01/15/18 159/57     The patient reports:  taking medications as instructed, no medication side effects noted, no TIA's, no chest pain on exertion, notes stable dyspnea on exertion, no change, noting swelling of ankles.      Lab Results   Component Value Date/Time    Sodium 146 (H) 01/10/2018 10:15 AM    Potassium 3.8 01/10/2018 10:15 AM    Chloride 100 01/10/2018 10:15 AM    CO2 25 01/10/2018 10:15 AM    Anion gap 12 10/07/2010 09:27 AM    Glucose 65 01/10/2018 10:15 AM    BUN 10 01/10/2018 10:15 AM    Creatinine 1.18 01/10/2018 10:15 AM BUN/Creatinine ratio 8 (L) 01/10/2018 10:15 AM    GFR est AA 68 01/10/2018 10:15 AM    GFR est non-AA 59 (L) 01/10/2018 10:15 AM    Calcium 10.0 01/10/2018 10:15 AM    Bilirubin, total 0.8 01/10/2018 10:15 AM    AST (SGOT) 32 01/10/2018 10:15 AM    Alk. phosphatase 87 01/10/2018 10:15 AM    Protein, total 7.3 01/10/2018 10:15 AM    Albumin 4.7 01/10/2018 10:15 AM    Globulin 2.7 10/07/2010 09:27 AM    A-G Ratio 1.8 01/10/2018 10:15 AM    ALT (SGPT) 23 01/10/2018 10:15 AM     Low salt diet? yes  Aerobic exercise? yes  Our goal is to normalize the blood pressure to decrease the risks of strokes and heart attacks. The patient is in agreement with the plan.  Hypercholesterolemia:  Cardiovascular risks for him are: LDL goal is under 80  diabetic  hypertension  hyperlipidemia. Currently he takes Mevacor (lovastatin) , 40 mg and zetia. Lab Results   Component Value Date/Time    Cholesterol, total 129 01/10/2018 10:15 AM    HDL Cholesterol 46 01/10/2018 10:15 AM    LDL, calculated 68 01/10/2018 10:15 AM    Triglyceride 73 01/10/2018 10:15 AM    CHOL/HDL Ratio 3.7 10/07/2010 09:27 AM     Lab Results   Component Value Date/Time    ALT (SGPT) 23 01/10/2018 10:15 AM    AST (SGOT) 32 01/10/2018 10:15 AM    Alk. phosphatase 87 01/10/2018 10:15 AM    Bilirubin, total 0.8 01/10/2018 10:15 AM      Myalgias: No   Fatigue: No   Other side effects: no  Wt Readings from Last 3 Encounters:   04/16/18 258 lb (117 kg)   02/21/18 263 lb 12.8 oz (119.7 kg)   01/10/18 259 lb (117.5 kg)     The patient is aware of our goal to reduce or eliminate the long term problems (such as strokes and heart attacks) related to poorly controlled hyperlipidemia.          Stage 3 chronic kidney disease: Now in stage II  Lab Results   Component Value Date/Time    GFR est AA 68 01/10/2018 10:15 AM    GFR est non-AA 59 (L) 01/10/2018 10:15 AM    Creatinine (POC) 1.4 (H) 01/02/2015 10:08 AM    Creatinine 1.18 01/10/2018 10:15 AM    BUN 10 01/10/2018 10:15 AM    Sodium 146 (H) 01/10/2018 10:15 AM    Potassium 3.8 01/10/2018 10:15 AM    Chloride 100 01/10/2018 10:15 AM    CO2 25 01/10/2018 10:15 AM            DDD (degenerative disc disease), lumbar: Source of chronic pain and chronic difficulty walking. He walks with a wide base gait and a cane.  Idiopathic gout, unspecified chronicity, unspecified site: Severe when it recurs but he has not had any lately         Chronic pain syndrome: Due to his DDD, lumbar. He has bilateral radiculopathies.  Tinnitus of both ears: He used to run a jackhammer. He has chronic severe tinnitus. He does not qualify for a hearing aid yet. He has been tested. I reminded him that hearing protection is the treatment of choice. Current and past medical information:    Current Medications after this visit[de-identified]   Current Outpatient Prescriptions   Medication Sig    cloNIDine HCl (CATAPRES) 0.3 mg tablet TAKE ONE TABLET BY MOUTH THREE TIMES DAILY    HYDROcodone-acetaminophen (NORCO)  mg tablet Take 1 Tab by mouth every six (6) hours as needed for Pain. Bridging prescription for Dr. Angelita Goodwin.  FOLBEE PLUS tab tablet TAKE ONE TABLET BY MOUTH ONCE DAILY    glipiZIDE (GLUCOTROL) 5 mg tablet TAKE TWO TABLETS BY MOUTH 20 MINUTES BEFORE BREAKFAST AND TWO 20 MINUTES BEFORE SUPPER    baclofen (LIORESAL) 10 mg tablet TAKE ONE TABLET BY MOUTH THREE TIMES DAILY (MUSCLE  RELAXER)    PRADAXA 150 mg capsule TAKE ONE CAPSULE BY MOUTH TWICE DAILY    hydrALAZINE (APRESOLINE) 50 mg tablet Take 1 Tab by mouth four (4) times daily. Indications: hypertension    empagliflozin (JARDIANCE) 10 mg tablet Take 1 Tab by mouth daily.  allopurinol (ZYLOPRIM) 300 mg tablet Take 1 Tab by mouth daily.  predniSONE (STERAPRED) 5 mg dose pack See administration instruction per 5mg dose pack    colchicine (COLCRYS) 0.6 mg tablet Take 1 Tab by mouth daily. To prevent gout.     amLODIPine (NORVASC) 10 mg tablet TAKE ONE TABLET BY MOUTH ONCE DAILY    montelukast (SINGULAIR) 10 mg tablet Take 1 Tab by mouth daily.  tamsulosin (FLOMAX) 0.4 mg capsule Take 1 Cap by mouth daily.  lovastatin (MEVACOR) 40 mg tablet Take 1 Tab by mouth nightly.  bumetanide (BUMEX) 2 mg tablet Take 1 Tab by mouth two (2) times a day.  omeprazole (PRILOSEC) 20 mg capsule Take 1 Cap by mouth daily. Fax to [facility]    potassium chloride (K-DUR) 10 mEq tablet Take 1 Tab by mouth two (2) times a day. Indications: hypokalemia    labetalol (NORMODYNE) 200 mg tablet TAKE ONE-HALF TABLET BY MOUTH TWICE DAILY FOR HYPERTENSION    fluticasone (FLONASE) 50 mcg/actuation nasal spray 1 spray BL daily    albuterol (PROVENTIL HFA, VENTOLIN HFA, PROAIR HFA) 90 mcg/actuation inhaler Take 2 Puffs by inhalation every six (6) hours as needed for Wheezing for up to 360 days. Please use generic that is covered    ezetimibe (ZETIA) 10 mg tablet Take 1 Tab by mouth daily.  lancets 30 gauge misc     ACCU-CHEK SHAUNA CONTROL SOLN soln     metFORMIN (GLUCOPHAGE) 500 mg tablet Increase to two TABLETS twice daily with meals    docusate sodium 100 mg tab Take 1 Tab by mouth two (2) times a day.  magnesium oxide (MAG-OX) 400 mg tablet Take 1 Tab by mouth daily.  gabapentin (NEURONTIN) 300 mg capsule TAKE ONE CAPSULE BY MOUTH THREE TIMES DAILY    Blood-Glucose Meter (TRUE METRIX GLUCOSE METER) misc Use as Directed    glucose blood VI test strips (TRUE METRIX GLUCOSE TEST STRIP) strip Test 2 times daily Dx Coed E11.65    Lancets misc Test 2 times daily Dx Code E11.65    ferrous sulfate (IRON) 325 mg (65 mg iron) tablet Take 1 Tab by mouth Daily (before breakfast).  melatonin 3 mg tablet Take 10 mg by mouth nightly.  aspirin 81 mg chewable tablet Take 81 mg by mouth daily.  benzoyl peroxide 5 % topical gel Apply  to affected area nightly. apply to affected area as directed     No current facility-administered medications for this visit. Patient Active Problem List    Diagnosis Date Noted    Diabetes mellitus ([facility] 04/16/2018    Type 2 diabetes mellitus with nephropathy ([facility] 01/10/2018    History of colon polyps 05/02/2016    Prostate cancer ([facility] 05/02/2016    Idiopathic gout 05/02/2016    Essential hypertension with goal blood pressure less than 130/85 05/02/2016    CKD (chronic kidney disease) 11/23/2015    COPD (chronic obstructive pulmonary disease) ([facility] [address] 04/27/2015    Left upper lobe pneumonia ([facility] 04/27/2015    Diabetes with neurologic complications ([facility] 13/80/5147    Chronic radicular pain of lower back 07/21/2014    Neuropathy 07/16/2013    DDD (degenerative disc disease), lumbar 07/16/2013    Prostate CA ([facility] 07/16/2013    Tubulovillous adenoma polyp of colon 07/16/2013    Hoarse voice quality 06/20/2012    Edema 01/12/2012    PAF (paroxysmal atrial fibrillation) (HCC)     Unspecified arthropathy, ankle and foot 07/14/2011    Arthritis 07/11/2011    Gout, unspecified     PVD (peripheral vascular disease) ([facility]     Hypertension     Hypercholesterolemia     Leg pain     Chronic pain 05/06/2010       Past Medical History:   Diagnosis Date    Acute on chronic diastolic congestive heart failure ([facility] [address] 4/27/2015    Arthritis 7/11/2011    [place] 6/7/2010    Cancer ([facility]     prostate    Chronic diastolic heart failure ([facility] 8/26/2015    CKD (chronic kidney disease) 11/23/2015    Diabetes ([facility]     Gout, unspecified     Hypercholesterolemia     Hypertension     Inguinal lymphadenopathy 2/18/2014    Leg pain     PAF (paroxysmal atrial fibrillation) (HCC)     PAF (paroxysmal atrial fibrillation) (HCC)     PVD (peripheral vascular disease) (HCC)        Allergies   Allergen Reactions    Indocin [Indomethacin Sodium] Unknown (comments)    Lisinopril Angioedema     Dxed in hospital.    Losartan Other (comments)     Face and throat swelling.        Past Surgical History:   Procedure Laterality Date  COLONOSCOPY N/A 9/22/2016    COLONOSCOPY performed by Julia Harkins MD at [facility]      back and left shoulder x2       Social History     Social History    Marital status: SINGLE     Spouse name: N/A    Number of children: N/A    Years of education: N/A     Social History Main Topics    Smoking status: Former Smoker     Types: Cigarettes     Quit date: 6/20/2003    Smokeless tobacco: Never Used    Alcohol use 0.0 oz/week     0 Standard drinks or equivalent per week      Comment: 2 drinks/month    Drug use: No    Sexual activity: Not Asked     Other Topics Concern    None     Social History Narrative       Review of Systems   Constitutional: Negative. Negative for chills, fever, malaise/fatigue and weight loss. HENT: Positive for hearing loss and tinnitus. Eyes: Negative. Negative for blurred vision and double vision. Respiratory: Negative. Negative for cough, hemoptysis, sputum production and shortness of breath. Cardiovascular: Negative. Negative for chest pain, palpitations and orthopnea. Gastrointestinal: Negative. Negative for abdominal pain, blood in stool, heartburn, nausea and vomiting. Genitourinary: Negative. Negative for dysuria, frequency and urgency. Musculoskeletal: Positive for back pain. Negative for myalgias and neck pain. Skin: Negative. Negative for rash. Neurological: Positive for sensory change. Negative for dizziness, tingling, tremors, weakness and headaches. Endo/Heme/Allergies: Negative. Psychiatric/Behavioral: Negative. Negative for depression. Objective:     Vitals:    04/16/18 0756 04/16/18 0831   BP: 171/68 152/68   Pulse: 66 (!) 59   Resp: 22    Temp: 97.6 °F (36.4 °C)    TempSrc: Oral    SpO2: 99%    Weight: 258 lb (117 kg)    Height: 6' 2\" (1.88 m)       Body mass index is 33.13 kg/(m^2).     Physical Exam   Constitutional: He is oriented to person, place, and time and well-developed, well-nourished, and in no distress. HENT:   Head: Normocephalic and atraumatic. Mouth/Throat: Oropharynx is clear and moist.   Eyes: Right eye exhibits no discharge. Left eye exhibits no discharge. No scleral icterus. Neck: No tracheal deviation present. No thyromegaly present. No bruit. Cardiovascular: Normal rate, regular rhythm and normal heart sounds. Pulmonary/Chest: Effort normal. No respiratory distress. He has no wheezes. Decreased breath sounds. No rales, no rhonchi. Abdominal: Soft. He exhibits no distension. There is no tenderness. There is no rebound. Musculoskeletal: He exhibits edema. Neurological: He is alert and oriented to person, place, and time. Skin: No rash noted. No erythema. Psychiatric: Mood and affect normal.   Nursing note and vitals reviewed. Health Maintenance Due   Topic Date Due    EYE EXAM RETINAL OR DILATED Q1  12/09/2017         Assessment and orders:     Encounter Diagnoses     ICD-10-CM ICD-9-CM   1. Centrilobular emphysema ([facility]) J43.2 492.8   2. 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 Essential hypertension with goal blood pressure less than 130/85 I10 401.9   4. Hypercholesterolemia E78.00 272.0   5. Stage 3 chronic kidney disease N18.3 585.3   6. DDD (degenerative disc disease), lumbar M51.36 722.52   7. Idiopathic gout, unspecified chronicity, unspecified site M10.00 274.9   8. Chronic pain syndrome G89.4 338.4   9. Tinnitus of both ears H93.13 388.30     Diagnoses and all orders for this visit:    1. Centrilobular emphysema ([facility] [address]  Assessment & Plan:  Stable, based on history, physical exam and review of pertinent labs, studies and medications; meds reconciled; continue current treatment plan.   Key COPD Medications             predniSONE (STERAPRED) 5 mg dose pack  (Taking) See administration instruction per 5mg dose pack    montelukast (SINGULAIR) 10 mg tablet (Taking) Take 1 Tab by mouth daily. albuterol (PROVENTIL HFA, VENTOLIN HFA, PROAIR HFA) 90 mcg/actuation inhaler  (Taking) Take 2 Puffs by inhalation every six (6) hours as needed for Wheezing for up to 360 days. Please use generic that is covered        Lab Results   Component Value Date/Time    HGB 14.0 01/10/2018 10:15 AM         2. 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 Essential hypertension with goal blood pressure less than 667/56  -     METABOLIC PANEL, COMPREHENSIVE  -     TSH 3RD GENERATION  -     HEMOGLOBIN  -     PROT+CREATU (RANDOM)    4. Hypercholesterolemia    5. Stage 3 chronic kidney disease  -     METABOLIC PANEL, COMPREHENSIVE  -     HEMOGLOBIN  -     URIC ACID  -     PROT+CREATU (RANDOM)    6. DDD (degenerative disc disease), lumbar    7. Idiopathic gout, unspecified chronicity, unspecified site  -     URIC ACID    8. Chronic pain syndrome    9. Tinnitus of both ears    10. PVD (peripheral vascular disease) ([facility] [address]  Assessment & Plan: This condition is managed by Specialist.  Key Peripheral Vascular Disease Meds             PRADAXA 150 mg capsule  (Taking) TAKE ONE CAPSULE BY MOUTH TWICE DAILY    hydrALAZINE (APRESOLINE) 50 mg tablet  (Taking) Take 1 Tab by mouth four (4) times daily. Indications: hypertension    lovastatin (MEVACOR) 40 mg tablet  (Taking) Take 1 Tab by mouth nightly.    ezetimibe (ZETIA) 10 mg tablet  (Taking) Take 1 Tab by mouth daily. aspirin 81 mg chewable tablet  (Taking) Take 81 mg by mouth daily.         Lab Results   Component Value Date/Time    HGB 14.0 01/10/2018 10:15 AM    Creatinine 1.18 01/10/2018 10:15 AM    Creatinine, External 1.29 10/12/2017    BUN 10 01/10/2018 10:15 AM    Cholesterol, total 129 01/10/2018 10:15 AM    HDL Cholesterol 46 01/10/2018 10:15 AM    LDL, calculated 68 01/10/2018 10:15 AM    LDL-C, External 70 10/12/2017    Triglyceride 73 01/10/2018 10:15 AM         11. PAF (paroxysmal atrial fibrillation) (HCC)  Assessment & Plan:  Stable, based on history, physical exam and review of pertinent labs, studies and medications; meds reconciled; continue current treatment plan. Key CAD CHF Meds             cloNIDine HCl (CATAPRES) 0.3 mg tablet  (Taking) TAKE ONE TABLET BY MOUTH THREE TIMES DAILY    PRADAXA 150 mg capsule  (Taking) TAKE ONE CAPSULE BY MOUTH TWICE DAILY    hydrALAZINE (APRESOLINE) 50 mg tablet  (Taking) Take 1 Tab by mouth four (4) times daily. Indications: hypertension    amLODIPine (NORVASC) 10 mg tablet  (Taking) TAKE ONE TABLET BY MOUTH ONCE DAILY    lovastatin (MEVACOR) 40 mg tablet  (Taking) Take 1 Tab by mouth nightly. bumetanide (BUMEX) 2 mg tablet  (Taking) Take 1 Tab by mouth two (2) times a day. labetalol (NORMODYNE) 200 mg tablet  (Taking) TAKE ONE-HALF TABLET BY MOUTH TWICE DAILY FOR HYPERTENSION    ezetimibe (ZETIA) 10 mg tablet  (Taking) Take 1 Tab by mouth daily. aspirin 81 mg chewable tablet  (Taking) Take 81 mg by mouth daily. 12. Prostate CA [facility])  Assessment & Plan: This condition is managed by Specialist.  Key Oncology Meds     Patient is on no Oncologic meds. Key Pain Meds             HYDROcodone-acetaminophen (NORCO)  mg tablet  (Taking) Take 1 Tab by mouth every six (6) hours as needed for Pain. Bridging prescription for Dr. Fariba Montgomery. Lab Results   Component Value Date/Time    HGB 14.0 01/10/2018 10:15 AM    Creatinine 1.18 01/10/2018 10:15 AM    Creatinine, External 1.29 10/12/2017    BUN 10 01/10/2018 10:15 AM    ALT (SGPT) 23 01/10/2018 10:15 AM    AST (SGOT) 32 01/10/2018 10:15 AM    Albumin 4.7 01/10/2018 10:15 AM         13.  Type 2 diabetes mellitus with other neurologic complication, without long-term current use of insulin (HCC)  Assessment & Plan:  Stable, based on history, physical exam and review of pertinent labs, studies and medications; meds reconciled; continue current treatment plan. Key Antihyperglycemic Medications             glipiZIDE (GLUCOTROL) 5 mg tablet  (Taking) TAKE TWO TABLETS BY MOUTH 20 MINUTES BEFORE BREAKFAST AND TWO 20 MINUTES BEFORE SUPPER    empagliflozin (JARDIANCE) 10 mg tablet  (Taking) Take 1 Tab by mouth daily. metFORMIN (GLUCOPHAGE) 500 mg tablet  (Taking) Increase to two TABLETS twice daily with meals        Other Key Diabetic Medications             lovastatin (MEVACOR) 40 mg tablet  (Taking) Take 1 Tab by mouth nightly.    ezetimibe (ZETIA) 10 mg tablet  (Taking) Take 1 Tab by mouth daily. gabapentin (NEURONTIN) 300 mg capsule  (Taking) TAKE ONE CAPSULE BY MOUTH THREE TIMES DAILY        Lab Results   Component Value Date/Time    Hemoglobin A1c 6.1 01/10/2018 10:15 AM    Hemoglobin A1c (POC) 7.2 02/07/2017 11:43 AM    Hemoglobin A1c, External 6.3 10/12/2017    Glucose 65 01/10/2018 10:15 AM    Creatinine 1.18 01/10/2018 10:15 AM    Creatinine, External 1.29 10/12/2017    Microalb/Creat ratio (ug/mg creat.) 227.5 10/11/2017 02:51 PM    Urine Microalbumin, External 250.3 10/12/2017    Cholesterol, total 129 01/10/2018 10:15 AM    HDL Cholesterol 46 01/10/2018 10:15 AM    LDL, calculated 68 01/10/2018 10:15 AM    LDL-C, External 70 10/12/2017    Triglyceride 73 01/10/2018 10:15 AM     Diabetic Foot and Eye Exam HM Status   Topic Date Due    Eye Exam  12/09/2017    Diabetic Foot Care  10/11/2018       Multiple diverse medical problems necessitating extensive decision making, prolonged [facility] [address] chart review and prolonged OV. Plan of care:  Discussed diagnoses in detail with patient. Medication risks/benefits/side effects discussed with patient. All of the patient's questions were addressed. The patient understands and agrees with our plan of care.     The patient knows to call back if they are unsure of or forget any changes we discussed today or if the symptoms change. The patient received an After-Visit Summary which contains VS, orders, medication list and allergy list. This can be used as a \"mini-medical record\" should they have to seek medical care while out of town. Patient Care Team:  Cathy Schwarz MD as PCP - General (Family Practice)  ERAN Montez (Orthopedic Surgery)  Meenakshi Parra MD (Otolaryngology)  Kory Wolf MD (Endocrinology)  Silvino Villegas MD as Physician (Internal Medicine)  Epifanio Sawant MD (Ophthalmology)  Jarrett Rossi MD (Podiatry)  Jon Mcconnell MD (Cardiology)  Joss Gonzales RN as Nurse Navigator    Follow-up Disposition:  Return in about 3 months (around 7/16/2018).     Future Appointments  Date Time Provider Elisha Sandoval   4/30/2018 1:30 PM Kory Wolf MD CDE [facility] SCHED   9/12/2018 10:00 AM Jon Mcconnell MD [address]       Signed By: Cathy Schwarz MD     April 16, 2018

## 2018-04-17 DIAGNOSIS — D64.9 ANEMIA, UNSPECIFIED TYPE: Primary | ICD-10-CM

## 2018-04-17 LAB
ALBUMIN SERPL-MCNC: 4.2 G/DL (ref 3.5–4.8)
ALBUMIN/GLOB SERPL: 2.2 {RATIO} (ref 1.2–2.2)
ALP SERPL-CCNC: 90 IU/L (ref 39–117)
ALT SERPL-CCNC: 24 IU/L (ref 0–44)
AST SERPL-CCNC: 40 IU/L (ref 0–40)
BILIRUB SERPL-MCNC: 0.5 MG/DL (ref 0–1.2)
BUN SERPL-MCNC: 12 MG/DL (ref 8–27)
BUN/CREAT SERPL: 9 (ref 10–24)
CALCIUM SERPL-MCNC: 9.4 MG/DL (ref 8.6–10.2)
CHLORIDE SERPL-SCNC: 102 MMOL/L (ref 96–106)
CHOLEST SERPL-MCNC: 110 MG/DL (ref 100–199)
CO2 SERPL-SCNC: 25 MMOL/L (ref 18–29)
CREAT SERPL-MCNC: 1.27 MG/DL (ref 0.76–1.27)
CREAT UR-MCNC: 149.3 MG/DL
EST. AVERAGE GLUCOSE BLD GHB EST-MCNC: 131 MG/DL
GFR SERPLBLD CREATININE-BSD FMLA CKD-EPI: 54 ML/MIN/1.73
GFR SERPLBLD CREATININE-BSD FMLA CKD-EPI: 62 ML/MIN/1.73
GLOBULIN SER CALC-MCNC: 1.9 G/DL (ref 1.5–4.5)
GLUCOSE SERPL-MCNC: 108 MG/DL (ref 65–99)
HBA1C MFR BLD: 6.2 % (ref 4.8–5.6)
HDLC SERPL-MCNC: 35 MG/DL
HGB BLD-MCNC: 11.1 G/DL (ref 13–17.7)
INTERPRETATION: NORMAL
LDLC SERPL CALC-MCNC: 59 MG/DL (ref 0–99)
POTASSIUM SERPL-SCNC: 4.2 MMOL/L (ref 3.5–5.2)
PROT SERPL-MCNC: 6.1 G/DL (ref 6–8.5)
PROT UR-MCNC: 102.5 MG/DL
PROT/CREAT UR: 687 MG/G CREAT (ref 0–200)
SODIUM SERPL-SCNC: 144 MMOL/L (ref 134–144)
TRIGL SERPL-MCNC: 80 MG/DL (ref 0–149)
TSH SERPL DL<=0.005 MIU/L-ACNC: 1.36 UIU/ML (ref 0.45–4.5)
URATE SERPL-MCNC: 4.3 MG/DL (ref 3.7–8.6)
VLDLC SERPL CALC-MCNC: 16 MG/DL (ref 5–40)

## 2018-04-17 NOTE — PROGRESS NOTES
He has a new anemia.   Please get the lab to add an iron profile and FIT test.  Thank you,  Dr. Mauro Peers

## 2018-04-19 ENCOUNTER — TELEPHONE (OUTPATIENT)
Dept: FAMILY MEDICINE CLINIC | Age: 78
End: 2018-04-19

## 2018-04-19 LAB
IRON SATN MFR SERPL: 10 % (ref 15–55)
IRON SERPL-MCNC: 35 UG/DL (ref 38–169)
SPECIMEN STATUS REPORT, ROLRST: NORMAL
TIBC SERPL-MCNC: 339 UG/DL (ref 250–450)
UIBC SERPL-MCNC: 304 UG/DL (ref 111–343)

## 2018-04-19 NOTE — TELEPHONE ENCOUNTER
Unable to reach patient by telephone. Detailed letter mailed to patient informing him per Dr. Ghazala Alfaro:   [place] find your most recent results below. With exception of iron deficiency anemia your labs are acceptable. Please began ferrous sulfate 325 mg once daily. This can be found over-the-counter. Take it with  a docusate stool softener to keep you from making him constipated. We will also need to  a stool specimen kit from the lab to check for blood in your stools. You will need to change your appointment with me to   6 weeks. \"

## 2018-04-23 DIAGNOSIS — G89.29 CHRONIC LEFT-SIDED LOW BACK PAIN WITHOUT SCIATICA: ICD-10-CM

## 2018-04-23 DIAGNOSIS — M54.50 CHRONIC LEFT-SIDED LOW BACK PAIN WITHOUT SCIATICA: ICD-10-CM

## 2018-04-23 DIAGNOSIS — M51.36 DDD (DEGENERATIVE DISC DISEASE), LUMBAR: Chronic | ICD-10-CM

## 2018-04-23 RX ORDER — HYDROCODONE BITARTRATE AND ACETAMINOPHEN 10; 325 MG/1; MG/1
1 TABLET ORAL
Qty: 90 TAB | Refills: 0 | Status: SHIPPED | OUTPATIENT
Start: 2018-04-23 | End: 2018-05-24 | Stop reason: SDUPTHER

## 2018-04-23 NOTE — TELEPHONE ENCOUNTER
[facility] on file  Last refill 3/20/18 for 90 tablets  Last drug screen 2/21/18  Last office visit 4/16/18

## 2018-04-28 LAB — HEMOCCULT STL QL IA: NEGATIVE

## 2018-04-30 ENCOUNTER — TELEPHONE (OUTPATIENT)
Dept: FAMILY MEDICINE CLINIC | Age: 78
End: 2018-04-30

## 2018-05-24 DIAGNOSIS — M54.50 CHRONIC LEFT-SIDED LOW BACK PAIN WITHOUT SCIATICA: ICD-10-CM

## 2018-05-24 DIAGNOSIS — M51.36 DDD (DEGENERATIVE DISC DISEASE), LUMBAR: Chronic | ICD-10-CM

## 2018-05-24 DIAGNOSIS — G89.29 CHRONIC LEFT-SIDED LOW BACK PAIN WITHOUT SCIATICA: ICD-10-CM

## 2018-05-24 RX ORDER — HYDROCODONE BITARTRATE AND ACETAMINOPHEN 10; 325 MG/1; MG/1
1 TABLET ORAL
Qty: 90 TAB | Refills: 0 | Status: SHIPPED | OUTPATIENT
Start: 2018-05-24 | End: 2018-06-25 | Stop reason: SDUPTHER

## 2018-05-24 NOTE — TELEPHONE ENCOUNTER
[facility] on file  Last refill 4/23/18 for 90 tablets  Last drug screen 2/21/18  Last office visit 4/16/18

## 2018-06-01 ENCOUNTER — OFFICE VISIT (OUTPATIENT)
Dept: FAMILY MEDICINE CLINIC | Age: 78
End: 2018-06-01

## 2018-06-01 VITALS
SYSTOLIC BLOOD PRESSURE: 140 MMHG | OXYGEN SATURATION: 99 % | TEMPERATURE: 98.3 F | WEIGHT: 270.6 LBS | BODY MASS INDEX: 34.73 KG/M2 | DIASTOLIC BLOOD PRESSURE: 67 MMHG | HEIGHT: 74 IN | HEART RATE: 72 BPM | RESPIRATION RATE: 20 BRPM

## 2018-06-01 DIAGNOSIS — E11.49 TYPE 2 DIABETES MELLITUS WITH OTHER NEUROLOGIC COMPLICATION, WITHOUT LONG-TERM CURRENT USE OF INSULIN (HCC): Chronic | ICD-10-CM

## 2018-06-01 DIAGNOSIS — E11.21 TYPE 2 DIABETES MELLITUS WITH NEPHROPATHY (HCC): Primary | ICD-10-CM

## 2018-06-01 DIAGNOSIS — N18.30 STAGE 3 CHRONIC KIDNEY DISEASE (HCC): Chronic | ICD-10-CM

## 2018-06-01 DIAGNOSIS — E78.00 HYPERCHOLESTEROLEMIA: Chronic | ICD-10-CM

## 2018-06-01 DIAGNOSIS — D50.9 IRON DEFICIENCY ANEMIA, UNSPECIFIED IRON DEFICIENCY ANEMIA TYPE: ICD-10-CM

## 2018-06-01 DIAGNOSIS — I10 ESSENTIAL HYPERTENSION: Chronic | ICD-10-CM

## 2018-06-01 DIAGNOSIS — J43.2 CENTRILOBULAR EMPHYSEMA (HCC): Chronic | ICD-10-CM

## 2018-06-01 DIAGNOSIS — M51.36 DDD (DEGENERATIVE DISC DISEASE), LUMBAR: Chronic | ICD-10-CM

## 2018-06-01 NOTE — PROGRESS NOTES
Progress Note    Patient: Mely Oneal MRN: [id number]  SSN: [id number]    YOB: 1940  Age: 77 y.o. Sex: male        Chief Complaint   Patient presents with    Hypertension    Leg Swelling     Right          Subjective:     Encounter Diagnoses   Name Primary?  Type 2 diabetes mellitus with nephropathy ([facility] Yes    Type 2 diabetes mellitus with other neurologic complication, without long-term current use of insulin ([facility]): A1c's have been excellent. No low blood sugars reported. This patient is managed under a comprehensive plan of care for Diabetes. Overall the patient feels well with good energy level. Key Antihyperglycemic Medications             glipiZIDE (GLUCOTROL) 5 mg tablet  (Taking) TAKE TWO TABLETS BY MOUTH 20 MINUTES BEFORE BREAKFAST AND TWO 20 MINUTES BEFORE SUPPER    empagliflozin (JARDIANCE) 10 mg tablet  (Taking) Take 1 Tab by mouth daily. metFORMIN (GLUCOPHAGE) 500 mg tablet  (Taking) Increase to two TABLETS twice daily with meals           Pertinent Labs:   Lab Results   Component Value Date/Time    Hemoglobin A1c 6.2 (H) 04/16/2018 12:28 PM    Hemoglobin A1c 6.1 (H) 01/10/2018 10:15 AM    Hemoglobin A1c 6.5 (H) 11/22/2017 12:25 PM    Hemoglobin A1c, External 6.3 10/12/2017      Body mass index is 34.74 kg/(m^2). Lab Results   Component Value Date/Time    LDL, calculated 59 04/16/2018 12:28 PM         Lab Results   Component Value Date/Time    Sodium 144 04/16/2018 12:28 PM    Potassium 4.2 04/16/2018 12:28 PM    Chloride 102 04/16/2018 12:28 PM    CO2 25 04/16/2018 12:28 PM    Anion gap 12 10/07/2010 09:27 AM    Glucose 108 (H) 04/16/2018 12:28 PM    BUN 12 04/16/2018 12:28 PM    Creatinine 1.27 04/16/2018 12:28 PM    BUN/Creatinine ratio 9 (L) 04/16/2018 12:28 PM    GFR est AA 62 04/16/2018 12:28 PM    GFR est non-AA 54 (L) 04/16/2018 12:28 PM    Calcium 9.4 04/16/2018 12:28 PM    AST (SGOT) 40 04/16/2018 12:28 PM    Alk.  phosphatase 90 04/16/2018 12:28 PM Protein, total 6.1 04/16/2018 12:28 PM    Albumin 4.2 04/16/2018 12:28 PM    Globulin 2.7 10/07/2010 09:27 AM    A-G Ratio 2.2 04/16/2018 12:28 PM    ALT (SGPT) 24 04/16/2018 12:28 PM     Lab Results   Component Value Date/Time    Microalb/Creat ratio (ug/mg creat.) 227.5 (H) 10/11/2017 02:51 PM      Frequency of home glucose testing:daily   Blood Sugar range at home: No logs    Last eye exam: Due-daughter will make appointment   Last foot exam: This year. Polyuria, polyphagia or polydipsia: No   Retinopathy: No   Neuropathy SX: Severe neuropathy symptoms complicated by radiculopathy   Low blood sugar symptoms: No   Dietary compliance: Fair   Medication compliance:Good   On ASA: Yes   Depression: No   CKD: Stage III     Wt Readings from Last 3 Encounters:   06/01/18 270 lb 9.6 oz (122.7 kg)   04/16/18 258 lb (117 kg)   02/21/18 263 lb 12.8 oz (119.7 kg)        History   Smoking Status    Former Smoker    Types: Cigarettes    Quit date: 6/20/2003   Smokeless Tobacco    Never Used     Body mass index is 34.74 kg/(m^2). Diabetic Consultants: Jaz Lazaro      All the patient's questions regarding medications, diet and exercise were answered. Goal of A1C of less than 7.5% is our goal.   Our overall goal is to reduce or eliminate the long term consequences of poorly controlled diabetes.  Centrilobular emphysema ([address]):  SpO2 Readings from Last 3 Encounters:   06/01/18 99%   04/16/18 99%   02/21/18 98%     Oxygen: He currently is not on home oxygen therapy. Symptoms: chronic dyspnea: severity = mild: course of sx: stable. Patient does not smoke cigarettes. Compliant to medications.  Essential hypertension:  BP Readings from Last 3 Encounters:   06/01/18 140/67   04/16/18 152/68   02/21/18 118/65     The patient reports:  taking medications as instructed, no medication side effects noted, no TIA's, no chest pain on exertion, no dyspnea on exertion, some swelling of ankles.     Key CAD CHF Meds cloNIDine HCl (CATAPRES) 0.3 mg tablet  (Taking) TAKE ONE TABLET BY MOUTH THREE TIMES DAILY    PRADAXA 150 mg capsule  (Taking) TAKE ONE CAPSULE BY MOUTH TWICE DAILY    hydrALAZINE (APRESOLINE) 50 mg tablet  (Taking) Take 1 Tab by mouth four (4) times daily. Indications: hypertension    amLODIPine (NORVASC) 10 mg tablet  (Taking) TAKE ONE TABLET BY MOUTH ONCE DAILY    lovastatin (MEVACOR) 40 mg tablet  (Taking) Take 1 Tab by mouth nightly. bumetanide (BUMEX) 2 mg tablet  (Taking) Take 1 Tab by mouth two (2) times a day. labetalol (NORMODYNE) 200 mg tablet  (Taking) TAKE ONE-HALF TABLET BY MOUTH TWICE DAILY FOR HYPERTENSION    ezetimibe (ZETIA) 10 mg tablet  (Taking) Take 1 Tab by mouth daily. aspirin 81 mg chewable tablet  (Taking) Take 81 mg by mouth daily. Lab Results   Component Value Date/Time    Sodium 144 04/16/2018 12:28 PM    Potassium 4.2 04/16/2018 12:28 PM    Chloride 102 04/16/2018 12:28 PM    CO2 25 04/16/2018 12:28 PM    Anion gap 12 10/07/2010 09:27 AM    Glucose 108 (H) 04/16/2018 12:28 PM    BUN 12 04/16/2018 12:28 PM    Creatinine 1.27 04/16/2018 12:28 PM    BUN/Creatinine ratio 9 (L) 04/16/2018 12:28 PM    GFR est AA 62 04/16/2018 12:28 PM    GFR est non-AA 54 (L) 04/16/2018 12:28 PM    Calcium 9.4 04/16/2018 12:28 PM    Bilirubin, total 0.5 04/16/2018 12:28 PM    AST (SGOT) 40 04/16/2018 12:28 PM    Alk. phosphatase 90 04/16/2018 12:28 PM    Protein, total 6.1 04/16/2018 12:28 PM    Albumin 4.2 04/16/2018 12:28 PM    Globulin 2.7 10/07/2010 09:27 AM    A-G Ratio 2.2 04/16/2018 12:28 PM    ALT (SGPT) 24 04/16/2018 12:28 PM     Low salt diet? yes  Aerobic exercise? yes  Our goal is to normalize the blood pressure to decrease the risks of strokes and heart attacks. The patient is in agreement with the plan.  Hypercholesterolemia:  Cardiovascular risks for him are: LDL goal is under 80  hypertension  hyperlipidemia.    Key Antihyperlipidemia Meds             lovastatin (MEVACOR) 40 mg tablet  (Taking) Take 1 Tab by mouth nightly.    ezetimibe (ZETIA) 10 mg tablet  (Taking) Take 1 Tab by mouth daily. Lab Results   Component Value Date/Time    Cholesterol, total 110 04/16/2018 12:28 PM    HDL Cholesterol 35 (L) 04/16/2018 12:28 PM    LDL, calculated 59 04/16/2018 12:28 PM    Triglyceride 80 04/16/2018 12:28 PM    CHOL/HDL Ratio 3.7 10/07/2010 09:27 AM     Lab Results   Component Value Date/Time    ALT (SGPT) 24 04/16/2018 12:28 PM    AST (SGOT) 40 04/16/2018 12:28 PM    Alk. phosphatase 90 04/16/2018 12:28 PM    Bilirubin, total 0.5 04/16/2018 12:28 PM      Myalgias: No   Fatigue: No   Other side effects: no  Wt Readings from Last 3 Encounters:   06/01/18 270 lb 9.6 oz (122.7 kg)   04/16/18 258 lb (117 kg)   02/21/18 263 lb 12.8 oz (119.7 kg)     The patient is aware of our goal to reduce or eliminate the long term problems (such as strokes and heart attacks) related to poorly controlled hyperlipidemia.  DDD (degenerative disc disease), lumbar:  Back symptoms are controlled with current medication. His legs hurt daily however.  Stage 3 chronic kidney disease: His GFR was normal  Lab Results   Component Value Date/Time    GFR est AA 62 04/16/2018 12:28 PM    GFR est non-AA 54 (L) 04/16/2018 12:28 PM    Creatinine (POC) 1.4 (H) 01/02/2015 10:08 AM    Creatinine 1.27 04/16/2018 12:28 PM    BUN 12 04/16/2018 12:28 PM    Sodium 144 04/16/2018 12:28 PM    Potassium 4.2 04/16/2018 12:28 PM    Chloride 102 04/16/2018 12:28 PM    CO2 25 04/16/2018 12:28 PM            Iron deficiency anemia, unspecified iron deficiency anemia type:  No sx.   Lab Results   Component Value Date/Time    HGB 11.1 (L) 04/16/2018 12:28 PM     Lab Results   Component Value Date/Time    Iron 35 (L) 04/16/2018 12:28 PM    TIBC 339 04/16/2018 12:28 PM    Iron % saturation 10 (L) 04/16/2018 12:28 PM    Ferritin 136 05/08/2013 10:47 AM                   Current and past medical information:    Current Medications after this visit[de-identified]   Current Outpatient Prescriptions   Medication Sig    HYDROcodone-acetaminophen (NORCO)  mg tablet Take 1 Tab by mouth every six (6) hours as needed for Pain.  cloNIDine HCl (CATAPRES) 0.3 mg tablet TAKE ONE TABLET BY MOUTH THREE TIMES DAILY    FOLBEE PLUS tab tablet TAKE ONE TABLET BY MOUTH ONCE DAILY    glipiZIDE (GLUCOTROL) 5 mg tablet TAKE TWO TABLETS BY MOUTH 20 MINUTES BEFORE BREAKFAST AND TWO 20 MINUTES BEFORE SUPPER    baclofen (LIORESAL) 10 mg tablet TAKE ONE TABLET BY MOUTH THREE TIMES DAILY (MUSCLE  RELAXER)    PRADAXA 150 mg capsule TAKE ONE CAPSULE BY MOUTH TWICE DAILY    hydrALAZINE (APRESOLINE) 50 mg tablet Take 1 Tab by mouth four (4) times daily. Indications: hypertension    empagliflozin (JARDIANCE) 10 mg tablet Take 1 Tab by mouth daily.  allopurinol (ZYLOPRIM) 300 mg tablet Take 1 Tab by mouth daily.  colchicine (COLCRYS) 0.6 mg tablet Take 1 Tab by mouth daily. To prevent gout.  amLODIPine (NORVASC) 10 mg tablet TAKE ONE TABLET BY MOUTH ONCE DAILY    montelukast (SINGULAIR) 10 mg tablet Take 1 Tab by mouth daily.  tamsulosin (FLOMAX) 0.4 mg capsule Take 1 Cap by mouth daily.  lovastatin (MEVACOR) 40 mg tablet Take 1 Tab by mouth nightly.  bumetanide (BUMEX) 2 mg tablet Take 1 Tab by mouth two (2) times a day.  omeprazole (PRILOSEC) 20 mg capsule Take 1 Cap by mouth daily. Fax to [facility]    potassium chloride (K-DUR) 10 mEq tablet Take 1 Tab by mouth two (2) times a day. Indications: hypokalemia    labetalol (NORMODYNE) 200 mg tablet TAKE ONE-HALF TABLET BY MOUTH TWICE DAILY FOR HYPERTENSION    fluticasone (FLONASE) 50 mcg/actuation nasal spray 1 spray BL daily    albuterol (PROVENTIL HFA, VENTOLIN HFA, PROAIR HFA) 90 mcg/actuation inhaler Take 2 Puffs by inhalation every six (6) hours as needed for Wheezing for up to 360 days.  Please use generic that is covered    ezetimibe (ZETIA) 10 mg tablet Take 1 Tab by mouth daily.  lancets 30 gauge misc     ACCU-CHEK SHAUNA CONTROL SOLN soln     metFORMIN (GLUCOPHAGE) 500 mg tablet Increase to two TABLETS twice daily with meals    docusate sodium 100 mg tab Take 1 Tab by mouth two (2) times a day.  magnesium oxide (MAG-OX) 400 mg tablet Take 1 Tab by mouth daily.  gabapentin (NEURONTIN) 300 mg capsule TAKE ONE CAPSULE BY MOUTH THREE TIMES DAILY    Blood-Glucose Meter (TRUE METRIX GLUCOSE METER) misc Use as Directed    glucose blood VI test strips (TRUE METRIX GLUCOSE TEST STRIP) strip Test 2 times daily Dx Coed E11.65    Lancets misc Test 2 times daily Dx Code E11.65    ferrous sulfate (IRON) 325 mg (65 mg iron) tablet Take 1 Tab by mouth Daily (before breakfast).  melatonin 3 mg tablet Take 10 mg by mouth nightly.  aspirin 81 mg chewable tablet Take 81 mg by mouth daily.  benzoyl peroxide 5 % topical gel Apply  to affected area nightly. apply to affected area as directed    predniSONE (STERAPRED) 5 mg dose pack See administration instruction per 5mg dose pack     No current facility-administered medications for this visit.         Patient Active Problem List    Diagnosis Date Noted    Diabetes mellitus ([facility] 04/16/2018    Type 2 diabetes mellitus with nephropathy ([facility] 01/10/2018    History of colon polyps 05/02/2016    Prostate cancer ([facility] 05/02/2016    Idiopathic gout 05/02/2016    Essential hypertension with goal blood pressure less than 130/85 05/02/2016    CKD (chronic kidney disease) 11/23/2015    COPD (chronic obstructive pulmonary disease) ([facility] 04/27/2015    Diabetes with neurologic complications ([facility] 16/27/0778    Chronic radicular pain of lower back 07/21/2014    Neuropathy 07/16/2013    DDD (degenerative disc disease), lumbar 07/16/2013    Prostate CA ([facility] 07/16/2013    Tubulovillous adenoma polyp of colon 07/16/2013    Hoarse voice quality 06/20/2012    Edema 01/12/2012    PAF (paroxysmal atrial fibrillation) ([facility]     Unspecified arthropathy, ankle and foot 07/14/2011    Arthritis 07/11/2011    Gout, unspecified     PVD (peripheral vascular disease) ([facility]     Hypertension     Hypercholesterolemia     Leg pain     Chronic pain 05/06/2010       Past Medical History:   Diagnosis Date    Acute on chronic diastolic congestive heart failure ([facility] 4/27/2015    Arthritis 7/11/2011    [place] 6/7/2010    Cancer (HCC)     prostate    Chronic diastolic heart failure ([facility] 8/26/2015    CKD (chronic kidney disease) 11/23/2015    Diabetes ([facility]     Gout, unspecified     Hypercholesterolemia     Hypertension     Inguinal lymphadenopathy 2/18/2014    Leg pain     PAF (paroxysmal atrial fibrillation) (HCC)     PAF (paroxysmal atrial fibrillation) (HCC)     PVD (peripheral vascular disease) (HCC)        Allergies   Allergen Reactions    Indocin [Indomethacin Sodium] Unknown (comments)    Lisinopril Angioedema     Dxed in hospital.    Losartan Other (comments)     Face and throat swelling. Past Surgical History:   Procedure Laterality Date    COLONOSCOPY N/A 9/22/2016    COLONOSCOPY performed by Ag oL MD at [address] [place] [address] HX ORTHOPAEDIC      back and left shoulder x2       Social History     Social History    Marital status: SINGLE     Spouse name: N/A    Number of children: N/A    Years of education: N/A     Social History Main Topics    Smoking status: Former Smoker     Types: Cigarettes     Quit date: 6/20/2003    Smokeless tobacco: Never Used    Alcohol use 0.0 oz/week     0 Standard drinks or equivalent per week      Comment: 2 drinks/month    Drug use: No    Sexual activity: Not on file     Other Topics Concern    Not on file     Social History Narrative       Review of Systems   Constitutional: Negative. Negative for chills, fever, malaise/fatigue and weight loss. HENT: Negative. Negative for hearing loss. Eyes: Negative. Negative for blurred vision and double vision. Respiratory: Negative. Negative for cough, hemoptysis, sputum production and shortness of breath. Cardiovascular: Negative. Negative for chest pain, palpitations and orthopnea. Gastrointestinal: Negative. Negative for abdominal pain, blood in stool, heartburn, nausea and vomiting. Genitourinary: Negative. Negative for dysuria, frequency and urgency. Musculoskeletal: Positive for back pain and joint pain. Negative for falls, myalgias and neck pain. Skin: Negative. Negative for rash. Neurological: Negative. Negative for dizziness, tingling, tremors, weakness and headaches. Endo/Heme/Allergies: Negative. Psychiatric/Behavioral: Negative. Negative for depression. Objective:     Vitals:    06/01/18 1119 06/01/18 1145   BP: 155/68 140/67   Pulse: (!) 49 72   Resp: 20    Temp: 98.3 °F (36.8 °C)    TempSrc: Oral    SpO2: 99%    Weight: 270 lb 9.6 oz (122.7 kg)    Height: 6' 2\" (1.88 m)       Body mass index is 34.74 kg/(m^2). Physical Exam   Constitutional: He is oriented to person, place, and time and well-developed, well-nourished, and in no distress. Wheelchair-bound for greater than 10 foot walk   HENT:   Head: Normocephalic and atraumatic. Mouth/Throat: Oropharynx is clear and moist.   Eyes: Right eye exhibits no discharge. Left eye exhibits no discharge. No scleral icterus. Neck: No tracheal deviation present. No thyromegaly present. No bruit. Cardiovascular: Normal rate, regular rhythm and normal heart sounds. Pulmonary/Chest: Effort normal and breath sounds normal.   Abdominal: Soft. He exhibits no distension. There is no tenderness. There is no rebound. Musculoskeletal: He exhibits edema. Neurological: He is alert and oriented to person, place, and time. Skin: No rash noted. No erythema. Psychiatric: Mood and affect normal.   Nursing note and vitals reviewed.         Health Maintenance Due   Topic Date Due    EYE EXAM RETINAL OR DILATED Q1  12/09/2017 Assessment and orders:     Encounter Diagnoses     ICD-10-CM ICD-9-CM   1. Type 2 diabetes mellitus with nephropathy (HCC) E11.21 250.40     583.81   2. Type 2 diabetes mellitus with other neurologic complication, without long-term current use of insulin (HCC) E11.49 250.60   3. Centrilobular emphysema (HCC) J43.2 492.8   4. Essential hypertension I10 401.9   5. Hypercholesterolemia E78.00 272.0   6. DDD (degenerative disc disease), lumbar M51.36 722.52   7. Stage 3 chronic kidney disease N18.3 585.3   8. Iron deficiency anemia, unspecified iron deficiency anemia type D50.9 280.9     Diagnoses and all orders for this visit:    1. Type 2 diabetes mellitus with nephropathy (HCC)-recheck labs  -     HEMOGLOBIN A1C WITH EAG  -     LIPID PANEL  -     METABOLIC PANEL, COMPREHENSIVE    2. Type 2 diabetes mellitus with other neurologic complication, without long-term current use of insulin (HCC)-recheck labs  -     HEMOGLOBIN A1C WITH EAG  -     LIPID PANEL  -     METABOLIC PANEL, COMPREHENSIVE    3. Centrilobular emphysema (HCC)-stable without recent infections    4. Essential hypertension-blood pressure borderline  -     CBC W/O DIFF    5. Hypercholesterolemia-recheck labs  -     LIPID PANEL  -     METABOLIC PANEL, COMPREHENSIVE    6. DDD (degenerative disc disease), lumbar-symptoms controlled    7. Stage 3 chronic kidney disease-recheck labs  -     METABOLIC PANEL, COMPREHENSIVE  -     CBC W/O DIFF  -     VITAMIN D, 25 HYDROXY    8. Iron deficiency anemia, unspecified iron deficiency anemia type-recheck labs  -     CBC W/O DIFF  -     IRON PROFILE            Plan of care:  Discussed diagnoses in detail with patient. Medication risks/benefits/side effects discussed with patient. All of the patient's questions were addressed. The patient understands and agrees with our plan of care. The patient knows to call back if they are unsure of or forget any changes we discussed today or if the symptoms change. The patient received an After-Visit Summary which contains VS, orders, medication list and allergy list. This can be used as a \"mini-medical record\" should they have to seek medical care while out of town. Patient Care Team:  Maurizio Linares MD as PCP - General (Family Practice)  ERAN Dale (Orthopedic Surgery)  Megan Mayfield MD (Otolaryngology)  Dalia Dee MD (Endocrinology)  Dee Kee MD as Physician (Internal Medicine)  Matthias Martinez MD (Ophthalmology)  Jhonatan Russell MD (Podiatry)  Dorothy Oconnor MD (Cardiology)  Cecy An, RN as Nurse Navigator    Follow-up Disposition:  Return in about 3 months (around 9/1/2018).     Future Appointments  Date Time Provider Elisha Sandoval   9/5/2018 9:55 AM Maurizio Linares MD [facility] SCHED   9/12/2018 10:00 AM Dorothy Oconnor MD [id number]       Signed By: Maurizio Linares MD     June 1, 2018

## 2018-06-01 NOTE — MR AVS SNAPSHOT
[address] [address] [id number] [address] 
[phone] Patient: Garo Rojo MRN: [id number] [facility][birth year] Visit Information Date & Time Provider Department Dept. Phone Encounter #  
 6/1/2018 11:10 AM Tico Odell MD [address] [id number] Follow-up Instructions Return in about 3 months (around 9/1/2018). Your Appointments 9/12/2018 10:00 AM  
ESTABLISHED PATIENT with Marleen Vaca MD  
[facility] ([facility] SCHEDULING) Appt Note: annual  
 [address]  [address]  
  
   
 [address] [address] Upcoming Health Maintenance Date Due  
 EYE EXAM RETINAL OR DILATED Q1 12/9/2017 Influenza Age 5 to Adult 8/1/2018 MEDICARE YEARLY EXAM 8/17/2018 FOOT EXAM Q1 10/11/2018 MICROALBUMIN Q1 10/12/2018 HEMOGLOBIN A1C Q6M 10/16/2018 GLAUCOMA SCREENING Q2Y 12/9/2018 LIPID PANEL Q1 4/16/2019 DTaP/Tdap/Td series (2 - Td) 8/16/2023 Allergies as of 6/1/2018  Review Complete On: 6/1/2018 By: Eligio Gómez LPN Severity Noted Reaction Type Reactions Indocin [Indomethacin Sodium]  06/05/2010    Unknown (comments) Lisinopril  04/17/2013    Angioedema Dxed in hospital.  
 Losartan  01/22/2015    Other (comments) Face and throat swelling. Current Immunizations  Reviewed on 12/12/2016 Name Date Influenza High Dose Vaccine PF 10/4/2017 Influenza Vaccine 10/7/2015, 10/9/2014, 9/26/2013 Influenza Vaccine (Quad) PF 9/28/2016 Influenza Vaccine Split 10/11/2012, 11/23/2011, 10/7/2010 Influenza Vaccine Whole 12/14/2009 Pneumococcal Conjugate (PCV-13) 8/12/2015 Pneumococcal Polysaccharide (PPSV-23) 10/20/2014, 12/7/2012 Tdap 8/16/2013 Zoster Vaccine, Live 5/26/2015 Not reviewed this visit You Were Diagnosed With   
  
 Codes Comments Type 2 diabetes mellitus with nephropathy ([facility]    -  Primary ICD-10-CM: E11.21 
ICD-9-CM: 250.40, 583.81 Type 2 diabetes mellitus with other neurologic complication, without long-term current use of insulin (HCC)     ICD-10-CM: E11.49 
ICD-9-CM: 250.60 Centrilobular emphysema ([facility]     ICD-10-CM: J43.2 ICD-9-CM: 492.8 Essential hypertension     ICD-10-CM: I10 
ICD-9-CM: 401.9 Hypercholesterolemia     ICD-10-CM: E78.00 ICD-9-CM: 272.0   
 DDD (degenerative disc disease), lumbar     ICD-10-CM: M51.36 
ICD-9-CM: 722.52 Stage 3 chronic kidney disease     ICD-10-CM: N18.3 ICD-9-CM: 291. 3 Iron deficiency anemia, unspecified iron deficiency anemia type     ICD-10-CM: D50.9 ICD-9-CM: 280.9 Vitals BP Pulse Temp Resp Height(growth percentile) Weight(growth percentile) 140/67 72 98.3 °F (36.8 °C) (Oral) 20 6' 2\" (1.88 m) 270 lb 9.6 oz (122.7 kg) SpO2 BMI Smoking Status 99% 34.74 kg/m2 Former Smoker Vitals History BMI and BSA Data Body Mass Index Body Surface Area 34.74 kg/m 2 2.53 m 2 Preferred Pharmacy Pharmacy Name Phone [address] [address] [phone] Your Updated Medication List  
  
   
This list is accurate as of 6/1/18 11:45 AM.  Always use your most recent med list.  
  
  
  
  
 ACCU-CHEK SHAUNA CONTROL SOLN Soln Generic drug:  Blood Glucose Control High&Low  
  
 albuterol 90 mcg/actuation inhaler Commonly known as:  PROVENTIL HFA, VENTOLIN HFA, PROAIR HFA Take 2 Puffs by inhalation every six (6) hours as needed for Wheezing for up to 360 days. Please use generic that is covered  
  
 allopurinol 300 mg tablet Commonly known as:  Reece Riedel Take 1 Tab by mouth daily. amLODIPine 10 mg tablet Commonly known as:  Ian Rincon TAKE ONE TABLET BY MOUTH ONCE DAILY  
  
 aspirin 81 mg chewable tablet Take 81 mg by mouth daily. baclofen 10 mg tablet Commonly known as:  LIORESAL  
TAKE ONE TABLET BY MOUTH THREE TIMES DAILY (MUSCLE  RELAXER) benzoyl peroxide 5 % topical gel Apply  to affected area nightly. apply to affected area as directed Blood-Glucose Meter Misc Commonly known as:  TRUE METRIX GLUCOSE METER Use as Directed  
  
 bumetanide 2 mg tablet Commonly known as:  Ciera Sables Take 1 Tab by mouth two (2) times a day. cloNIDine HCl 0.3 mg tablet Commonly known as:  CATAPRES  
TAKE ONE TABLET BY MOUTH THREE TIMES DAILY  
  
 colchicine 0.6 mg tablet Commonly known as:  COLCRYS Take 1 Tab by mouth daily. To prevent gout. docusate sodium 100 mg Tab Take 1 Tab by mouth two (2) times a day. empagliflozin 10 mg tablet Commonly known as:  Margarite Angelina Take 1 Tab by mouth daily. ezetimibe 10 mg tablet Commonly known as:  Brown Crumble Take 1 Tab by mouth daily. fluticasone 50 mcg/actuation nasal spray Commonly known as:  FLONASE  
1 spray BL daily FOLBEE PLUS Tab tablet Generic drug:  b complex-vitamin c-folic acid 5mg TAKE ONE TABLET BY MOUTH ONCE DAILY  
  
 gabapentin 300 mg capsule Commonly known as:  NEURONTIN  
TAKE ONE CAPSULE BY MOUTH THREE TIMES DAILY  
  
 glipiZIDE 5 mg tablet Commonly known as:  GLUCOTROL  
TAKE TWO TABLETS BY MOUTH 20 MINUTES BEFORE BREAKFAST AND TWO 20 MINUTES BEFORE SUPPER  
  
 glucose blood VI test strips strip Commonly known as:  TRUE METRIX GLUCOSE TEST STRIP Test 2 times daily Dx Coed E11.65  
  
 hydrALAZINE 50 mg tablet Commonly known as:  APRESOLINE Take 1 Tab by mouth four (4) times daily. Indications: hypertension HYDROcodone-acetaminophen  mg tablet Commonly known as:  Arturo Quirk Take 1 Tab by mouth every six (6) hours as needed for Pain. Iron 325 mg (65 mg iron) tablet Generic drug:  ferrous sulfate Take 1 Tab by mouth Daily (before breakfast). labetalol 200 mg tablet Commonly known as:  NORMODYNE  
TAKE ONE-HALF TABLET BY MOUTH TWICE DAILY FOR HYPERTENSION * Lancets Misc Test 2 times daily Dx Code E11.65  
  
 * lancets 30 gauge Misc  
  
 lovastatin 40 mg tablet Commonly known as:  MEVACOR Take 1 Tab by mouth nightly.  
  
 magnesium oxide 400 mg tablet Commonly known as:  MAG-OX Take 1 Tab by mouth daily. melatonin 3 mg tablet Take 10 mg by mouth nightly. metFORMIN 500 mg tablet Commonly known as:  GLUCOPHAGE Increase to two TABLETS twice daily with meals  
  
 montelukast 10 mg tablet Commonly known as:  SINGULAIR Take 1 Tab by mouth daily. omeprazole 20 mg capsule Commonly known as:  PRILOSEC Take 1 Cap by mouth daily. Fax to [facility]  
  
 potassium chloride 10 mEq tablet Commonly known as:  K-DUR Take 1 Tab by mouth two (2) times a day. Indications: hypokalemia PRADAXA 150 mg capsule Generic drug:  dabigatran etexilate TAKE ONE CAPSULE BY MOUTH TWICE DAILY predniSONE 5 mg dose pack Commonly known as:  STERAPRED See administration instruction per 5mg dose pack  
  
 tamsulosin 0.4 mg capsule Commonly known as:  FLOMAX Take 1 Cap by mouth daily. * Notice: This list has 2 medication(s) that are the same as other medications prescribed for you. Read the directions carefully, and ask your doctor or other care provider to review them with you. We Performed the Following CBC W/O DIFF [37774 CPT(R)] HEMOGLOBIN A1C WITH EAG [77811 CPT(R)] IRON PROFILE [id number] CPT(R)] LIPID PANEL [72847 CPT(R)] METABOLIC PANEL, COMPREHENSIVE [91527 CPT(R)] VITAMIN D, 25 HYDROXY [id number] CPT(R)] Follow-up Instructions Return in about 3 months (around 9/1/2018). Patient Instructions Iron Deficiency Anemia: Care Instructions Your Care Instructions Anemia means that you do not have enough red blood cells.  Red blood cells carry oxygen around your body. When you have anemia, it can make you pale, weak, and tired. Many things can cause anemia. The most common cause is loss of blood. This can happen if you have heavy menstrual periods. It can also happen if you have bleeding in your stomach or bowel. You can also get anemia if you don't have enough iron in your diet or if it's hard for your body to absorb iron. In some cases, pregnancy causes anemia. That's because a pregnant woman needs more iron. Your doctor may do more tests to find the cause of your anemia. If a disease or other health problem is causing it, your doctor will treat that problem. It's important to follow up with your doctor to make sure that your iron level returns to normal. 
Follow-up care is a key part of your treatment and safety. Be sure to make and go to all appointments, and call your doctor if you are having problems. It's also a good idea to know your test results and keep a list of the medicines you take. How can you care for yourself at home? · If your doctor recommended iron pills, take them as directed. ¨ Try to take the pills on an empty stomach. You can do this about 1 hour before or 2 hours after meals. But you may need to take iron with food to avoid an upset stomach. ¨ Do not take antacids or drink milk or anything with caffeine within 2 hours of when you take your iron. They can keep your body from absorbing the iron well. ¨ Vitamin C helps your body absorb iron. You may want to take iron pills with a glass of orange juice or some other food high in vitamin C. 
¨ Iron pills may cause stomach problems. These include heartburn, nausea, diarrhea, constipation, and cramps. It can help to drink plenty of fluids and include fruits, vegetables, and fiber in your diet. ¨ It's normal for iron pills to make your stool a greenish or grayish black. But internal bleeding can also cause dark stool.  So it's important to tell your doctor about any color changes. ¨ Call your doctor if you think you are having a problem with your iron pills. Even after you start to feel better, it will take several months for your body to build up its supply of iron. ¨ If you miss a pill, don't take a double dose. ¨ Keep iron pills out of the reach of small children. Too much iron can be very dangerous. · Eat foods with a lot of iron. These include red meat, shellfish, poultry, and eggs. They also include beans, raisins, whole-grain bread, and leafy green vegetables. · Steam your vegetables. This is the best way to prepare them if you want to get as much iron as possible. · Be safe with medicines. Do not take nonsteroidal anti-inflammatory pain relievers unless your doctor tells you to. These include aspirin, naproxen (Aleve), and ibuprofen (Advil, Motrin). · Liquid iron can stain your teeth. But you can mix it with water or juice and drink it with a straw. Then it won't get on your teeth. When should you call for help? Call 911 anytime you think you may need emergency care. For example, call if: 
? · You passed out (lost consciousness). ?Call your doctor now or seek immediate medical care if: 
? · You are short of breath. ? · You are dizzy or light-headed, or you feel like you may faint. ? · You have new or worse bleeding. ? Watch closely for changes in your health, and be sure to contact your doctor if: 
? · You feel weaker or more tired than usual.  
? · You do not get better as expected. Where can you learn more? Go to http://otto-cindy.info/. Enter M200 in the search box to learn more about \"Iron Deficiency Anemia: Care Instructions. \" Current as of: October 13, 2016 Content Version: 11.4 © 0743-4017 [employer]. Care instructions adapted under license by [employer] (which disclaims liability or warranty for this information).  If you have questions about a medical condition or this instruction, always ask your healthcare professional. [address] [address] any warranty or liability for your use of this information. Please provide this summary of care documentation to your next provider. Your primary care clinician is listed as [address]. If you have any questions after today's visit, please call [phone].

## 2018-06-01 NOTE — PROGRESS NOTES
Chief Complaint   Patient presents with    Hypertension    Leg Swelling     Right      Body mass index is 34.74 kg/(m^2). 1. Have you been to the ER, urgent care clinic since your last visit? Hospitalized since your last visit? No    2. Have you seen or consulted any other health care providers outside of the [address] [place] [address] since your last visit? Include any pap smears or colon screening.  No    Reviewed record in preparation for visit and have necessary documentation  Pt did not bring medication to office visit for review  Information was given to pt on Advanced Directives, Living Will  Opportunity was given for questions  Goals that were addressed and/or need to be completed after this appointment include:     Health Maintenance Due   Topic Date Due    EYE EXAM RETINAL OR DILATED Q1  12/09/2017     Patient is due for an eye exam

## 2018-06-01 NOTE — PATIENT INSTRUCTIONS
Iron Deficiency Anemia: Care Instructions  Your Care Instructions    Anemia means that you do not have enough red blood cells. Red blood cells carry oxygen around your body. When you have anemia, it can make you pale, weak, and tired. Many things can cause anemia. The most common cause is loss of blood. This can happen if you have heavy menstrual periods. It can also happen if you have bleeding in your stomach or bowel. You can also get anemia if you don't have enough iron in your diet or if it's hard for your body to absorb iron. In some cases, pregnancy causes anemia. That's because a pregnant woman needs more iron. Your doctor may do more tests to find the cause of your anemia. If a disease or other health problem is causing it, your doctor will treat that problem. It's important to follow up with your doctor to make sure that your iron level returns to normal.  Follow-up care is a key part of your treatment and safety. Be sure to make and go to all appointments, and call your doctor if you are having problems. It's also a good idea to know your test results and keep a list of the medicines you take. How can you care for yourself at home? · If your doctor recommended iron pills, take them as directed. ¨ Try to take the pills on an empty stomach. You can do this about 1 hour before or 2 hours after meals. But you may need to take iron with food to avoid an upset stomach. ¨ Do not take antacids or drink milk or anything with caffeine within 2 hours of when you take your iron. They can keep your body from absorbing the iron well. ¨ Vitamin C helps your body absorb iron. You may want to take iron pills with a glass of orange juice or some other food high in vitamin C.  ¨ Iron pills may cause stomach problems. These include heartburn, nausea, diarrhea, constipation, and cramps. It can help to drink plenty of fluids and include fruits, vegetables, and fiber in your diet.   ¨ It's normal for iron pills to make your stool a greenish or grayish black. But internal bleeding can also cause dark stool. So it's important to tell your doctor about any color changes. ¨ Call your doctor if you think you are having a problem with your iron pills. Even after you start to feel better, it will take several months for your body to build up its supply of iron. ¨ If you miss a pill, don't take a double dose. ¨ Keep iron pills out of the reach of small children. Too much iron can be very dangerous. · Eat foods with a lot of iron. These include red meat, shellfish, poultry, and eggs. They also include beans, raisins, whole-grain bread, and leafy green vegetables. · Steam your vegetables. This is the best way to prepare them if you want to get as much iron as possible. · Be safe with medicines. Do not take nonsteroidal anti-inflammatory pain relievers unless your doctor tells you to. These include aspirin, naproxen (Aleve), and ibuprofen (Advil, Motrin). · Liquid iron can stain your teeth. But you can mix it with water or juice and drink it with a straw. Then it won't get on your teeth. When should you call for help? Call 911 anytime you think you may need emergency care. For example, call if:  ? · You passed out (lost consciousness). ?Call your doctor now or seek immediate medical care if:  ? · You are short of breath. ? · You are dizzy or light-headed, or you feel like you may faint. ? · You have new or worse bleeding. ? Watch closely for changes in your health, and be sure to contact your doctor if:  ? · You feel weaker or more tired than usual.   ? · You do not get better as expected. Where can you learn more? Go to http://otto-cindy.info/. Enter D634 in the search box to learn more about \"Iron Deficiency Anemia: Care Instructions. \"  Current as of: October 13, 2016  Content Version: 11.4  © 6256-3179 [employer].  Care instructions adapted under license by [employer] (which disclaims liability or warranty for this information). If you have questions about a medical condition or this instruction, always ask your healthcare professional. [address] any warranty or liability for your use of this information.

## 2018-06-02 LAB
25(OH)D3+25(OH)D2 SERPL-MCNC: 13.4 NG/ML (ref 30–100)
ALBUMIN SERPL-MCNC: 4.4 G/DL (ref 3.5–4.8)
ALBUMIN/GLOB SERPL: 2.2 {RATIO} (ref 1.2–2.2)
ALP SERPL-CCNC: 84 IU/L (ref 39–117)
ALT SERPL-CCNC: 17 IU/L (ref 0–44)
AST SERPL-CCNC: 28 IU/L (ref 0–40)
BILIRUB SERPL-MCNC: 0.5 MG/DL (ref 0–1.2)
BUN SERPL-MCNC: 10 MG/DL (ref 8–27)
BUN/CREAT SERPL: 8 (ref 10–24)
CALCIUM SERPL-MCNC: 9.5 MG/DL (ref 8.6–10.2)
CHLORIDE SERPL-SCNC: 104 MMOL/L (ref 96–106)
CHOLEST SERPL-MCNC: 102 MG/DL (ref 100–199)
CO2 SERPL-SCNC: 24 MMOL/L (ref 18–29)
CREAT SERPL-MCNC: 1.22 MG/DL (ref 0.76–1.27)
ERYTHROCYTE [DISTWIDTH] IN BLOOD BY AUTOMATED COUNT: 16.6 % (ref 12.3–15.4)
EST. AVERAGE GLUCOSE BLD GHB EST-MCNC: 140 MG/DL
GFR SERPLBLD CREATININE-BSD FMLA CKD-EPI: 56 ML/MIN/1.73
GFR SERPLBLD CREATININE-BSD FMLA CKD-EPI: 65 ML/MIN/1.73
GLOBULIN SER CALC-MCNC: 2 G/DL (ref 1.5–4.5)
GLUCOSE SERPL-MCNC: 111 MG/DL (ref 65–99)
HBA1C MFR BLD: 6.5 % (ref 4.8–5.6)
HCT VFR BLD AUTO: 34.4 % (ref 37.5–51)
HDLC SERPL-MCNC: 41 MG/DL
HGB BLD-MCNC: 10.7 G/DL (ref 13–17.7)
INTERPRETATION: NORMAL
IRON SATN MFR SERPL: 8 % (ref 15–55)
IRON SERPL-MCNC: 31 UG/DL (ref 38–169)
LDLC SERPL CALC-MCNC: 51 MG/DL (ref 0–99)
Lab: NORMAL
MCH RBC QN AUTO: 23.9 PG (ref 26.6–33)
MCHC RBC AUTO-ENTMCNC: 31.1 G/DL (ref 31.5–35.7)
MCV RBC AUTO: 77 FL (ref 79–97)
PLATELET # BLD AUTO: 154 X10E3/UL (ref 150–379)
POTASSIUM SERPL-SCNC: 4.1 MMOL/L (ref 3.5–5.2)
PROT SERPL-MCNC: 6.4 G/DL (ref 6–8.5)
RBC # BLD AUTO: 4.47 X10E6/UL (ref 4.14–5.8)
SODIUM SERPL-SCNC: 144 MMOL/L (ref 134–144)
TIBC SERPL-MCNC: 369 UG/DL (ref 250–450)
TRIGL SERPL-MCNC: 52 MG/DL (ref 0–149)
UIBC SERPL-MCNC: 338 UG/DL (ref 111–343)
VLDLC SERPL CALC-MCNC: 10 MG/DL (ref 5–40)
WBC # BLD AUTO: 3.9 X10E3/UL (ref 3.4–10.8)

## 2018-06-04 ENCOUNTER — TELEPHONE (OUTPATIENT)
Dept: FAMILY MEDICINE CLINIC | Age: 78
End: 2018-06-04

## 2018-06-04 DIAGNOSIS — D50.8 OTHER IRON DEFICIENCY ANEMIA: Primary | ICD-10-CM

## 2018-06-04 NOTE — TELEPHONE ENCOUNTER
Spoke with patient and informed him per Dr. Candi Alexander:    \"Please find your most recent results below. Your remain anemic and have a very low iron test. Double the amount of iron you take and get a FIT TEST. See me in 1 month. You also need to take 5000 international units of vitamin D daily from Over the counter. \"    Patient verbalized understanding and requested that we tell his daughter Abdi Toribio. Phone call to Marjorie. Advised her in detail of message above. Marjorie verbalized understanding and states that she has to come to [facility] this week and will stop by the lab and  the FIT test for the patient. A detailed lab letter was mailed to patient today with his labs results attached.

## 2018-06-19 ENCOUNTER — TELEPHONE (OUTPATIENT)
Dept: FAMILY MEDICINE CLINIC | Age: 78
End: 2018-06-19

## 2018-06-19 LAB — HEMOCCULT STL QL IA: NEGATIVE

## 2018-06-19 NOTE — TELEPHONE ENCOUNTER
----- Message from Vero Bean MD sent at 6/19/2018  1:16 PM EDT -----  Please call the patient. His fit test is negative again.   Thank you,  Dr. Zunilda Ventura

## 2018-06-19 NOTE — TELEPHONE ENCOUNTER
Called and spoke to patient. Advised per Dr. Ryann Purcell:      His fit test is negative again. Pt verbalized understanding.

## 2018-06-25 ENCOUNTER — TELEPHONE (OUTPATIENT)
Dept: FAMILY MEDICINE CLINIC | Age: 78
End: 2018-06-25

## 2018-06-25 DIAGNOSIS — M54.50 CHRONIC LEFT-SIDED LOW BACK PAIN WITHOUT SCIATICA: ICD-10-CM

## 2018-06-25 DIAGNOSIS — M51.36 DDD (DEGENERATIVE DISC DISEASE), LUMBAR: Chronic | ICD-10-CM

## 2018-06-25 DIAGNOSIS — G89.29 CHRONIC LEFT-SIDED LOW BACK PAIN WITHOUT SCIATICA: ICD-10-CM

## 2018-06-25 RX ORDER — HYDROCODONE BITARTRATE AND ACETAMINOPHEN 10; 325 MG/1; MG/1
1 TABLET ORAL
Qty: 90 TAB | Refills: 0 | Status: SHIPPED | OUTPATIENT
Start: 2018-06-25 | End: 2018-07-24 | Stop reason: SDUPTHER

## 2018-06-25 RX ORDER — ALLOPURINOL 300 MG/1
TABLET ORAL
Qty: 30 TAB | Refills: 5 | Status: SHIPPED | OUTPATIENT
Start: 2018-06-25 | End: 2018-07-02 | Stop reason: SDUPTHER

## 2018-06-25 NOTE — TELEPHONE ENCOUNTER
Called and spoke to patient. Advised Norco prescription ready for  at office. Pt verbalized understanding.

## 2018-06-25 NOTE — TELEPHONE ENCOUNTER
[address] on file  Last office visit on 6/1/18  Last drug screen on 2/21/18  Last refill on 5/24/18 for 90 tabs  Please advise, thank you

## 2018-07-01 DIAGNOSIS — M54.16 CHRONIC RADICULAR PAIN OF LOWER BACK: ICD-10-CM

## 2018-07-01 DIAGNOSIS — G89.29 CHRONIC RADICULAR PAIN OF LOWER BACK: ICD-10-CM

## 2018-07-01 RX ORDER — GABAPENTIN 300 MG/1
CAPSULE ORAL
Qty: 270 CAP | Refills: 2 | Status: SHIPPED | OUTPATIENT
Start: 2018-07-01 | End: 2018-07-02 | Stop reason: SDUPTHER

## 2018-07-02 ENCOUNTER — OFFICE VISIT (OUTPATIENT)
Dept: FAMILY MEDICINE CLINIC | Age: 78
End: 2018-07-02

## 2018-07-02 VITALS
WEIGHT: 261.2 LBS | RESPIRATION RATE: 16 BRPM | TEMPERATURE: 98.5 F | DIASTOLIC BLOOD PRESSURE: 70 MMHG | HEART RATE: 63 BPM | HEIGHT: 74 IN | BODY MASS INDEX: 33.52 KG/M2 | OXYGEN SATURATION: 99 % | SYSTOLIC BLOOD PRESSURE: 133 MMHG

## 2018-07-02 DIAGNOSIS — G89.29 CHRONIC RADICULAR PAIN OF LOWER BACK: ICD-10-CM

## 2018-07-02 DIAGNOSIS — M54.16 CHRONIC RADICULAR PAIN OF LOWER BACK: ICD-10-CM

## 2018-07-02 DIAGNOSIS — D50.9 IRON DEFICIENCY ANEMIA, UNSPECIFIED IRON DEFICIENCY ANEMIA TYPE: Primary | ICD-10-CM

## 2018-07-02 DIAGNOSIS — J44.9 CHRONIC OBSTRUCTIVE PULMONARY DISEASE, UNSPECIFIED COPD TYPE (HCC): Chronic | ICD-10-CM

## 2018-07-02 DIAGNOSIS — E61.1 IRON DEFICIENCY: ICD-10-CM

## 2018-07-02 RX ORDER — ALBUTEROL SULFATE 90 UG/1
2 AEROSOL, METERED RESPIRATORY (INHALATION)
Qty: 1 INHALER | Refills: 11 | Status: SHIPPED | OUTPATIENT
Start: 2018-07-02 | End: 2019-03-05 | Stop reason: SDUPTHER

## 2018-07-02 RX ORDER — LANOLIN ALCOHOL/MO/W.PET/CERES
325 CREAM (GRAM) TOPICAL
Qty: 100 TAB | Refills: 3 | Status: SHIPPED | OUTPATIENT
Start: 2018-07-02

## 2018-07-02 RX ORDER — GABAPENTIN 300 MG/1
300 CAPSULE ORAL 3 TIMES DAILY
Qty: 270 CAP | Refills: 2 | Status: SHIPPED | OUTPATIENT
Start: 2018-07-02 | End: 2019-06-05 | Stop reason: SDUPTHER

## 2018-07-02 RX ORDER — ALLOPURINOL 300 MG/1
300 TABLET ORAL DAILY
Qty: 90 TAB | Refills: 3 | Status: SHIPPED | OUTPATIENT
Start: 2018-07-02 | End: 2019-04-16 | Stop reason: SDUPTHER

## 2018-07-02 RX ORDER — FLUTICASONE PROPIONATE 50 MCG
SPRAY, SUSPENSION (ML) NASAL
Qty: 3 BOTTLE | Refills: 3 | Status: SHIPPED | OUTPATIENT
Start: 2018-07-02 | End: 2019-03-05 | Stop reason: SDUPTHER

## 2018-07-02 RX ORDER — LOSARTAN POTASSIUM 25 MG/1
TABLET ORAL DAILY
COMMUNITY
End: 2019-05-20 | Stop reason: ALTCHOICE

## 2018-07-02 NOTE — MR AVS SNAPSHOT
[address] [facility], [place] 
 
 
 [address] [place] [id number] [address] 
[phone] Patient: Faye Santamaria MRN: [id number] UDM:5/87/1725 Visit Information Date & Time Provider Department Dept. Phone Encounter #  
 7/2/2018  9:50 AM Adolph Turner MD [address] [place] [id number] Follow-up Instructions Return in about 1 month (around 8/2/2018). Your Appointments 7/2/2018  9:50 AM  
ROUTINE CARE with Adolph Turner MD  
[address] [place] [address] [facility]: f/up $0CP lmj 06/4/2018 2005 [facility] [address]   
[phone]  
  
   
 [facility] [address]  
  
    
 9/5/2018  9:55 AM  
ROUTINE CARE with Adolph Turner MD  
[address] [place] [address] [facility] Appt Note: 3 mo f/u-Diabetes 2005 [facility] [address]  
[phone]  
  
    
 9/12/2018 10:00 AM  
ESTABLISHED PATIENT with Mary Salgado MD  
[facility] ([facility]) Appt Note: annual  
 [address]  [address]  
  
   
 [address] [place] [address] Upcoming Health Maintenance Date Due  
 EYE EXAM RETINAL OR DILATED Q1 12/9/2017 Influenza Age 5 to Adult 8/1/2018 MEDICARE YEARLY EXAM 8/17/2018 FOOT EXAM Q1 10/11/2018 MICROALBUMIN Q1 10/12/2018 HEMOGLOBIN A1C Q6M 12/1/2018 GLAUCOMA SCREENING Q2Y 12/9/2018 LIPID PANEL Q1 6/1/2019 DTaP/Tdap/Td series (2 - Td) 8/16/2023 Allergies as of 7/2/2018  Review Complete On: 7/2/2018 By: Jesus Schofield LPN Severity Noted Reaction Type Reactions Indocin [Indomethacin Sodium]  06/05/2010    Unknown (comments) Lisinopril  04/17/2013    Angioedema Dxed in hospital.  
 Losartan  01/22/2015    Other (comments) Face and throat swelling. Current Immunizations  Reviewed on 12/12/2016 Name Date Influenza High Dose Vaccine PF 10/4/2017 Influenza Vaccine 10/7/2015, 10/9/2014, 9/26/2013 Influenza Vaccine (Quad) PF 9/28/2016 Influenza Vaccine Split 10/11/2012, 11/23/2011, 10/7/2010 Influenza Vaccine Whole 12/14/2009 Pneumococcal Conjugate (PCV-13) 8/12/2015 Pneumococcal Polysaccharide (PPSV-23) 10/20/2014, 12/7/2012 Tdap 8/16/2013 Zoster Vaccine, Live 5/26/2015 Not reviewed this visit You Were Diagnosed With   
  
 Codes Comments Iron deficiency anemia, unspecified iron deficiency anemia type    -  Primary ICD-10-CM: D50.9 ICD-9-CM: 280. 9 Chronic radicular pain of lower back     ICD-10-CM: M54.16, G89.29 ICD-9-CM: 724.4, 338.29 Chronic obstructive pulmonary disease, unspecified COPD type ([facility]     ICD-10-CM: J44.9 ICD-9-CM: 803 Iron deficiency     ICD-10-CM: E61.1 ICD-9-CM: 280.9 Vitals BP Pulse Temp Resp Height(growth percentile) Weight(growth percentile) 133/70 63 98.5 °F (36.9 °C) (Oral) 16 6' 2\" (1.88 m) 261 lb 3.2 oz (118.5 kg) SpO2 BMI Smoking Status 99% 33.54 kg/m2 Former Smoker Vitals History BMI and BSA Data Body Mass Index Body Surface Area  
 33.54 kg/m 2 2.49 m 2 Preferred Pharmacy Pharmacy Name Phone [address] [address] [phone] Your Updated Medication List  
  
   
This list is accurate as of 7/2/18  9:39 AM.  Always use your most recent med list.  
  
  
  
  
 ACCU-CHEK SHAUNA CONTROL SOLN Soln Generic drug:  Blood Glucose Control High&Low  
  
 albuterol 90 mcg/actuation inhaler Commonly known as:  PROVENTIL HFA, VENTOLIN HFA, PROAIR HFA Take 2 Puffs by inhalation every six (6) hours as needed for Wheezing for up to 360 days. Please use generic that is covered  
  
 allopurinol 300 mg tablet Commonly known as:  Orpha Conquest Take 1 Tab by mouth daily. amLODIPine 10 mg tablet Commonly known as:  Steffany Spruce TAKE ONE TABLET BY MOUTH ONCE DAILY  
  
 aspirin 81 mg chewable tablet Take 81 mg by mouth daily. baclofen 10 mg tablet Commonly known as:  LIORESAL  
TAKE ONE TABLET BY MOUTH THREE TIMES DAILY (MUSCLE  RELAXER) benzoyl peroxide 5 % topical gel Apply  to affected area nightly. apply to affected area as directed Blood-Glucose Meter Misc Commonly known as:  TRUE METRIX GLUCOSE METER Use as Directed  
  
 bumetanide 2 mg tablet Commonly known as:  Wilhelmenia Montane Take 1 Tab by mouth two (2) times a day. cloNIDine HCl 0.3 mg tablet Commonly known as:  CATAPRES  
TAKE ONE TABLET BY MOUTH THREE TIMES DAILY  
  
 colchicine 0.6 mg tablet Commonly known as:  COLCRYS Take 1 Tab by mouth daily. To prevent gout. docusate sodium 100 mg Tab Take 1 Tab by mouth two (2) times a day. empagliflozin 10 mg tablet Commonly known as:  Polina Staple Take 1 Tab by mouth daily. ezetimibe 10 mg tablet Commonly known as:  Chava Shade Take 1 Tab by mouth daily. ferrous sulfate 325 mg (65 mg iron) tablet Commonly known as:  Iron Take 1 Tab by mouth two (2) times daily (after meals). Indications: Iron Deficiency Anemia  
  
 fluticasone 50 mcg/actuation nasal spray Commonly known as:  FLONASE  
1 spray BL daily FOLBEE PLUS Tab tablet Generic drug:  b complex-vitamin c-folic acid 5mg TAKE ONE TABLET BY MOUTH ONCE DAILY  
  
 gabapentin 300 mg capsule Commonly known as:  NEURONTIN Take 1 Cap by mouth three (3) times daily. glipiZIDE 5 mg tablet Commonly known as:  GLUCOTROL  
TAKE TWO TABLETS BY MOUTH 20 MINUTES BEFORE BREAKFAST AND TWO 20 MINUTES BEFORE SUPPER  
  
 glucose blood VI test strips strip Commonly known as:  TRUE METRIX GLUCOSE TEST STRIP Test 2 times daily Dx Coed E11.65  
  
 hydrALAZINE 50 mg tablet Commonly known as:  APRESOLINE  
 Take 1 Tab by mouth four (4) times daily. Indications: hypertension HYDROcodone-acetaminophen  mg tablet Commonly known as:  Pradeep Dominique Take 1 Tab by mouth every six (6) hours as needed for Pain.  
  
 labetalol 200 mg tablet Commonly known as:  NORMODYNE  
TAKE ONE-HALF TABLET BY MOUTH TWICE DAILY FOR HYPERTENSION * Lancets Misc Test 2 times daily Dx Code E11.65  
  
 * lancets 30 gauge Misc  
  
 losartan 25 mg tablet Commonly known as:  COZAAR Take  by mouth daily. lovastatin 40 mg tablet Commonly known as:  MEVACOR Take 1 Tab by mouth nightly.  
  
 magnesium oxide 400 mg tablet Commonly known as:  MAG-OX Take 1 Tab by mouth daily. melatonin 3 mg tablet Take 10 mg by mouth nightly. metFORMIN 500 mg tablet Commonly known as:  GLUCOPHAGE Increase to two TABLETS twice daily with meals  
  
 montelukast 10 mg tablet Commonly known as:  SINGULAIR Take 1 Tab by mouth daily. omeprazole 20 mg capsule Commonly known as:  PRILOSEC Take 1 Cap by mouth daily. [facility]  
  
 potassium chloride 10 mEq tablet Commonly known as:  K-DUR Take 1 Tab by mouth two (2) times a day. Indications: hypokalemia PRADAXA 150 mg capsule Generic drug:  dabigatran etexilate TAKE ONE CAPSULE BY MOUTH TWICE DAILY  
  
 tamsulosin 0.4 mg capsule Commonly known as:  FLOMAX Take 1 Cap by mouth daily. * Notice: This list has 2 medication(s) that are the same as other medications prescribed for you. Read the directions carefully, and ask your doctor or other care provider to review them with you. Prescriptions Sent to Pharmacy Refills  
 gabapentin (NEURONTIN) 300 mg capsule 2 Sig: Take 1 Cap by mouth three (3) times daily. Class: Normal  
 Pharmacy: [facility] [address] #: [phone]  Route: Oral  
 allopurinol (ZYLOPRIM) 300 mg tablet 3  
 Sig: Take 1 Tab by mouth daily. Class: Normal  
 Pharmacy: [facility] [address] [place] [address] Ph #: [phone] Route: Oral  
 albuterol (PROVENTIL HFA, VENTOLIN HFA, PROAIR HFA) 90 mcg/actuation inhaler 11 Sig: Take 2 Puffs by inhalation every six (6) hours as needed for Wheezing for up to 360 days. Please use generic that is covered Class: Normal  
 Pharmacy: [facility] [address] [place] [address] Ph #: [phone] Route: Inhalation  
 fluticasone (FLONASE) 50 mcg/actuation nasal spray 3 Si spray BL daily Class: Normal  
 Pharmacy: [facility] [address] [place] [address] Ph #: [phone]  
 ferrous sulfate (IRON) 325 mg (65 mg iron) tablet 3 Sig: Take 1 Tab by mouth two (2) times daily (after meals). Indications: Iron Deficiency Anemia Class: Normal  
 Pharmacy: [facility] [address] [place] [address] Ph #: [phone] Route: Oral  
  
We Performed the Following CBC+HEMATOLOGY REVIEW I318183 CPT(R)] FERRITIN [86333 CPT(R)] IRON PROFILE [id number] CPT(R)] Follow-up Instructions Return in about 1 month (around 2018). Patient Instructions Take ferrous sulfate 325 mg twice daily. Please provide this summary of care documentation to your next provider. Your primary care clinician is listed as [address]. If you have any questions after today's visit, please call [phone].

## 2018-07-02 NOTE — PROGRESS NOTES
Chief Complaint   Patient presents with    Leg Swelling     right     Medication Refill    Other     Discuss hearing        Body mass index is 33.54 kg/(m^2). 1. Have you been to the ER, urgent care clinic since your last visit? Hospitalized since your last visit? No    2. Have you seen or consulted any other health care providers outside of the [address] [place] [address] since your last visit? Include any pap smears or colon screening.  No    Reviewed record in preparation for visit and have necessary documentation  Pt did not bring medication to office visit for review  Information was given to pt on Advanced Directives, Living Will  Opportunity was given for questions  Goals that were addressed and/or need to be completed after this appointment include:     Health Maintenance Due   Topic Date Due    EYE EXAM RETINAL OR DILATED Q1  12/09/2017

## 2018-07-02 NOTE — PROGRESS NOTES
Progress Note    Patient: Josep Mayen MRN: [id number]  SSN: [id number]    YOB: 1940  Age: 77 y.o. Sex: male        Chief Complaint   Patient presents with    IRON DEF ANEMIA         Medication Refill    Other     Discuss hearing          Subjective:     Encounter Diagnoses   Name Primary?  Iron deficiency anemia, unspecified iron deficiency anemia type: DENIES BLOOD IN STOOL- FIT NEGATIVE. Does not eat red meat. This is probably a dietary deficiency. No sx. Lab Results   Component Value Date/Time    HGB 10.7 (L) 06/01/2018 04:24 PM     Lab Results   Component Value Date/Time    Iron 31 (L) 06/01/2018 04:24 PM    TIBC 369 06/01/2018 04:24 PM    Iron % saturation 8 (LL) 06/01/2018 04:24 PM    Ferritin 136 05/08/2013 10:47 AM        Yes    Chronic radicular pain of lower back: Chronic pain. Stable on hydrocodone.  Chronic obstructive pulmonary disease, unspecified COPD type ([facility]):  SpO2 Readings from Last 3 Encounters:   07/02/18 99%   06/01/18 99%   04/16/18 99%     Oxygen: He currently is not on home oxygen therapy. Symptoms: chronic dyspnea: severity = moderate: course of sx: stable. Patient does not smoke cigarettes. Compliant to medications.  Iron deficiency: See above. Increase his iron to twice daily. Current and past medical information:    Current Medications after this visit[de-identified]   Current Outpatient Prescriptions   Medication Sig    gabapentin (NEURONTIN) 300 mg capsule Take 1 Cap by mouth three (3) times daily.  allopurinol (ZYLOPRIM) 300 mg tablet Take 1 Tab by mouth daily.  losartan (COZAAR) 25 mg tablet Take  by mouth daily.  albuterol (PROVENTIL HFA, VENTOLIN HFA, PROAIR HFA) 90 mcg/actuation inhaler Take 2 Puffs by inhalation every six (6) hours as needed for Wheezing for up to 360 days.  Please use generic that is covered    fluticasone (FLONASE) 50 mcg/actuation nasal spray 1 spray BL daily    ferrous sulfate (IRON) 325 mg (65 mg iron) tablet Take 1 Tab by mouth two (2) times daily (after meals). Indications: Iron Deficiency Anemia    HYDROcodone-acetaminophen (NORCO)  mg tablet Take 1 Tab by mouth every six (6) hours as needed for Pain.  cloNIDine HCl (CATAPRES) 0.3 mg tablet TAKE ONE TABLET BY MOUTH THREE TIMES DAILY    FOLBEE PLUS tab tablet TAKE ONE TABLET BY MOUTH ONCE DAILY    glipiZIDE (GLUCOTROL) 5 mg tablet TAKE TWO TABLETS BY MOUTH 20 MINUTES BEFORE BREAKFAST AND TWO 20 MINUTES BEFORE SUPPER    baclofen (LIORESAL) 10 mg tablet TAKE ONE TABLET BY MOUTH THREE TIMES DAILY (MUSCLE  RELAXER)    PRADAXA 150 mg capsule TAKE ONE CAPSULE BY MOUTH TWICE DAILY    hydrALAZINE (APRESOLINE) 50 mg tablet Take 1 Tab by mouth four (4) times daily. Indications: hypertension    empagliflozin (JARDIANCE) 10 mg tablet Take 1 Tab by mouth daily.  colchicine (COLCRYS) 0.6 mg tablet Take 1 Tab by mouth daily. To prevent gout.  amLODIPine (NORVASC) 10 mg tablet TAKE ONE TABLET BY MOUTH ONCE DAILY    montelukast (SINGULAIR) 10 mg tablet Take 1 Tab by mouth daily.  tamsulosin (FLOMAX) 0.4 mg capsule Take 1 Cap by mouth daily.  lovastatin (MEVACOR) 40 mg tablet Take 1 Tab by mouth nightly.  bumetanide (BUMEX) 2 mg tablet Take 1 Tab by mouth two (2) times a day.  omeprazole (PRILOSEC) 20 mg capsule Take 1 Cap by mouth daily. Fax to [facility]    potassium chloride (K-DUR) 10 mEq tablet Take 1 Tab by mouth two (2) times a day. Indications: hypokalemia    labetalol (NORMODYNE) 200 mg tablet TAKE ONE-HALF TABLET BY MOUTH TWICE DAILY FOR HYPERTENSION    ezetimibe (ZETIA) 10 mg tablet Take 1 Tab by mouth daily.  lancets 30 gauge misc     ACCU-CHEK SHAUNA CONTROL SOLN soln     metFORMIN (GLUCOPHAGE) 500 mg tablet Increase to two TABLETS twice daily with meals    docusate sodium 100 mg tab Take 1 Tab by mouth two (2) times a day.  magnesium oxide (MAG-OX) 400 mg tablet Take 1 Tab by mouth daily.     Blood-Glucose Meter (TRUE METRIX GLUCOSE METER) misc Use as Directed    glucose blood VI test strips (TRUE METRIX GLUCOSE TEST STRIP) strip Test 2 times daily Dx Coed E11.65    Lancets misc Test 2 times daily Dx Code E11.65    melatonin 3 mg tablet Take 10 mg by mouth nightly.  aspirin 81 mg chewable tablet Take 81 mg by mouth daily.  benzoyl peroxide 5 % topical gel Apply  to affected area nightly. apply to affected area as directed     No current facility-administered medications for this visit.         Patient Active Problem List    Diagnosis Date Noted    Diabetes mellitus ([address] 04/16/2018    Type 2 diabetes mellitus with nephropathy ([address] 01/10/2018    History of colon polyps 05/02/2016    Prostate cancer ([facility] 05/02/2016    Idiopathic gout 05/02/2016    Essential hypertension with goal blood pressure less than 130/85 05/02/2016    CKD (chronic kidney disease) 11/23/2015    COPD (chronic obstructive pulmonary disease) ([address] 04/27/2015    Diabetes with neurologic complications ([facility] 58/94/8709    Chronic radicular pain of lower back 07/21/2014    Neuropathy 07/16/2013    DDD (degenerative disc disease), lumbar 07/16/2013    Prostate CA ([facility] 07/16/2013    Tubulovillous adenoma polyp of colon 07/16/2013    Hoarse voice quality 06/20/2012    Edema 01/12/2012    PAF (paroxysmal atrial fibrillation) (HCC)     Unspecified arthropathy, ankle and foot 07/14/2011    Arthritis 07/11/2011    Gout, unspecified     PVD (peripheral vascular disease) ([facility]     Hypertension     Hypercholesterolemia     Leg pain     Chronic pain 05/06/2010       Past Medical History:   Diagnosis Date    Acute on chronic diastolic congestive heart failure ([address] 4/27/2015    Arthritis 7/11/2011    Blackhead 6/7/2010    Cancer ([address]     prostate    Chronic diastolic heart failure ([facility] 8/26/2015    CKD (chronic kidney disease) 11/23/2015    Diabetes (HCC)     Gout, unspecified     Hypercholesterolemia     Hypertension  Inguinal lymphadenopathy 2/18/2014    Leg pain     PAF (paroxysmal atrial fibrillation) (HCC)     PAF (paroxysmal atrial fibrillation) (HCC)     PVD (peripheral vascular disease) (HCC)        Allergies   Allergen Reactions    Indocin [Indomethacin Sodium] Unknown (comments)    Lisinopril Angioedema     Dxed in hospital.    Losartan Other (comments)     Face and throat swelling. Past Surgical History:   Procedure Laterality Date    COLONOSCOPY N/A 9/22/2016    COL[facility] Cuca Mon [facility] HX ORTHOPAEDIC      back and left shoulder x2       Social History     Social History    Marital status: SINGLE     Spouse name: N/A    Number of children: N/A    Years of education: N/A     Social History Main Topics    Smoking status: Former Smoker     Types: Cigarettes     Quit date: 6/20/2003    Smokeless tobacco: Never Used    Alcohol use 0.0 oz/week     0 Standard drinks or equivalent per week      Comment: 2 drinks/month    Drug use: No    Sexual activity: Not Asked     Other Topics Concern    None     Social History Narrative       Review of Systems   Constitutional: Negative. Negative for chills, fever, malaise/fatigue and weight loss. HENT: Negative. Negative for hearing loss. Eyes: Negative. Negative for blurred vision and double vision. Respiratory: Negative. Negative for cough, hemoptysis, sputum production and shortness of breath. Cardiovascular: Negative. Negative for chest pain, palpitations and orthopnea. Gastrointestinal: Negative. Negative for abdominal pain, blood in stool, constipation, diarrhea, heartburn, melena, nausea and vomiting. Genitourinary: Negative. Negative for dysuria, frequency and urgency. Musculoskeletal: Positive for back pain and joint pain. Negative for falls, myalgias and neck pain. Skin: Negative. Negative for rash. Neurological: Negative. Negative for dizziness, tingling, tremors, weakness and headaches. Endo/Heme/Allergies: Negative. Psychiatric/Behavioral: Negative. Negative for depression. Objective:     Vitals:    07/02/18 0915   BP: 133/70   Pulse: 63   Resp: 16   Temp: 98.5 °F (36.9 °C)   TempSrc: Oral   SpO2: 99%   Weight: 261 lb 3.2 oz (118.5 kg)   Height: 6' 2\" (1.88 m)      Body mass index is 33.54 kg/(m^2). Physical Exam   Constitutional: He is oriented to person, place, and time and well-developed, well-nourished, and in no distress. Examined in wheelchair. HENT:   Head: Normocephalic and atraumatic. Mouth/Throat: Oropharynx is clear and moist.   Eyes: Right eye exhibits no discharge. Left eye exhibits no discharge. No scleral icterus. Neck: No tracheal deviation present. No thyromegaly present. No bruit. Cardiovascular: Normal rate, regular rhythm and normal heart sounds. Pulmonary/Chest: Effort normal.   Decreased breath sounds but no rales or rhonchi. Abdominal: Soft. He exhibits no distension. There is no tenderness. Neurological: He is alert and oriented to person, place, and time. Skin: No rash noted. No erythema. Psychiatric: Mood and affect normal.   Nursing note and vitals reviewed. Health Maintenance Due   Topic Date Due    EYE EXAM RETINAL OR DILATED Q1  12/09/2017         Assessment and orders:     Encounter Diagnoses     ICD-10-CM ICD-9-CM   1. Iron deficiency anemia, unspecified iron deficiency anemia type D50.9 280.9   2. Chronic radicular pain of lower back M54.16 724.4    G89.29 338.29   3. Chronic obstructive pulmonary disease, unspecified COPD type ([facility] J44.9 496   4. Iron deficiency E61.1 280.9     Diagnoses and all orders for this visit:    1. Iron deficiency anemia, unspecified iron deficiency anemia type-by history this is most likely dietary deficiency. -     CBC+HEMATOLOGY REVIEW  -     IRON PROFILE  -     FERRITIN    2. Chronic radicular pain of lower back-stable  -     gabapentin (NEURONTIN) 300 mg capsule;  Take 1 Cap by mouth three (3) times daily. 3. Chronic obstructive pulmonary disease, unspecified COPD type (HCC)  -     albuterol (PROVENTIL HFA, VENTOLIN HFA, PROAIR HFA) 90 mcg/actuation inhaler; Take 2 Puffs by inhalation every six (6) hours as needed for Wheezing for up to 360 days. Please use generic that is covered    4. Iron deficiency-increase iron dosage  -     ferrous sulfate (IRON) 325 mg (65 mg iron) tablet; Take 1 Tab by mouth two (2) times daily (after meals). Indications: Iron Deficiency Anemia    Other orders  -     allopurinol (ZYLOPRIM) 300 mg tablet; Take 1 Tab by mouth daily. -     fluticasone (FLONASE) 50 mcg/actuation nasal spray; 1 spray BL daily            Plan of care:  Discussed diagnoses in detail with patient. Medication risks/benefits/side effects discussed with patient. All of the patient's questions were addressed. The patient understands and agrees with our plan of care. The patient knows to call back if they are unsure of or forget any changes we discussed today or if the symptoms change. The patient received an After-Visit Summary which contains VS, orders, medication list and allergy list. This can be used as a \"mini-medical record\" should they have to seek medical care while out of town. Patient Care Team:  Sonam Wei MD as PCP - General (Family Practice)  ERAN Duffy (Orthopedic Surgery)  Negro Jones MD (Otolaryngology)  Mary Nunez MD (Endocrinology)  Amy Lutz MD as Physician (Internal Medicine)  Dellis Goldberg, MD (Ophthalmology)  Archana Wright MD (Podiatry)  Horace Gitelman, MD (Cardiology)  Vickey Alejo RN as Nurse Navigator    Follow-up Disposition:  Return in about 1 month (around 8/2/2018).     Future Appointments  Date Time Provider [facility]   7/2/2018 9:50 AM Sonam Wei MD [facility] SCHED   9/5/2018 9:55 AM Sonam Wei MD [facility] SCHED   9/12/2018 10:00 AM Horace Gitelman, MD [place] [address] [facility]       Signed By: Teresa Ann MD     July 2, 2018

## 2018-07-03 ENCOUNTER — TELEPHONE (OUTPATIENT)
Dept: FAMILY MEDICINE CLINIC | Age: 78
End: 2018-07-03

## 2018-07-03 NOTE — PROGRESS NOTES
He needs to increase his iron to twice daily. He needs to see me back in 1 month to repeat his iron level.   If his iron level does not go up we will have to send him to a hematologist-blood specialist.  Thank you,  Dr. José Manuel Blackmon

## 2018-07-03 NOTE — TELEPHONE ENCOUNTER
Advised patient per Dr. Pate Hidden: \"He needs to increase his iron to twice daily. He needs to see me back in 1 month to repeat his iron level. If his iron level does not go up we will have to send him to a hematologist-blood specialist.\"    Patient verbalized understanding and scheduled an appointment for 8/1/18.

## 2018-07-04 LAB
BASOPHILS # BLD MANUAL: 0 X10E3/UL (ref 0–0.2)
BASOPHILS NFR BLD MANUAL: 0 %
DIFFERENTIAL COMMENT, 115260: ABNORMAL
EOSINOPHIL # BLD MANUAL: 0.3 X10E3/UL (ref 0–0.4)
EOSINOPHIL NFR BLD MANUAL: 5 %
ERYTHROCYTE [DISTWIDTH] IN BLOOD BY AUTOMATED COUNT: 16.7 % (ref 12.3–15.4)
FERRITIN SERPL-MCNC: 36 NG/ML (ref 30–400)
HCT VFR BLD AUTO: 38.2 % (ref 37.5–51)
HGB BLD-MCNC: 11.7 G/DL (ref 13–17.7)
IRON SATN MFR SERPL: 9 % (ref 15–55)
IRON SERPL-MCNC: 35 UG/DL (ref 38–169)
LYMPHOCYTES # BLD MANUAL: 1.1 X10E3/UL (ref 0.7–3.1)
LYMPHOCYTES NFR BLD MANUAL: 19 %
MCH RBC QN AUTO: 23.7 PG (ref 26.6–33)
MCHC RBC AUTO-ENTMCNC: 30.6 G/DL (ref 31.5–35.7)
MCV RBC AUTO: 78 FL (ref 79–97)
MONOCYTES # BLD MANUAL: 0.4 X10E3/UL (ref 0.1–0.9)
MONOCYTES NFR BLD MANUAL: 7 %
NEUTROPHILS # BLD MANUAL: 3.9 X10E3/UL (ref 1.4–7)
NEUTROPHILS NFR BLD MANUAL: 69 %
PLATELET BLD QL SMEAR: ADEQUATE
RBC # BLD AUTO: 4.93 X10E6/UL (ref 4.14–5.8)
RBC MORPH BLD: ABNORMAL
TIBC SERPL-MCNC: 384 UG/DL (ref 250–450)
UIBC SERPL-MCNC: 349 UG/DL (ref 111–343)
WBC # BLD AUTO: 5.6 X10E3/UL (ref 3.4–10.8)

## 2018-07-18 RX ORDER — EZETIMIBE 10 MG/1
TABLET ORAL
Qty: 30 TAB | Refills: 5 | Status: SHIPPED | OUTPATIENT
Start: 2018-07-18 | End: 2018-09-05 | Stop reason: SDUPTHER

## 2018-07-24 DIAGNOSIS — M51.36 DDD (DEGENERATIVE DISC DISEASE), LUMBAR: Chronic | ICD-10-CM

## 2018-07-24 DIAGNOSIS — M54.50 CHRONIC LEFT-SIDED LOW BACK PAIN WITHOUT SCIATICA: ICD-10-CM

## 2018-07-24 DIAGNOSIS — G89.29 CHRONIC LEFT-SIDED LOW BACK PAIN WITHOUT SCIATICA: ICD-10-CM

## 2018-07-24 RX ORDER — HYDROCODONE BITARTRATE AND ACETAMINOPHEN 10; 325 MG/1; MG/1
1 TABLET ORAL
Qty: 90 TAB | Refills: 0 | Status: SHIPPED | OUTPATIENT
Start: 2018-07-24 | End: 2018-08-22 | Stop reason: SDUPTHER

## 2018-07-24 NOTE — TELEPHONE ENCOUNTER
[facility] on file   Last office visit on 7/2/18  Last refill on 6/25/18 for 90 tablets  Last drug screen on 2/21/18  Please advise, thank you

## 2018-08-01 ENCOUNTER — OFFICE VISIT (OUTPATIENT)
Dept: FAMILY MEDICINE CLINIC | Age: 78
End: 2018-08-01

## 2018-08-01 VITALS
SYSTOLIC BLOOD PRESSURE: 133 MMHG | DIASTOLIC BLOOD PRESSURE: 62 MMHG | OXYGEN SATURATION: 99 % | HEIGHT: 74 IN | BODY MASS INDEX: 33.75 KG/M2 | WEIGHT: 263 LBS | HEART RATE: 54 BPM | RESPIRATION RATE: 24 BRPM | TEMPERATURE: 97.9 F

## 2018-08-01 DIAGNOSIS — D50.9 IRON DEFICIENCY ANEMIA, UNSPECIFIED IRON DEFICIENCY ANEMIA TYPE: Primary | ICD-10-CM

## 2018-08-01 NOTE — PROGRESS NOTES
1. Have you been to the ER, urgent care clinic, or been hospitalized since your last visit? No 
 
2. Have you seen or consulted any other health care providers outside of the [facility] since your last visit? Include any pap smears or colon screening. Reviewed record in preparation for visit and have necessary documentation Pt did not bring medication to office visit for review Information was given to pt on Advanced Directives, Living Will 
opportunity was given for questions Goals that were addressed and/or need to be completed during or after this appointment include Health Maintenance Due Topic Date Due  
 EYE EXAM RETINAL OR DILATED Q1  12/09/2017  Influenza Age 5 to Adult  08/01/2018

## 2018-08-01 NOTE — MR AVS SNAPSHOT
[address] [facility], [place] 
 
 
 [address] [facility] [address] [address] 
[phone] Patient: Lolita Ricketts MRN: [id number] DGT:[id number] Visit Information Date & Time Provider Department Dept. Phone Encounter #  
 8/1/2018  9:20 AM Debbie Barba MD [facility] [id number] Your Appointments 9/5/2018  9:55 AM  
ROUTINE CARE with Betty Kaba MD  
[facility]) Appt Note: 3 mo f[facility]abetes [facility] [address]  
[phone]  
  
   
 [facility]  
  
    
 9/12/2018 10:00 AM  
ESTABLISHED PATIENT with Farhad Cervantes MD  
[facility] ([facility]) Appt Note: annual  
 [address]  [address]  
  
   
 [address] [address] Upcoming Health Maintenance Date Due  
 EYE EXAM RETINAL OR DILATED Q1 12/9/2017 Influenza Age 5 to Adult 8/1/2018 MEDICARE YEARLY EXAM 8/17/2018 FOOT EXAM Q1 10/11/2018 MICROALBUMIN Q1 10/12/2018 HEMOGLOBIN A1C Q6M 12/1/2018 GLAUCOMA SCREENING Q2Y 12/9/2018 LIPID PANEL Q1 6/1/2019 DTaP/Tdap/Td series (2 - Td) 8/16/2023 Allergies as of 8/1/2018  Review Complete On: 8/1/2018 By: Princess Hardin Severity Noted Reaction Type Reactions Indocin [Indomethacin Sodium]  06/05/2010    Unknown (comments) Lisinopril  04/17/2013    Angioedema Dxed in hospital.  
 Losartan  01/22/2015    Other (comments) Face and throat swelling. Current Immunizations  Reviewed on 12/12/2016 Name Date Influenza High Dose Vaccine PF 10/4/2017 Influenza Vaccine 10/7/2015, 10/9/2014, 9/26/2013 Influenza Vaccine (Quad) PF 9/28/2016 Influenza Vaccine Split 10/11/2012, 11/23/2011, 10/7/2010 Influenza Vaccine Whole 12/14/2009 Pneumococcal Conjugate (PCV-13) 8/12/2015 Pneumococcal Polysaccharide (PPSV-23) 10/20/2014, 12/7/2012 Tdap 8/16/2013 Zoster Vaccine, Live 5/26/2015 Not reviewed this visit You Were Diagnosed With   
  
 Codes Comments Iron deficiency anemia, unspecified iron deficiency anemia type    -  Primary ICD-10-CM: D50.9 ICD-9-CM: 280.9 Vitals BP Pulse Temp Resp Height(growth percentile) Weight(growth percentile) 133/62 (BP 1 Location: Left arm, BP Patient Position: Sitting) (!) 54 97.9 °F (36.6 °C) (Oral) 24 6' 2\" (1.88 m) 263 lb (119.3 kg) SpO2 BMI Smoking Status 99% 33.77 kg/m2 Former Smoker Vitals History BMI and BSA Data Body Mass Index Body Surface Area  
 33.77 kg/m 2 2.5 m 2 Preferred Pharmacy Pharmacy Name Phone [address] [address] [phone] Your Updated Medication List  
  
   
This list is accurate as of 8/1/18 10:29 AM.  Always use your most recent med list.  
  
  
  
  
 ACCU-CHEK SHAUNA CONTROL SOLN Soln Generic drug:  Blood Glucose Control High&Low  
  
 albuterol 90 mcg/actuation inhaler Commonly known as:  PROVENTIL HFA, VENTOLIN HFA, PROAIR HFA Take 2 Puffs by inhalation every six (6) hours as needed for Wheezing for up to 360 days. Please use generic that is covered  
  
 allopurinol 300 mg tablet Commonly known as:  Jillian Garza Take 1 Tab by mouth daily. amLODIPine 10 mg tablet Commonly known as:  Renuka Salle TAKE ONE TABLET BY MOUTH ONCE DAILY  
  
 aspirin 81 mg chewable tablet Take 81 mg by mouth daily. baclofen 10 mg tablet Commonly known as:  LIORESAL  
TAKE ONE TABLET BY MOUTH THREE TIMES DAILY (MUSCLE  RELAXER) benzoyl peroxide 5 % topical gel Apply  to affected area nightly. apply to affected area as directed Blood-Glucose Meter Misc Commonly known as:  TRUE METRIX GLUCOSE METER Use as Directed bumetanide 2 mg tablet Commonly known as:  Romana  Take 1 Tab by mouth two (2) times a day. cloNIDine HCl 0.3 mg tablet Commonly known as:  CATAPRES  
TAKE ONE TABLET BY MOUTH THREE TIMES DAILY  
  
 colchicine 0.6 mg tablet Commonly known as:  COLCRYS Take 1 Tab by mouth daily. To prevent gout. docusate sodium 100 mg Tab Take 1 Tab by mouth two (2) times a day.  
  
 ezetimibe 10 mg tablet Commonly known as:  Negar Gilbert TAKE ONE TABLET BY MOUTH ONCE DAILY ferrous sulfate 325 mg (65 mg iron) tablet Commonly known as:  Iron Take 1 Tab by mouth two (2) times daily (after meals). Indications: Iron Deficiency Anemia  
  
 fluticasone 50 mcg/actuation nasal spray Commonly known as:  FLONASE  
1 spray BL daily FOLBEE PLUS Tab tablet Generic drug:  b complex-vitamin c-folic acid 5mg TAKE ONE TABLET BY MOUTH ONCE DAILY  
  
 gabapentin 300 mg capsule Commonly known as:  NEURONTIN Take 1 Cap by mouth three (3) times daily. glipiZIDE 5 mg tablet Commonly known as:  GLUCOTROL  
TAKE TWO TABLETS BY MOUTH 20 MINUTES BEFORE BREAKFAST AND TWO 20 MINUTES BEFORE SUPPER  
  
 glucose blood VI test strips strip Commonly known as:  TRUE METRIX GLUCOSE TEST STRIP Test 2 times daily Dx Coed E11.65  
  
 hydrALAZINE 50 mg tablet Commonly known as:  APRESOLINE Take 1 Tab by mouth four (4) times daily. Indications: hypertension HYDROcodone-acetaminophen  mg tablet Commonly known as:  Birda Taylor Take 1 Tab by mouth every six (6) hours as needed for Pain. JARDIANCE 10 mg tablet Generic drug:  empagliflozin TAKE ONE TABLET BY MOUTH ONCE DAILY  
  
 labetalol 200 mg tablet Commonly known as:  NORMODYNE  
TAKE ONE-HALF TABLET BY MOUTH TWICE DAILY FOR HYPERTENSION * Lancets Misc Test 2 times daily Dx Code E11.65  
  
 * lancets 30 gauge Misc  
  
 losartan 25 mg tablet Commonly known as:  COZAAR Take  by mouth daily. lovastatin 40 mg tablet Commonly known as:  MEVACOR Take 1 Tab by mouth nightly.  
  
 magnesium oxide 400 mg tablet Commonly known as:  MAG-OX Take 1 Tab by mouth daily. melatonin 3 mg tablet Take 10 mg by mouth nightly. metFORMIN 500 mg tablet Commonly known as:  GLUCOPHAGE Increase to two TABLETS twice daily with meals  
  
 montelukast 10 mg tablet Commonly known as:  SINGULAIR Take 1 Tab by mouth daily. omeprazole 20 mg capsule Commonly known as:  PRILOSEC Take 1 Cap by mouth daily. Fax to [facility]  
  
 potassium chloride 10 mEq tablet Commonly known as:  K-DUR Take 1 Tab by mouth two (2) times a day. Indications: hypokalemia PRADAXA 150 mg capsule Generic drug:  dabigatran etexilate TAKE ONE CAPSULE BY MOUTH TWICE DAILY  
  
 tamsulosin 0.4 mg capsule Commonly known as:  FLOMAX Take 1 Cap by mouth daily. * Notice: This list has 2 medication(s) that are the same as other medications prescribed for you. Read the directions carefully, and ask your doctor or other care provider to review them with you. We Performed the Following CBC WITH AUTOMATED DIFF [60416 CPT(R)] IRON PROFILE [id number] CPT(R)] Patient Instructions Iron Deficiency Anemia: Care Instructions Your Care Instructions Anemia means that you do not have enough red blood cells. Red blood cells carry oxygen around your body. When you have anemia, it can make you pale, weak, and tired. Many things can cause anemia. The most common cause is loss of blood. This can happen if you have heavy menstrual periods. It can also happen if you have bleeding in your stomach or bowel. You can also get anemia if you don't have enough iron in your diet or if it's hard for your body to absorb iron. In some cases, pregnancy causes anemia. That's because a pregnant woman needs more iron. Your doctor may do more tests to find the cause of your anemia.  If a disease or other health problem is causing it, your doctor will treat that problem. It's important to follow up with your doctor to make sure that your iron level returns to normal. 
Follow-up care is a key part of your treatment and safety. Be sure to make and go to all appointments, and call your doctor if you are having problems. It's also a good idea to know your test results and keep a list of the medicines you take. How can you care for yourself at home? · If your doctor recommended iron pills, take them as directed. ¨ Try to take the pills on an empty stomach. You can do this about 1 hour before or 2 hours after meals. But you may need to take iron with food to avoid an upset stomach. ¨ Do not take antacids or drink milk or anything with caffeine within 2 hours of when you take your iron. They can keep your body from absorbing the iron well. ¨ Vitamin C helps your body absorb iron. You may want to take iron pills with a glass of orange juice or some other food high in vitamin C. 
¨ Iron pills may cause stomach problems. These include heartburn, nausea, diarrhea, constipation, and cramps. It can help to drink plenty of fluids and include fruits, vegetables, and fiber in your diet. ¨ It's normal for iron pills to make your stool a greenish or grayish black. But internal bleeding can also cause dark stool. So it's important to tell your doctor about any color changes. ¨ Call your doctor if you think you are having a problem with your iron pills. Even after you start to feel better, it will take several months for your body to build up its supply of iron. ¨ If you miss a pill, don't take a double dose. ¨ Keep iron pills out of the reach of small children. Too much iron can be very dangerous. · Eat foods with a lot of iron. These include red meat, shellfish, poultry, and eggs. They also include beans, raisins, whole-grain bread, and leafy green vegetables. · Steam your vegetables. This is the best way to prepare them if you want to get as much iron as possible. · Be safe with medicines. Do not take nonsteroidal anti-inflammatory pain relievers unless your doctor tells you to. These include aspirin, naproxen (Aleve), and ibuprofen (Advil, Motrin). · Liquid iron can stain your teeth. But you can mix it with water or juice and drink it with a straw. Then it won't get on your teeth. When should you call for help? Call 911 anytime you think you may need emergency care. For example, call if: 
  · You passed out (lost consciousness).  
 Call your doctor now or seek immediate medical care if: 
  · You are short of breath.  
  · You are dizzy or light-headed, or you feel like you may faint.  
  · You have new or worse bleeding.  
 Watch closely for changes in your health, and be sure to contact your doctor if: 
  · You feel weaker or more tired than usual.  
  · You do not get better as expected. Where can you learn more? Go to http://otto-cindy.info/. Enter T731 in the search box to learn more about \"Iron Deficiency Anemia: Care Instructions. \" Current as of: October 9, 2017 Content Version: 11.7 © 9404-4133 [employer]. Care instructions adapted under license by [employer] (which disclaims liability or warranty for this information). If you have questions about a medical condition or this instruction, always ask your healthcare professional. [address] [place] [address] any warranty or liability for your use of this information. Please provide this summary of care documentation to your next provider. Your primary care clinician is listed as [address]. If you have any questions after today's visit, please call [phone].

## 2018-08-01 NOTE — PATIENT INSTRUCTIONS
Iron Deficiency Anemia: Care Instructions Your Care Instructions Anemia means that you do not have enough red blood cells. Red blood cells carry oxygen around your body. When you have anemia, it can make you pale, weak, and tired. Many things can cause anemia. The most common cause is loss of blood. This can happen if you have heavy menstrual periods. It can also happen if you have bleeding in your stomach or bowel. You can also get anemia if you don't have enough iron in your diet or if it's hard for your body to absorb iron. In some cases, pregnancy causes anemia. That's because a pregnant woman needs more iron. Your doctor may do more tests to find the cause of your anemia. If a disease or other health problem is causing it, your doctor will treat that problem. It's important to follow up with your doctor to make sure that your iron level returns to normal. 
Follow-up care is a key part of your treatment and safety. Be sure to make and go to all appointments, and call your doctor if you are having problems. It's also a good idea to know your test results and keep a list of the medicines you take. How can you care for yourself at home? · If your doctor recommended iron pills, take them as directed. ¨ Try to take the pills on an empty stomach. You can do this about 1 hour before or 2 hours after meals. But you may need to take iron with food to avoid an upset stomach. ¨ Do not take antacids or drink milk or anything with caffeine within 2 hours of when you take your iron. They can keep your body from absorbing the iron well. ¨ Vitamin C helps your body absorb iron. You may want to take iron pills with a glass of orange juice or some other food high in vitamin C. 
¨ Iron pills may cause stomach problems. These include heartburn, nausea, diarrhea, constipation, and cramps. It can help to drink plenty of fluids and include fruits, vegetables, and fiber in your diet.  
¨ It's normal for iron pills to make your stool a greenish or grayish black. But internal bleeding can also cause dark stool. So it's important to tell your doctor about any color changes. ¨ Call your doctor if you think you are having a problem with your iron pills. Even after you start to feel better, it will take several months for your body to build up its supply of iron. ¨ If you miss a pill, don't take a double dose. ¨ Keep iron pills out of the reach of small children. Too much iron can be very dangerous. · Eat foods with a lot of iron. These include red meat, shellfish, poultry, and eggs. They also include beans, raisins, whole-grain bread, and leafy green vegetables. · Steam your vegetables. This is the best way to prepare them if you want to get as much iron as possible. · Be safe with medicines. Do not take nonsteroidal anti-inflammatory pain relievers unless your doctor tells you to. These include aspirin, naproxen (Aleve), and ibuprofen (Advil, Motrin). · Liquid iron can stain your teeth. But you can mix it with water or juice and drink it with a straw. Then it won't get on your teeth. When should you call for help? Call 911 anytime you think you may need emergency care. For example, call if: 
  · You passed out (lost consciousness).  
 Call your doctor now or seek immediate medical care if: 
  · You are short of breath.  
  · You are dizzy or light-headed, or you feel like you may faint.  
  · You have new or worse bleeding.  
 Watch closely for changes in your health, and be sure to contact your doctor if: 
  · You feel weaker or more tired than usual.  
  · You do not get better as expected. Where can you learn more? Go to http://otto-cindy.info/. Enter W067 in the search box to learn more about \"Iron Deficiency Anemia: Care Instructions. \" Current as of: October 9, 2017 Content Version: 11.7 © 0407-9741 [employer].  Care instructions adapted under license by [employer] (which disclaims liability or warranty for this information). If you have questions about a medical condition or this instruction, always ask your healthcare professional. [address] any warranty or liability for your use of this information.

## 2018-08-02 LAB
BASOPHILS # BLD AUTO: 0 X10E3/UL (ref 0–0.2)
BASOPHILS NFR BLD AUTO: 0 %
EOSINOPHIL # BLD AUTO: 0.2 X10E3/UL (ref 0–0.4)
EOSINOPHIL NFR BLD AUTO: 4 %
ERYTHROCYTE [DISTWIDTH] IN BLOOD BY AUTOMATED COUNT: 17.7 % (ref 12.3–15.4)
HCT VFR BLD AUTO: 38.8 % (ref 37.5–51)
HGB BLD-MCNC: 11.7 G/DL (ref 13–17.7)
IMM GRANULOCYTES # BLD: 0 X10E3/UL (ref 0–0.1)
IMM GRANULOCYTES NFR BLD: 0 %
IRON SATN MFR SERPL: 11 % (ref 15–55)
IRON SERPL-MCNC: 36 UG/DL (ref 38–169)
LYMPHOCYTES # BLD AUTO: 1 X10E3/UL (ref 0.7–3.1)
LYMPHOCYTES NFR BLD AUTO: 24 %
MCH RBC QN AUTO: 23.4 PG (ref 26.6–33)
MCHC RBC AUTO-ENTMCNC: 30.2 G/DL (ref 31.5–35.7)
MCV RBC AUTO: 78 FL (ref 79–97)
MONOCYTES # BLD AUTO: 0.5 X10E3/UL (ref 0.1–0.9)
MONOCYTES NFR BLD AUTO: 11 %
NEUTROPHILS # BLD AUTO: 2.4 X10E3/UL (ref 1.4–7)
NEUTROPHILS NFR BLD AUTO: 61 %
PLATELET # BLD AUTO: 173 X10E3/UL (ref 150–379)
RBC # BLD AUTO: 5 X10E6/UL (ref 4.14–5.8)
TIBC SERPL-MCNC: 340 UG/DL (ref 250–450)
UIBC SERPL-MCNC: 304 UG/DL (ref 111–343)
WBC # BLD AUTO: 4 X10E3/UL (ref 3.4–10.8)

## 2018-08-05 NOTE — PROGRESS NOTES
Progress Note    Patient: Uli Sandoval MRN: [id number]  SSN: [id number]    YOB: 1940  Age: 77 y.o. Sex: male        Chief Complaint   Patient presents with    Annual Wellness Visit    Leg Pain    Diabetes     Sugar was 141 this morning     he is a 77y.o. year old male who presents for CBC and iron levels. PCP out of office today. Prior labs reviewed. Patient says will make follow up with PCP for [facility] and routine follow up. Patient denies HA, dizziness, SOB, CP, abdominal pain, dysuria, acute myalgias or arthralgias. Patient feeling good sans other complaints or concerns at this time. Encounter Diagnoses   Name Primary?  Iron deficiency anemia, unspecified iron deficiency anemia type Yes     BP Readings from Last 3 Encounters:   08/01/18 133/62   07/02/18 133/70   06/01/18 140/67     Wt Readings from Last 3 Encounters:   08/01/18 263 lb (119.3 kg)   07/02/18 261 lb 3.2 oz (118.5 kg)   06/01/18 270 lb 9.6 oz (122.7 kg)     Body mass index is 33.77 kg/(m^2).     Lab Results  Component Value Date/Time   WBC 4.0 08/01/2018 10:37 AM   HGB 11.7 (L) 08/01/2018 10:37 AM   HCT 38.8 08/01/2018 10:37 AM   PLATELET 513 40/78/1870 10:37 AM   MCV 78 (L) 08/01/2018 10:37 AM         Patient Active Problem List   Diagnosis Code    Chronic pain G89.29    Gout, unspecified M10.9    PVD (peripheral vascular disease) ([facility] [address] I73.9    Hypertension I10    Hypercholesterolemia E78.00    Leg pain M79.606    Arthritis M19.90    Unspecified arthropathy, ankle and foot M19.079    PAF (paroxysmal atrial fibrillation) ([facility]) I48.0    Edema R60.9    Hoarse voice quality R49.0    Neuropathy G62.9    DDD (degenerative disc disease), lumbar M51.36    [facility] C61    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 E11.9     Past Surgical History:   Procedure Laterality Date    COLONOSCOPY N/A 9/22/2016    COLONOSCOPY performed by Mickey Michel MD at [address] [facility] HX ORTHOPAEDIC      back and left shoulder x2     Social History     Social History    Marital status: SINGLE     Spouse name: N/A    Number of children: N/A    Years of education: N/A     Occupational History    Not on file. Social History Main Topics    Smoking status: Former Smoker     Types: Cigarettes     Quit date: 6/20/2003    Smokeless tobacco: Never Used    Alcohol use 0.0 oz/week     0 Standard drinks or equivalent per week      Comment: 2 drinks/month    Drug use: No    Sexual activity: Not on file     Other Topics Concern    Not on file     Social History Narrative     Family History   Problem Relation Age of Onset    Cancer Paternal Grandfather      colon    Hypertension Other      son     Current Outpatient Prescriptions   Medication Sig    ezetimibe (ZETIA) 10 mg tablet TAKE ONE TABLET BY MOUTH ONCE DAILY    JARDIANCE 10 mg tablet TAKE ONE TABLET BY MOUTH ONCE DAILY    gabapentin (NEURONTIN) 300 mg capsule Take 1 Cap by mouth three (3) times daily.  allopurinol (ZYLOPRIM) 300 mg tablet Take 1 Tab by mouth daily.  losartan (COZAAR) 25 mg tablet Take  by mouth daily.  albuterol (PROVENTIL HFA, VENTOLIN HFA, PROAIR HFA) 90 mcg/actuation inhaler Take 2 Puffs by inhalation every six (6) hours as needed for Wheezing for up to 360 days. Please use generic that is covered    fluticasone (FLONASE) 50 mcg/actuation nasal spray 1 spray BL daily    ferrous sulfate (IRON) 325 mg (65 mg iron) tablet Take 1 Tab by mouth two (2) times daily (after meals).  Indications: Iron Deficiency Anemia    cloNIDine HCl (CATAPRES) 0.3 mg tablet TAKE ONE TABLET BY MOUTH THREE TIMES DAILY    FOLBEE PLUS tab tablet TAKE ONE TABLET BY MOUTH ONCE DAILY    glipiZIDE (GLUCOTROL) 5 mg tablet TAKE TWO TABLETS BY MOUTH 20 MINUTES BEFORE BREAKFAST AND TWO 20 MINUTES BEFORE SUPPER    baclofen (LIORESAL) 10 mg tablet TAKE ONE TABLET BY MOUTH THREE TIMES DAILY (MUSCLE  RELAXER)    PRADAXA 150 mg capsule TAKE ONE CAPSULE BY MOUTH TWICE DAILY    hydrALAZINE (APRESOLINE) 50 mg tablet Take 1 Tab by mouth four (4) times daily. Indications: hypertension    colchicine (COLCRYS) 0.6 mg tablet Take 1 Tab by mouth daily. To prevent gout.  amLODIPine (NORVASC) 10 mg tablet TAKE ONE TABLET BY MOUTH ONCE DAILY    montelukast (SINGULAIR) 10 mg tablet Take 1 Tab by mouth daily.  tamsulosin (FLOMAX) 0.4 mg capsule Take 1 Cap by mouth daily.  lovastatin (MEVACOR) 40 mg tablet Take 1 Tab by mouth nightly.  bumetanide (BUMEX) 2 mg tablet Take 1 Tab by mouth two (2) times a day.  omeprazole (PRILOSEC) 20 mg capsule Take 1 Cap by mouth daily. Fax to [facility]    potassium chloride (K-DUR) 10 mEq tablet Take 1 Tab by mouth two (2) times a day. Indications: hypokalemia    labetalol (NORMODYNE) 200 mg tablet TAKE ONE-HALF TABLET BY MOUTH TWICE DAILY FOR HYPERTENSION    lancets 30 gauge misc     ACCU-CHEK SHAUNA CONTROL SOLN soln     metFORMIN (GLUCOPHAGE) 500 mg tablet Increase to two TABLETS twice daily with meals    docusate sodium 100 mg tab Take 1 Tab by mouth two (2) times a day.  magnesium oxide (MAG-OX) 400 mg tablet Take 1 Tab by mouth daily.  Blood-Glucose Meter (TRUE METRIX GLUCOSE METER) misc Use as Directed    glucose blood VI test strips (TRUE METRIX GLUCOSE TEST STRIP) strip Test 2 times daily Dx Coed E11.65    Lancets misc Test 2 times daily Dx Code E11.65    melatonin 3 mg tablet Take 10 mg by mouth nightly.  aspirin 81 mg chewable tablet Take 81 mg by mouth daily.     benzoyl peroxide 5 % topical gel Apply  to affected area nightly. apply to affected area as directed    HYDROcodone-acetaminophen (NORCO)  mg tablet Take 1 Tab by mouth every six (6) hours as needed for Pain. No current facility-administered medications for this visit. Allergies   Allergen Reactions    Indocin [Indomethacin Sodium] Unknown (comments)    Lisinopril Angioedema     Dxed in hospital.    Losartan Other (comments)     Face and throat swelling. Review of Systems:  Constitutional: Negative for fatigue, malaise  Resp: Negative for cough, wheezing or SOB  CV: Negative for chest pain, dizziness or palpitations  GI: Negative for nausea or abdominal pain  MS: Negative for acute myalgia or arthralgia  Neuro: Negative for HA, weakness or paresthesia  Psych: Negative for depression or anxiety     Vitals:    08/01/18 0959   BP: 133/62   Pulse: (!) 54   Resp: 24   Temp: 97.9 °F (36.6 °C)   TempSrc: Oral   SpO2: 99%   Weight: 263 lb (119.3 kg)   Height: 6' 2\" (1.88 m)       Physical Examination:  General: Well developed, well nourished, in no acute distress  Head: Normocephalic, atraumatic  Eyes: Sclera clear, EOMI  Neck: Normal range of motion  Respiratory: symmetrical, unlabored effort  Cardiovascular: Regular rate and rhythm  Extremities: Full range of motion, normal gait  Neurologic: No focal deficits  Psych: Active, alert and oriented. Affect appropriate     Diagnoses and all orders for this visit:    1. Iron deficiency anemia, unspecified iron deficiency anemia type  -     IRON PROFILE  -     CBC WITH AUTOMATED DIFF      Plan of care: Anemia sans change. Minimal increase in iron. Diagnoses were discussed in detail with patient. Medication risks/benefits/side effects discussed with patient. All of the patient's questions were addressed and answered to apparent satisfaction. The patient understands and agrees with our plan of care.   The patient knows to call back if they have questions about the plan of care or if symptoms change. The patient received an After-Visit Summary which contains VS, diagnoses, orders, allergy and medication lists.       Future Appointments  Date Time Provider Elisha Sandoval   9/5/2018 9:55 AM MD JERROD Eisenberg   9/12/2018 10:00 AM MD CAROLYNN Helms SCHED       Follow-up Disposition: Not on File

## 2018-08-22 DIAGNOSIS — M54.50 CHRONIC LEFT-SIDED LOW BACK PAIN WITHOUT SCIATICA: ICD-10-CM

## 2018-08-22 DIAGNOSIS — M51.36 DDD (DEGENERATIVE DISC DISEASE), LUMBAR: Chronic | ICD-10-CM

## 2018-08-22 DIAGNOSIS — G89.29 CHRONIC LEFT-SIDED LOW BACK PAIN WITHOUT SCIATICA: ICD-10-CM

## 2018-08-22 RX ORDER — HYDROCODONE BITARTRATE AND ACETAMINOPHEN 10; 325 MG/1; MG/1
1 TABLET ORAL
Qty: 90 TAB | Refills: 0 | Status: SHIPPED | OUTPATIENT
Start: 2018-08-22 | End: 2018-09-24 | Stop reason: SDUPTHER

## 2018-08-22 NOTE — TELEPHONE ENCOUNTER
[address] on file   Last office visit on 8/1/18 with Dr. Janett Majano  Last refill on 7/24/18 for 90 tablets  Last drug screen on 2/21/18  Please advise, thank you

## 2018-08-22 NOTE — TELEPHONE ENCOUNTER
Refill request received from [facility]. Last office visit was 8/1/18. Best call back number is [phone].

## 2018-08-28 DIAGNOSIS — M51.36 DDD (DEGENERATIVE DISC DISEASE), LUMBAR: ICD-10-CM

## 2018-08-28 DIAGNOSIS — G89.4 CHRONIC PAIN SYNDROME: Primary | ICD-10-CM

## 2018-08-28 RX ORDER — AMLODIPINE BESYLATE 10 MG/1
TABLET ORAL
Qty: 90 TAB | Refills: 3 | Status: SHIPPED | OUTPATIENT
Start: 2018-08-28 | End: 2019-06-18 | Stop reason: SDUPTHER

## 2018-09-05 ENCOUNTER — OFFICE VISIT (OUTPATIENT)
Dept: FAMILY MEDICINE CLINIC | Age: 78
End: 2018-09-05

## 2018-09-05 VITALS
DIASTOLIC BLOOD PRESSURE: 62 MMHG | HEART RATE: 57 BPM | SYSTOLIC BLOOD PRESSURE: 127 MMHG | RESPIRATION RATE: 20 BRPM | BODY MASS INDEX: 33.26 KG/M2 | OXYGEN SATURATION: 97 % | HEIGHT: 74 IN | WEIGHT: 259.2 LBS | TEMPERATURE: 97.9 F

## 2018-09-05 DIAGNOSIS — E13.319: ICD-10-CM

## 2018-09-05 DIAGNOSIS — J44.9 CHRONIC OBSTRUCTIVE PULMONARY DISEASE, UNSPECIFIED COPD TYPE (HCC): Chronic | ICD-10-CM

## 2018-09-05 DIAGNOSIS — E78.00 HYPERCHOLESTEROLEMIA: Chronic | ICD-10-CM

## 2018-09-05 DIAGNOSIS — R60.0 LOCALIZED EDEMA: Chronic | ICD-10-CM

## 2018-09-05 DIAGNOSIS — M51.36 DDD (DEGENERATIVE DISC DISEASE), LUMBAR: Chronic | ICD-10-CM

## 2018-09-05 DIAGNOSIS — Z23 ENCOUNTER FOR IMMUNIZATION: Primary | ICD-10-CM

## 2018-09-05 DIAGNOSIS — N18.30 STAGE 3 CHRONIC KIDNEY DISEASE (HCC): Chronic | ICD-10-CM

## 2018-09-05 DIAGNOSIS — I48.0 PAF (PAROXYSMAL ATRIAL FIBRILLATION) (HCC): Chronic | ICD-10-CM

## 2018-09-05 DIAGNOSIS — E11.49 TYPE 2 DIABETES MELLITUS WITH OTHER NEUROLOGIC COMPLICATION, WITHOUT LONG-TERM CURRENT USE OF INSULIN (HCC): Chronic | ICD-10-CM

## 2018-09-05 DIAGNOSIS — I10 ESSENTIAL HYPERTENSION WITH GOAL BLOOD PRESSURE LESS THAN 130/85: Chronic | ICD-10-CM

## 2018-09-05 LAB
LEFT EYE DIABETIC RETINOPATHY: ABNORMAL
LEFT EYE IMAGE QUALITY: ABNORMAL
LEFT EYE MACULAR EDEMA: ABNORMAL
LEFT EYE OTHER RETINOPATHY: ABNORMAL
RESULT: ABNORMAL
RIGHT EYE DIABETIC RETINOPATHY: ABNORMAL
RIGHT EYE IMAGE QUALITY: ABNORMAL
RIGHT EYE MACULAR EDEMA: ABNORMAL
RIGHT EYE OTHER RETINOPATHY: ABNORMAL
SEVERITY: ABNORMAL

## 2018-09-05 RX ORDER — EZETIMIBE 10 MG/1
TABLET ORAL
Qty: 30 TAB | Refills: 5 | Status: SHIPPED | OUTPATIENT
Start: 2018-09-05 | End: 2019-05-20 | Stop reason: ALTCHOICE

## 2018-09-05 RX ORDER — MONTELUKAST SODIUM 10 MG/1
10 TABLET ORAL DAILY
Qty: 90 TAB | Refills: 3 | Status: SHIPPED | OUTPATIENT
Start: 2018-09-05 | End: 2019-06-18 | Stop reason: SDUPTHER

## 2018-09-05 NOTE — PATIENT INSTRUCTIONS
Chronic Obstructive Pulmonary Disease (COPD): Care Instructions  Your Care Instructions    Chronic obstructive pulmonary disease (COPD) is a general term for a group of lung diseases, including emphysema and chronic bronchitis. People with COPD have decreased airflow in and out of the lungs, which makes it hard to breathe. The airways also can get clogged with thick mucus. Cigarette smoking is a major cause of COPD. Although there is no cure for COPD, you can slow its progress. Following your treatment plan and taking care of yourself can help you feel better and live longer. Follow-up care is a key part of your treatment and safety. Be sure to make and go to all appointments, and call your doctor if you are having problems. It's also a good idea to know your test results and keep a list of the medicines you take. How can you care for yourself at home?   Staying healthy    · Do not smoke. This is the most important step you can take to prevent more damage to your lungs. If you need help quitting, talk to your doctor about stop-smoking programs and medicines. These can increase your chances of quitting for good.     · Avoid colds and flu. Get a pneumococcal vaccine shot. If you have had one before, ask your doctor whether you need a second dose. Get the flu vaccine every fall. If you must be around people with colds or the flu, wash your hands often.     · Avoid secondhand smoke, air pollution, and high altitudes. Also avoid cold, dry air and hot, humid air. Stay at home with your windows closed when air pollution is bad.    Medicines and oxygen therapy    · Take your medicines exactly as prescribed. Call your doctor if you think you are having a problem with your medicine.     · You may be taking medicines such as:  ¨ Bronchodilators. These help open your airways and make breathing easier. Bronchodilators are either short-acting (work for 6 to 9 hours) or long-acting (work for 24 hours).  You inhale most bronchodilators, so they start to act quickly. Always carry your quick-relief inhaler with you in case you need it while you are away from home. ¨ Corticosteroids (prednisone, budesonide). These reduce airway inflammation. They come in pill or inhaled form. You must take these medicines every day for them to work well.     · A spacer may help you get more inhaled medicine to your lungs. Ask your doctor or pharmacist if a spacer is right for you. If it is, ask how to use it properly.     · Do not take any vitamins, over-the-counter medicine, or herbal products without talking to your doctor first.     · If your doctor prescribed antibiotics, take them as directed. Do not stop taking them just because you feel better. You need to take the full course of antibiotics.     · Oxygen therapy boosts the amount of oxygen in your blood and helps you breathe easier. Use the flow rate your doctor has recommended, and do not change it without talking to your doctor first.   Activity    · Get regular exercise. Walking is an easy way to get exercise. Start out slowly, and walk a little more each day.     · Pay attention to your breathing. You are exercising too hard if you cannot talk while you are exercising.     · Take short rest breaks when doing household chores and other activities.     · Learn breathing methods-such as breathing through pursed lips-to help you become less short of breath.     · If your doctor has not set you up with a pulmonary rehabilitation program, talk to him or her about whether rehab is right for you. Rehab includes exercise programs, education about your disease and how to manage it, help with diet and other changes, and emotional support. Diet    · Eat regular, healthy meals. Use bronchodilators about 1 hour before you eat to make it easier to eat. Eat several small meals instead of three large ones.  Drink beverages at the end of the meal. Avoid foods that are hard to chew.     · Eat foods that contain protein so that you do not lose muscle mass.     · Talk with your doctor if you gain too much weight or if you lose weight without trying.    Mental health    · Talk to your family, friends, or a therapist about your feelings. It is normal to feel frightened, angry, hopeless, helpless, and even guilty. Talking openly about bad feelings can help you cope. If these feelings last, talk to your doctor. When should you call for help? Call 911 anytime you think you may need emergency care. For example, call if:    · You have severe trouble breathing.    Call your doctor now or seek immediate medical care if:    · You have new or worse trouble breathing.     · You cough up blood.     · You have a fever.    Watch closely for changes in your health, and be sure to contact your doctor if:    · You cough more deeply or more often, especially if you notice more mucus or a change in the color of your mucus.     · You have new or worse swelling in your legs or belly.     · You are not getting better as expected. Where can you learn more? Go to http://otto-cindy.info/. Madeline Queen in the search box to learn more about \"Chronic Obstructive Pulmonary Disease (COPD): Care Instructions. \"  Current as of: December 6, 2017  Content Version: 11.7  © 5637-2908 [employer]. Care instructions adapted under license by [employer] (which disclaims liability or warranty for this information). If you have questions about a medical condition or this instruction, always ask your healthcare professional. [place] [address] any warranty or liability for your use of this information.

## 2018-09-05 NOTE — MR AVS SNAPSHOT
[address] [address] [address] [address] 
[phone] Patient: Andrea Scott MRN: [id number] IES:[id number] Visit Information Date & Time Provider Department Dept. Phone Encounter #  
 9/5/2018  9:55 AM Braulio Oneil MD [address] [place] [address] [id number] Follow-up Instructions Return in about 3 months (around 12/5/2018). Your Appointments 9/12/2018 10:00 AM  
ESTABLISHED PATIENT with Jose R Decker MD  
[facility] ([facility] SCHEDULING) Appt Note: annual  
 [address] [place] [address]  
[phone]  
  
   
 [address] [place] [address]  
  
    
 11/19/2018  9:45 AM  
ROUTINE CARE with Lam Shook MD  
[facility] [address] [place] [address]) Appt Note: routine f/u dsk 08/13/18  
 [address] [facility] [address]  
[phone]  
  
   
 [address] [place] [id number] Upcoming Health Maintenance Date Due  
 EYE EXAM RETINAL OR DILATED Q1 12/9/2017 Influenza Age 5 to Adult 8/1/2018 MEDICARE YEARLY EXAM 8/17/2018 FOOT EXAM Q1 10/11/2018 MICROALBUMIN Q1 10/12/2018 HEMOGLOBIN A1C Q6M 12/1/2018 GLAUCOMA SCREENING Q2Y 12/9/2018 LIPID PANEL Q1 6/1/2019 DTaP/Tdap/Td series (2 - Td) 8/16/2023 Allergies as of 9/5/2018  Review Complete On: 9/5/2018 By: Jimbo Matos LPN Severity Noted Reaction Type Reactions Indocin [Indomethacin Sodium]  06/05/2010    Unknown (comments) Lisinopril  04/17/2013    Angioedema Dxed in hospital.  
 Losartan  01/22/2015    Other (comments) Face and throat swelling. Current Immunizations  Reviewed on 12/12/2016 Name Date Influenza High Dose Vaccine PF 10/4/2017 Influenza Vaccine 10/7/2015, 10/9/2014, 9/26/2013 Influenza Vaccine (Quad) PF 9/28/2016 Influenza Vaccine (Tri) Adjuvanted  Incomplete Influenza Vaccine Split 10/11/2012, 11/23/2011, 10/7/2010 Influenza Vaccine Whole 12/14/2009 Pneumococcal Conjugate (PCV-13) 8/12/2015 Pneumococcal Polysaccharide (PPSV-23) 10/20/2014, 12/7/2012 Tdap 8/16/2013 Zoster Vaccine, Live 5/26/2015 Not reviewed this visit You Were Diagnosed With   
  
 Codes Comments Encounter for immunization    -  Primary ICD-10-CM: F26 ICD-9-CM: V03.89 Type 2 diabetes mellitus with other neurologic complication, without long-term current use of insulin (HCC)     ICD-10-CM: E11.49 
ICD-9-CM: 250.60 Chronic obstructive pulmonary disease, unspecified COPD type ([facility]     ICD-10-CM: J44.9 ICD-9-CM: 895 PAF (paroxysmal atrial fibrillation) ([facility])     ICD-10-CM: I48.0 ICD-9-CM: 427.31 Essential hypertension with goal blood pressure less than 130/85     ICD-10-CM: I10 
ICD-9-CM: 401.9 Stage 3 chronic kidney disease     ICD-10-CM: N18.3 ICD-9-CM: 585.3 DDD (degenerative disc disease), lumbar     ICD-10-CM: M51.36 
ICD-9-CM: 722.52 Localized edema     ICD-10-CM: R60.0 ICD-9-CM: 694. 3 Hypercholesterolemia     ICD-10-CM: E78.00 ICD-9-CM: 272.0 Other specified diabetes mellitus with unspecified diabetic retinopathy without macular edema ([facility]     ICD-10-CM: U34.362 ICD-9-CM: 250.50, 362.01 Vitals BP Pulse Temp Resp Height(growth percentile) Weight(growth percentile) 127/62 (BP 1 Location: Right arm, BP Patient Position: Sitting) (!) 57 97.9 °F (36.6 °C) (Oral) 20 6' 2\" (1.88 m) 259 lb 3.2 oz (117.6 kg) SpO2 BMI Smoking Status 97% 33.28 kg/m2 Former Smoker Vitals History BMI and BSA Data Body Mass Index Body Surface Area  
 33.28 kg/m 2 2.48 m 2 Preferred Pharmacy Pharmacy Name Phone [id number] [address] [phone] Your Updated Medication List  
  
   
 This list is accurate as of 9/5/18 10:23 AM.  Always use your most recent med list.  
  
  
  
  
 ACCU-CHEK SHAUNA CONTROL SOLN Soln Generic drug:  Blood Glucose Control High&Low  
  
 albuterol 90 mcg/actuation inhaler Commonly known as:  PROVENTIL HFA, VENTOLIN HFA, PROAIR HFA Take 2 Puffs by inhalation every six (6) hours as needed for Wheezing for up to 360 days. Please use generic that is covered  
  
 allopurinol 300 mg tablet Commonly known as:  Acquaneelana Bunk Take 1 Tab by mouth daily. amLODIPine 10 mg tablet Commonly known as:  Hoang Del TAKE ONE TABLET BY MOUTH ONCE DAILY  
  
 aspirin 81 mg chewable tablet Take 81 mg by mouth daily. baclofen 10 mg tablet Commonly known as:  LIORESAL  
TAKE ONE TABLET BY MOUTH THREE TIMES DAILY (MUSCLE  RELAXER) benzoyl peroxide 5 % topical gel Apply  to affected area nightly. apply to affected area as directed Blood-Glucose Meter Misc Commonly known as:  TRUE METRIX GLUCOSE METER Use as Directed  
  
 bumetanide 2 mg tablet Commonly known as:  Em Achilles Take 1 Tab by mouth two (2) times a day. cloNIDine HCl 0.3 mg tablet Commonly known as:  CATAPRES  
TAKE ONE TABLET BY MOUTH THREE TIMES DAILY  
  
 colchicine 0.6 mg tablet Commonly known as:  COLCRYS Take 1 Tab by mouth daily. To prevent gout. docusate sodium 100 mg Tab Take 1 Tab by mouth two (2) times a day.  
  
 ezetimibe 10 mg tablet Commonly known as:  Glendy Barker TAKE ONE TABLET BY MOUTH ONCE DAILY ferrous sulfate 325 mg (65 mg iron) tablet Commonly known as:  Iron Take 1 Tab by mouth two (2) times daily (after meals). Indications: Iron Deficiency Anemia  
  
 fluticasone 50 mcg/actuation nasal spray Commonly known as:  FLONASE  
1 spray BL daily FOLBEE PLUS Tab tablet Generic drug:  b complex-vitamin c-folic acid 5mg TAKE ONE TABLET BY MOUTH ONCE DAILY  
  
 gabapentin 300 mg capsule Commonly known as:  NEURONTIN  
 Take 1 Cap by mouth three (3) times daily. glipiZIDE 5 mg tablet Commonly known as:  GLUCOTROL  
TAKE TWO TABLETS BY MOUTH 20 MINUTES BEFORE BREAKFAST AND TWO 20 MINUTES BEFORE SUPPER  
  
 glucose blood VI test strips strip Commonly known as:  TRUE METRIX GLUCOSE TEST STRIP Test 2 times daily Dx Coed E11.65  
  
 hydrALAZINE 50 mg tablet Commonly known as:  APRESOLINE Take 1 Tab by mouth four (4) times daily. Indications: hypertension HYDROcodone-acetaminophen  mg tablet Commonly known as:  Valdez Minor Take 1 Tab by mouth every six (6) hours as needed for Pain. JARDIANCE 10 mg tablet Generic drug:  empagliflozin TAKE ONE TABLET BY MOUTH ONCE DAILY  
  
 labetalol 200 mg tablet Commonly known as:  NORMODYNE  
TAKE ONE-HALF TABLET BY MOUTH TWICE DAILY FOR HYPERTENSION * Lancets Misc Test 2 times daily Dx Code E11.65  
  
 * lancets 30 gauge Misc  
  
 losartan 25 mg tablet Commonly known as:  COZAAR Take  by mouth daily. lovastatin 40 mg tablet Commonly known as:  MEVACOR Take 1 Tab by mouth nightly.  
  
 magnesium oxide 400 mg tablet Commonly known as:  MAG-OX Take 1 Tab by mouth daily. melatonin 3 mg tablet Take 10 mg by mouth nightly. metFORMIN 500 mg tablet Commonly known as:  GLUCOPHAGE Increase to two TABLETS twice daily with meals  
  
 montelukast 10 mg tablet Commonly known as:  SINGULAIR Take 1 Tab by mouth daily. omeprazole 20 mg capsule Commonly known as:  PRILOSEC Take 1 Cap by mouth daily. Fax to [facility]  
  
 potassium chloride 10 mEq tablet Commonly known as:  K-DUR Take 1 Tab by mouth two (2) times a day. Indications: hypokalemia PRADAXA 150 mg capsule Generic drug:  dabigatran etexilate TAKE ONE CAPSULE BY MOUTH TWICE DAILY  
  
 tamsulosin 0.4 mg capsule Commonly known as:  FLOMAX Take 1 Cap by mouth daily. * Notice:   This list has 2 medication(s) that are the same as other medications prescribed for you. Read the directions carefully, and ask your doctor or other care provider to review them with you. Prescriptions Sent to Pharmacy Refills  
 ezetimibe (ZETIA) 10 mg tablet 5 Sig: TAKE ONE TABLET BY MOUTH ONCE DAILY Class: Normal  
 Pharmacy: [address] [address] Ph #: [phone]  
 montelukast (SINGULAIR) 10 mg tablet 3 Sig: Take 1 Tab by mouth daily. Class: Normal  
 Pharmacy: [address] [address] Ph #: [phone] Route: Oral  
  
We Performed the Following ADMIN INFLUENZA VIRUS VAC [ HCPCS] CBC WITH AUTOMATED DIFF [31740 CPT(R)] FUNDUS PHOTOGRAPHY [id number] CPT(R)] HEMOGLOBIN A1C WITH EAG [96777 CPT(R)] INFLUENZA VACCINE INACTIVATED (IIV), SUBUNIT, ADJUVANTED, IM C587965 CPT(R)] LIPID PANEL [22256 CPT(R)] MAGNESIUM [id number] CPT(R)] METABOLIC PANEL, COMPREHENSIVE [06144 CPT(R)] PHOSPHORUS [93605 CPT(R)] PROT+CREATU (RANDOM) [id number] CPT(R)] VITAMIN D, 25 HYDROXY [id number] CPT(R)] Follow-up Instructions Return in about 3 months (around 12/5/2018). Patient Instructions Chronic Obstructive Pulmonary Disease (COPD): Care Instructions Your Care Instructions Chronic obstructive pulmonary disease (COPD) is a general term for a group of lung diseases, including emphysema and chronic bronchitis. People with COPD have decreased airflow in and out of the lungs, which makes it hard to breathe. The airways also can get clogged with thick mucus. Cigarette smoking is a major cause of COPD. Although there is no cure for COPD, you can slow its progress. Following your treatment plan and taking care of yourself can help you feel better and live longer. Follow-up care is a key part of your treatment and safety.  Be sure to make and go to all appointments, and call your doctor if you are having problems. It's also a good idea to know your test results and keep a list of the medicines you take. How can you care for yourself at home? 
 Staying healthy 
  · Do not smoke. This is the most important step you can take to prevent more damage to your lungs. If you need help quitting, talk to your doctor about stop-smoking programs and medicines. These can increase your chances of quitting for good.  
  · Avoid colds and flu. Get a pneumococcal vaccine shot. If you have had one before, ask your doctor whether you need a second dose. Get the flu vaccine every fall. If you must be around people with colds or the flu, wash your hands often.  
  · Avoid secondhand smoke, air pollution, and high altitudes. Also avoid cold, dry air and hot, humid air. Stay at home with your windows closed when air pollution is bad.  
 Medicines and oxygen therapy 
  · Take your medicines exactly as prescribed. Call your doctor if you think you are having a problem with your medicine.  
  · You may be taking medicines such as: ¨ Bronchodilators. These help open your airways and make breathing easier. Bronchodilators are either short-acting (work for 6 to 9 hours) or long-acting (work for 24 hours). You inhale most bronchodilators, so they start to act quickly. Always carry your quick-relief inhaler with you in case you need it while you are away from home. ¨ Corticosteroids (prednisone, budesonide). These reduce airway inflammation. They come in pill or inhaled form. You must take these medicines every day for them to work well.  
  · A spacer may help you get more inhaled medicine to your lungs. Ask your doctor or pharmacist if a spacer is right for you. If it is, ask how to use it properly.  
  · Do not take any vitamins, over-the-counter medicine, or herbal products without talking to your doctor first.  
  · If your doctor prescribed antibiotics, take them as directed.  Do not stop taking them just because you feel better. You need to take the full course of antibiotics.  
  · Oxygen therapy boosts the amount of oxygen in your blood and helps you breathe easier. Use the flow rate your doctor has recommended, and do not change it without talking to your doctor first.  
Activity 
  · Get regular exercise. Walking is an easy way to get exercise. Start out slowly, and walk a little more each day.  
  · Pay attention to your breathing. You are exercising too hard if you cannot talk while you are exercising.  
  · Take short rest breaks when doing household chores and other activities.  
  · Learn breathing methods-such as breathing through pursed lips-to help you become less short of breath.  
  · If your doctor has not set you up with a pulmonary rehabilitation program, talk to him or her about whether rehab is right for you. Rehab includes exercise programs, education about your disease and how to manage it, help with diet and other changes, and emotional support. Diet 
  · Eat regular, healthy meals. Use bronchodilators about 1 hour before you eat to make it easier to eat. Eat several small meals instead of three large ones. Drink beverages at the end of the meal. Avoid foods that are hard to chew.  
  · Eat foods that contain protein so that you do not lose muscle mass.  
  · Talk with your doctor if you gain too much weight or if you lose weight without trying.  
 Mental health 
  · Talk to your family, friends, or a therapist about your feelings. It is normal to feel frightened, angry, hopeless, helpless, and even guilty. Talking openly about bad feelings can help you cope. If these feelings last, talk to your doctor. When should you call for help? Call 911 anytime you think you may need emergency care. For example, call if: 
  · You have severe trouble breathing.  
 Call your doctor now or seek immediate medical care if: 
  · You have new or worse trouble breathing.   · You cough up blood.  
  · You have a fever.  
 Watch closely for changes in your health, and be sure to contact your doctor if: 
  · You cough more deeply or more often, especially if you notice more mucus or a change in the color of your mucus.  
  · You have new or worse swelling in your legs or belly.  
  · You are not getting better as expected. Where can you learn more? Go to http://otto-cindy.info/. Shannan Ziegler in the search box to learn more about \"Chronic Obstructive Pulmonary Disease (COPD): Care Instructions. \" Current as of: December 6, 2017 Content Version: 11.7 © 6384-3825 [employer]. Care instructions adapted under license by [employer] (which disclaims liability or warranty for this information). If you have questions about a medical condition or this instruction, always ask your healthcare professional. [address] any warranty or liability for your use of this information. Please provide this summary of care documentation to your next provider. Your primary care clinician is listed as [address]. If you have any questions after today's visit, please call [phone].

## 2018-09-05 NOTE — PROGRESS NOTES
1. Have you been to the ER, urgent care clinic since your last visit? Hospitalized since your last visit? No    2. Have you seen or consulted any other health care providers outside of the [facility] since your last visit? Include any pap smears or colon screening. No  Reviewed record in preparation for visit and have necessary documentation  Pt did not bring medication to office visit for review  Goals that were addressed and/or need to be completed during or after this appointment include   . Health Maintenance Due   Topic Date Due    EYE EXAM RETINAL OR DILATED Q1  12/09/2017    Influenza Age 5 to Adult  08/01/2018    MEDICARE YEARLY EXAM  08/17/2018         Carter Perez is a 77 y.o. male who presents for routine immunizations. He denies any symptoms , reactions or allergies that would exclude them from being immunized today. Risks and adverse reactions were discussed and the VIS was given to them. All questions were addressed.   Devyn Brown LPN

## 2018-09-06 ENCOUNTER — TELEPHONE (OUTPATIENT)
Dept: FAMILY MEDICINE CLINIC | Age: 78
End: 2018-09-06

## 2018-09-06 DIAGNOSIS — E87.6 HYPOKALEMIA: ICD-10-CM

## 2018-09-06 DIAGNOSIS — E11.3393 MODERATE NONPROLIFERATIVE DIABETIC RETINOPATHY OF BOTH EYES ASSOCIATED WITH TYPE 2 DIABETES MELLITUS, MACULAR EDEMA PRESENCE UNSPECIFIED (HCC): Primary | Chronic | ICD-10-CM

## 2018-09-06 LAB
25(OH)D3+25(OH)D2 SERPL-MCNC: 31.6 NG/ML (ref 30–100)
ALBUMIN SERPL-MCNC: 4.3 G/DL (ref 3.5–4.8)
ALBUMIN/GLOB SERPL: 2.2 {RATIO} (ref 1.2–2.2)
ALP SERPL-CCNC: 81 IU/L (ref 39–117)
ALT SERPL-CCNC: 17 IU/L (ref 0–44)
AST SERPL-CCNC: 25 IU/L (ref 0–40)
BASOPHILS # BLD AUTO: 0 X10E3/UL (ref 0–0.2)
BASOPHILS NFR BLD AUTO: 0 %
BILIRUB SERPL-MCNC: 0.5 MG/DL (ref 0–1.2)
BUN SERPL-MCNC: 14 MG/DL (ref 8–27)
BUN/CREAT SERPL: 11 (ref 10–24)
CALCIUM SERPL-MCNC: 9.5 MG/DL (ref 8.6–10.2)
CHLORIDE SERPL-SCNC: 104 MMOL/L (ref 96–106)
CHOLEST SERPL-MCNC: 97 MG/DL (ref 100–199)
CO2 SERPL-SCNC: 24 MMOL/L (ref 20–29)
CREAT SERPL-MCNC: 1.28 MG/DL (ref 0.76–1.27)
CREAT UR-MCNC: 110 MG/DL
EOSINOPHIL # BLD AUTO: 0.1 X10E3/UL (ref 0–0.4)
EOSINOPHIL NFR BLD AUTO: 3 %
ERYTHROCYTE [DISTWIDTH] IN BLOOD BY AUTOMATED COUNT: 17.7 % (ref 12.3–15.4)
EST. AVERAGE GLUCOSE BLD GHB EST-MCNC: 131 MG/DL
GLOBULIN SER CALC-MCNC: 2 G/DL (ref 1.5–4.5)
GLUCOSE SERPL-MCNC: 127 MG/DL (ref 65–99)
HBA1C MFR BLD: 6.2 % (ref 4.8–5.6)
HCT VFR BLD AUTO: 36.1 % (ref 37.5–51)
HDLC SERPL-MCNC: 33 MG/DL
HGB BLD-MCNC: 11.3 G/DL (ref 13–17.7)
IMM GRANULOCYTES # BLD: 0 X10E3/UL (ref 0–0.1)
IMM GRANULOCYTES NFR BLD: 0 %
INTERPRETATION: NORMAL
LDLC SERPL CALC-MCNC: 50 MG/DL (ref 0–99)
LYMPHOCYTES # BLD AUTO: 0.8 X10E3/UL (ref 0.7–3.1)
LYMPHOCYTES NFR BLD AUTO: 23 %
Lab: NORMAL
MAGNESIUM SERPL-MCNC: 1.7 MG/DL (ref 1.6–2.3)
MCH RBC QN AUTO: 24 PG (ref 26.6–33)
MCHC RBC AUTO-ENTMCNC: 31.3 G/DL (ref 31.5–35.7)
MCV RBC AUTO: 77 FL (ref 79–97)
MONOCYTES # BLD AUTO: 0.4 X10E3/UL (ref 0.1–0.9)
MONOCYTES NFR BLD AUTO: 10 %
NEUTROPHILS # BLD AUTO: 2.4 X10E3/UL (ref 1.4–7)
NEUTROPHILS NFR BLD AUTO: 64 %
PHOSPHATE SERPL-MCNC: 1.7 MG/DL (ref 2.5–4.5)
PLATELET # BLD AUTO: 171 X10E3/UL (ref 150–379)
POTASSIUM SERPL-SCNC: 3.3 MMOL/L (ref 3.5–5.2)
PROT SERPL-MCNC: 6.3 G/DL (ref 6–8.5)
PROT UR-MCNC: 61.4 MG/DL
PROT/CREAT UR: 558 MG/G CREAT (ref 0–200)
RBC # BLD AUTO: 4.7 X10E6/UL (ref 4.14–5.8)
SODIUM SERPL-SCNC: 144 MMOL/L (ref 134–144)
TRIGL SERPL-MCNC: 68 MG/DL (ref 0–149)
VLDLC SERPL CALC-MCNC: 14 MG/DL (ref 5–40)
WBC # BLD AUTO: 3.7 X10E3/UL (ref 3.4–10.8)

## 2018-09-06 RX ORDER — POTASSIUM CHLORIDE 750 MG/1
20 TABLET, EXTENDED RELEASE ORAL 2 TIMES DAILY
Qty: 360 TAB | Refills: 3 | Status: SHIPPED | OUTPATIENT
Start: 2018-09-06 | End: 2019-03-13 | Stop reason: SDUPTHER

## 2018-09-06 NOTE — TELEPHONE ENCOUNTER
Called and spoke to patient. Advised patient per Dr. Pate Hidden:    *Please find your most recent results below. With exception of a low potassium and low phosphorus and increased your your labs are acceptable. I increased your dosage of potassium to 2 tablets twice daily. I sent a new prescription to your mail order pharmacy. If you run out prior to that prescription coming in the mail you will need to call me for a local refill. I think the phosphorus will correct after the potassium is corrected. Specifically it does not need any treatment. Please call patient.  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  Let us know which eye specialist he wants to see and we will make the appointment. Hari Wiseman would suggest the the [facility] located at the [facility] in [address] that is he willing to go to the [facility] [place] [facility] as long as they take his insurance (medicare/medicaid)  Otherwise he would like to go to someone that does take his insurance.

## 2018-09-06 NOTE — PROGRESS NOTES
Please call patient. 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 Let us know which eye specialist he wants to see and we will make the appointment. I would suggest the the [address] [place] [address] located at the [facility].   Thank you,  Dr. Argenis Hong

## 2018-09-07 NOTE — PROGRESS NOTES
Progress Note    Patient: Ry Oliver MRN: [id number]  SSN: [id number]    YOB: 1940  Age: 77 y.o. Sex: male        Chief Complaint   Patient presents with    Diabetes    Immunization/Injection         Subjective:     Encounter Diagnoses   Name Primary?  Encounter for immunization: Flu shot given. Yes    Type 2 diabetes mellitus with other neurologic complication, without long-term current use of insulin ([facility] [address]): This patient is managed under a comprehensive plan of care for Diabetes. Overall the patient feels well with good energy level. Key Antihyperglycemic Medications             JARDIANCE 10 mg tablet  (Taking) TAKE ONE TABLET BY MOUTH ONCE DAILY    glipiZIDE (GLUCOTROL) 5 mg tablet  (Taking) TAKE TWO TABLETS BY MOUTH 20 MINUTES BEFORE BREAKFAST AND TWO 20 MINUTES BEFORE SUPPER    metFORMIN (GLUCOPHAGE) 500 mg tablet  (Taking) Increase to two TABLETS twice daily with meals           Pertinent Labs:   Lab Results   Component Value Date/Time    Hemoglobin A1c 6.2 (H) 09/05/2018 12:41 PM    Hemoglobin A1c 6.5 (H) 06/01/2018 04:24 PM    Hemoglobin A1c 6.2 (H) 04/16/2018 12:28 PM    Hemoglobin A1c, External 6.3 10/12/2017      Body mass index is 33.28 kg/(m^2). Lab Results   Component Value Date/Time    LDL, calculated 50 09/05/2018 12:41 PM         Lab Results   Component Value Date/Time    Sodium 144 09/05/2018 12:41 PM    Potassium 3.3 (L) 09/05/2018 12:41 PM    Chloride 104 09/05/2018 12:41 PM    CO2 24 09/05/2018 12:41 PM    Anion gap 12 10/07/2010 09:27 AM    Glucose 127 (H) 09/05/2018 12:41 PM    BUN 14 09/05/2018 12:41 PM    Creatinine 1.28 (H) 09/05/2018 12:41 PM    BUN/Creatinine ratio 11 09/05/2018 12:41 PM    GFR est AA 61 09/05/2018 12:41 PM    GFR est non-AA 53 (L) 09/05/2018 12:41 PM    Calcium 9.5 09/05/2018 12:41 PM    AST (SGOT) 25 09/05/2018 12:41 PM    Alk.  phosphatase 81 09/05/2018 12:41 PM    Protein, total 6.3 09/05/2018 12:41 PM    Albumin 4.3 09/05/2018 12:41 PM    Globulin 2.7 10/07/2010 09:27 AM    A-G Ratio 2.2 09/05/2018 12:41 PM    ALT (SGPT) 17 09/05/2018 12:41 PM     Lab Results   Component Value Date/Time    Microalb/Creat ratio (ug/mg creat.) 227.5 (H) 10/11/2017 02:51 PM      Frequency of home glucose testing:daily   Blood Sugar range at home: Less than 150    Last eye exam: Retinal scan done today and is abnormal.   Last foot exam: This year. Polyuria, polyphagia or polydipsia: No   Retinopathy: No   Neuropathy SX: Yes plus radiculopathy   Low blood sugar symptoms: No   Dietary compliance: Good   Medication compliance:Good   On ASA: Yes   Depression: No   CKD:no     Wt Readings from Last 3 Encounters:   09/05/18 259 lb 3.2 oz (117.6 kg)   08/01/18 263 lb (119.3 kg)   07/02/18 261 lb 3.2 oz (118.5 kg)        History   Smoking Status    Former Smoker    Types: Cigarettes    Quit date: 6/20/2003   Smokeless Tobacco    Never Used     Body mass index is 33.28 kg/(m^2). Diabetic Consultants: All the patient's questions regarding medications, diet and exercise were answered. Goal of A1C of less than 7.5% is our goal.   Our overall goal is to reduce or eliminate the long term consequences of poorly controlled diabetes.  Chronic obstructive pulmonary disease, unspecified COPD type ([facility] [address]):  SpO2 Readings from Last 3 Encounters:   09/05/18 97%   08/01/18 99%   07/02/18 99%     Oxygen: He currently is not on home oxygen therapy. Symptoms: chronic dyspnea: severity = moderate: course of sx: stable. Patient does not smoke cigarettes. Compliant to medications.  PAF (paroxysmal atrial fibrillation) ([address]):  Denies chest pain, edema, dyspnea or dizziness. Unaware of irregular heartbeats. No neurologic sx. Avoids caffeine.        Essential hypertension with goal blood pressure less than 130/85: Controlled  BP Readings from Last 3 Encounters:   09/05/18 127/62   08/01/18 133/62   07/02/18 133/70     The patient reports:  taking medications as instructed, no medication side effects noted, no TIA's, no chest pain on exertion, notes stable dyspnea on exertion, no change, noting swelling of ankles. Key CAD CHF Meds             ezetimibe (ZETIA) 10 mg tablet  (Taking) TAKE ONE TABLET BY MOUTH ONCE DAILY    amLODIPine (NORVASC) 10 mg tablet  (Taking) TAKE ONE TABLET BY MOUTH ONCE DAILY    losartan (COZAAR) 25 mg tablet  (Taking) Take  by mouth daily. cloNIDine HCl (CATAPRES) 0.3 mg tablet  (Taking) TAKE ONE TABLET BY MOUTH THREE TIMES DAILY    PRADAXA 150 mg capsule  (Taking) TAKE ONE CAPSULE BY MOUTH TWICE DAILY    hydrALAZINE (APRESOLINE) 50 mg tablet  (Taking) Take 1 Tab by mouth four (4) times daily. Indications: hypertension    lovastatin (MEVACOR) 40 mg tablet  (Taking) Take 1 Tab by mouth nightly. bumetanide (BUMEX) 2 mg tablet  (Taking) Take 1 Tab by mouth two (2) times a day. labetalol (NORMODYNE) 200 mg tablet  (Taking) TAKE ONE-HALF TABLET BY MOUTH TWICE DAILY FOR HYPERTENSION    aspirin 81 mg chewable tablet  (Taking) Take 81 mg by mouth daily. Lab Results   Component Value Date/Time    Sodium 144 09/05/2018 12:41 PM    Potassium 3.3 (L) 09/05/2018 12:41 PM    Chloride 104 09/05/2018 12:41 PM    CO2 24 09/05/2018 12:41 PM    Anion gap 12 10/07/2010 09:27 AM    Glucose 127 (H) 09/05/2018 12:41 PM    BUN 14 09/05/2018 12:41 PM    Creatinine 1.28 (H) 09/05/2018 12:41 PM    BUN/Creatinine ratio 11 09/05/2018 12:41 PM    GFR est AA 61 09/05/2018 12:41 PM    GFR est non-AA 53 (L) 09/05/2018 12:41 PM    Calcium 9.5 09/05/2018 12:41 PM    Bilirubin, total 0.5 09/05/2018 12:41 PM    AST (SGOT) 25 09/05/2018 12:41 PM    Alk. phosphatase 81 09/05/2018 12:41 PM    Protein, total 6.3 09/05/2018 12:41 PM    Albumin 4.3 09/05/2018 12:41 PM    Globulin 2.7 10/07/2010 09:27 AM    A-G Ratio 2.2 09/05/2018 12:41 PM    ALT (SGPT) 17 09/05/2018 12:41 PM     Low salt diet? yes  Aerobic exercise? yes  Our goal is to normalize the blood pressure to decrease the risks of strokes and heart attacks. The patient is in agreement with the plan.  Stage 3 chronic kidney disease: Is improved to stage II. 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 He also had an accident. He is on chronically.  Localized edema: Stable lower leg edema.  Hypercholesterolemia:  Cardiovascular risks for him are: LDL goal is under 80  diabetic  hypertension  hyperlipidemia. Key Antihyperlipidemia Meds             ezetimibe (ZETIA) 10 mg tablet  (Taking) TAKE ONE TABLET BY MOUTH ONCE DAILY    lovastatin (MEVACOR) 40 mg tablet  (Taking) Take 1 Tab by mouth nightly. Lab Results   Component Value Date/Time    Cholesterol, total 97 (L) 09/05/2018 12:41 PM    HDL Cholesterol 33 (L) 09/05/2018 12:41 PM    LDL, calculated 50 09/05/2018 12:41 PM    Triglyceride 68 09/05/2018 12:41 PM    CHOL/HDL Ratio 3.7 10/07/2010 09:27 AM     Lab Results   Component Value Date/Time    ALT (SGPT) 17 09/05/2018 12:41 PM    AST (SGOT) 25 09/05/2018 12:41 PM    Alk. phosphatase 81 09/05/2018 12:41 PM    Bilirubin, total 0.5 09/05/2018 12:41 PM      Myalgias: No   Fatigue: No   Other side effects: no  Wt Readings from Last 3 Encounters:   09/05/18 259 lb 3.2 oz (117.6 kg)   08/01/18 263 lb (119.3 kg)   07/02/18 261 lb 3.2 oz (118.5 kg)     The patient is aware of our goal to reduce or eliminate the long term problems (such as strokes and heart attacks) related to poorly controlled hyperlipidemia.            Other specified diabetes mellitus with unspecified diabetic retinopathy without macular edema [facility]): see retinal scan. Current and past medical information:    Current Medications after this visit[de-identified]   Current Outpatient Prescriptions   Medication Sig    ezetimibe (ZETIA) 10 mg tablet TAKE ONE TABLET BY MOUTH ONCE DAILY    montelukast (SINGULAIR) 10 mg tablet Take 1 Tab by mouth daily.  amLODIPine (NORVASC) 10 mg tablet TAKE ONE TABLET BY MOUTH ONCE DAILY    HYDROcodone-acetaminophen (NORCO)  mg tablet Take 1 Tab by mouth every six (6) hours as needed for Pain.  JARDIANCE 10 mg tablet TAKE ONE TABLET BY MOUTH ONCE DAILY    gabapentin (NEURONTIN) 300 mg capsule Take 1 Cap by mouth three (3) times daily.  allopurinol (ZYLOPRIM) 300 mg tablet Take 1 Tab by mouth daily.  losartan (COZAAR) 25 mg tablet Take  by mouth daily.  albuterol (PROVENTIL HFA, VENTOLIN HFA, PROAIR HFA) 90 mcg/actuation inhaler Take 2 Puffs by inhalation every six (6) hours as needed for Wheezing for up to 360 days. Please use generic that is covered    fluticasone (FLONASE) 50 mcg/actuation nasal spray 1 spray BL daily    ferrous sulfate (IRON) 325 mg (65 mg iron) tablet Take 1 Tab by mouth two (2) times daily (after meals). Indications: Iron Deficiency Anemia    cloNIDine HCl (CATAPRES) 0.3 mg tablet TAKE ONE TABLET BY MOUTH THREE TIMES DAILY    FOLBEE PLUS tab tablet TAKE ONE TABLET BY MOUTH ONCE DAILY    glipiZIDE (GLUCOTROL) 5 mg tablet TAKE TWO TABLETS BY MOUTH 20 MINUTES BEFORE BREAKFAST AND TWO 20 MINUTES BEFORE SUPPER    baclofen (LIORESAL) 10 mg tablet TAKE ONE TABLET BY MOUTH THREE TIMES DAILY (MUSCLE  RELAXER)    PRADAXA 150 mg capsule TAKE ONE CAPSULE BY MOUTH TWICE DAILY    hydrALAZINE (APRESOLINE) 50 mg tablet Take 1 Tab by mouth four (4) times daily. Indications: hypertension    colchicine (COLCRYS) 0.6 mg tablet Take 1 Tab by mouth daily. To prevent gout.     tamsulosin (FLOMAX) 0.4 mg capsule Take 1 Cap by mouth daily.  lovastatin (MEVACOR) 40 mg tablet Take 1 Tab by mouth nightly.  bumetanide (BUMEX) 2 mg tablet Take 1 Tab by mouth two (2) times a day.  omeprazole (PRILOSEC) 20 mg capsule Take 1 Cap by mouth daily. Fax to [facility]    labetalol (NORMODYNE) 200 mg tablet TAKE ONE-HALF TABLET BY MOUTH TWICE DAILY FOR HYPERTENSION    lancets 30 gauge misc     ACCU-CHEK SHAUNA CONTROL SOLN soln     metFORMIN (GLUCOPHAGE) 500 mg tablet Increase to two TABLETS twice daily with meals    docusate sodium 100 mg tab Take 1 Tab by mouth two (2) times a day.  magnesium oxide (MAG-OX) 400 mg tablet Take 1 Tab by mouth daily.  Blood-Glucose Meter (TRUE METRIX GLUCOSE METER) misc Use as Directed    glucose blood VI test strips (TRUE METRIX GLUCOSE TEST STRIP) strip Test 2 times daily Dx Coed E11.65    Lancets misc Test 2 times daily Dx Code E11.65    melatonin 3 mg tablet Take 10 mg by mouth nightly.  aspirin 81 mg chewable tablet Take 81 mg by mouth daily.  benzoyl peroxide 5 % topical gel Apply  to affected area nightly. apply to affected area as directed    potassium chloride (K-DUR) 10 mEq tablet Take 2 Tabs by mouth two (2) times a day. Indications: hypokalemia     No current facility-administered medications for this visit.         Patient Active Problem List    Diagnosis Date Noted    Diabetes mellitus ([facility] [address] 04/16/2018    Type 2 diabetes mellitus with nephropathy ([facility] [address] 01/10/2018    History of colon polyps 05/02/2016    Prostate cancer ([facility] [address] 05/02/2016    Idiopathic gout 05/02/2016    Essential hypertension with goal blood pressure less than 130/85 05/02/2016    CKD (chronic kidney disease) 11/23/2015    COPD (chronic obstructive pulmonary disease) ([address] 04/27/2015    Diabetes with neurologic complications ([facility] [address] 41/29/6315    Chronic radicular pain of lower back 07/21/2014    Neuropathy 07/16/2013    DDD (degenerative disc disease), lumbar 07/16/2013    Prostate CA ([address] 07/16/2013    Tubulovillous adenoma polyp of colon 07/16/2013    Hoarse voice quality 06/20/2012    Edema 01/12/2012    PAF (paroxysmal atrial fibrillation) (HCC)     Unspecified arthropathy, ankle and foot 07/14/2011    Arthritis 07/11/2011    Gout, unspecified     PVD [facility]peripheral vascular disease) ([facility]     Hypertension     Hypercholesterolemia     Leg pain     Chronic pain 05/06/2010       Past Medical History:   Diagnosis Date    Acute on chronic diastolic congestive heart failure ([facility] [address] 4/27/2015    Arthritis 7/11/2011    [place] 6/7/2010    Cancer (HCC)     prostate    [facility] 8/26/2015    CKD (chronic kidney disease) 11/23/2015    Diabetes ([facility]     Gout, unspecified     Hypercholesterolemia     Hypertension     Inguinal lymphadenopathy 2/18/2014    Leg pain     PAF (paroxysmal atrial fibrillation) (HCC)     PAF (paroxysmal atrial fibrillation) (HCC)     PVD (peripheral vascular disease) (HCC)        Allergies   Allergen Reactions    Indocin [Indomethacin Sodium] Unknown (comments)    Lisinopril Angioedema     Dxed in hospital.    Losartan Other (comments)     Face and throat swelling. Past Surgical History:   Procedure Laterality Date    COLONOSCOPY N/A 9/22/2016    COLONOSCOPY performed by Bryn Handley MD at [address] [facility] HX ORTHOPAEDIC      back and left shoulder x2       Social History     Social History    Marital status: SINGLE     Spouse name: N/A    Number of children: N/A    Years of education: N/A     Social History Main Topics    Smoking status: Former Smoker     Types: Cigarettes     Quit date: 6/20/2003    Smokeless tobacco: Never Used    Alcohol use 0.0 oz/week     0 Standard drinks or equivalent per week      Comment: 2 drinks/month    Drug use: No    Sexual activity: Not Asked     Other Topics Concern    None     Social History Narrative       Review of Systems   Constitutional: Negative.   Negative for chills, fever, malaise/fatigue and weight loss. HENT: Negative. Negative for hearing loss. Eyes: Negative. Negative for blurred vision and double vision. Respiratory: Positive for shortness of breath. Negative for cough and sputum production. All shortness of breath stable. Unchanged. Cardiovascular: Negative. Negative for chest pain, palpitations and orthopnea. Gastrointestinal: Negative. Negative for abdominal pain, blood in stool, heartburn, nausea and vomiting. Genitourinary: Negative. Negative for dysuria, frequency and urgency. Musculoskeletal: Negative. Negative for back pain, falls, myalgias and neck pain. Examined in a wheelchair. He was unable to walk down the abdi to his back injury back pain   Skin: Negative. Negative for rash. Neurological: Negative. Negative for dizziness, tingling, tremors, weakness and headaches. Endo/Heme/Allergies: Negative. Psychiatric/Behavioral: Negative. Negative for depression. Objective:     Vitals:    09/05/18 0950   BP: 127/62   Pulse: (!) 57   Resp: 20   Temp: 97.9 °F (36.6 °C)   TempSrc: Oral   SpO2: 97%   Weight: 259 lb 3.2 oz (117.6 kg)   Height: 6' 2\" (1.88 m)      Body mass index is 33.28 kg/(m^2). Physical Exam   Constitutional: He is oriented to person, place, and time and well-developed, well-nourished, and in no distress. Examined in a wheelchair. HENT:   Head: Normocephalic and atraumatic. Mouth/Throat: Oropharynx is clear and moist.   Eyes: Right eye exhibits no discharge. Left eye exhibits no discharge. No scleral icterus. Neck: No tracheal deviation present. No thyromegaly present. No bruit. Cardiovascular: Normal rate, regular rhythm and normal heart sounds. Pulmonary/Chest: Effort normal and breath sounds normal.   Abdominal: Soft. Musculoskeletal: He exhibits edema. Neurological: He is alert and oriented to person, place, and time. Skin: No rash noted. No erythema.    Psychiatric: Mood and affect normal.   Nursing note and vitals reviewed. Health Maintenance Due   Topic Date Due    MEDICARE YEARLY EXAM  08/17/2018         Assessment and orders:     Encounter Diagnoses     ICD-10-CM ICD-9-CM   1. Encounter for immunization Z23 V03.89   2. Type 2 diabetes mellitus with other neurologic complication, without long-term current use of insulin ([facility]) E11.49 250.60   3. Chronic obstructive pulmonary disease, unspecified COPD type ([facility] J44.9 496   4. PAF (paroxysmal atrial fibrillation) ([facility]) I48.0 427.31   5. Essential hypertension with goal blood pressure less than 130/85 I10 401.9   6. Stage 3 chronic kidney disease N18.3 585.3   7. DDD (degenerative disc disease), lumbar M51.36 722.52   8. Localized edema R60.0 782.3   9. Hypercholesterolemia E78.00 272.0   10. Other specified diabetes mellitus with unspecified diabetic retinopathy without macular edema ([facility])  E13.319 250.50     362.01     Diagnoses and all orders for this visit:    1. Encounter for immunization  -     Influenza Vaccine Inactivated (IIV)(FLUAD), Subunit, Adjuvanted, IM, (82580)  -     Administration fee () for Medicare insured patients    2. Type 2 diabetes mellitus with other neurologic complication, without long-term current use of insulin ([facility])  -     FUNDUS PHOTOGRAPHY  -     HEMOGLOBIN A1C WITH EAG  -     LIPID PANEL  -     METABOLIC PANEL, COMPREHENSIVE  -     CBC WITH AUTOMATED DIFF    3. Chronic obstructive pulmonary disease, unspecified COPD type ([facility])-stable    4. PAF (paroxysmal atrial fibrillation) ([facility])-no symptoms    5.  Essential hypertension with goal blood pressure less than 130/85-at goal  -     LIPID PANEL  -     METABOLIC PANEL, COMPREHENSIVE  -     CBC WITH AUTOMATED DIFF    6. Stage 3 chronic kidney disease-retest, has improved to stage II  -     LIPID PANEL  -     METABOLIC PANEL, COMPREHENSIVE  -     CBC WITH AUTOMATED DIFF  -     PROT+CREATU (RANDOM)  -     VITAMIN D, 25 HYDROXY  -     MAGNESIUM  - PHOSPHORUS    7. DDD (degenerative disc disease), lumbar-stable chronic severe pain    8. Localized edema  -     METABOLIC PANEL, COMPREHENSIVE    9. Hypercholesterolemia-retest  -     LIPID PANEL  -     METABOLIC PANEL, COMPREHENSIVE    10. Other specified diabetes mellitus with unspecified diabetic retinopathy without macular edema (HCC)-needs referral to ophthalmology  -     FUNDUS PHOTOGRAPHY  -     HEMOGLOBIN A1C WITH EAG  -     LIPID PANEL  -     METABOLIC PANEL, COMPREHENSIVE    Other orders  -     ezetimibe (ZETIA) 10 mg tablet; TAKE ONE TABLET BY MOUTH ONCE DAILY  -     montelukast (SINGULAIR) 10 mg tablet; Take 1 Tab by mouth daily. -     CKD REPORT  -     DIABETES PATIENT EDUCATION            Plan of care:  Discussed diagnoses in detail with patient. Medication risks/benefits/side effects discussed with patient. All of the patient's questions were addressed. The patient understands and agrees with our plan of care. The patient knows to call back if they are unsure of or forget any changes we discussed today or if the symptoms change. The patient received an After-Visit Summary which contains VS, orders, medication list and allergy list. This can be used as a \"mini-medical record\" should they have to seek medical care while out of town. Patient Care Team:  Eboni Dumont MD as PCP - General (Family Practice)  ERAN Roque (Orthopedic Surgery)  José Manuel Hernandez MD (Otolaryngology)  Ludwin Lomas MD (Endocrinology)  Tomasa Campbell MD as Physician (Internal Medicine)  Alvis Lesches, MD (Ophthalmology)  Edward Aschoff, MD (Podiatry)  Farshad Carson MD (Cardiology)  Fariba Villalobos RN as Nurse Navigator    Follow-up Disposition:  Return in about 3 months (around 12/5/2018).     Future Appointments  Date Time Provider Elisha Sandoval   9/12/2018 10:00 AM MD CAROLYNN Alvarez SCHED   11/19/2018 9:45 AM MD FINN Andrew SCHED   12/5/2018 8:20 ZACARIAS Saucedo MD [facility] AM[facility] GARCIA       Signed By: Hitesh Saucedo MD     September 7, 2018

## 2018-09-12 ENCOUNTER — OFFICE VISIT (OUTPATIENT)
Dept: CARDIOLOGY CLINIC | Age: 78
End: 2018-09-12

## 2018-09-12 VITALS
BODY MASS INDEX: 33.24 KG/M2 | WEIGHT: 259 LBS | HEART RATE: 65 BPM | TEMPERATURE: 97.8 F | SYSTOLIC BLOOD PRESSURE: 135 MMHG | DIASTOLIC BLOOD PRESSURE: 57 MMHG | RESPIRATION RATE: 20 BRPM | OXYGEN SATURATION: 97 % | HEIGHT: 74 IN

## 2018-09-12 DIAGNOSIS — E78.00 HYPERCHOLESTEROLEMIA: Chronic | ICD-10-CM

## 2018-09-12 DIAGNOSIS — I48.19 PERSISTENT ATRIAL FIBRILLATION (HCC): Primary | ICD-10-CM

## 2018-09-12 DIAGNOSIS — R60.0 LOCALIZED EDEMA: Chronic | ICD-10-CM

## 2018-09-12 DIAGNOSIS — N18.30 STAGE 3 CHRONIC KIDNEY DISEASE (HCC): Chronic | ICD-10-CM

## 2018-09-12 DIAGNOSIS — I10 ESSENTIAL HYPERTENSION: Chronic | ICD-10-CM

## 2018-09-12 DIAGNOSIS — I73.9 PVD (PERIPHERAL VASCULAR DISEASE) (HCC): Chronic | ICD-10-CM

## 2018-09-12 NOTE — MR AVS SNAPSHOT
[address] [facility] 
 
 
 [address] [place] [address] [address] [address] 
[phone] Patient: Douglas Acosta MRN: [id number] HVT:[id number] Visit Information Date & Time Provider Department Dept. Phone Encounter #  
 9/12/2018 10:00 AM Vielka Arias MD [facility] [phone] [id number] Your Appointments 11/19/2018  9:45 AM  
ROUTINE CARE with Bozena Pinon MD  
[facility]) Appt Note: routine f/u dsk 08/13/18  
 [address] [facility] [id number] [phone]  
  
   
 [id number]  
  
    
 12/5/2018  8:20 AM  
ROUTINE CARE with Nita Steve MD  
[facility] Appt Note: 3 mo [facility] [address] [address]  
[place] [address] [address] Upcoming Health Maintenance Date Due  
 MEDICARE YEARLY EXAM 8/17/2018 FOOT EXAM Q1 10/11/2018 MICROALBUMIN Q1 10/12/2018 GLAUCOMA SCREENING Q2Y 12/9/2018 HEMOGLOBIN A1C Q6M 3/5/2019 EYE EXAM RETINAL OR DILATED Q1 9/5/2019 LIPID PANEL Q1 9/5/2019 DTaP/Tdap/Td series (2 - Td) 8/16/2023 Allergies as of 9/12/2018  Review Complete On: 9/12/2018 By: Vielka Arias MD  
  
 Severity Noted Reaction Type Reactions Indocin [Indomethacin Sodium]  06/05/2010    Unknown (comments) Lisinopril  04/17/2013    Angioedema Dxed in hospital.  
 Losartan  01/22/2015    Other (comments) Face and throat swelling. Current Immunizations  Reviewed on 12/12/2016 Name Date Influenza High Dose Vaccine PF 10/4/2017 Influenza Vaccine 10/7/2015, 10/9/2014, 9/26/2013 Influenza Vaccine (Quad) PF 9/28/2016 Influenza Vaccine (Tri) Adjuvanted 9/5/2018 Influenza Vaccine Split 10/11/2012, 11/23/2011, 10/7/2010 Influenza Vaccine Whole 12/14/2009 Pneumococcal Conjugate (PCV-13) 8/12/2015 Pneumococcal Polysaccharide (PPSV-23) 10/20/2014, 12/7/2012 Tdap 8/16/2013 Zoster Vaccine, Live 5/26/2015 Not reviewed this visit You Were Diagnosed With   
  
 Codes Comments Persistent atrial fibrillation (HCC)    -  Primary ICD-10-CM: I48.1 ICD-9-CM: 427.31 Essential hypertension     ICD-10-CM: I10 
ICD-9-CM: 401.9 Hypercholesterolemia     ICD-10-CM: E78.00 ICD-9-CM: 272.0   
 PVD (peripheral vascular disease) ([facility]     ICD-10-CM: I73.9 ICD-9-CM: 443.9 Localized edema     ICD-10-CM: R60.0 ICD-9-CM: 035. 3 Vitals BP Pulse Temp Resp Height(growth percentile) Weight(growth percentile) 135/57 (BP 1 Location: Left arm, BP Patient Position: Sitting) 65 97.8 °F (36.6 °C) (Oral) 20 6' 2\" (1.88 m) 259 lb (117.5 kg) SpO2 BMI Smoking Status 97% 33.25 kg/m2 Former Smoker Vitals History BMI and BSA Data Body Mass Index Body Surface Area  
 33.25 kg/m 2 2.48 m 2 Preferred Pharmacy Pharmacy Name Phone [address] 8[address] [facility] [address] [place] [address] [phone] Your Updated Medication List  
  
   
This list is accurate as of 9/12/18 10:39 AM.  Always use your most recent med list.  
  
  
  
  
 ACCU-CHEK SHAUNA CONTROL SOLN Soln Generic drug:  Blood Glucose Control High&Low  
  
 albuterol 90 mcg/actuation inhaler Commonly known as:  PROVENTIL HFA, VENTOLIN HFA, PROAIR HFA Take 2 Puffs by inhalation every six (6) hours as needed for Wheezing for up to 360 days. Please use generic that is covered  
  
 allopurinol 300 mg tablet Commonly known as:  Ardie Fleischer Take 1 Tab by mouth daily. amLODIPine 10 mg tablet Commonly known as:  [place] TAKE ONE TABLET BY MOUTH ONCE DAILY  
  
 aspirin 81 mg chewable tablet Take 81 mg by mouth daily. baclofen 10 mg tablet Commonly known as:  LIORESAL  
 TAKE ONE TABLET BY MOUTH THREE TIMES DAILY (MUSCLE  RELAXER) benzoyl peroxide 5 % topical gel Apply  to affected area nightly. apply to affected area as directed Blood-Glucose Meter Misc Commonly known as:  TRUE METRIX GLUCOSE METER Use as Directed  
  
 bumetanide 2 mg tablet Commonly known as:  [place] Take 1 Tab by mouth two (2) times a day. cloNIDine HCl 0.3 mg tablet Commonly known as:  CATAPRES  
TAKE ONE TABLET BY MOUTH THREE TIMES DAILY  
  
 colchicine 0.6 mg tablet Commonly known as:  COLCRYS Take 1 Tab by mouth daily. To prevent gout. docusate sodium 100 mg Tab Take 1 Tab by mouth two (2) times a day.  
  
 ezetimibe 10 mg tablet Commonly known as:  Nayely Perez TAKE ONE TABLET BY MOUTH ONCE DAILY ferrous sulfate 325 mg (65 mg iron) tablet Commonly known as:  Iron Take 1 Tab by mouth two (2) times daily (after meals). Indications: Iron Deficiency Anemia  
  
 fluticasone 50 mcg/actuation nasal spray Commonly known as:  FLONASE  
1 spray BL daily FOLBEE PLUS Tab tablet Generic drug:  b complex-vitamin c-folic acid 5mg TAKE ONE TABLET BY MOUTH ONCE DAILY  
  
 gabapentin 300 mg capsule Commonly known as:  NEURONTIN Take 1 Cap by mouth three (3) times daily. glipiZIDE 5 mg tablet Commonly known as:  GLUCOTROL  
TAKE TWO TABLETS BY MOUTH 20 MINUTES BEFORE BREAKFAST AND TWO 20 MINUTES BEFORE SUPPER  
  
 glucose blood VI test strips strip Commonly known as:  TRUE METRIX GLUCOSE TEST STRIP Test 2 times daily Dx Coed E11.65  
  
 hydrALAZINE 50 mg tablet Commonly known as:  APRESOLINE Take 1 Tab by mouth four (4) times daily. Indications: hypertension HYDROcodone-acetaminophen  mg tablet Commonly known as:  Annamary Zaira Take 1 Tab by mouth every six (6) hours as needed for Pain. JARDIANCE 10 mg tablet Generic drug:  empagliflozin TAKE ONE TABLET BY MOUTH ONCE DAILY  
  
 labetalol 200 mg tablet Commonly known as:  NORMODYNE  
TAKE ONE-HALF TABLET BY MOUTH TWICE DAILY FOR HYPERTENSION * Lancets Misc Test 2 times daily Dx Code E11.65  
  
 * lancets 30 gauge Misc  
  
 losartan 25 mg tablet Commonly known as:  COZAAR Take  by mouth daily. lovastatin 40 mg tablet Commonly known as:  MEVACOR Take 1 Tab by mouth nightly.  
  
 magnesium oxide 400 mg tablet Commonly known as:  MAG-OX Take 1 Tab by mouth daily. melatonin 3 mg tablet Take 10 mg by mouth nightly. metFORMIN 500 mg tablet Commonly known as:  GLUCOPHAGE Increase to two TABLETS twice daily with meals  
  
 montelukast 10 mg tablet Commonly known as:  SINGULAIR Take 1 Tab by mouth daily. omeprazole 20 mg capsule Commonly known as:  PRILOSEC Take 1 Cap by mouth daily. Fax to [facility]  
  
 potassium chloride 10 mEq tablet Commonly known as:  K-DUR Take 2 Tabs by mouth two (2) times a day. Indications: hypokalemia PRADAXA 150 mg capsule Generic drug:  dabigatran etexilate TAKE ONE CAPSULE BY MOUTH TWICE DAILY  
  
 tamsulosin 0.4 mg capsule Commonly known as:  FLOMAX Take 1 Cap by mouth daily. * Notice: This list has 2 medication(s) that are the same as other medications prescribed for you. Read the directions carefully, and ask your doctor or other care provider to review them with you. Please provide this summary of care documentation to your next provider. Your primary care clinician is listed as [address]. If you have any questions after today's visit, please call [phone].

## 2018-09-12 NOTE — PROGRESS NOTES
1. Have you been to the ER, urgent care clinic since your last visit? Hospitalized since your last visit? No    2. Have you seen or consulted any other health care providers outside of the [address] [place] [address] since your last visit? Include any pap smears or colon screening.  No  Reviewed record in preparation for visit and have necessary documentation  Pt did  bring medication to office visit for review    Goals that were addressed and/or need to be completed during or after this appointment include   Health Maintenance Due   Topic Date Due    MEDICARE YEARLY EXAM  08/17/2018    FOOT EXAM Q1  10/11/2018    MICROALBUMIN Q1  10/12/2018

## 2018-09-12 NOTE — PROGRESS NOTES
Tnoy Oas      1940   Ti Talley MD  Date of Visit-9/12/2018     [facility],  PCP=Vin De La O MD     [facility] [place]. HPI:   Ramón Rodgers is a 77 y.o. male   follow up of atrial fibrillation   One year fu, sees PCP regularly , no trips to hospital  Feels feel  Denies chest pain, edema, syncope or shortness of breath at rest   Has no tachycardia , palpitations or sense of arrythmia   CONN is chronic  Echo last visit one year ago was WNL    Assessment/Plans:  No diagnosis found. 1.  Persistent A Fib. Good rate control, Echo confirms normal EF today. Continues on Pradaxa for stroke prophylaxis  , no bleeding  --I dont feel strongly about aspirin since on [facility]  --RRR on exam due to likely near normal R-R     2.  Normal LV systolic function. No significant valve disease. Likely mild diastolic dysfunction. Edema is resolved     3. Malignant HTN, on multiple meds. BP Readings from Last 3 Encounters:   09/12/18 135/57   09/05/18 127/62   08/01/18 133/62      4. CKD-renal fu     Lab Results   Component Value Date/Time    Creatinine (POC) 1.4 (H) 01/02/2015 10:08 AM    Creatinine 1.28 (H) 09/05/2018 12:41 PM      5. Dyslipidemia & DM cardiovascular disease risk factors on Jardiance     Lab Results   Component Value Date/Time    Hemoglobin A1c 6.2 (H) 09/05/2018 12:41 PM    Hemoglobin A1c (POC) 7.2 02/07/2017 11:43 AM    Hemoglobin A1c, External 6.3 10/12/2017      Lab Results   Component Value Date/Time    LDL, calculated 50 09/05/2018 12:41 PM      6. PVD- no current complaints, continue ASA  ---June 2015 [place] 0.39 right and 0.73 on left     Follow-up 1 year at [facility]. Future Appointments  Date Time Provider Elisha Sandoval   11/19/2018 9:45 AM Elijah Jorgensen MD CDE [facility] SCHED   12/5/2018 8:20 AM Teri Galvan MD [facility] SCHED       Cardiac History:   No specialty comments available. ROS:Cardiac complete  as above. Respiratory as above with no wheezing or hemoptysis. He denies  symptoms of unusual weight loss , fevers,  BRBPR, hematuria,  or recent stroke    Past Medical History:   Diagnosis Date    Acute on chronic diastolic congestive heart failure ([facility] 4/27/2015    Arthritis 7/11/2011    [place] 6/7/2010    Cancer (HCC)     prostate    Chronic diastolic heart failure ([facility] 8/26/2015    CKD (chronic kidney disease) 11/23/2015    Diabetes ([facility]     Gout, unspecified     Hypercholesterolemia     Hypertension     Inguinal lymphadenopathy 2/18/2014    Leg pain     PAF (paroxysmal atrial fibrillation) (HCC)     PAF (paroxysmal atrial fibrillation) (HCC)     PVD (peripheral vascular disease) ([facility])       Exam and Labs:  Visit Vitals    /57 (BP 1 Location: Left arm, BP Patient Position: Sitting)    Pulse 65    Temp 97.8 °F (36.6 °C) (Oral)    Resp 20    Ht 6' 2\" (1.88 m)    Wt 259 lb (117.5 kg)    SpO2 97%    BMI 33.25 kg/m2     Constitutional:  NAD, comfortable , moist mucous membranesHENT: Head: NC,ATEyes: No scleral icterus. Neck:  Neck supple. No JVD present. No tracheal deviation,mass  Chest: Effort normal & normal respiratory excursion     Lungs:breath sounds normal. No stridor. distress, wheezes or  Rales. Heart:normal rate, regular rhythm, normal S1, S2, no murmurs, rubs, clicks or gallops , PMI non displaced. Edema: Edema is none. Extremities:  no clubbing or cyanosis. Abdominal:  no abnormal distension. Neurological: alert, conversant and oriented . Skin: Skin is not cold. No obvious systemic rash noted. Not diaphoretic. No erythema. Psychiatric:  Grossly normal mood and affect.   Behavior appears normal.     Lab Results   Component Value Date/Time    Cholesterol, total 97 (L) 09/05/2018 12:41 PM    HDL Cholesterol 33 (L) 09/05/2018 12:41 PM    LDL, calculated 50 09/05/2018 12:41 PM    Triglyceride 68 09/05/2018 12:41 PM    CHOL/HDL Ratio 3.7 10/07/2010 09:27 AM     Lab Results   Component Value Date/Time    Sodium 144 09/05/2018 12:41 PM    Potassium 3.3 (L) 09/05/2018 12:41 PM    Chloride 104 09/05/2018 12:41 PM    CO2 24 09/05/2018 12:41 PM    Anion gap 12 10/07/2010 09:27 AM    Glucose 127 (H) 09/05/2018 12:41 PM    BUN 14 09/05/2018 12:41 PM    Creatinine 1.28 (H) 09/05/2018 12:41 PM    BUN/Creatinine ratio 11 09/05/2018 12:41 PM    GFR est AA 61 09/05/2018 12:41 PM    GFR est non-AA 53 (L) 09/05/2018 12:41 PM    Calcium 9.5 09/05/2018 12:41 PM      Wt Readings from Last 3 Encounters:   09/12/18 259 lb (117.5 kg)   09/05/18 259 lb 3.2 oz (117.6 kg)   08/01/18 263 lb (119.3 kg)    BP Readings from Last 3 Encounters:   09/12/18 135/57   09/05/18 127/62   08/01/18 133/62        Current Outpatient Prescriptions   Medication Sig    potassium chloride (K-DUR) 10 mEq tablet Take 2 Tabs by mouth two (2) times a day. Indications: hypokalemia    ezetimibe (ZETIA) 10 mg tablet TAKE ONE TABLET BY MOUTH ONCE DAILY    montelukast (SINGULAIR) 10 mg tablet Take 1 Tab by mouth daily.  amLODIPine (NORVASC) 10 mg tablet TAKE ONE TABLET BY MOUTH ONCE DAILY    HYDROcodone-acetaminophen (NORCO)  mg tablet Take 1 Tab by mouth every six (6) hours as needed for Pain.  JARDIANCE 10 mg tablet TAKE ONE TABLET BY MOUTH ONCE DAILY    gabapentin (NEURONTIN) 300 mg capsule Take 1 Cap by mouth three (3) times daily.  allopurinol (ZYLOPRIM) 300 mg tablet Take 1 Tab by mouth daily.  losartan (COZAAR) 25 mg tablet Take  by mouth daily.  albuterol (PROVENTIL HFA, VENTOLIN HFA, PROAIR HFA) 90 mcg/actuation inhaler Take 2 Puffs by inhalation every six (6) hours as needed for Wheezing for up to 360 days. Please use generic that is covered    fluticasone (FLONASE) 50 mcg/actuation nasal spray 1 spray BL daily    ferrous sulfate (IRON) 325 mg (65 mg iron) tablet Take 1 Tab by mouth two (2) times daily (after meals).  Indications: Iron Deficiency Anemia    cloNIDine HCl (CATAPRES) 0.3 mg tablet TAKE ONE TABLET BY MOUTH THREE TIMES DAILY    FOLBEE PLUS tab tablet TAKE ONE TABLET BY MOUTH ONCE DAILY    glipiZIDE (GLUCOTROL) 5 mg tablet TAKE TWO TABLETS BY MOUTH 20 MINUTES BEFORE BREAKFAST AND TWO 20 MINUTES BEFORE SUPPER    baclofen (LIORESAL) 10 mg tablet TAKE ONE TABLET BY MOUTH THREE TIMES DAILY (MUSCLE  RELAXER)    PRADAXA 150 mg capsule TAKE ONE CAPSULE BY MOUTH TWICE DAILY    hydrALAZINE (APRESOLINE) 50 mg tablet Take 1 Tab by mouth four (4) times daily. Indications: hypertension    colchicine (COLCRYS) 0.6 mg tablet Take 1 Tab by mouth daily. To prevent gout.  tamsulosin (FLOMAX) 0.4 mg capsule Take 1 Cap by mouth daily.  lovastatin (MEVACOR) 40 mg tablet Take 1 Tab by mouth nightly.  bumetanide (BUMEX) 2 mg tablet Take 1 Tab by mouth two (2) times a day.  omeprazole (PRILOSEC) 20 mg capsule Take 1 Cap by mouth daily. Fax to [facility]    labetalol (NORMODYNE) 200 mg tablet TAKE ONE-HALF TABLET BY MOUTH TWICE DAILY FOR HYPERTENSION    lancets 30 gauge misc     ACCU-CHEK SHAUNA CONTROL SOLN soln     metFORMIN (GLUCOPHAGE) 500 mg tablet Increase to two TABLETS twice daily with meals    docusate sodium 100 mg tab Take 1 Tab by mouth two (2) times a day.  magnesium oxide (MAG-OX) 400 mg tablet Take 1 Tab by mouth daily.  Blood-Glucose Meter (TRUE METRIX GLUCOSE METER) misc Use as Directed    glucose blood VI test strips (TRUE METRIX GLUCOSE TEST STRIP) strip Test 2 times daily Dx Coed E11.65    Lancets misc Test 2 times daily Dx Code E11.65    melatonin 3 mg tablet Take 10 mg by mouth nightly.  aspirin 81 mg chewable tablet Take 81 mg by mouth daily.  benzoyl peroxide 5 % topical gel Apply  to affected area nightly. apply to affected area as directed     No current facility-administered medications for this visit.        Past Surgical History:   Procedure Laterality Date    COLONOSCOPY N/A 9/22/2016    COLONOSCOPY performed by Laura Clark MD at [facility]      back and left shoulder x2      Social Hx=  reports that he quit smoking about 15 years ago. His smoking use included Cigarettes. He has never used smokeless tobacco. He reports that he drinks alcohol. He reports that he does not use illicit drugs. Family Hx= family history includes Cancer in his paternal grandfather; Hypertension in an other family member. Impression see above.

## 2018-09-18 ENCOUNTER — TELEPHONE (OUTPATIENT)
Dept: FAMILY MEDICINE CLINIC | Age: 78
End: 2018-09-18

## 2018-09-18 RX ORDER — METFORMIN HYDROCHLORIDE 500 MG/1
TABLET ORAL
Qty: 270 TAB | Refills: 2 | Status: SHIPPED | OUTPATIENT
Start: 2018-09-18 | End: 2019-04-15 | Stop reason: SDUPTHER

## 2018-09-18 NOTE — TELEPHONE ENCOUNTER
----- Message from Chio Malhotra sent at 9/18/2018  3:16 PM EDT -----  Regarding: Dr Roger Tyson with [employer] would like a call back to confirm that a fax was received on 9/6/18 requesting information regarding the patient medical records. Alyssia Pugh can be reached at [phone].  Alyssia Pugh may call back again to follow up on this request.

## 2018-09-19 NOTE — TELEPHONE ENCOUNTER
Returned phone call to [employer] with [employer]. No answer, unable to leave message due to mailbox being full. I have not received a fax requesting medication records. Please refax this request to [phone].

## 2018-09-20 NOTE — TELEPHONE ENCOUNTER
Attempted to reach [employer] by telephone, no answer. Unable to leave voicemail due to mailbox being full.

## 2018-09-24 DIAGNOSIS — G89.29 CHRONIC LEFT-SIDED LOW BACK PAIN WITHOUT SCIATICA: ICD-10-CM

## 2018-09-24 DIAGNOSIS — M54.50 CHRONIC LEFT-SIDED LOW BACK PAIN WITHOUT SCIATICA: ICD-10-CM

## 2018-09-24 DIAGNOSIS — M51.36 DDD (DEGENERATIVE DISC DISEASE), LUMBAR: Chronic | ICD-10-CM

## 2018-09-24 RX ORDER — HYDROCODONE BITARTRATE AND ACETAMINOPHEN 10; 325 MG/1; MG/1
1 TABLET ORAL
Qty: 90 TAB | Refills: 0 | Status: SHIPPED | OUTPATIENT
Start: 2018-09-24 | End: 2018-10-23 | Stop reason: SDUPTHER

## 2018-09-24 NOTE — TELEPHONE ENCOUNTER
Last office visit on 9/5/18  Last drug screen on 2/21/18  [facility] on file  Last refill on 8/22/18 for 90 tabs    ran on 9/24/18

## 2018-10-01 DIAGNOSIS — I10 ESSENTIAL HYPERTENSION: Chronic | ICD-10-CM

## 2018-10-01 DIAGNOSIS — I48.19 PERSISTENT ATRIAL FIBRILLATION (HCC): ICD-10-CM

## 2018-10-01 RX ORDER — LABETALOL 200 MG/1
TABLET, FILM COATED ORAL
Qty: 90 TAB | Refills: 3 | Status: SHIPPED | OUTPATIENT
Start: 2018-10-01 | End: 2019-06-05 | Stop reason: SDUPTHER

## 2018-10-23 DIAGNOSIS — M51.36 DDD (DEGENERATIVE DISC DISEASE), LUMBAR: Chronic | ICD-10-CM

## 2018-10-23 DIAGNOSIS — G89.29 CHRONIC LEFT-SIDED LOW BACK PAIN WITHOUT SCIATICA: ICD-10-CM

## 2018-10-23 DIAGNOSIS — M54.50 CHRONIC LEFT-SIDED LOW BACK PAIN WITHOUT SCIATICA: ICD-10-CM

## 2018-10-23 RX ORDER — HYDROCODONE BITARTRATE AND ACETAMINOPHEN 10; 325 MG/1; MG/1
1 TABLET ORAL
Qty: 90 TAB | Refills: 0 | Status: SHIPPED | OUTPATIENT
Start: 2018-10-23 | End: 2018-11-26 | Stop reason: SDUPTHER

## 2018-11-19 ENCOUNTER — OFFICE VISIT (OUTPATIENT)
Dept: ENDOCRINOLOGY | Age: 78
End: 2018-11-19

## 2018-11-19 VITALS
SYSTOLIC BLOOD PRESSURE: 147 MMHG | WEIGHT: 253.5 LBS | TEMPERATURE: 97.8 F | BODY MASS INDEX: 32.53 KG/M2 | DIASTOLIC BLOOD PRESSURE: 64 MMHG | RESPIRATION RATE: 18 BRPM | HEIGHT: 74 IN | OXYGEN SATURATION: 100 % | HEART RATE: 67 BPM

## 2018-11-19 DIAGNOSIS — E78.2 MIXED HYPERLIPIDEMIA: ICD-10-CM

## 2018-11-19 DIAGNOSIS — E11.42 TYPE 2 DIABETES MELLITUS WITH DIABETIC POLYNEUROPATHY, WITHOUT LONG-TERM CURRENT USE OF INSULIN (HCC): Primary | ICD-10-CM

## 2018-11-19 DIAGNOSIS — N18.2 STAGE 2 CHRONIC KIDNEY DISEASE: ICD-10-CM

## 2018-11-19 DIAGNOSIS — I10 ESSENTIAL HYPERTENSION: ICD-10-CM

## 2018-11-19 DIAGNOSIS — Z13.29 THYROID DISORDER SCREEN: ICD-10-CM

## 2018-11-19 RX ORDER — DIPHENHYDRAMINE HCL 50 MG
25 CAPSULE ORAL
COMMUNITY
End: 2019-05-20 | Stop reason: ALTCHOICE

## 2018-11-19 NOTE — PROGRESS NOTES
HISTORY OF PRESENT ILLNESS Zuleyka Page is a 77 y.o. male. HPI Patient is here  after last visit of Type 2 diabetes mellitus from  June 2017 A GAP OF 16 MONTHS  
LOST 6 LBS Gotten diet compliant He got the log with high sugars Old history He is told to come back with dietary changes while on current meds No hospitalizations No med changes Brought the log , has better sugars, but gained 5 lbs Not a great historian Taking glipizide and metformin NOT Accompanied by his daughter Prior history :  
Referred : by PCP 
 
H/o diabetes for 10 years He has hearing issues  
accomapnied by his daughter Current A1C is 8.1 %   From jan 2015  and symptoms/problems include fluctutating sugars Current diabetic medications include metformin   500 mg TID He lives alone, manages his own meds . Current monitoring regimen: home blood tests - daily Home blood sugar records: trend: fluctuating a bit Any episodes of hypoglycemia? no 
 
Weight trend: fluctuating a bit Prior visit with dietician: no 
Current diet: \"unhealthy\" diet in general 
Current exercise: walking Known diabetic complications: retinopathy, nephropathy, peripheral neuropathy and cardiovascular disease Cardiovascular risk factors: dyslipidemia, diabetes mellitus, obesity, sedentary life style, male gender, stress Eye exam current (within one year): yes ARA: no  
 
Past Medical History:  
Diagnosis Date  Acute on chronic diastolic congestive heart failure ([address] 4/27/2015  Arthritis 7/11/2011  [place] 6/7/2010  Cancer [facility])   
 prostate  Chronic diastolic heart failure ([address] 8/26/2015  CKD (chronic kidney disease) 11/23/2015  Diabetes ([address]  Gout, unspecified  Hypercholesterolemia  Hypertension  Inguinal lymphadenopathy 2/18/2014  Leg pain  PAF (paroxysmal atrial fibrillation) ([address]  PAF (paroxysmal atrial fibrillation) ([address]  PVD (peripheral vascular disease) ([facility] Past Surgical History:  
Procedure Laterality Date  HX ORTHOPAEDIC    
 back and left shoulder x2 Current Outpatient Medications Medication Sig  diphenhydrAMINE (BENADRYL) 50 mg capsule Take 25 mg by mouth every six (6) hours as needed.  HYDROcodone-acetaminophen (NORCO)  mg tablet Take 1 Tab by mouth every six (6) hours as needed for Pain.  labetalol (NORMODYNE) 200 mg tablet TAKE ONE-HALF TABLET BY MOUTH TWICE DAILY FOR  HYPERTENSION  metFORMIN (GLUCOPHAGE) 500 mg tablet TAKE ONE TABLET BY MOUTH THREE TIMES DAILY WITH MEALS  potassium chloride (K-DUR) 10 mEq tablet Take 2 Tabs by mouth two (2) times a day. Indications: hypokalemia  ezetimibe (ZETIA) 10 mg tablet TAKE ONE TABLET BY MOUTH ONCE DAILY  montelukast (SINGULAIR) 10 mg tablet Take 1 Tab by mouth daily.  amLODIPine (NORVASC) 10 mg tablet TAKE ONE TABLET BY MOUTH ONCE DAILY  JARDIANCE 10 mg tablet TAKE ONE TABLET BY MOUTH ONCE DAILY  gabapentin (NEURONTIN) 300 mg capsule Take 1 Cap by mouth three (3) times daily.  allopurinol (ZYLOPRIM) 300 mg tablet Take 1 Tab by mouth daily.  losartan (COZAAR) 25 mg tablet Take  by mouth daily.  albuterol (PROVENTIL HFA, VENTOLIN HFA, PROAIR HFA) 90 mcg/actuation inhaler Take 2 Puffs by inhalation every six (6) hours as needed for Wheezing for up to 360 days. Please use generic that is covered  fluticasone (FLONASE) 50 mcg/actuation nasal spray 1 spray BL daily  ferrous sulfate (IRON) 325 mg (65 mg iron) tablet Take 1 Tab by mouth two (2) times daily (after meals). Indications: Iron Deficiency Anemia  cloNIDine HCl (CATAPRES) 0.3 mg tablet TAKE ONE TABLET BY MOUTH THREE TIMES DAILY  FOLBEE PLUS tab tablet TAKE ONE TABLET BY MOUTH ONCE DAILY  glipiZIDE (GLUCOTROL) 5 mg tablet TAKE TWO TABLETS BY MOUTH 20 MINUTES BEFORE BREAKFAST AND TWO 20 MINUTES BEFORE SUPPER  
  baclofen (LIORESAL) 10 mg tablet TAKE ONE TABLET BY MOUTH THREE TIMES DAILY (MUSCLE  RELAXER)  PRADAXA 150 mg capsule TAKE ONE CAPSULE BY MOUTH TWICE DAILY  hydrALAZINE (APRESOLINE) 50 mg tablet Take 1 Tab by mouth four (4) times daily. Indications: hypertension  colchicine (COLCRYS) 0.6 mg tablet Take 1 Tab by mouth daily. To prevent gout.  tamsulosin (FLOMAX) 0.4 mg capsule Take 1 Cap by mouth daily.  lovastatin (MEVACOR) 40 mg tablet Take 1 Tab by mouth nightly.  bumetanide (BUMEX) 2 mg tablet Take 1 Tab by mouth two (2) times a day.  omeprazole (PRILOSEC) 20 mg capsule Take 1 Cap by mouth daily. Fax to [facility]  lancets 30 gauge [facility]  ACCU-CHEK SHAUNA CONTROL SOLN soln  docusate sodium 100 mg tab Take 1 Tab by mouth two (2) times a day.  magnesium oxide (MAG-OX) 400 mg tablet Take 1 Tab by mouth daily.  Blood-Glucose Meter (TRUE METRIX GLUCOSE METER) misc Use as Directed  glucose blood VI test strips (TRUE METRIX GLUCOSE TEST STRIP) strip Test 2 times daily Dx Coed E11.65  Lancets misc Test 2 times daily Dx Code E11.65  
 melatonin 3 mg tablet Take 10 mg by mouth nightly.  aspirin 81 mg chewable tablet Take 81 mg by mouth daily.  benzoyl peroxide 5 % topical gel Apply  to affected area nightly. apply to affected area as directed No current facility-administered medications for this visit. Review of Systems Constitutional: Positive for malaise/fatigue. HENT: Positive for hearing loss. Eyes: Positive for blurred vision. Negative for pain and redness. Respiratory: Negative. Cardiovascular: Negative for chest pain, palpitations and leg swelling. Gastrointestinal: Negative. Negative for constipation. Genitourinary: Negative. Musculoskeletal: Positive for myalgias. Skin: Negative. Neurological: Negative. Endo/Heme/Allergies: Negative. Psychiatric/Behavioral: Negative for depression and memory loss.  The patient does not have insomnia. Physical Exam  
Vitals reviewed. Constitutional: He is oriented to person, place, and time. He appears well-developed and well-nourished. Obese gentle man HENT:  
Head: Normocephalic. Eyes: Conjunctivae and EOM are normal. Pupils are equal, round, and reactive to light. Neck: Normal range of motion. Neck supple. No JVD present. No tracheal deviation present. No thyromegaly present. Cardiovascular: Normal rate, regular rhythm and normal heart sounds. Pulmonary/Chest: Effort normal and breath sounds normal.  
Abdominal: Soft. Bowel sounds are normal.  
Musculoskeletal: Normal range of motion. Lymphadenopathy:  
  He has no cervical adenopathy. Neurological: He is alert and oriented to person, place, and time. He has normal reflexes. Skin: Skin is warm. Psychiatric: He has a normal mood and affect. Diabetic feet exam : NOV 2018  
 Fam[facility]fer podiatrist  
H/o partial or complete amputation of foot : n 
H/o previous foot ulceration :n 
H/o pre - ulcerative callus: yes H/o peripheral neuropathy and callus: yes H/o poor circulation     ARA   : yes,   Right side ARA  is less than 7 Foot deformity : crossing over of toes, second onto first toe, third onto second Flat feet Diabetic foot exam: NOV 2018 Left Foot: 
 Visual Exam: normal   HAS HAMMER TOES1. 2,3,4  OVER RIDING ON SECOND ON TO FIRST TOE , BUNION PRESENT Pulse DP: 1+ (weak) Filament test: reduced sensation Vibratory sensation: diminished Right Foot: 
 Visual Exam: normal  HAS HAMMER TOES 1, 2,3,4  OVER RIDING ON SECOND ON TO FIRST TOE , BUNION PRESENT Pulse DP: 1+ (weak) Filament test: reduced sensation Vibratory sensation: diminished Lab Results Component Value Date/Time  Hemoglobin A1c 6.2 (H) 09/05/2018 12:41 PM  
 Hemoglobin A1c 6.5 (H) 06/01/2018 04:24 PM  
 Hemoglobin A1c 6.2 (H) 04/16/2018 12:28 PM  
 Hemoglobin A1c, External 6.3 10/12/2017 Glucose 127 (H) 09/05/2018 12:41 PM  
 Glucose (POC) 132 (H) 09/22/2016 09:07 AM  
 Microalb/Creat ratio (ug/mg creat.) 227.5 (H) 10/11/2017 02:51 PM  
 LDL, calculated 50 09/05/2018 12:41 PM  
 Creatinine (POC) 1.4 (H) 01/02/2015 10:08 AM  
 Creatinine 1.28 (H) 09/05/2018 12:41 PM  
  
Lab Results Component Value Date/Time Cholesterol, total 97 (L) 09/05/2018 12:41 PM  
 HDL Cholesterol 33 (L) 09/05/2018 12:41 PM  
 LDL, calculated 50 09/05/2018 12:41 PM  
 Triglyceride 68 09/05/2018 12:41 PM  
 CHOL/HDL Ratio 3.7 10/07/2010 09:27 AM  
 
 
Lab Results Component Value Date/Time ALT (SGPT) 17 09/05/2018 12:41 PM  
 AST (SGOT) 25 09/05/2018 12:41 PM  
 Alk. phosphatase 81 09/05/2018 12:41 PM  
 Bilirubin, total 0.5 09/05/2018 12:41 PM  
 
 
Lab Results Component Value Date/Time GFR est AA 61 09/05/2018 12:41 PM  
 GFR est non-AA 53 (L) 09/05/2018 12:41 PM  
 Creatinine (POC) 1.4 (H) 01/02/2015 10:08 AM  
 Creatinine 1.28 (H) 09/05/2018 12:41 PM  
 BUN 14 09/05/2018 12:41 PM  
 Sodium 144 09/05/2018 12:41 PM  
 Potassium 3.3 (L) 09/05/2018 12:41 PM  
 Chloride 104 09/05/2018 12:41 PM  
 CO2 24 09/05/2018 12:41 PM  
  
 
 
 
ASSESSMENT and PLAN 1. Type 2 DM, uncontrolled : a1c is    6.2 %      FROM SEPT 2018     COMPARED TO    8.4 %      From may 2017     Compared to   7.2 %   From feb 2017     compared to   7%    From   May 2016  compared to   7.3 %    From nov 2015  Compared to  6 %  From Aug 2015 compared to 6.6 %   From  May 2015   Compared to 7.8 %  From  Feb 2015 GAINED GOOD  glycemic control HE GETS HELP FROM DAUGHTER  
 
NO TRANSPORTATION  FOR FOLLOW UPS Reviewed log , sugars are down to 130s compared,   POST MEAL  OVER 250  mg POST MEALS Discussed with daughter that he needs insulin LAST VISIT Gave 6 weeks time to seriously change the dietary habits AND I NEVER GOT TO SEE HIM IN A YEAR Will do jardiance 10  Mg a day [place] is going to be on the family as insulin gets introduced and family's help and understanding is utmost needed Continue on glucophage at 500 mg TID   and  glipizide to 10 mg bid dose Patient is advised about checking blood sugars 2 times a day and maintaining log book. Hypoglycemia management has been explained to the patient. 2. Hypoglycemia :  Educated on treating the hypoglycemia. Discussed Glipizide use and prescribed 3. HTN : continue on clonidine 0.3 mg tid , hydralazine 50 MG QID  ,  Labetalol  200 mg HALF TAB  bid,  and norvasc  10 mg a day He is allergic to Losartan , developed Angioedema He is allergic to lisinopril also Proteinuria is worsening from lack of glycemic control Patient is educated about importance of compliance with anti-hypertensives especially ARB/ACEI 4. Dyslipidemia : LDL is good , on lovastatin 40 mg and  zetia 10 mg at night I could nto stop zetia as the lipids are so well controlled Patient is educated about benefits and adverse effects of statins and explained how benefits outweigh risk. 5. use of aspirin to prevent MI and TIA's discussed 6. Diabetic complications :  
 
A. Retinopathy-YES   educated on this complication,  regular f/u with ophthalmologist encouraged B. Nephropathy - YES Stage 3 ckd from diuretic use for CAD and CHF Proteinuria present  
educated on this disease and indicated stages and its prognosis. Regular care with nephrologist encouraged C. Peripheral Neuropathy - YES  , moderate 
educated on this disease and indicated improvement with good and stable glycemic control D. PAD : YES    Right side is <7  By ARA [facility]   From  June 2014 E : CAD : YES 
F/u with Dr. Vivian Napier 
 
 
 
Did the comprehensive foot exam today I am treating the patient [facility] comprehensive plan of care for diabetes The patient would benefit from diabetic foot wear to protect their feet > 50 % of time is spent on counseling Patient voiced understanding her plan of care

## 2018-11-19 NOTE — PROGRESS NOTES
1. Have you been to the ER, urgent care clinic since your last visit? No  Hospitalized since your last visit? No 
 
2. Have you seen or consulted any other health care providers outside of the [address] [place] [address] since your last visit? Include any pap smears or colon screening. No  
 
Wt Readings from Last 3 Encounters:  
11/19/18 253 lb 8 oz (115 kg) 09/12/18 259 lb (117.5 kg) 09/05/18 259 lb 3.2 oz (117.6 kg) Temp Readings from Last 3 Encounters:  
11/19/18 97.8 °F (36.6 °C) (Oral) 09/12/18 97.8 °F (36.6 °C) (Oral) 09/05/18 97.9 °F (36.6 °C) (Oral) BP Readings from Last 3 Encounters:  
11/19/18 147/64  
09/12/18 135/57  
09/05/18 127/62 Pulse Readings from Last 3 Encounters:  
11/19/18 67  
09/12/18 65  
09/05/18 (!) 57 Lab Results Component Value Date/Time Hemoglobin A1c 6.2 (H) 09/05/2018 12:41 PM  
 Hemoglobin A1c (POC) 7.2 02/07/2017 11:43 AM  
 Hemoglobin A1c, External 6.3 10/12/2017 Patient has meter today.

## 2018-11-19 NOTE — PATIENT INSTRUCTIONS
STAY on jardiance 10 mg a day Metformin 500 mg ONE PILL THREE   times a day  With meals  
 
 
glipizide 5 mg  To 2 pills ,  twenty minutes  Before b-fast and to 2 pills  pill before dinner  
 
------------------------------------------------------------------------------------------------------------------------------------------------------------------------------------------------------------------- Do not skip meals Do not eat in between meals Reduce carbs- pasta, rice, potatoes, bread Do not drink juices or sodas Donot eat peanut butter Do not eat sugar free cookies and cakes Do not eat peaches, grapes, pineapples and oranges

## 2018-11-26 DIAGNOSIS — M51.36 DDD (DEGENERATIVE DISC DISEASE), LUMBAR: Chronic | ICD-10-CM

## 2018-11-26 DIAGNOSIS — G89.29 CHRONIC PAIN: ICD-10-CM

## 2018-11-26 DIAGNOSIS — G89.29 CHRONIC BACK PAIN: ICD-10-CM

## 2018-11-26 DIAGNOSIS — G89.29 CHRONIC LEFT-SIDED LOW BACK PAIN WITHOUT SCIATICA: ICD-10-CM

## 2018-11-26 DIAGNOSIS — M54.50 CHRONIC LEFT-SIDED LOW BACK PAIN WITHOUT SCIATICA: ICD-10-CM

## 2018-11-26 DIAGNOSIS — M54.9 CHRONIC BACK PAIN: ICD-10-CM

## 2018-11-26 DIAGNOSIS — M10.9 GOUT, UNSPECIFIED: ICD-10-CM

## 2018-11-26 RX ORDER — HYDROCODONE BITARTRATE AND ACETAMINOPHEN 10; 325 MG/1; MG/1
1 TABLET ORAL
Qty: 90 TAB | Refills: 0 | Status: SHIPPED | OUTPATIENT
Start: 2018-11-26 | End: 2018-12-18 | Stop reason: SDUPTHER

## 2018-11-26 RX ORDER — OXYCODONE AND ACETAMINOPHEN 5; 325 MG/1; MG/1
1 TABLET ORAL
Qty: 90 TAB | Refills: 0 | OUTPATIENT
Start: 2018-11-26

## 2018-11-26 NOTE — TELEPHONE ENCOUNTER
[address] on file  Last office visit on 9/5/18  Last drug screen on 2/21/18  He was recently prescribed Hydrocodone 90 tabs on 10/23/18

## 2018-11-26 NOTE — TELEPHONE ENCOUNTER
[facility] on file  Last office visit on 9/5/18   today  Last drug screen on 2/21/18  He was recently prescribed Hydrocodone 90 tabs on 10/23/18    He states that he is not sure where the request for Percocet came from but says he needs a refill on his Hydrocodone.

## 2018-12-04 DIAGNOSIS — I10 ESSENTIAL HYPERTENSION: Chronic | ICD-10-CM

## 2018-12-04 DIAGNOSIS — M1A.39X0 CHRONIC GOUT DUE TO RENAL IMPAIRMENT OF MULTIPLE SITES WITHOUT TOPHUS: ICD-10-CM

## 2018-12-04 RX ORDER — COLCHICINE 0.6 MG/1
TABLET, FILM COATED ORAL
Qty: 30 TAB | Refills: 11 | Status: SHIPPED | OUTPATIENT
Start: 2018-12-04 | End: 2019-05-01 | Stop reason: SDUPTHER

## 2018-12-04 RX ORDER — CLONIDINE HYDROCHLORIDE 0.3 MG/1
TABLET ORAL
Qty: 270 TAB | Refills: 2 | Status: SHIPPED | OUTPATIENT
Start: 2018-12-04 | End: 2019-04-16 | Stop reason: SDUPTHER

## 2018-12-05 ENCOUNTER — OFFICE VISIT (OUTPATIENT)
Dept: FAMILY MEDICINE CLINIC | Age: 78
End: 2018-12-05

## 2018-12-05 VITALS
BODY MASS INDEX: 32.73 KG/M2 | HEART RATE: 69 BPM | TEMPERATURE: 97.8 F | OXYGEN SATURATION: 100 % | SYSTOLIC BLOOD PRESSURE: 117 MMHG | DIASTOLIC BLOOD PRESSURE: 68 MMHG | HEIGHT: 74 IN | WEIGHT: 255 LBS | RESPIRATION RATE: 20 BRPM

## 2018-12-05 DIAGNOSIS — E11.49 TYPE 2 DIABETES MELLITUS WITH OTHER NEUROLOGIC COMPLICATION, WITHOUT LONG-TERM CURRENT USE OF INSULIN (HCC): ICD-10-CM

## 2018-12-05 DIAGNOSIS — C61 PROSTATE CANCER (HCC): ICD-10-CM

## 2018-12-05 DIAGNOSIS — I48.19 PERSISTENT ATRIAL FIBRILLATION (HCC): ICD-10-CM

## 2018-12-05 DIAGNOSIS — I10 ESSENTIAL HYPERTENSION: ICD-10-CM

## 2018-12-05 DIAGNOSIS — M51.36 DDD (DEGENERATIVE DISC DISEASE), LUMBAR: ICD-10-CM

## 2018-12-05 DIAGNOSIS — J43.2 CENTRILOBULAR EMPHYSEMA (HCC): ICD-10-CM

## 2018-12-05 DIAGNOSIS — E66.09 CLASS 1 OBESITY DUE TO EXCESS CALORIES WITH SERIOUS COMORBIDITY AND BODY MASS INDEX (BMI) OF 32.0 TO 32.9 IN ADULT: Chronic | ICD-10-CM

## 2018-12-05 DIAGNOSIS — E78.00 HYPERCHOLESTEROLEMIA: ICD-10-CM

## 2018-12-05 DIAGNOSIS — E11.21 TYPE 2 DIABETES MELLITUS WITH NEPHROPATHY (HCC): Primary | Chronic | ICD-10-CM

## 2018-12-05 NOTE — PROGRESS NOTES
[address] 
[phone]  
 
 
Progress Note Patient: Bernardino Cook MRN: [id number]  SSN: [id number] YOB: 1940  Age: 77 y.o. Sex: male Chief Complaint Patient presents with  Diabetes Subjective: The patient presents today to address multiple chronic medical problems. Encounter Diagnoses Name Primary?  Type 2 diabetes mellitus with nephropathy ([facility]): This patient is managed under a comprehensive plan of care for Diabetes. Overall the patient feels well with good energy level. Key Antihyperglycemic Medications   
    
  
 metFORMIN (GLUCOPHAGE) 500 mg tablet  (Taking) TAKE ONE TABLET BY MOUTH THREE TIMES DAILY WITH MEALS JARDIANCE 10 mg tablet  (Taking) TAKE ONE TABLET BY MOUTH ONCE DAILY  
 glipiZIDE (GLUCOTROL) 5 mg tablet  (Taking) TAKE TWO TABLETS BY MOUTH 20 MINUTES BEFORE BREAKFAST AND TWO 20 MINUTES BEFORE SUPPER Pertinent Labs:  
Lab Results Component Value Date/Time Hemoglobin A1c 6.2 (H) 09/05/2018 12:41 PM  
 Hemoglobin A1c 6.5 (H) 06/01/2018 04:24 PM  
 Hemoglobin A1c 6.2 (H) 04/16/2018 12:28 PM  
 Hemoglobin A1c, External 6.3 10/12/2017 Body mass index is 32.74 kg/m². Lab Results Component Value Date/Time LDL, calculated 50 09/05/2018 12:41 PM  
     
Lab Results Component Value Date/Time Sodium 144 09/05/2018 12:41 PM  
 Potassium 3.3 (L) 09/05/2018 12:41 PM  
 Chloride 104 09/05/2018 12:41 PM  
 CO2 24 09/05/2018 12:41 PM  
 Anion gap 12 10/07/2010 09:27 AM  
 Glucose 127 (H) 09/05/2018 12:41 PM  
 BUN 14 09/05/2018 12:41 PM  
 Creatinine 1.28 (H) 09/05/2018 12:41 PM  
 BUN/Creatinine ratio 11 09/05/2018 12:41 PM  
 GFR est AA 61 09/05/2018 12:41 PM  
 GFR est non-AA 53 (L) 09/05/2018 12:41 PM  
 Calcium 9.5 09/05/2018 12:41 PM  
 AST (SGOT) 25 09/05/2018 12:41 PM  
 Alk.  phosphatase 81 09/05/2018 12:41 PM  
 Protein, total 6.3 09/05/2018 12:41 PM  
 Albumin 4.3 09/05/2018 12:41 PM  
 Globulin 2.7 10/07/2010 09:27 AM  
 A-G Ratio 2.2 09/05/2018 12:41 PM  
 ALT (SGPT) 17 09/05/2018 12:41 PM  
 
Lab Results Component Value Date/Time Microalb/Creat ratio (ug/mg creat.) 227.5 (H) 10/11/2017 02:51 PM  
 
 Frequency of home glucose testing: No logs today. Blood Sugar range at home:  
 Last eye exam: In past 12 months. Last foot exam: This year. Polyuria, polyphagia or polydipsia: No 
 Retinopathy: No 
 Neuropathy SX: Neuropathy symptoms may be due to back disease Low blood sugar symptoms: No 
 Dietary compliance: Good Medication compliance:Good On ASA: Yes Depression: No 
 CKD:no Wt Readings from Last 3 Encounters:  
12/05/18 255 lb (115.7 kg) 11/19/18 253 lb 8 oz (115 kg) 09/12/18 259 lb (117.5 kg) Social History Tobacco Use Smoking Status Former Smoker  Types: Cigarettes  Last attempt to quit: 6/20/2003  Years since quitting: 15.4 Smokeless Tobacco Never Used Body mass index is 32.74 kg/m². Diabetic Consultants: All the patient's questions regarding medications, diet and exercise were answered. Goal of A1C of less than 7.5% is our goal.  
Our overall goal is to reduce or eliminate the long term consequences of poorly controlled diabetes. Yes  Type 2 diabetes mellitus with other neurologic complication, without long-term current use of insulin [facility]): above.  Essential hypertension: 
BP Readings from Last 3 Encounters:  
12/05/18 117/68  
11/19/18 147/64  
09/12/18 135/57 The patient reports:  taking medications as instructed, no medication side effects noted, no TIA's, no chest pain on exertion, no dyspnea on exertion, no swelling of ankles.    
Key CAD CHF Meds   
    
  
 cloNIDine HCl (CATAPRES) 0.3 mg tablet  (Taking) TAKE 1 TABLET BY MOUTH THREE TIMES DAILY  
 labetalol (NORMODYNE) 200 mg tablet  (Taking) TAKE ONE-HALF TABLET BY MOUTH TWICE DAILY FOR  HYPERTENSION  
 ezetimibe (ZETIA) 10 mg tablet  (Taking) TAKE ONE TABLET BY MOUTH ONCE DAILY  
 amLODIPine (NORVASC) 10 mg tablet  (Taking) TAKE ONE TABLET BY MOUTH ONCE DAILY losartan (COZAAR) 25 mg tablet  (Taking) Take  by mouth daily. PRADAXA 150 mg capsule  (Taking) TAKE ONE CAPSULE BY MOUTH TWICE DAILY  
 hydrALAZINE (APRESOLINE) 50 mg tablet  (Taking) Take 1 Tab by mouth four (4) times daily. Indications: hypertension  
 lovastatin (MEVACOR) 40 mg tablet  (Taking) Take 1 Tab by mouth nightly. bumetanide (BUMEX) 2 mg tablet  (Taking) Take 1 Tab by mouth two (2) times a day. aspirin 81 mg chewable tablet  (Taking) Take 81 mg by mouth daily. Lab Results Component Value Date/Time Sodium 144 09/05/2018 12:41 PM  
 Potassium 3.3 (L) 09/05/2018 12:41 PM  
 Chloride 104 09/05/2018 12:41 PM  
 CO2 24 09/05/2018 12:41 PM  
 Anion gap 12 10/07/2010 09:27 AM  
 Glucose 127 (H) 09/05/2018 12:41 PM  
 BUN 14 09/05/2018 12:41 PM  
 Creatinine 1.28 (H) 09/05/2018 12:41 PM  
 BUN/Creatinine ratio 11 09/05/2018 12:41 PM  
 GFR est AA 61 09/05/2018 12:41 PM  
 GFR est non-AA 53 (L) 09/05/2018 12:41 PM  
 Calcium 9.5 09/05/2018 12:41 PM  
 Bilirubin, total 0.5 09/05/2018 12:41 PM  
 AST (SGOT) 25 09/05/2018 12:41 PM  
 Alk. phosphatase 81 09/05/2018 12:41 PM  
 Protein, total 6.3 09/05/2018 12:41 PM  
 Albumin 4.3 09/05/2018 12:41 PM  
 Globulin 2.7 10/07/2010 09:27 AM  
 A-G Ratio 2.2 09/05/2018 12:41 PM  
 ALT (SGPT) 17 09/05/2018 12:41 PM  
 
Low salt diet? yes Aerobic exercise? yes Our goal is to normalize the blood pressure to decrease the risks of strokes and heart attacks. The patient is in agreement with the plan.  Hypercholesterolemia: 
Cardiovascular risks for him are: LDL goal is under 80. Key Antihyperlipidemia Meds   
    
  
 ezetimibe (ZETIA) 10 mg tablet  (Taking) TAKE ONE TABLET BY MOUTH ONCE DAILY lovastatin (MEVACOR) 40 mg tablet  (Taking) Take 1 Tab by mouth nightly. Lab Results Component Value Date/Time Cholesterol, total 97 (L) 09/05/2018 12:41 PM  
 HDL Cholesterol 33 (L) 09/05/2018 12:41 PM  
 LDL, calculated 50 09/05/2018 12:41 PM  
 Triglyceride 68 09/05/2018 12:41 PM  
 CHOL/HDL Ratio 3.7 10/07/2010 09:27 AM  
 
Lab Results Component Value Date/Time ALT (SGPT) 17 09/05/2018 12:41 PM  
 AST (SGOT) 25 09/05/2018 12:41 PM  
 Alk. phosphatase 81 09/05/2018 12:41 PM  
 Bilirubin, total 0.5 09/05/2018 12:41 PM  
 
 Myalgias: No 
 Fatigue: No 
 Other side effects: no Wt Readings from Last 3 Encounters:  
12/05/18 255 lb (115.7 kg) 11/19/18 253 lb 8 oz (115 kg) 09/12/18 259 lb (117.5 kg) The patient is aware of our goal to reduce or eliminate the long term problems (such as strokes and heart attacks) related to poorly controlled hyperlipidemia.  Persistent atrial fibrillation ([address]): Irregular heartbeat on exam today.  Centrilobular emphysema ([address]): Oxygen: He currently is not on home oxygen therapy. Symptoms: chronic dyspnea: severity = mild: course of sx: stable. Patient does not smoke cigarettes. Compliant to medications.  Prostate cancer [facility]): This is Dr. German Aguirre every 3 years.  DDD (degenerative disc disease), lumbar: severe, unable to walk without assistive device. Prefers to use wheelchair.  Class 1 obesity due to excess calories with serious comorbidity and body mass index (BMI) of 32.0 to 32.9 in adult: 
I have reviewed/discussed the above normal BMI with the patient. I have recommended the following interventions: dietary management education, guidance, and counseling . Elina Interiano Current and past medical information: 
 
Current Medications after this visit[de-identified]    
Current Outpatient Medications Medication Sig  
 COLCRYS 0.6 mg tablet TAKE ONE TABLET BY MOUTH ONCE DAILY TO  PREVENT  GOUT  
  cloNIDine HCl (CATAPRES) 0.3 mg tablet TAKE 1 TABLET BY MOUTH THREE TIMES DAILY  HYDROcodone-acetaminophen (NORCO)  mg tablet Take 1 Tab by mouth every six (6) hours as needed for Pain.  diphenhydrAMINE (BENADRYL) 50 mg capsule Take 25 mg by mouth every six (6) hours as needed.  labetalol (NORMODYNE) 200 mg tablet TAKE ONE-HALF TABLET BY MOUTH TWICE DAILY FOR  HYPERTENSION  metFORMIN (GLUCOPHAGE) 500 mg tablet TAKE ONE TABLET BY MOUTH THREE TIMES DAILY WITH MEALS  potassium chloride (K-DUR) 10 mEq tablet Take 2 Tabs by mouth two (2) times a day. Indications: hypokalemia  ezetimibe (ZETIA) 10 mg tablet TAKE ONE TABLET BY MOUTH ONCE DAILY  montelukast (SINGULAIR) 10 mg tablet Take 1 Tab by mouth daily.  amLODIPine (NORVASC) 10 mg tablet TAKE ONE TABLET BY MOUTH ONCE DAILY  JARDIANCE 10 mg tablet TAKE ONE TABLET BY MOUTH ONCE DAILY  gabapentin (NEURONTIN) 300 mg capsule Take 1 Cap by mouth three (3) times daily.  allopurinol (ZYLOPRIM) 300 mg tablet Take 1 Tab by mouth daily.  losartan (COZAAR) 25 mg tablet Take  by mouth daily.  albuterol (PROVENTIL HFA, VENTOLIN HFA, PROAIR HFA) 90 mcg/actuation inhaler Take 2 Puffs by inhalation every six (6) hours as needed for Wheezing for up to 360 days. Please use generic that is covered  fluticasone (FLONASE) 50 mcg/actuation nasal spray 1 spray BL daily  ferrous sulfate (IRON) 325 mg (65 mg iron) tablet Take 1 Tab by mouth two (2) times daily (after meals). Indications: Iron Deficiency Anemia  FOLBEE PLUS tab tablet TAKE ONE TABLET BY MOUTH ONCE DAILY  glipiZIDE (GLUCOTROL) 5 mg tablet TAKE TWO TABLETS BY MOUTH 20 MINUTES BEFORE BREAKFAST AND TWO 20 MINUTES BEFORE SUPPER  
 baclofen (LIORESAL) 10 mg tablet TAKE ONE TABLET BY MOUTH THREE TIMES DAILY (MUSCLE  RELAXER)  PRADAXA 150 mg capsule TAKE ONE CAPSULE BY MOUTH TWICE DAILY  hydrALAZINE (APRESOLINE) 50 mg tablet Take 1 Tab by mouth four (4) times daily. Indications: hypertension  tamsulosin (FLOMAX) 0.4 mg capsule Take 1 Cap by mouth daily.  lovastatin (MEVACOR) 40 mg tablet Take 1 Tab by mouth nightly.  bumetanide (BUMEX) 2 mg tablet Take 1 Tab by mouth two (2) times a day.  omeprazole (PRILOSEC) 20 mg capsule Take 1 Cap by mouth daily. [facility]ax to [facility]  [facility]  ACCU-CHEK SHAUNA CONTROL SOLN soln  docusate sodium 100 mg tab Take 1 Tab by mouth two (2) times a day.  magnesium oxide (MAG-OX) 400 mg tablet Take 1 Tab by mouth daily.  Blood-Glucose Meter (TRUE METRIX GLUCOSE METER) misc Use as Directed  glucose blood VI test strips (TRUE METRIX GLUCOSE TEST STRIP) strip Test 2 times daily Dx Coed E11.65  Lancets misc Test 2 times daily Dx Code E11.65  
 melatonin 3 mg tablet Take 10 mg by mouth nightly.  aspirin 81 mg chewable tablet Take 81 mg by mouth daily.  benzoyl peroxide 5 % topical gel Apply  to affected area nightly. apply to affected area as directed No current facility-administered medications for this visit. Patient Active Problem List  
 Diagnosis Date Noted  Type 2 diabetes mellitus with nephropathy ([facility] 01/10/2018 Priority: 1 - One  Prostate cancer ([facility] 05/02/2016 Priority: 1 - One  
 Essential hypertension with goal blood pressure less than 130/85 05/02/2016 Priority: 1 - One  
 CKD (chronic kidney disease) 11/23/2015 Priority: 1 - One  
 COPD (chronic obstructive pulmonary disease) ([facility] 04/27/2015 Priority: 1 - One  
 Diabetes with neurologic complications ([facility] 21/34/3144 Priority: 1 - One  
 Neuropathy 07/16/2013 Priority: 1 - One  DDD (degenerative disc disease), lumbar 07/16/2013 Priority: 1 - One  Prostate CA ([facility] 07/16/2013 Priority: 1 - One  Persistent atrial fibrillation (HCC) Priority: 1 - One  Gout, unspecified Priority: 1 - One  
 Hypertension Priority: 1 - One  
 Hypercholesterolemia Priority: 1 - One  Chronic pain 05/06/2010 Priority: 1 - One  
 PVD (peripheral vascular disease) ([facility] Priority: 6 - Six  History of colon polyps 05/02/2016  Idiopathic gout 05/02/2016  Chronic radicular pain of lower back 07/21/2014  Tubulovillous adenoma polyp of colon 07/16/2013  
 Hoarse voice quality 06/20/2012  Edema 01/12/2012  Unspecified arthropathy, ankle and foot 07/14/2011  Arthritis 07/11/2011  Leg pain Past Medical History:  
Diagnosis Date  Acute on chronic diastolic congestive heart failure ([facility] 4/27/2015  Arthritis 7/11/2011  [place] 6/7/2010  Cancer [facility])   
 prostate  Chronic diastolic heart failure ([facility] [address] 8/26/2015  CKD (chronic kidney disease) 11/23/2015  Diabetes ([facility]  Gout, unspecified  Hypercholesterolemia  Hypertension  Inguinal lymphadenopathy 2/18/2014  Leg pain  PAF (paroxysmal atrial fibrillation) ([facility]  PAF (paroxysmal atrial fibrillation) ([facility]  PVD (peripheral vascular disease) ([facility] Allergies Allergen Reactions  Indocin [Indomethacin Sodium] Unknown (comments)  Lisinopril Angioedema Dxed in hospital.  
 Losartan Other (comments) Face and throat swelling. Past Surgical History:  
Procedure Laterality Date  COLONOSCOPY N/A 9/22/2016 COLONOSCOPY performed by Chilo Anderson MD at [facility]    
 back and left shoulder x2 Social History Socioeconomic History  Marital status: SINGLE Spouse name: Not on file  Number of children: Not on file  Years of education: Not on file  Highest education level: Not on file Tobacco Use  Smoking status: Former Smoker Types: Cigarettes Last attempt to quit: 6/20/2003 Years since quitting: 15.4  Smokeless tobacco: Never Used Substance and Sexual Activity  Alcohol use: Yes Alcohol/week: 0.0 oz  
  Comment: 2 drinks/month  Drug use:  No  
 
 
 Review of Systems Constitutional: Negative. Negative for chills, fever, malaise/fatigue and weight loss. HENT: Negative. Negative for hearing loss. Eyes: Negative. Negative for blurred vision and double vision. Respiratory: Negative. Negative for cough, hemoptysis and sputum production. Cardiovascular: Negative. Negative for chest pain, palpitations and orthopnea. Gastrointestinal: Negative. Negative for abdominal pain, blood in stool, heartburn, nausea and vomiting. Genitourinary: Negative. Negative for dysuria, frequency and urgency. Musculoskeletal: Positive for back pain and joint pain. Negative for myalgias and neck pain. Skin: Negative. Negative for rash. Neurological: Negative. Negative for dizziness, tingling, tremors, weakness and headaches. Endo/Heme/Allergies: Negative. Psychiatric/Behavioral: Negative. Negative for depression. The patient is not nervous/anxious. Objective:  
 
Vitals:  
 12/05/18 1716 BP: 117/68 Pulse: 69 Resp: 20 Temp: 97.8 °F (36.6 °C) SpO2: 100% Weight: 255 lb (115.7 kg) Height: 6' 2\" (1.88 m) Body mass index is 32.74 kg/m². Physical Exam  
Constitutional: He is oriented to person, place, and time and well-developed, well-nourished, and in no distress. HENT:  
Head: Normocephalic and atraumatic. Mouth/Throat: Oropharynx is clear and moist.  
Eyes: Right eye exhibits no discharge. Left eye exhibits no discharge. No scleral icterus. Neck: No thyromegaly present. No bruit. Cardiovascular: Normal rate and normal heart sounds. He had an irregular heart rate today. Pulmonary/Chest: Effort normal and breath sounds normal.  
Abdominal: Soft. Neurological: He is alert and oriented to person, place, and time. Skin: No rash noted. No erythema. Psychiatric: Mood and affect normal.  
Nursing note and vitals reviewed. Health Maintenance Due Topic Date Due  Shingrix Vaccine Age 50> (1 of 2) 05/13/1989  MEDICARE YEARLY EXAM  08/17/2018  MICROALBUMIN Q1  10/12/2018 Assessment and orders:  
 
Encounter Diagnoses ICD-10-CM ICD-9-CM 1. Type 2 diabetes mellitus with nephropathy (HCC) E11.21 250.40  
  583.81  
2. Type 2 diabetes mellitus with other neurologic complication, without long-term current use of insulin (HCC) E11.49 250.60 3. Essential hypertension I10 401.9 4. Hypercholesterolemia E78.00 272.0 5. Persistent atrial fibrillation (HCC) I48.1 427.31  
6. Centrilobular emphysema (HCC) J43.2 492.8 7. Prostate cancer ([facility] C61 185 8. DDD (degenerative disc disease), lumbar M51.36 722.52  
9. Class 1 obesity due to excess calories with serious comorbidity and body mass index (BMI) of 32.0 to 32.9 in adult E66.09 278.00  
 Z68.32 V85.32 Diagnoses and all orders for this visit: 
 
1. Type 2 diabetes mellitus with nephropathy (HCC)-retest labs 
-     HEMOGLOBIN A1C WITH EAG 
-     MICROALBUMIN, UR, RAND W/ MICROALB/CREAT RATIO 
-     LIPID PANEL 
-     METABOLIC PANEL, COMPREHENSIVE 
-     TSH 3RD GENERATION 
-     T4, FREE 2. Type 2 diabetes mellitus with other neurologic complication, without long-term current use of insulin (HCC)-see above 
-     HEMOGLOBIN A1C WITH EAG 
-     MICROALBUMIN, UR, RAND W/ MICROALB/CREAT RATIO 
-     LIPID PANEL 
-     METABOLIC PANEL, COMPREHENSIVE 
-     TSH 3RD GENERATION 
-     T4, FREE 3. Essential hypertension-controlled -     MICROALBUMIN, UR, RAND W/ MICROALB/CREAT RATIO 
-     LIPID PANEL 
-     METABOLIC PANEL, COMPREHENSIVE 4. Hypercholesterolemia-retest 
-     LIPID PANEL 
-     METABOLIC PANEL, COMPREHENSIVE 5. Persistent atrial fibrillation (HCC)-no symptoms 6. Centrilobular emphysema (HCC)-stable symptoms 7. Prostate cancer (HCC)-no symptoms 8. DDD (degenerative disc disease), lumbar-severe symptoms 9. Class 1 obesity due to excess calories with serious comorbidity and body mass index (BMI) of 32.0 to 32.9 in adult -     METABOLIC PANEL, COMPREHENSIVE 
-     TSH 3RD GENERATION 
-     T4, FREE Plan of care: 
Discussed diagnoses in detail with patient. Medication risks/benefits/side effects discussed with patient. All of the patient's questions were addressed. The patient understands and agrees with our plan of care. The patient knows to call back if they are unsure of or forget any changes we discussed today or if the symptoms change. The patient received an After-Visit Summary which contains VS, orders, medication list and allergy list. This can be used as a \"mini-medical record\" should they have to seek medical care while out of town. Patient Care Team: 
Jackie Stanley MD as PCP - [facility]) ERAN Camacho (Orthopedic Surgery) Keith Driver MD (Otolaryngology) Darya Macias MD (Endocrinology) Treva Richardson MD as Physician (Internal Medicine) Arnoldo Marinelli MD (Ophthalmology) Person, Samia Muhammad MD (Podiatry) Marco A Cruz MD (Cardiology) Thu Peacock MD (Ophthalmology) Follow-up Disposition: 
Return in about 3 months (around 3/5/2019) for -Due for a Medicare Wellness Visit please schedule an appointment. Future Appointments Date Time Provider Elisha Lori 1/10/2019  3:05 PM Jackie Stanley MD [facility] SCHED  
5/20/2019  9:00 AM Darya Macias MD CDE [facility] SCHED  
9/11/2019  9:00 AM Marco A Cruz MD [id number] Signed By: Anne-Marie Resendez MD   
 December 5, 2018 This note was created using voice recognition software. Despite editing, there may be syntax errors.

## 2018-12-05 NOTE — PATIENT INSTRUCTIONS

## 2018-12-07 LAB
ALBUMIN SERPL-MCNC: 4.4 G/DL (ref 3.5–4.8)
ALBUMIN/CREAT UR: 156.7 MG/G CREAT (ref 0–30)
ALBUMIN/GLOB SERPL: 2.1 {RATIO} (ref 1.2–2.2)
ALP SERPL-CCNC: 88 IU/L (ref 39–117)
ALT SERPL-CCNC: 22 IU/L (ref 0–44)
AST SERPL-CCNC: 25 IU/L (ref 0–40)
BILIRUB SERPL-MCNC: 0.4 MG/DL (ref 0–1.2)
BUN SERPL-MCNC: 9 MG/DL (ref 8–27)
BUN/CREAT SERPL: 6 (ref 10–24)
CALCIUM SERPL-MCNC: 9.6 MG/DL (ref 8.6–10.2)
CHLORIDE SERPL-SCNC: 104 MMOL/L (ref 96–106)
CHOLEST SERPL-MCNC: 134 MG/DL (ref 100–199)
CO2 SERPL-SCNC: 29 MMOL/L (ref 20–29)
CREAT SERPL-MCNC: 1.41 MG/DL (ref 0.76–1.27)
CREAT UR-MCNC: 102.8 MG/DL
EST. AVERAGE GLUCOSE BLD GHB EST-MCNC: 137 MG/DL
GLOBULIN SER CALC-MCNC: 2.1 G/DL (ref 1.5–4.5)
GLUCOSE SERPL-MCNC: 64 MG/DL (ref 65–99)
HBA1C MFR BLD: 6.4 % (ref 4.8–5.6)
HDLC SERPL-MCNC: 40 MG/DL
LDLC SERPL CALC-MCNC: 85 MG/DL (ref 0–99)
Lab: NORMAL
MICROALBUMIN UR-MCNC: 161.1 UG/ML
POTASSIUM SERPL-SCNC: 4 MMOL/L (ref 3.5–5.2)
PROT SERPL-MCNC: 6.5 G/DL (ref 6–8.5)
SODIUM SERPL-SCNC: 145 MMOL/L (ref 134–144)
T4 FREE SERPL-MCNC: 1.39 NG/DL (ref 0.82–1.77)
TRIGL SERPL-MCNC: 45 MG/DL (ref 0–149)
TSH SERPL DL<=0.005 MIU/L-ACNC: 1.67 UIU/ML (ref 0.45–4.5)
VLDLC SERPL CALC-MCNC: 9 MG/DL (ref 5–40)

## 2018-12-18 DIAGNOSIS — M51.36 DDD (DEGENERATIVE DISC DISEASE), LUMBAR: Chronic | ICD-10-CM

## 2018-12-18 DIAGNOSIS — G89.29 CHRONIC LEFT-SIDED LOW BACK PAIN WITHOUT SCIATICA: ICD-10-CM

## 2018-12-18 DIAGNOSIS — M54.50 CHRONIC LEFT-SIDED LOW BACK PAIN WITHOUT SCIATICA: ICD-10-CM

## 2018-12-18 NOTE — TELEPHONE ENCOUNTER
[facility] on file  Last office visit on 12/5/18  Last drug screen on 2/19/18  Last  on 11/26/18  Last refill for 90 tabs on 11/26/18  Please advise, thank you

## 2018-12-18 NOTE — TELEPHONE ENCOUNTER
Pts daughter Brianna Iverson stated her father needs a Rx for Hydrocodone and it is due on 12/25/18. She is requesting to know if she could pick it up earlier. Daughters number is [phone]. Message taken by Call Center.   Last office visit was 12/5/18

## 2018-12-19 RX ORDER — LOVASTATIN 40 MG/1
TABLET ORAL
Qty: 90 TAB | Refills: 3 | Status: SHIPPED | OUTPATIENT
Start: 2018-12-19 | End: 2019-03-09

## 2018-12-19 RX ORDER — HYDROCODONE BITARTRATE AND ACETAMINOPHEN 10; 325 MG/1; MG/1
1 TABLET ORAL
Qty: 90 TAB | Refills: 0 | Status: SHIPPED | OUTPATIENT
Start: 2018-12-19 | End: 2019-01-23 | Stop reason: SDUPTHER

## 2018-12-31 ENCOUNTER — TELEPHONE (OUTPATIENT)
Dept: FAMILY MEDICINE CLINIC | Age: 78
End: 2018-12-31

## 2018-12-31 DIAGNOSIS — Z23 ENCOUNTER FOR IMMUNIZATION: Primary | ICD-10-CM

## 2018-12-31 NOTE — TELEPHONE ENCOUNTER
Patient is requesting a prescription for the Shingrix vaccine. Patient was advised per Dr. Brenda Bueno that he can take Diabetic Tussin from OTC. Patient verbalized understanding.

## 2018-12-31 NOTE — TELEPHONE ENCOUNTER
----- Message from Beverly Mask sent at 12/31/2018 11:13 AM EST -----  Regarding: Dr. Radha Alvarez (spouse) would like a call back about a cough syrup that hpt is able to take since he is a diabetic. Mrs. Hellen Major would also like to request a refill on the \" pneumonia shot\" sent to the [address] [place] [address]  Pharmacy callback: [phone] pt's callback: [phone]

## 2018-12-31 NOTE — TELEPHONE ENCOUNTER
Casimiro Roche says he has a cough, please advise of what to take    MD wants him to take a shot, but paperwork given to him was for someone else    Please contact Mr Casimiro Diaz at

## 2019-01-03 ENCOUNTER — TELEPHONE (OUTPATIENT)
Dept: FAMILY MEDICINE CLINIC | Age: 79
End: 2019-01-03

## 2019-01-03 NOTE — TELEPHONE ENCOUNTER
Nancy Abreu 60 called from [facility] [address] wanting to know status of form for back and knee brace. She states it was faxed on 12/19 & 12/24.     [phone]

## 2019-01-03 NOTE — TELEPHONE ENCOUNTER
Returned phone call to [place], no answer. Message left on voicemail for her to call [facility] at earliest convenience. If the patient would like to have a back or knee brace he will need to schedule an appointment to discuss this with the doctor for documentation purposes.

## 2019-01-04 NOTE — TELEPHONE ENCOUNTER
Nancy Melchor called back from [facility] [address] She was advised of message from Nurse Susy. Nancy Melchor verbalized understanding and stated that she will call and let the pt know to schedule an appt to discuss this with PCP.

## 2019-01-07 DIAGNOSIS — K21.9 GASTROESOPHAGEAL REFLUX DISEASE WITHOUT ESOPHAGITIS: ICD-10-CM

## 2019-01-07 RX ORDER — TAMSULOSIN HYDROCHLORIDE 0.4 MG/1
CAPSULE ORAL
Qty: 90 CAP | Refills: 3 | Status: SHIPPED | OUTPATIENT
Start: 2019-01-07 | End: 2019-04-16 | Stop reason: SDUPTHER

## 2019-01-07 RX ORDER — BUMETANIDE 2 MG/1
TABLET ORAL
Qty: 180 TAB | Refills: 1 | Status: SHIPPED | OUTPATIENT
Start: 2019-01-07 | End: 2019-04-16 | Stop reason: SDUPTHER

## 2019-01-07 RX ORDER — OMEPRAZOLE 20 MG/1
CAPSULE, DELAYED RELEASE ORAL
Qty: 90 CAP | Refills: 3 | Status: SHIPPED | OUTPATIENT
Start: 2019-01-07 | End: 2019-04-16 | Stop reason: SDUPTHER

## 2019-01-23 DIAGNOSIS — M51.36 DDD (DEGENERATIVE DISC DISEASE), LUMBAR: Chronic | ICD-10-CM

## 2019-01-23 DIAGNOSIS — M54.50 CHRONIC LEFT-SIDED LOW BACK PAIN WITHOUT SCIATICA: ICD-10-CM

## 2019-01-23 DIAGNOSIS — G89.29 CHRONIC LEFT-SIDED LOW BACK PAIN WITHOUT SCIATICA: ICD-10-CM

## 2019-01-23 RX ORDER — HYDROCODONE BITARTRATE AND ACETAMINOPHEN 10; 325 MG/1; MG/1
1 TABLET ORAL
Qty: 90 TAB | Refills: 0 | Status: SHIPPED | OUTPATIENT
Start: 2019-01-23 | End: 2019-02-25 | Stop reason: SDUPTHER

## 2019-01-23 NOTE — TELEPHONE ENCOUNTER
Last office visit on 12/5/18  [facility] on file  Last drug screen on 2/21/18  Last refill for 90 tabs on 12/19/18   on your desk

## 2019-01-23 NOTE — TELEPHONE ENCOUNTER
Trinh daughter is requesting to  Rx for Hydrocodone. Best Contact [phone]     Message taken by Call Center.   Last office visit was 12/5/18

## 2019-02-05 ENCOUNTER — OFFICE VISIT (OUTPATIENT)
Dept: FAMILY MEDICINE CLINIC | Age: 79
End: 2019-02-05

## 2019-02-05 VITALS
HEART RATE: 60 BPM | HEIGHT: 74 IN | SYSTOLIC BLOOD PRESSURE: 152 MMHG | WEIGHT: 259 LBS | OXYGEN SATURATION: 98 % | TEMPERATURE: 98.5 F | RESPIRATION RATE: 16 BRPM | BODY MASS INDEX: 33.24 KG/M2 | DIASTOLIC BLOOD PRESSURE: 71 MMHG

## 2019-02-05 DIAGNOSIS — J43.2 CENTRILOBULAR EMPHYSEMA (HCC): Chronic | ICD-10-CM

## 2019-02-05 DIAGNOSIS — N18.30 STAGE 3 CHRONIC KIDNEY DISEASE (HCC): ICD-10-CM

## 2019-02-05 DIAGNOSIS — Z00.00 MEDICARE ANNUAL WELLNESS VISIT, SUBSEQUENT: Primary | ICD-10-CM

## 2019-02-05 DIAGNOSIS — E11.49 TYPE 2 DIABETES MELLITUS WITH OTHER NEUROLOGIC COMPLICATION, WITHOUT LONG-TERM CURRENT USE OF INSULIN (HCC): ICD-10-CM

## 2019-02-05 DIAGNOSIS — E78.00 HYPERCHOLESTEROLEMIA: ICD-10-CM

## 2019-02-05 DIAGNOSIS — G89.4 CHRONIC PAIN SYNDROME: ICD-10-CM

## 2019-02-05 DIAGNOSIS — I48.19 PERSISTENT ATRIAL FIBRILLATION (HCC): ICD-10-CM

## 2019-02-05 DIAGNOSIS — I10 ESSENTIAL HYPERTENSION WITH GOAL BLOOD PRESSURE LESS THAN 130/85: ICD-10-CM

## 2019-02-05 DIAGNOSIS — I73.9 PVD (PERIPHERAL VASCULAR DISEASE) (HCC): Chronic | ICD-10-CM

## 2019-02-05 DIAGNOSIS — E11.21 TYPE 2 DIABETES MELLITUS WITH NEPHROPATHY (HCC): ICD-10-CM

## 2019-02-05 DIAGNOSIS — M51.36 DDD (DEGENERATIVE DISC DISEASE), LUMBAR: ICD-10-CM

## 2019-02-05 DIAGNOSIS — C61 PROSTATE CA (HCC): ICD-10-CM

## 2019-02-05 DIAGNOSIS — R60.0 LOCALIZED EDEMA: ICD-10-CM

## 2019-02-05 NOTE — PROGRESS NOTES
1. Have you been to the ER, urgent care clinic since your last visit? Hospitalized since your last visit? No 
 
2. Have you seen or consulted any other health care providers outside of the [address] [place] [address] since your last visit? Include any pap smears or colon screening. No 
Reviewed record in preparation for visit and have necessary documentation Pt did not bring medication to office visit for review Goals that were addressed and/or need to be completed during or after this appointment include Health Maintenance Due Topic Date Due  Shingrix Vaccine Age 50> (1 of 2) 05/13/1989  MEDICARE YEARLY EXAM  08/17/2018

## 2019-02-05 NOTE — PROGRESS NOTES
[facility] 2005 [facility], [address] [place] [address] Date of visit: 2/5/2019 This is a Subsequent Medicare Annual Wellness Visit (AWV), (Performed more than 12 months after effective date of Medicare Part B enrollment and 12 months after last wellness visit) I have reviewed the patient's medical history in detail and updated the computerized patient record. Royal Hebert is a 77 y.o. male History obtained from: the patient. Concerns today (Patient understands that medical problems addressed today may incur additional notes andcost as this is a preventive visit) History Patient Active Problem List  
Diagnosis Code  Chronic pain G89.29  
 Gout, unspecified M10.9  PVD (peripheral vascular disease) ([facility]) I73.9  Hypertension I10  
 Hypercholesterolemia E78.00  Leg pain M79.606  Arthritis M19.90  
 Unspecified arthropathy, ankle and foot M19.079  Persistent atrial fibrillation (HCC) I48.1  Edema R60.9  Neuropathy G62.9  DDD (degenerative disc disease), lumbar M51.36  
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  
 COPD (chronic obstructive pulmonary disease) (HCC) J44.9  CKD (chronic kidney disease) N18.9  History of colon polyps Z86.010  
 Idiopathic gout M10.00  Essential hypertension with goal blood pressure less than 130/85 I10  Type 2 diabetes mellitus with nephropathy ([facility]) E11.21 Past Medical History:  
Diagnosis Date  Acute on chronic diastolic congestive heart failure ([address] 4/27/2015  Arthritis 7/11/2011  [place] 6/7/2010  Cancer [facility])   
 prostate  Chronic diastolic heart failure ([address] 8/26/2015  CKD (chronic kidney disease) 11/23/2015  Diabetes ([address]  Gout, unspecified  Hypercholesterolemia  Hypertension  Inguinal lymphadenopathy 2/18/2014  Leg pain  PAF (paroxysmal atrial fibrillation) ([address]  PAF (paroxysmal atrial fibrillation) ([address]  PVD (peripheral vascular disease) ([address] Past Surgical History:  
Procedure Laterality Date  COLONOSCOPY N/A 9/22/2016 COLONOSCOPY performed by Gerald Cadena MD at [facility]    
 back and left shoulder x2 Allergies Allergen Reactions  Indocin [Indomethacin Sodium] Unknown (comments)  Lisinopril Angioedema Dxed in hospital.  
 Losartan Other (comments) Face and throat swelling. Current Outpatient Medications Medication Sig Dispense Refill  
 HYDROcodone-acetaminophen (NORCO)  mg tablet Take 1 Tab by mouth every six (6) hours as needed for Pain. 90 Tab 0  
 tamsulosin (FLOMAX) 0.4 mg capsule TAKE ONE CAPSULE BY MOUTH ONCE DAILY 90 Cap 3  
 omeprazole (PRILOSEC) 20 mg capsule TAKE ONE CAPSULE BY MOUTH ONCE DAILY 90 Cap 3  
 bumetanide (BUMEX) 2 mg tablet TAKE ONE TABLET BY MOUTH TWICE DAILY 180 Tab 1  
 lovastatin (MEVACOR) 40 mg tablet TAKE 1 TABLET BY MOUTH EACH NIGHT 90 Tab 3  
 COLCRYS 0.6 mg tablet TAKE ONE TABLET BY MOUTH ONCE DAILY TO  PREVENT  GOUT 30 Tab 11  
 cloNIDine HCl (CATAPRES) 0.3 mg tablet TAKE 1 TABLET BY MOUTH THREE TIMES DAILY 270 Tab 2  
 diphenhydrAMINE (BENADRYL) 50 mg capsule Take 25 mg by mouth every six (6) hours as needed.  labetalol (NORMODYNE) 200 mg tablet TAKE ONE-HALF TABLET BY MOUTH TWICE DAILY FOR  HYPERTENSION 90 Tab 3  
 metFORMIN (GLUCOPHAGE) 500 mg tablet TAKE ONE TABLET BY MOUTH THREE TIMES DAILY WITH MEALS 270 Tab 2  potassium chloride (K-DUR) 10 mEq tablet Take 2 Tabs by mouth two (2) times a day. Indications: hypokalemia 360 Tab 3  
 ezetimibe (ZETIA) 10 mg tablet TAKE ONE TABLET BY MOUTH ONCE DAILY 30 Tab 5  
 montelukast (SINGULAIR) 10 mg tablet Take 1 Tab by mouth daily.  90 Tab 3  
  amLODIPine (NORVASC) 10 mg tablet TAKE ONE TABLET BY MOUTH ONCE DAILY 90 Tab 3  JARDIANCE 10 mg tablet TAKE ONE TABLET BY MOUTH ONCE DAILY 90 Tab 2  
 gabapentin (NEURONTIN) 300 mg capsule Take 1 Cap by mouth three (3) times daily. 270 Cap 2  
 allopurinol (ZYLOPRIM) 300 mg tablet Take 1 Tab by mouth daily. 90 Tab 3  
 losartan (COZAAR) 25 mg tablet Take  by mouth daily.  albuterol (PROVENTIL HFA, VENTOLIN HFA, PROAIR HFA) 90 mcg/actuation inhaler Take 2 Puffs by inhalation every six (6) hours as needed for Wheezing for up to 360 days. Please use generic that is covered 1 Inhaler 11  
 fluticasone (FLONASE) 50 mcg/actuation nasal spray 1 spray BL daily 3 Bottle 3  
 ferrous sulfate (IRON) 325 mg (65 mg iron) tablet Take 1 Tab by mouth two (2) times daily (after meals). Indications: Iron Deficiency Anemia 100 Tab 3  
 FOLBEE PLUS tab tablet TAKE ONE TABLET BY MOUTH ONCE DAILY 90 Tab 3  
 baclofen (LIORESAL) 10 mg tablet TAKE ONE TABLET BY MOUTH THREE TIMES DAILY (MUSCLE  RELAXER) 270 Tab 3  PRADAXA 150 mg capsule TAKE ONE CAPSULE BY MOUTH TWICE DAILY 180 Cap 3  
 hydrALAZINE (APRESOLINE) 50 mg tablet Take 1 Tab by mouth four (4) times daily. Indications: hypertension 360 Tab 3  
 docusate sodium 100 mg tab Take 1 Tab by mouth two (2) times a day.  magnesium oxide (MAG-OX) 400 mg tablet Take 1 Tab by mouth daily. 90 Tab 3  
 melatonin 3 mg tablet Take 10 mg by mouth nightly.  aspirin 81 mg chewable tablet Take 81 mg by mouth daily.  benzoyl peroxide 5 % topical gel Apply  to affected area nightly. apply to affected area as directed 60 g 0  
 glipiZIDE (GLUCOTROL) 5 mg tablet TAKE TWO TABLETS BY MOUTH 20 MINUTES BEFORE BREAKFAST AND TWO 20 MINUTES BEFORE SUPPER 360 Tab 3  
 lancets 30 gauge [facility]  ACCU-CHEK SHAUNA CONTROL SOLN soln  Blood-Glucose Meter (TRUE METRIX GLUCOSE METER) misc Use as Directed 1 Each 0  
  glucose blood VI test strips (TRUE METRIX GLUCOSE TEST STRIP) strip Test 2 times daily Dx Coed E11.65 100 Strip 6  Lancets misc Test 2 times daily Dx Code E11.65 100 Each 6 Family History Problem Relation Age of Onset  Cancer Paternal Grandfather   
     colon  Hypertension Other   
     son Social History Tobacco Use  Smoking status: Former Smoker Types: Cigarettes Last attempt to quit: 6/20/2003 Years since quitting: 15.6  Smokeless tobacco: Never Used Substance Use Topics  Alcohol use: Yes Alcohol/week: 0.0 oz  
  Comment: 2 drinks/month Specialists/Care Team  
Jean Pierre Palm has established care with the following healthcare providers: 
Patient Care Team: 
Nohemi Guardado MD as PCP - [facility]) ERAN Myers (Orthopedic Surgery) Fifi Chiang MD (Otolaryngology) Iram Schafer MD (Endocrinology) Anthony Lam MD as Physician (Internal Medicine) Yolanda Javed MD (Ophthalmology) Person, Abelino Mason MD (Podiatry) Kelly Peralta MD (Cardiology) Tina Bragg MD (Ophthalmology) Health Risk Assessment Demographics  
male 
77 y.o. General Health Questions  
-During the past 4 weeks: 
 -how would you rate your health in general? Fair -how often have you been bothered by feeling dizzy when standing up? never -how much have you been bothered by bodily pain? moderately 
 -Have you noticed any hearing difficulties? yes 
 -has your physical and emotional health limited your social activities with family or friends? no 
 
Emotional Health Questions  
-Do you have a history of depression, anxiety, or emotional problems? no 
-Over the past 2 weeks, have you felt down, depressed or hopeless? no 
-Over the past 2 weeks, have you felt little interest or pleasure in doing things? no 
 
Health Habits Please describe your diet habits: Self grade B 
 Do you get 5 servings of fruits or vegetables daily? yes Do you exercise regularly? no 
 
Alcohol Risk Factor Screening: On any occasion during the past 3 months, have you had more than 4 drinks containing alcohol? No 
 Do you average more than 14 drinks per week? No 
  
Activities of Daily Living and Functional Status  
-Do you need help with eating, walking, dressing, bathing, toileting, the phone, transportation, shopping, preparing meals, housework, laundry, medications or managing money? yes 
-In the past four weeks, was someone available to help you if you needed and wanted help with anything? yes 
-Are you confident are you that you can control and manage most of your health problems? yes 
-Have you been given information to help you keep track of your medications? yes 
-How often do you have trouble taking your medications as prescribed? never Fall Risk and Home Safety Have you fallen 2 or more times in the past year? no 
Does your home have rugs in the hallway, lack grab bars in the bathroom, lack handrails on the stairs or have poor lighting? no 
Do you have smoke detectors and check them regularly? yes Do you have difficulties driving a car? yes Do you always fasten your seat belt when you are in a car? yes Review of Systems (if indicated for problems addressed today) Review of Systems Constitutional: Negative. Negative for chills, fever, malaise/fatigue and weight loss. HENT: Negative. Negative for hearing loss. Eyes: Negative. Negative for blurred vision and double vision. Respiratory: Negative. Negative for cough, hemoptysis, sputum production and shortness of breath. Cardiovascular: Negative. Negative for chest pain, palpitations and orthopnea. Gastrointestinal: Negative. Negative for abdominal pain, blood in stool, heartburn, nausea and vomiting. Genitourinary: Negative. Negative for dysuria, frequency and urgency. Musculoskeletal: Positive for back pain and joint pain. Negative for falls, myalgias and neck pain. Skin: Negative. Negative for rash. Neurological: Negative. Negative for dizziness, tingling, tremors, weakness and headaches. Endo/Heme/Allergies: Negative. Psychiatric/Behavioral: Negative. Negative for depression. Physical Examination Wt Readings from Last 3 Encounters:  
02/05/19 259 lb (117.5 kg) 12/05/18 255 lb (115.7 kg) 11/19/18 253 lb 8 oz (115 kg) Temp Readings from Last 3 Encounters:  
02/05/19 98.5 °F (36.9 °C) (Oral) 12/05/18 97.8 °F (36.6 °C)  
11/19/18 97.8 °F (36.6 °C) (Oral) BP Readings from Last 3 Encounters:  
02/05/19 152/71  
12/05/18 117/68  
11/19/18 147/64 Pulse Readings from Last 3 Encounters:  
02/05/19 60  
12/05/18 69  
11/19/18 67 Body mass index is 33.25 kg/m². No exam data present Was the patient's timed Up & Go test unsteady or longer than 10 seconds? yes Evaluation of Cognitive Function Mood/affect:  neutral 
Orientation: Person, Place, Time and Situation Appearance: age appropriate and casually dressed Family member/caregiver input:  no Additional exam if indicated for problems addressed today: 
Physical Exam  
Constitutional: He is oriented to person, place, and time. He appears well-developed and well-nourished. No distress. HENT:  
Head: Normocephalic and atraumatic. Mouth/Throat: Oropharynx is clear and moist.  
Eyes: Conjunctivae are normal. Right eye exhibits no discharge. Left eye exhibits no discharge. No scleral icterus. Neck:  
No carotid bruit. Cardiovascular: Normal rate, regular rhythm and normal heart sounds. Exam reveals no gallop and no friction rub. No murmur heard. Pulmonary/Chest: Effort normal and breath sounds normal. No respiratory distress. He has no wheezes. He has no rales. He exhibits no tenderness. Abdominal: Soft. Bowel sounds are normal. He exhibits no distension.  There is no tenderness. Musculoskeletal:  
Wheelchair-bound from his chronic severe lumbar radicular pain. Neurological: He is alert and oriented to person, place, and time. No cranial nerve deficit. Skin: Skin is warm and dry. No rash noted. He is not diaphoretic. No erythema. No pallor. Psychiatric: He has a normal mood and affect. His behavior is normal.  
 
 
Advice/Referrals/Counseling (as indicated) ACP on file. Preventive Services (Preventive care checklist to be included in patient instructions) Discussed today Done Previously Not Needed   
 x  Pneumococcal vaccines  
 x  Flu vaccine  
  x Hepatitis B vaccine (if at risk) x   Shingles vaccine  
 x  TDAP vaccine  
  x Digital rectal exam  
  x PSA X TA this yr x  Colorectal cancer screening  
  x Low-dose CT for lung cancer screening  
  x Bone density test  
x   Glaucoma screening  
 x  Cholesterol test  
 x  Diabetes screening test   
 x  Diabetes self-management class  
 x  Nutritionist referral for diabetes or renal disease Discussion of Advance Directive Discussed with Jean Pierre Contreras his ability to prepare and advance directive in the case that an injury or illness causes him to be unable to make health care decisions. Assessment/Plan  
Z00.00 ICD-10-CM ICD-9-CM 1. Medicare annual wellness visit, subsequent Z00.00 V70.0  chronic diagnoses reviewed. 2. Type 2 diabetes mellitus with nephropathy (HCC) E11.21 250.40   
  583.81   
3. Type 2 diabetes mellitus with other neurologic complication, without long-term current use of insulin (HCC) E11.49 250.60   
4. Essential hypertension with goal blood pressure less than 130/85 I10 401.9 5. Hypercholesterolemia E78.00 272.0 6. Stage 3 chronic kidney disease (HCC) N18.3 585.3 7. Prostate CA ([address] C61 185 8. Persistent atrial fibrillation (HCC) I48.1 427.31   
9. DDD (degenerative disc disease), lumbar M51.36 722.52   
10. Chronic pain syndrome G89.4 338.4 11. Localized edema R60.0 782.3 12. Centrilobular emphysema (HCC) J43.2 492.8 13. PVD (peripheral vascular disease) (HCC) I73.9 443.9 No orders of the defined types were placed in this encounter. Patient Care Team: 
Clara Lamas MD as PCP - [facility]) ERAN Tao (Orthopedic Surgery) Socorro Junior MD (Otolaryngology) Rosemarie Dave MD (Endocrinology) Zak Turner MD as Physician (Internal Medicine) Gary Eaosn MD (Ophthalmology) Person, Oumar Avilez MD (Podiatry) Kylie Marie MD (Cardiology) Yeny Webb MD (Ophthalmology) Future Appointments Date Time Provider Elisha Lori 3/5/2019  9:50 AM Clara Lamas MD [facility] SCHED  
5/20/2019  9:00 AM Rosemarie Dave MD CDE [facility] SCHED  
9/11/2019  9:00 AM Kylie Marie MD [place] [id number] Jennifer Kruse MD

## 2019-02-05 NOTE — ASSESSMENT & PLAN NOTE
Stable, based on history, physical exam and review of pertinent labs, studies and medications; meds reconciled; continue current treatment plan. Key Oncology Meds Patient is on no Oncologic meds. Key Pain Meds HYDROcodone-acetaminophen (NORCO)  mg tablet  (Taking) Take 1 Tab by mouth every six (6) hours as needed for Pain. Lab Results Component Value Date/Time WBC 3.7 09/05/2018 12:41 PM  
 ABS. NEUTROPHILS 2.4 09/05/2018 12:41 PM  
 HGB 11.3 09/05/2018 12:41 PM  
 HCT 36.1 09/05/2018 12:41 PM  
 PLATELET 596 96/22/8383 12:41 PM  
 Creatinine 1.41 12/05/2018 12:30 PM  
 Creatinine, External 1.29 10/12/2017  BUN 9 12/05/2018 12:30 PM  
 ALT (SGPT) 22 12/05/2018 12:30 PM  
 AST (SGOT) 25 12/05/2018 12:30 PM  
 Albumin 4.4 12/05/2018 12:30 PM

## 2019-02-07 NOTE — TELEPHONE ENCOUNTER
Ludivina Shaw called from [facility] [address] wanting to know status of order for back and knee brace.     [phone]

## 2019-02-25 DIAGNOSIS — M51.36 DDD (DEGENERATIVE DISC DISEASE), LUMBAR: Chronic | ICD-10-CM

## 2019-02-25 DIAGNOSIS — G89.29 CHRONIC LEFT-SIDED LOW BACK PAIN WITHOUT SCIATICA: ICD-10-CM

## 2019-02-25 DIAGNOSIS — M54.50 CHRONIC LEFT-SIDED LOW BACK PAIN WITHOUT SCIATICA: ICD-10-CM

## 2019-02-25 RX ORDER — HYDROCODONE BITARTRATE AND ACETAMINOPHEN 10; 325 MG/1; MG/1
1 TABLET ORAL
Qty: 90 TAB | Refills: 0 | Status: SHIPPED | OUTPATIENT
Start: 2019-02-25 | End: 2019-03-21 | Stop reason: SDUPTHER

## 2019-03-05 ENCOUNTER — OFFICE VISIT (OUTPATIENT)
Dept: FAMILY MEDICINE CLINIC | Age: 79
End: 2019-03-05

## 2019-03-05 VITALS
HEIGHT: 74 IN | HEART RATE: 62 BPM | DIASTOLIC BLOOD PRESSURE: 73 MMHG | OXYGEN SATURATION: 99 % | RESPIRATION RATE: 16 BRPM | WEIGHT: 250 LBS | TEMPERATURE: 97.8 F | BODY MASS INDEX: 32.08 KG/M2 | SYSTOLIC BLOOD PRESSURE: 146 MMHG

## 2019-03-05 DIAGNOSIS — I50.32 CHRONIC DIASTOLIC HEART FAILURE (HCC): ICD-10-CM

## 2019-03-05 DIAGNOSIS — J44.9 CHRONIC OBSTRUCTIVE PULMONARY DISEASE, UNSPECIFIED COPD TYPE (HCC): Chronic | ICD-10-CM

## 2019-03-05 DIAGNOSIS — N18.30 STAGE 3 CHRONIC KIDNEY DISEASE (HCC): Chronic | ICD-10-CM

## 2019-03-05 DIAGNOSIS — E11.49 TYPE 2 DIABETES MELLITUS WITH OTHER NEUROLOGIC COMPLICATION, WITHOUT LONG-TERM CURRENT USE OF INSULIN (HCC): Chronic | ICD-10-CM

## 2019-03-05 DIAGNOSIS — E78.00 HYPERCHOLESTEROLEMIA: Chronic | ICD-10-CM

## 2019-03-05 DIAGNOSIS — E11.21 TYPE 2 DIABETES MELLITUS WITH NEPHROPATHY (HCC): Primary | ICD-10-CM

## 2019-03-05 DIAGNOSIS — M54.16 CHRONIC RADICULAR PAIN OF LOWER BACK: ICD-10-CM

## 2019-03-05 DIAGNOSIS — M17.11 PRIMARY OSTEOARTHRITIS OF RIGHT KNEE: ICD-10-CM

## 2019-03-05 DIAGNOSIS — M10.00 IDIOPATHIC GOUT, UNSPECIFIED CHRONICITY, UNSPECIFIED SITE: ICD-10-CM

## 2019-03-05 DIAGNOSIS — G89.29 CHRONIC RADICULAR PAIN OF LOWER BACK: ICD-10-CM

## 2019-03-05 DIAGNOSIS — I10 ESSENTIAL HYPERTENSION WITH GOAL BLOOD PRESSURE LESS THAN 130/85: Chronic | ICD-10-CM

## 2019-03-05 RX ORDER — FLUTICASONE PROPIONATE 50 MCG
SPRAY, SUSPENSION (ML) NASAL
Qty: 3 BOTTLE | Refills: 3 | Status: SHIPPED | OUTPATIENT
Start: 2019-03-05 | End: 2019-03-05 | Stop reason: SDUPTHER

## 2019-03-05 RX ORDER — FLUTICASONE PROPIONATE 50 MCG
SPRAY, SUSPENSION (ML) NASAL
Qty: 3 BOTTLE | Refills: 3 | Status: SHIPPED | OUTPATIENT
Start: 2019-03-05 | End: 2019-05-20 | Stop reason: ALTCHOICE

## 2019-03-05 RX ORDER — ALBUTEROL SULFATE 90 UG/1
2 AEROSOL, METERED RESPIRATORY (INHALATION)
Qty: 1 INHALER | Refills: 11 | Status: SHIPPED | OUTPATIENT
Start: 2019-03-05 | End: 2019-11-20 | Stop reason: SDUPTHER

## 2019-03-05 NOTE — TELEPHONE ENCOUNTER
[facility] [address] called to give another fax number. The fax number given on form is not working. They do not need office notes w/ signed order at this time.     Fax [phone]

## 2019-03-05 NOTE — PROGRESS NOTES
1. Have you been to the ER, urgent care clinic since your last visit? Hospitalized since your last visit? No    2. Have you seen or consulted any other health care providers outside of the [address] [place] [address] since your last visit? Include any pap smears or colon screening. No  Reviewed record in preparation for visit and have necessary documentation  Pt did not bring medication to office visit for review    Goals that were addressed and/or need to be completed during or after this appointment include     There are no preventive care reminders to display for this patient.

## 2019-03-05 NOTE — PATIENT INSTRUCTIONS
Back Pain: Care Instructions  Your Care Instructions    Back pain has many possible causes. It is often related to problems with muscles and ligaments of the back. It may also be related to problems with the nerves, discs, or bones of the back. Moving, lifting, standing, sitting, or sleeping in an awkward way can strain the back. Sometimes you don't notice the injury until later. Arthritis is another common cause of back pain. Although it may hurt a lot, back pain usually improves on its own within several weeks. Most people recover in 12 weeks or less. Using good home treatment and being careful not to stress your back can help you feel better sooner. Follow-up care is a key part of your treatment and safety. Be sure to make and go to all appointments, and call your doctor if you are having problems. It's also a good idea to know your test results and keep a list of the medicines you take. How can you care for yourself at home? · Sit or lie in positions that are most comfortable and reduce your pain. Try one of these positions when you lie down:  ? Lie on your back with your knees bent and supported by large pillows. ? Lie on the floor with your legs on the seat of a sofa or chair. ? Lie on your side with your knees and hips bent and a pillow between your legs. ? Lie on your stomach if it does not make pain worse. · Do not sit up in bed, and avoid soft couches and twisted positions. Bed rest can help relieve pain at first, but it delays healing. Avoid bed rest after the first day of back pain. · Change positions every 30 minutes. If you must sit for long periods of time, take breaks from sitting. Get up and walk around, or lie in a comfortable position. · Try using a heating pad on a low or medium setting for 15 to 20 minutes every 2 or 3 hours. Try a warm shower in place of one session with the heating pad. · You can also try an ice pack for 10 to 15 minutes every 2 to 3 hours.  Put a thin cloth between the ice pack and your skin. · Take pain medicines exactly as directed. ? If the doctor gave you a prescription medicine for pain, take it as prescribed. ? If you are not taking a prescription pain medicine, ask your doctor if you can take an over-the-counter medicine. · Take short walks several times a day. You can start with 5 to 10 minutes, 3 or 4 times a day, and work up to longer walks. Walk on level surfaces and avoid hills and stairs until your back is better. · Return to work and other activities as soon as you can. Continued rest without activity is usually not good for your back. · To prevent future back pain, do exercises to stretch and strengthen your back and stomach. Learn how to use good posture, safe lifting techniques, and proper body mechanics. When should you call for help? Call your doctor now or seek immediate medical care if:    · You have new or worsening numbness in your legs.     · You have new or worsening weakness in your legs. (This could make it hard to stand up.)     · You lose control of your bladder or bowels.    Watch closely for changes in your health, and be sure to contact your doctor if:    · You have a fever, lose weight, or don't feel well.     · You do not get better as expected. Where can you learn more? Go to http://otto-cindy.info/. Enter W895 in the search box to learn more about \"Back Pain: Care Instructions. \"  Current as of: September 20, 2018  Content Version: 11.9  © 2141-7399 [email or web address]. Care instructions adapted under license by [employer] (which disclaims liability or warranty for this information). If you have questions about a medical condition or this instruction, always ask your healthcare professional. [place] [address] any warranty or liability for your use of this information.

## 2019-03-06 NOTE — PROGRESS NOTES
[address] [place] [address]  [phone]           Progress Note    Patient: Bishop Marin MRN: [id number]  SSN: [id number]    YOB: 1940  Age: 77 y.o. Sex: male        Chief Complaint   Patient presents with    Follow-up         Subjective:     Encounter Diagnoses   Name Primary?  Type 2 diabetes mellitus with nephropathy ([facility] [place] [address]): This patient is managed under a comprehensive plan of care for Diabetes. Overall the patient feels well with good energy level. Key Antihyperglycemic Medications             metFORMIN (GLUCOPHAGE) 500 mg tablet  (Taking) TAKE ONE TABLET BY MOUTH THREE TIMES DAILY WITH MEALS    JARDIANCE 10 mg tablet  (Taking) TAKE ONE TABLET BY MOUTH ONCE DAILY    glipiZIDE (GLUCOTROL) 5 mg tablet  (Taking) TAKE TWO TABLETS BY MOUTH 20 MINUTES BEFORE BREAKFAST AND TWO 20 MINUTES BEFORE SUPPER           Pertinent Labs:   Lab Results   Component Value Date/Time    Hemoglobin A1c 6.4 (H) 12/05/2018 12:30 PM    Hemoglobin A1c 6.2 (H) 09/05/2018 12:41 PM    Hemoglobin A1c 6.5 (H) 06/01/2018 04:24 PM    Hemoglobin A1c, External 6.3 10/12/2017      Body mass index is 32.1 kg/m². Lab Results   Component Value Date/Time    LDL, calculated 85 12/05/2018 12:30 PM         Lab Results   Component Value Date/Time    Sodium 145 (H) 12/05/2018 12:30 PM    Potassium 4.0 12/05/2018 12:30 PM    Chloride 104 12/05/2018 12:30 PM    CO2 29 12/05/2018 12:30 PM    Anion gap 12 10/07/2010 09:27 AM    Glucose 64 (L) 12/05/2018 12:30 PM    BUN 9 12/05/2018 12:30 PM    Creatinine 1.41 (H) 12/05/2018 12:30 PM    BUN/Creatinine ratio 6 (L) 12/05/2018 12:30 PM    GFR est AA 54 (L) 12/05/2018 12:30 PM    GFR est non-AA 47 (L) 12/05/2018 12:30 PM    Calcium 9.6 12/05/2018 12:30 PM    AST (SGOT) 25 12/05/2018 12:30 PM    Alk.  phosphatase 88 12/05/2018 12:30 PM    Protein, total 6.5 12/05/2018 12:30 PM    Albumin 4.4 12/05/2018 12:30 PM Globulin 2.7 10/07/2010 09:27 AM    A-G Ratio 2.1 12/05/2018 12:30 PM    ALT (SGPT) 22 12/05/2018 12:30 PM     Lab Results   Component Value Date/Time    Microalb/Creat ratio (ug/mg creat.) 156.7 (H) 12/06/2018 12:00 AM      Frequency of home glucose testing:daily   Blood Sugar range at home:usually < 120   Last eye exam: In past 12 months. Last foot exam: This year. Polyuria, polyphagia or polydipsia: No   Retinopathy: No   Neuropathy SX: yes   Low blood sugar symptoms: No   Dietary compliance: Good   Medication compliance:Good   On ASA: Yes   Depression: No   CKD:no     Wt Readings from Last 3 Encounters:   03/05/19 250 lb (113.4 kg)   02/05/19 259 lb (117.5 kg)   12/05/18 255 lb (115.7 kg)        Social History     Tobacco Use   Smoking Status Former Smoker    Types: Cigarettes    Last attempt to quit: 6/20/2003    Years since quitting: 15.7   Smokeless Tobacco Never Used     Body mass index is 32.1 kg/m². All the patient's questions regarding medications, diet and exercise were answered. Goal of A1C of less than 7.5% is our goal.   Our overall goal is to reduce or eliminate the long term consequences of poorly controlled diabetes. Yes    Chronic obstructive pulmonary disease, unspecified COPD type ([address]):  SpO2 Readings from Last 3 Encounters:   03/05/19 99%   02/05/19 98%   12/05/18 100%     Oxygen: He currently is not on oxygen  Symptoms: chronic dyspnea: severity = mild: course of sx: stable. Patient does not smoke cigarettes. Compliant to medications.  Idiopathic gout, unspecified chronicity, unspecified site:one gout spell recently. Lab Results   Component Value Date/Time    Uric acid 4.3 04/16/2018 12:28 PM          Hypercholesterolemia:  Cardiovascular risks for him are: LDL goal is under 80  diabetic  hypertension  hyperlipidemia.    Key Antihyperlipidemia Meds             lovastatin (MEVACOR) 40 mg tablet  (Taking) TAKE 1 TABLET BY MOUTH EACH NIGHT    ezetimibe (Daphine Grow) 10 mg tablet  (Taking) TAKE ONE TABLET BY MOUTH ONCE DAILY        Lab Results   Component Value Date/Time    Cholesterol, total 134 12/05/2018 12:30 PM    HDL Cholesterol 40 12/05/2018 12:30 PM    LDL, calculated 85 12/05/2018 12:30 PM    Triglyceride 45 12/05/2018 12:30 PM    CHOL/HDL Ratio 3.7 10/07/2010 09:27 AM     Lab Results   Component Value Date/Time    ALT (SGPT) 22 12/05/2018 12:30 PM    AST (SGOT) 25 12/05/2018 12:30 PM    Alk. phosphatase 88 12/05/2018 12:30 PM    Bilirubin, total 0.4 12/05/2018 12:30 PM      Myalgias: No   Fatigue: No   Other side effects: no  Wt Readings from Last 3 Encounters:   03/05/19 250 lb (113.4 kg)   02/05/19 259 lb (117.5 kg)   12/05/18 255 lb (115.7 kg)     The patient is aware of our goal to reduce or eliminate the long term problems (such as strokes and heart attacks) related to poorly controlled hyperlipidemia.  Essential hypertension with goal blood pressure less than 130/85: elevated today, need home readings. BP Readings from Last 3 Encounters:   03/05/19 146/73   02/05/19 152/71   12/05/18 117/68     The patient reports:  taking medications as instructed, no medication side effects noted, no TIA's, no chest pain on exertion, no dyspnea on exertion, no swelling of ankles.     Key CAD CHF Meds             bumetanide (BUMEX) 2 mg tablet  (Taking) TAKE ONE TABLET BY MOUTH TWICE DAILY    lovastatin (MEVACOR) 40 mg tablet  (Taking) TAKE 1 TABLET BY MOUTH EACH NIGHT    cloNIDine HCl (CATAPRES) 0.3 mg tablet  (Taking) TAKE 1 TABLET BY MOUTH THREE TIMES DAILY    labetalol (NORMODYNE) 200 mg tablet  (Taking) TAKE ONE-HALF TABLET BY MOUTH TWICE DAILY FOR  HYPERTENSION    ezetimibe (ZETIA) 10 mg tablet  (Taking) TAKE ONE TABLET BY MOUTH ONCE DAILY    amLODIPine (NORVASC) 10 mg tablet  (Taking) TAKE ONE TABLET BY MOUTH ONCE DAILY    PRADAXA 150 mg capsule  (Taking) TAKE ONE CAPSULE BY MOUTH TWICE DAILY    hydrALAZINE (APRESOLINE) 50 mg tablet  (Taking) Take 1 Tab by mouth four (4) times daily. Indications: hypertension    aspirin 81 mg chewable tablet  (Taking) Take 81 mg by mouth daily. losartan (COZAAR) 25 mg tablet Take  by mouth daily. Lab Results   Component Value Date/Time    Sodium 145 (H) 12/05/2018 12:30 PM    Potassium 4.0 12/05/2018 12:30 PM    Chloride 104 12/05/2018 12:30 PM    CO2 29 12/05/2018 12:30 PM    Anion gap 12 10/07/2010 09:27 AM    Glucose 64 (L) 12/05/2018 12:30 PM    BUN 9 12/05/2018 12:30 PM    Creatinine 1.41 (H) 12/05/2018 12:30 PM    BUN/Creatinine ratio 6 (L) 12/05/2018 12:30 PM    GFR est AA 54 (L) 12/05/2018 12:30 PM    GFR est non-AA 47 (L) 12/05/2018 12:30 PM    Calcium 9.6 12/05/2018 12:30 PM    Bilirubin, total 0.4 12/05/2018 12:30 PM    AST (SGOT) 25 12/05/2018 12:30 PM    Alk. phosphatase 88 12/05/2018 12:30 PM    Protein, total 6.5 12/05/2018 12:30 PM    Albumin 4.4 12/05/2018 12:30 PM    Globulin 2.7 10/07/2010 09:27 AM    A-G Ratio 2.1 12/05/2018 12:30 PM    ALT (SGPT) 22 12/05/2018 12:30 PM     Low salt diet? yes  Aerobic exercise?no  Our goal is to normalize the blood pressure to decrease the risks of strokes and heart attacks. The patient is in agreement with the plan.  Type 2 diabetes mellitus with other neurologic complication, without long-term current use of insulin (HCC):see above  This patient is managed under a comprehensive plan of care for Diabetes. Overall the patient feels well with good energy level.   Key Antihyperglycemic Medications             metFORMIN (GLUCOPHAGE) 500 mg tablet  (Taking) TAKE ONE TABLET BY MOUTH THREE TIMES DAILY WITH MEALS    JARDIANCE 10 mg tablet  (Taking) TAKE ONE TABLET BY MOUTH ONCE DAILY    glipiZIDE (GLUCOTROL) 5 mg tablet  (Taking) TAKE TWO TABLETS BY MOUTH 20 MINUTES BEFORE BREAKFAST AND TWO 20 MINUTES BEFORE SUPPER           Pertinent Labs:   Lab Results   Component Value Date/Time    Hemoglobin A1c 6.4 (H) 12/05/2018 12:30 PM    Hemoglobin A1c 6.2 (H) 09/05/2018 12:41 PM Hemoglobin A1c 6.5 (H) 06/01/2018 04:24 PM    Hemoglobin A1c, External 6.3 10/12/2017      Body mass index is 32.1 kg/m². Lab Results   Component Value Date/Time    LDL, calculated 85 12/05/2018 12:30 PM         Lab Results   Component Value Date/Time    Sodium 145 (H) 12/05/2018 12:30 PM    Potassium 4.0 12/05/2018 12:30 PM    Chloride 104 12/05/2018 12:30 PM    CO2 29 12/05/2018 12:30 PM    Anion gap 12 10/07/2010 09:27 AM    Glucose 64 (L) 12/05/2018 12:30 PM    BUN 9 12/05/2018 12:30 PM    Creatinine 1.41 (H) 12/05/2018 12:30 PM    BUN/Creatinine ratio 6 (L) 12/05/2018 12:30 PM    GFR est AA 54 (L) 12/05/2018 12:30 PM    GFR est non-AA 47 (L) 12/05/2018 12:30 PM    Calcium 9.6 12/05/2018 12:30 PM    AST (SGOT) 25 12/05/2018 12:30 PM    Alk.  phosphatase 88 12/05/2018 12:30 PM    Protein, total 6.5 12/05/2018 12:30 PM    Albumin 4.4 12/05/2018 12:30 PM    Globulin 2.7 10/07/2010 09:27 AM    A-G Ratio 2.1 12/05/2018 12:30 PM    ALT (SGPT) 22 12/05/2018 12:30 PM     Lab Results   Component Value Date/Time    Microalb/Creat ratio (ug/mg creat.) 156.7 (H) 12/06/2018 12:00 AM       Wt Readings from Last 3 Encounters:   03/05/19 250 lb (113.4 kg)   02/05/19 259 lb (117.5 kg)   12/05/18 255 lb (115.7 kg)        Social History     Tobacco Use   Smoking Status Former Smoker    Types: Cigarettes    Last attempt to quit: 6/20/2003    Years since quitting: 15.7   Smokeless Tobacco Never Used            Stage 3 chronic kidney disease ([facility] [address]):  Lab Results   Component Value Date/Time    GFR est AA 54 (L) 12/05/2018 12:30 PM    GFR est non-AA 47 (L) 12/05/2018 12:30 PM    Creatinine (POC) 1.4 (H) 01/02/2015 10:08 AM    Creatinine 1.41 (H) 12/05/2018 12:30 PM    BUN 9 12/05/2018 12:30 PM    Sodium 145 (H) 12/05/2018 12:30 PM    Potassium 4.0 12/05/2018 12:30 PM    Chloride 104 12/05/2018 12:30 PM    CO2 29 12/05/2018 12:30 PM            Chronic radicular pain of lower back:  Source of pain and has back instability so he needs a back brace to allow him to be more active.  Primary osteoarthritis of right knee:unstable knee that gives away with him. Needs knee brace for that as well.  Chronic diastolic heart failure ([address]): compensated. Current and past medical information:    Current Medications after this visit[de-identified]     Current Outpatient Medications   Medication Sig    albuterol (PROVENTIL HFA, VENTOLIN HFA, PROAIR HFA) 90 mcg/actuation inhaler Take 2 Puffs by inhalation every six (6) hours as needed for Wheezing for up to 360 days. Please use generic that is covered    fluticasone (FLONASE) 50 mcg/actuation nasal spray 1 spray BL daily    HYDROcodone-acetaminophen (NORCO)  mg tablet Take 1 Tab by mouth every six (6) hours as needed for Pain.  tamsulosin (FLOMAX) 0.4 mg capsule TAKE ONE CAPSULE BY MOUTH ONCE DAILY    omeprazole (PRILOSEC) 20 mg capsule TAKE ONE CAPSULE BY MOUTH ONCE DAILY    bumetanide (BUMEX) 2 mg tablet TAKE ONE TABLET BY MOUTH TWICE DAILY    lovastatin (MEVACOR) 40 mg tablet TAKE 1 TABLET BY MOUTH EACH NIGHT    COLCRYS 0.6 mg tablet TAKE ONE TABLET BY MOUTH ONCE DAILY TO  PREVENT  GOUT    cloNIDine HCl (CATAPRES) 0.3 mg tablet TAKE 1 TABLET BY MOUTH THREE TIMES DAILY    diphenhydrAMINE (BENADRYL) 50 mg capsule Take 25 mg by mouth every six (6) hours as needed.  labetalol (NORMODYNE) 200 mg tablet TAKE ONE-HALF TABLET BY MOUTH TWICE DAILY FOR  HYPERTENSION    metFORMIN (GLUCOPHAGE) 500 mg tablet TAKE ONE TABLET BY MOUTH THREE TIMES DAILY WITH MEALS    potassium chloride (K-DUR) 10 mEq tablet Take 2 Tabs by mouth two (2) times a day. Indications: hypokalemia    ezetimibe (ZETIA) 10 mg tablet TAKE ONE TABLET BY MOUTH ONCE DAILY    montelukast (SINGULAIR) 10 mg tablet Take 1 Tab by mouth daily.     amLODIPine (NORVASC) 10 mg tablet TAKE ONE TABLET BY MOUTH ONCE DAILY    JARDIANCE 10 mg tablet TAKE ONE TABLET BY MOUTH ONCE DAILY    gabapentin (NEURONTIN) 300 mg capsule Take 1 Cap by mouth three (3) times daily.  allopurinol (ZYLOPRIM) 300 mg tablet Take 1 Tab by mouth daily.  ferrous sulfate (IRON) 325 mg (65 mg iron) tablet Take 1 Tab by mouth two (2) times daily (after meals). Indications: Iron Deficiency Anemia    FOLBEE PLUS tab tablet TAKE ONE TABLET BY MOUTH ONCE DAILY    glipiZIDE (GLUCOTROL) 5 mg tablet TAKE TWO TABLETS BY MOUTH 20 MINUTES BEFORE BREAKFAST AND TWO 20 MINUTES BEFORE SUPPER    baclofen (LIORESAL) 10 mg tablet TAKE ONE TABLET BY MOUTH THREE TIMES DAILY (MUSCLE  RELAXER)    PRADAXA 150 mg capsule TAKE ONE CAPSULE BY MOUTH TWICE DAILY    hydrALAZINE (APRESOLINE) 50 mg tablet Take 1 Tab by mouth four (4) times daily. Indications: hypertension    [facility]ancets [facility]     ACCU-CHEK SHAUNA CONTROL SOLN soln     docusate sodium 100 mg tab Take 1 Tab by mouth two (2) times a day.  magnesium oxide (MAG-OX) 400 mg tablet Take 1 Tab by mouth daily.  Blood-Glucose Meter (TRUE METRIX GLUCOSE METER) misc Use as Directed    glucose blood VI test strips (TRUE METRIX GLUCOSE TEST STRIP) strip Test 2 times daily Dx Coed E11.65    Lancets misc Test 2 times daily Dx Code E11.65    melatonin 3 mg tablet Take 10 mg by mouth nightly.  aspirin 81 mg chewable tablet Take 81 mg by mouth daily.  benzoyl peroxide 5 % topical gel Apply  to affected area nightly. apply to affected area as directed    losartan (COZAAR) 25 mg tablet Take  by mouth daily. No current facility-administered medications for this visit.         Patient Active Problem List    Diagnosis Date Noted    Type 2 diabetes mellitus with nephropathy ([facility] [address] 01/10/2018     Priority: 1 - One    History of colon polyps 05/02/2016     Priority: 1 - One    Idiopathic gout 05/02/2016     Priority: 1 - One    Essential hypertension with goal blood pressure less than 130/85 05/02/2016     Priority: 1 - One    CKD (chronic kidney disease) 11/23/2015     Priority: 1 - One    COPD (chronic obstructive pulmonary disease) ([facility] [address] 04/27/2015     Priority: 1 - One    Diabetes with neurologic complications ([facility] 15/41/3346     Priority: 1 - One    Chronic radicular pain of lower back 07/21/2014     Priority: 1 - One    Neuropathy 07/16/2013     Priority: 1 - One    DDD (degenerative disc disease), lumbar 07/16/2013     Priority: 1 - One    Prostate CA ([facility] [address] 07/16/2013     Priority: 1 - One    Edema 01/12/2012     Priority: 1 - One    Persistent atrial fibrillation ([facility] [address]      Priority: 1 - One    Arthritis 07/11/2011     Priority: 1 - One    Gout, unspecified      Priority: 1 - One    Hypercholesterolemia      Priority: 1 - One    Leg pain      Priority: 1 - One    Chronic pain 05/06/2010     Priority: 1 - One    Hypertension      Priority: 2 - Two    Tubulovillous adenoma polyp of colon 07/16/2013     Priority: 6 - Six    Unspecified arthropathy, ankle and foot 07/14/2011     Priority: 6 - Six    PVD (peripheral vascular disease) ([facility] [address]      Priority: 6 - Six       Past Medical History:   Diagnosis Date    Acute on chronic diastolic congestive heart failure ([facility] [address] 4/27/2015    Arthritis 7/11/2011    [place] 6/7/2010    Cancer ([facility] [address]     prostate    Chronic diastolic heart failure ([facility] [address] 8/26/2015    CKD (chronic kidney disease) 11/23/2015    Diabetes ([facility] [address]     Gout, unspecified     Hypercholesterolemia     Hypertension     Inguinal lymphadenopathy 2/18/2014    Leg pain     PAF (paroxysmal atrial fibrillation) (HCC)     PAF (paroxysmal atrial fibrillation) (HCC)     PVD (peripheral vascular disease) ([facility])        Allergies   Allergen Reactions    Indocin [Indomethacin Sodium] Unknown (comments)    Lisinopril Angioedema     Dxed in hospital.    Losartan Other (comments)     Face and throat swelling.        Past Surgical History:   Procedure Laterality Date    COLONOSCOPY N/A 9/22/2016    COLONOSCOPY performed by [facility]y Duncan [facility] at [facility]      back and left shoulder x2       Social History     Socioeconomic History    Marital status: SINGLE     Spouse name: Not on file    Number of children: Not on file    Years of education: Not on file    Highest education level: Not on file   Tobacco Use    Smoking status: Former Smoker     Types: Cigarettes     Last attempt to quit: 6/20/2003     Years since quitting: 15.7    Smokeless tobacco: Never Used   Substance and Sexual Activity    Alcohol use: Yes     Alcohol/week: 0.0 oz     Comment: 2 drinks/month    Drug use: No       Review of Systems   Constitutional: Negative. Negative for chills, fever, malaise/fatigue and weight loss. HENT: Negative. Negative for hearing loss. Eyes: Negative. Negative for blurred vision and double vision. Respiratory: Negative. Negative for cough, hemoptysis, sputum production and shortness of breath. Cardiovascular: Negative. Negative for chest pain, palpitations and orthopnea. Gastrointestinal: Negative. Negative for abdominal pain, blood in stool, heartburn, nausea and vomiting. Genitourinary: Negative. Negative for dysuria, frequency and urgency. Musculoskeletal: Positive for back pain and joint pain. Negative for myalgias and neck pain. Skin: Negative. Negative for rash. Neurological: Negative. Negative for dizziness, tingling, tremors, weakness and headaches. Endo/Heme/Allergies: Negative. Psychiatric/Behavioral: Negative. Negative for depression. Objective:     Vitals:    03/05/19 0911   BP: 146/73   Pulse: 62   Resp: 16   Temp: 97.8 °F (36.6 °C)   TempSrc: Oral   SpO2: 99%   Weight: 250 lb (113.4 kg)   Height: 6' 2\" (1.88 m)      Body mass index is 32.1 kg/m². Physical Exam   Constitutional: He is oriented to person, place, and time and well-developed, well-nourished, and in no distress. HENT:   Head: Normocephalic and atraumatic. Mouth/Throat: Oropharynx is clear and moist.   Eyes: Right eye exhibits no discharge.  Left eye exhibits no discharge. No scleral icterus. Neck: No thyromegaly present. No bruit. Cardiovascular: Normal rate, regular rhythm and normal heart sounds. Pulmonary/Chest: Effort normal and breath sounds normal. No respiratory distress. He has no wheezes. He has no rales. Abdominal: Soft. He exhibits no distension. There is no tenderness. There is no guarding. Neurological: He is alert and oriented to person, place, and time. Skin: No rash noted. No erythema. Psychiatric: Mood and affect normal.   Nursing note and vitals reviewed. There are no preventive care reminders to display for this patient. Assessment and orders:     Encounter Diagnoses     ICD-10-CM ICD-9-CM   1. Type 2 diabetes mellitus with nephropathy ([facility]) E11.21 250.40     583.81   2. Chronic obstructive pulmonary disease, unspecified COPD type ([facility] J44.9 496   3. Idiopathic gout, unspecified chronicity, unspecified site M10.00 274.9   4. Hypercholesterolemia E78.00 272.0   5. Essential hypertension with goal blood pressure less than 130/85 I10 401.9   6. Type 2 diabetes mellitus with other neurologic complication, without long-term current use of insulin ([facility]) E11.49 250.60   7. Stage 3 chronic kidney disease ([facility]) N18.3 585.3   8. Chronic radicular pain of lower back M54.16 724.4    G89.29 338.29   9. Primary osteoarthritis of right knee M17.11 715.16   10. Chronic diastolic heart failure ([facility]) I50.32 428.32     Diagnoses and all orders for this visit:    1. Type 2 diabetes mellitus with nephropathy ([facility]- retest  -     HEMOGLOBIN A1C WITH EAG  -     MICROALBUMIN, UR, RAND W/ MICROALB/CREAT RATIO  -     LIPID PANEL  -     METABOLIC PANEL, COMPREHENSIVE    2. Chronic obstructive pulmonary disease, unspecified COPD type ([facility]- retest  -     albuterol (PROVENTIL HFA, VENTOLIN HFA, PROAIR HFA) 90 mcg/actuation inhaler; Take 2 Puffs by inhalation every six (6) hours as needed for Wheezing for up to 360 days.  Please use generic that is covered    3. Idiopathic gout, unspecified chronicity, unspecified site- retest    4. Hypercholesterolemia- retest  -     LIPID PANEL  -     METABOLIC PANEL, COMPREHENSIVE    5. Essential hypertension with goal blood pressure less than 130/85  -     MICROALBUMIN, UR, RAND W/ MICROALB/CREAT RATIO  -     METABOLIC PANEL, COMPREHENSIVE    6. Type 2 diabetes mellitus with other neurologic complication, without long-term current use of insulin (HCC)  -     HEMOGLOBIN A1C WITH EAG  -     MICROALBUMIN, UR, RAND W/ MICROALB/CREAT RATIO  -     LIPID PANEL  -     METABOLIC PANEL, COMPREHENSIVE    7. Stage 3 chronic kidney disease (HCC)-retest  -     MICROALBUMIN, UR, RAND W/ MICROALB/CREAT RATIO  -     METABOLIC PANEL, COMPREHENSIVE  -     PROT+CREATU (RANDOM)  -     PTH INTACT  -     VITAMIN D, 25 HYDROXY  -     HEMOGLOBIN    8. Chronic radicular pain of lower back-needs bracing to increase activity-    9. Primary osteoarthritis of right knee- gives away, needs brace to prevent falls and increase activity. 10. Chronic diastolic heart failure ([address]- compensated. -     METABOLIC PANEL, COMPREHENSIVE    Other orders  -     fluticasone (FLONASE) 50 mcg/actuation nasal spray; 1 spray BL daily        Plan of care:  Discussed diagnoses in detail with patient. Medication risks/benefits/side effects discussed with patient. All of the patient's questions were addressed. The patient understands and agrees with our plan of care. The patient knows to call back if they are unsure of or forget any changes we discussed today or if the symptoms change. The patient received an After-Visit Summary which contains VS, orders, medication list and allergy list. This can be used as a \"mini-medical record\" should they have to seek medical care while out of town.     Patient Care Team:  Trinidad Jimenez MD as PCP - General (Family Practice)  [place], [place] (Orthopedic Surgery)  Tavia Iyer MD (Otolaryngology)  Chalo Baron MD (Endocrinology)  Ru Vizcarra MD as Physician (Internal Medicine)  Kedar Crawford MD (Ophthalmology)  Person, Cornelio Narayan MD (Podiatry)  Angela Martini MD (Cardiology)  Juan Jarrell MD (Ophthalmology)    Follow-up Disposition:  Return in about 3 months (around 6/5/2019). Future Appointments   Date Time Provider \[facility]\""   5/20/2019  9:00 AM Chalo Baron MD [address]   6/5/2019  9:55 AM Chasidy Watson MD [facility]   9/11/2019  9:00 AM Angela Martini MD [id number]       Signed By: Sarina Dee MD     March 6, 2019        This note was created using voice recognition software. Despite editing, there may be syntax errors.

## 2019-03-08 ENCOUNTER — TELEPHONE (OUTPATIENT)
Dept: FAMILY MEDICINE CLINIC | Age: 79
End: 2019-03-08

## 2019-03-08 LAB
25(OH)D3+25(OH)D2 SERPL-MCNC: 10.6 NG/ML (ref 30–100)
ALBUMIN SERPL-MCNC: 4.3 G/DL (ref 3.5–4.8)
ALBUMIN/CREAT UR: 139.9 MG/G CREAT (ref 0–30)
ALBUMIN/GLOB SERPL: 1.8 {RATIO} (ref 1.2–2.2)
ALP SERPL-CCNC: 84 IU/L (ref 39–117)
ALT SERPL-CCNC: 25 IU/L (ref 0–44)
AST SERPL-CCNC: 25 IU/L (ref 0–40)
BILIRUB SERPL-MCNC: 0.5 MG/DL (ref 0–1.2)
BUN SERPL-MCNC: 10 MG/DL (ref 8–27)
BUN/CREAT SERPL: 8 (ref 10–24)
CALCIUM SERPL-MCNC: 9.7 MG/DL (ref 8.6–10.2)
CHLORIDE SERPL-SCNC: 104 MMOL/L (ref 96–106)
CHOLEST SERPL-MCNC: 186 MG/DL (ref 100–199)
CO2 SERPL-SCNC: 26 MMOL/L (ref 20–29)
CREAT SERPL-MCNC: 1.2 MG/DL (ref 0.76–1.27)
CREAT UR-MCNC: 221.7 MG/DL
EST. AVERAGE GLUCOSE BLD GHB EST-MCNC: 111 MG/DL
GLOBULIN SER CALC-MCNC: 2.4 G/DL (ref 1.5–4.5)
GLUCOSE SERPL-MCNC: 73 MG/DL (ref 65–99)
HBA1C MFR BLD: 5.5 % (ref 4.8–5.6)
HDLC SERPL-MCNC: 41 MG/DL
HGB BLD-MCNC: 12.3 G/DL (ref 13–17.7)
INTERPRETATION: NORMAL
LDLC SERPL CALC-MCNC: 130 MG/DL (ref 0–99)
Lab: NORMAL
MICROALBUMIN UR-MCNC: 310.2 UG/ML
POTASSIUM SERPL-SCNC: 3.5 MMOL/L (ref 3.5–5.2)
PROT SERPL-MCNC: 6.7 G/DL (ref 6–8.5)
PROT UR-MCNC: 87.2 MG/DL
PROT/CREAT UR: 393 MG/G CREAT (ref 0–200)
PTH-INTACT SERPL-MCNC: 101 PG/ML (ref 15–65)
SODIUM SERPL-SCNC: 145 MMOL/L (ref 134–144)
TRIGL SERPL-MCNC: 77 MG/DL (ref 0–149)
VLDLC SERPL CALC-MCNC: 15 MG/DL (ref 5–40)

## 2019-03-08 RX ORDER — ERGOCALCIFEROL 1.25 MG/1
50000 CAPSULE ORAL
Qty: 12 CAP | Refills: 2 | Status: SHIPPED | OUTPATIENT
Start: 2019-03-08 | End: 2019-09-16 | Stop reason: SDUPTHER

## 2019-03-08 NOTE — TELEPHONE ENCOUNTER
Called made to pt per Dr. Mj Yancey:     Please find your most recent results below. 1-cholesterol is much higher. If you have been taking your cholesterol medication regularly we will need to work on your diet to get back in the range that is recommended. If you are on the best possible diet then we need to increase your cholesterol medication to something more potent. Call and let me know.     2-your vitamin D is critically low. You will need to be on prescription strength vitamin D. I will send that prescription to your mail order pharmacy. This vitamin D needs to be taken once a week. Take it with a dinner meal to improve absorption.      A letter was sent to pt also. Pt stated he was taking his meds regularly. Pt verbalized understanding.

## 2019-03-09 RX ORDER — ROSUVASTATIN CALCIUM 20 MG/1
20 TABLET, COATED ORAL
Qty: 90 TAB | Refills: 1 | Status: SHIPPED | OUTPATIENT
Start: 2019-03-09 | End: 2019-09-16 | Stop reason: SDUPTHER

## 2019-03-11 NOTE — TELEPHONE ENCOUNTER
Called made to pt, no answer, mess left. Per Dr. Ryann Purcell:     I went ahead and changed him to crestor since he is a high risk diabetic. Sent RX to mail order pharmacy. See me 2 months after starting new cholesterol med fasting. Call us if he has side effects. Please let pt know Dr. Ryann Purcell sent new RX in and to make an appt 2 months after starting meds.

## 2019-03-13 DIAGNOSIS — E87.6 HYPOKALEMIA: ICD-10-CM

## 2019-03-13 RX ORDER — POTASSIUM CHLORIDE 750 MG/1
20 TABLET, EXTENDED RELEASE ORAL 2 TIMES DAILY
Qty: 360 TAB | Refills: 3 | Status: SHIPPED | OUTPATIENT
Start: 2019-03-13 | End: 2019-09-16 | Stop reason: SDUPTHER

## 2019-03-13 NOTE — TELEPHONE ENCOUNTER
Refill request received from [facility]. Last office visit was 3/5/19.

## 2019-03-15 ENCOUNTER — TELEPHONE (OUTPATIENT)
Dept: FAMILY MEDICINE CLINIC | Age: 79
End: 2019-03-15

## 2019-03-15 NOTE — TELEPHONE ENCOUNTER
Phone call to patient to discuss the blood pressure readings that he dropped off in the office. Per Dr. Mark Perez: His blood pressure readings look good. \"    Patient verbalized understanding.

## 2019-03-18 DIAGNOSIS — I10 ESSENTIAL HYPERTENSION WITH GOAL BLOOD PRESSURE LESS THAN 130/85: Chronic | ICD-10-CM

## 2019-03-18 DIAGNOSIS — I50.32 CHRONIC DIASTOLIC HEART FAILURE (HCC): ICD-10-CM

## 2019-03-18 RX ORDER — FOLIC ACID/VIT B COMPLEX AND C 5 MG
TABLET ORAL
Qty: 30 TAB | Refills: 11 | Status: SHIPPED | OUTPATIENT
Start: 2019-03-18 | End: 2019-05-03 | Stop reason: SDUPTHER

## 2019-03-18 RX ORDER — HYDRALAZINE HYDROCHLORIDE 50 MG/1
TABLET, FILM COATED ORAL
Qty: 360 TAB | Refills: 3 | Status: SHIPPED | OUTPATIENT
Start: 2019-03-18 | End: 2019-06-05 | Stop reason: SDUPTHER

## 2019-03-21 DIAGNOSIS — M54.50 CHRONIC LEFT-SIDED LOW BACK PAIN WITHOUT SCIATICA: ICD-10-CM

## 2019-03-21 DIAGNOSIS — M51.36 DDD (DEGENERATIVE DISC DISEASE), LUMBAR: Chronic | ICD-10-CM

## 2019-03-21 DIAGNOSIS — I48.0 PAF (PAROXYSMAL ATRIAL FIBRILLATION) (HCC): Chronic | ICD-10-CM

## 2019-03-21 DIAGNOSIS — G89.29 CHRONIC LEFT-SIDED LOW BACK PAIN WITHOUT SCIATICA: ICD-10-CM

## 2019-03-21 RX ORDER — DABIGATRAN ETEXILATE MESYLATE 150 MG/1
CAPSULE ORAL
Qty: 180 CAP | Refills: 3 | Status: SHIPPED | OUTPATIENT
Start: 2019-03-21 | End: 2020-01-01

## 2019-03-21 RX ORDER — HYDROCODONE BITARTRATE AND ACETAMINOPHEN 10; 325 MG/1; MG/1
1 TABLET ORAL
Qty: 90 TAB | Refills: 0 | Status: SHIPPED | OUTPATIENT
Start: 2019-03-21 | End: 2019-04-23 | Stop reason: SDUPTHER

## 2019-03-21 NOTE — TELEPHONE ENCOUNTER
Triage for Controlled Substance Refill Request    Pain Diagnosis: Chronic Pain    Last Outpatient Visit: 2/5/19    Next Outpatient Visit:See Chart, q 3 months    Reason for refill needed outside of office visit?prescribed only on a monthly basis    Pain escalation requiring increased medication- no    Pharmacy: See pain contract    Medication: See Prescription requested       reviewed:today by Dr. Rachna Zavala and filed in  folder, separate from chart    Date of Urine Drug Screen:  2/21/18       Opioid Safety Handout given: Always prints with the Patient AVS    Appropriate for refill: He is past due for a urine drug screen     Action:  Rx sent to Dr. Rachna Zavala

## 2019-03-30 LAB
6-ACETYLMORPHINE: NEGATIVE
AMPHETAMINES SERPL QL SCN: NEGATIVE NG/ML
BARBITURATES SERPL QL SCN: NEGATIVE UG/ML
BENZODIAZ SERPL QL SCN: NEGATIVE NG/ML
CANNABINOIDS SERPL QL SCN: NEGATIVE NG/ML
COCAINE+BZE SERPL QL SCN: NEGATIVE NG/ML
CODEINE, 737848: NEGATIVE NG/ML
DIHYDROCODEINE, 764159: 2 NG/ML
HYDROCODONE, 737854: 12.3 NG/ML
HYDROMORPHONE, 737852: NEGATIVE NG/ML
METHADONE SERPL QL SCN: NEGATIVE NG/ML
MORPHINE, 737850: NEGATIVE NG/ML
OPIATES SERPL QL SCN: ABNORMAL NG/ML
OPIATES SPEC QL: POSITIVE
OXYCOCONE: NEGATIVE NG/ML
OXYCODONES CONFIRMATION, 738393: NEGATIVE
OXYCODONES, 738315: NEGATIVE NG/ML
OXYMORPHONE, 763902: NEGATIVE NG/ML
PCP SERPL QL SCN: NEGATIVE NG/ML
PROPOXYPH SERPL QL SCN: NEGATIVE NG/ML

## 2019-04-15 ENCOUNTER — TELEPHONE (OUTPATIENT)
Dept: FAMILY MEDICINE CLINIC | Age: 79
End: 2019-04-15

## 2019-04-15 NOTE — TELEPHONE ENCOUNTER
Phone call to patient to clarify which pharmacy he would like to use and which type of Metformin he is currently taking. Patient reports that he would like to use [facility] and that his medication is just Metformin, not Metformin HCL. Refill set up and sent to provider.

## 2019-04-15 NOTE — TELEPHONE ENCOUNTER
Refill request received from [employer] for Metformin Hydrochloride ER tabs for a 90 day supply. Metformin is in the patient's list but not as listed above.   Last office visit was 3/5/19

## 2019-04-16 DIAGNOSIS — K21.9 GASTROESOPHAGEAL REFLUX DISEASE WITHOUT ESOPHAGITIS: ICD-10-CM

## 2019-04-16 DIAGNOSIS — M51.36 DDD (DEGENERATIVE DISC DISEASE), LUMBAR: Chronic | ICD-10-CM

## 2019-04-16 DIAGNOSIS — I10 ESSENTIAL HYPERTENSION: Chronic | ICD-10-CM

## 2019-04-16 RX ORDER — TAMSULOSIN HYDROCHLORIDE 0.4 MG/1
CAPSULE ORAL
Qty: 90 CAP | Refills: 3 | Status: SHIPPED | OUTPATIENT
Start: 2019-04-16 | End: 2020-01-01

## 2019-04-16 RX ORDER — BUMETANIDE 2 MG/1
TABLET ORAL
Qty: 180 TAB | Refills: 1 | Status: SHIPPED | OUTPATIENT
Start: 2019-04-16 | End: 2019-06-05 | Stop reason: SDUPTHER

## 2019-04-16 RX ORDER — METFORMIN HYDROCHLORIDE 500 MG/1
500 TABLET ORAL
Qty: 270 TAB | Refills: 2 | Status: SHIPPED | OUTPATIENT
Start: 2019-04-16 | End: 2019-05-20 | Stop reason: SDUPTHER

## 2019-04-16 RX ORDER — CLONIDINE HYDROCHLORIDE 0.3 MG/1
TABLET ORAL
Qty: 270 TAB | Refills: 2 | Status: SHIPPED | OUTPATIENT
Start: 2019-04-16 | End: 2019-09-16

## 2019-04-16 RX ORDER — ALLOPURINOL 300 MG/1
300 TABLET ORAL DAILY
Qty: 90 TAB | Refills: 3 | Status: SHIPPED | OUTPATIENT
Start: 2019-04-16 | End: 2020-01-01

## 2019-04-16 RX ORDER — BACLOFEN 10 MG/1
TABLET ORAL
Qty: 270 TAB | Refills: 3 | Status: SHIPPED | OUTPATIENT
Start: 2019-04-16 | End: 2020-01-01

## 2019-04-16 RX ORDER — OMEPRAZOLE 20 MG/1
CAPSULE, DELAYED RELEASE ORAL
Qty: 90 CAP | Refills: 3 | Status: SHIPPED | OUTPATIENT
Start: 2019-04-16 | End: 2020-01-01

## 2019-04-23 ENCOUNTER — TELEPHONE (OUTPATIENT)
Dept: FAMILY MEDICINE CLINIC | Age: 79
End: 2019-04-23

## 2019-04-23 DIAGNOSIS — M51.36 DDD (DEGENERATIVE DISC DISEASE), LUMBAR: Chronic | ICD-10-CM

## 2019-04-23 DIAGNOSIS — M54.50 CHRONIC LEFT-SIDED LOW BACK PAIN WITHOUT SCIATICA: ICD-10-CM

## 2019-04-23 DIAGNOSIS — G89.29 CHRONIC LEFT-SIDED LOW BACK PAIN WITHOUT SCIATICA: ICD-10-CM

## 2019-04-23 RX ORDER — HYDROCODONE BITARTRATE AND ACETAMINOPHEN 10; 325 MG/1; MG/1
1 TABLET ORAL
Qty: 90 TAB | Refills: 0 | Status: SHIPPED | OUTPATIENT
Start: 2019-04-23 | End: 2019-05-20 | Stop reason: ALTCHOICE

## 2019-04-23 NOTE — TELEPHONE ENCOUNTER
Please call when ready.  Thanks    HYDROcodone-acetaminophen (NORCO)  mg tablet [[id number]]  ENDED     Order Details   Dose: 1 Tab Route: Oral Frequency: EVERY 6 HOURS AS NEEDED for Pain   Dispense Quantity: 90 Tab Refills: 0 Fills remaining: --

## 2019-04-23 NOTE — TELEPHONE ENCOUNTER
Last office visit on 3/5/19  [facility] on file   Last drug screen 3/22/19  Please advise since Dr. Mino Garnica is out of the office, thank you

## 2019-04-23 NOTE — TELEPHONE ENCOUNTER
Rx request received for Norco while pt's PCP out of the office. Pain Contract on file?: YES  Date: 4/7/17  Provider: Nancy Whittington   reviewed and appropriate?: YES  Date of last UDS: 3/22/19, appropriate    Rx printed and will be available for pick-up at the .

## 2019-05-01 DIAGNOSIS — I10 ESSENTIAL HYPERTENSION WITH GOAL BLOOD PRESSURE LESS THAN 130/85: Chronic | ICD-10-CM

## 2019-05-01 DIAGNOSIS — E11.42 TYPE 2 DIABETES MELLITUS WITH DIABETIC POLYNEUROPATHY, WITHOUT LONG-TERM CURRENT USE OF INSULIN (HCC): Chronic | ICD-10-CM

## 2019-05-01 DIAGNOSIS — M1A.39X0 CHRONIC GOUT DUE TO RENAL IMPAIRMENT OF MULTIPLE SITES WITHOUT TOPHUS: ICD-10-CM

## 2019-05-01 RX ORDER — COLCHICINE 0.6 MG/1
TABLET, FILM COATED ORAL
Qty: 90 TAB | Refills: 2 | Status: SHIPPED | OUTPATIENT
Start: 2019-05-01 | End: 2020-01-01

## 2019-05-02 NOTE — TELEPHONE ENCOUNTER
Refill request received from [facility] for Lovastatin 40 mg - marked off patient's list as ineffective. Last office visit was 3/25/19. Please advise.

## 2019-05-03 NOTE — TELEPHONE ENCOUNTER
That is correct. Patient is current taking Rosuvastatin (CRESTOR) 20 mg tablet instead of Lovastatin.

## 2019-05-20 ENCOUNTER — OFFICE VISIT (OUTPATIENT)
Dept: ENDOCRINOLOGY | Age: 79
End: 2019-05-20

## 2019-05-20 VITALS
DIASTOLIC BLOOD PRESSURE: 46 MMHG | HEART RATE: 57 BPM | OXYGEN SATURATION: 98 % | BODY MASS INDEX: 32.35 KG/M2 | WEIGHT: 252.1 LBS | TEMPERATURE: 97.2 F | SYSTOLIC BLOOD PRESSURE: 121 MMHG | RESPIRATION RATE: 18 BRPM | HEIGHT: 74 IN

## 2019-05-20 DIAGNOSIS — Z79.899 POLYPHARMACY: ICD-10-CM

## 2019-05-20 DIAGNOSIS — E78.2 MIXED HYPERLIPIDEMIA: ICD-10-CM

## 2019-05-20 DIAGNOSIS — I10 ESSENTIAL HYPERTENSION WITH GOAL BLOOD PRESSURE LESS THAN 130/85: Chronic | ICD-10-CM

## 2019-05-20 DIAGNOSIS — E11.42 TYPE 2 DIABETES MELLITUS WITH DIABETIC POLYNEUROPATHY, WITHOUT LONG-TERM CURRENT USE OF INSULIN (HCC): Primary | ICD-10-CM

## 2019-05-20 DIAGNOSIS — E11.42 TYPE 2 DIABETES MELLITUS WITH DIABETIC POLYNEUROPATHY, WITHOUT LONG-TERM CURRENT USE OF INSULIN (HCC): Chronic | ICD-10-CM

## 2019-05-20 DIAGNOSIS — I10 ESSENTIAL HYPERTENSION: ICD-10-CM

## 2019-05-20 RX ORDER — METFORMIN HYDROCHLORIDE 500 MG/1
500 TABLET ORAL
Qty: 270 TAB | Refills: 4 | Status: SHIPPED | OUTPATIENT
Start: 2019-05-20 | End: 2019-09-16 | Stop reason: SDUPTHER

## 2019-05-20 RX ORDER — LOVASTATIN 40 MG/1
40 TABLET ORAL
COMMUNITY
End: 2019-05-20 | Stop reason: CLARIF

## 2019-05-20 RX ORDER — GLIPIZIDE 5 MG/1
TABLET ORAL
Qty: 360 TAB | Refills: 4 | Status: SHIPPED | OUTPATIENT
Start: 2019-05-20 | End: 2019-05-24 | Stop reason: SDUPTHER

## 2019-05-20 NOTE — PROGRESS NOTES
HISTORY OF PRESENT ILLNESS Shannon Mora is a [de-identified] y.o. male. HPI Patient is here  after last visit of Type 2 diabetes mellitus from  November 2018 Sugars are good on log He has been well he says Reviewed all the med bottles : 
 
Got all the med bottles Noticing he is on two diff statins Old history :  
 
A GAP OF 16 MONTHS  
LOST 6 LBS Gotten diet compliant He got the log with high sugars Old history He is told to come back with dietary changes while on current meds No hospitalizations No med changes Brought the log , has better sugars, but gained 5 lbs Not a great historian Taking glipizide and metformin NOT Accompanied by his daughter Prior history :  
Referred : by PCP 
 
H/o diabetes for 10 years He has hearing issues  
accomapnied by his daughter Current A1C is 8.1 %   From jan 2015  and symptoms/problems include fluctutating sugars Current diabetic medications include metformin   500 mg TID He lives alone, manages his own meds . Current monitoring regimen: home blood tests - daily Home blood sugar records: trend: fluctuating a bit Any episodes of hypoglycemia? no 
 
Weight trend: fluctuating a bit Prior visit with dietician: no 
Current diet: \"unhealthy\" diet in general 
Current exercise: walking Known diabetic complications: retinopathy, nephropathy, peripheral neuropathy and cardiovascular disease Cardiovascular risk factors: dyslipidemia, diabetes mellitus, obesity, sedentary life style, male gender, stress Eye exam current (within one year): yes ARA: no  
 
Past Medical History:  
Diagnosis Date  Acute on chronic diastolic congestive heart failure ([facility] [place] [address] 4/27/2015  Arthritis 7/11/2011  [place] 6/7/2010  Cancer [facility])   
 prostate  Chronic diastolic heart failure ([address] 8/26/2015  CKD (chronic kidney disease) 11/23/2015  Diabetes ([facility] [address]  Gout, unspecified  Hypercholesterolemia  Hypertension  Inguinal lymphadenopathy 2/18/2014  Leg pain  PAF (paroxysmal atrial fibrillation) ([facility] [address]  PAF (paroxysmal atrial fibrillation) ([facility] [address]  PVD (peripheral vascular disease) ([facility] [address] Past Surgical History:  
Procedure Laterality Date  COLONOSCOPY N/A 9/22/2016 COLONOSCOPY performed by Sun Marks MD at [facility] [place] [facility]    
 back and left shoulder x2 Current Outpatient Medications Medication Sig  lovastatin (MEVACOR) 40 mg tablet Take 40 mg by mouth nightly.  b complex-vitamin c-folic acid 5mg (FOLBEE PLUS) tab tablet TAKE 1 TABLET BY MOUTH ONCE DAILY  COLCRYS 0.6 mg tablet TAKE ONE TABLET BY MOUTH ONCE DAILY TO  PREVENT  GOUT  
 empagliflozin (JARDIANCE) 10 mg tablet Take 1 Tab by mouth daily.  cloNIDine HCl (CATAPRES) 0.3 mg tablet TAKE 1 TABLET BY MOUTH THREE TIMES DAILY  omeprazole (PRILOSEC) 20 mg capsule TAKE ONE CAPSULE BY MOUTH ONCE DAILY  tamsulosin (FLOMAX) 0.4 mg capsule TAKE ONE CAPSULE BY MOUTH ONCE DAILY  allopurinol (ZYLOPRIM) 300 mg tablet Take 1 Tab by mouth daily.  baclofen (LIORESAL) 10 mg tablet TAKE ONE TABLET BY MOUTH THREE TIMES DAILY (MUSCLE  RELAXER)  bumetanide (BUMEX) 2 mg tablet TAKE ONE TABLET BY MOUTH TWICE DAILY  PRADAXA 150 mg capsule TAKE ONE CAPSULE BY MOUTH TWICE DAILY  hydrALAZINE (APRESOLINE) 50 mg tablet TAKE ONE TABLET BY MOUTH 4 TIMES DAILY FOR  HYPERTENSION  potassium chloride (K-DUR) 10 mEq tablet Take 2 Tabs by mouth two (2) times a day.  rosuvastatin (CRESTOR) 20 mg tablet Take 1 Tab by mouth nightly.  ergocalciferol (ERGOCALCIFEROL) 50,000 unit capsule Take 1 Cap by mouth every seven (7) days.  albuterol (PROVENTIL HFA, VENTOLIN HFA, PROAIR HFA) 90 mcg/actuation inhaler Take 2 Puffs by inhalation every six (6) hours as needed for Wheezing for up to 360 days. Please use generic that is covered  labetalol (NORMODYNE) 200 mg tablet TAKE ONE-HALF TABLET BY MOUTH TWICE DAILY FOR  HYPERTENSION  montelukast (SINGULAIR) 10 mg tablet Take 1 Tab by mouth daily.  amLODIPine (NORVASC) 10 mg tablet TAKE ONE TABLET BY MOUTH ONCE DAILY  gabapentin (NEURONTIN) 300 mg capsule Take 1 Cap by mouth three (3) times daily.  ferrous sulfate (IRON) 325 mg (65 mg iron) tablet Take 1 Tab by mouth two (2) times daily (after meals). Indications: Iron Deficiency Anemia  glipiZIDE (GLUCOTROL) 5 mg tablet TAKE TWO TABLETS BY MOUTH 20 MINUTES BEFORE BREAKFAST AND TWO 20 MINUTES BEFORE SUPPER  
 [facility]ets [facility]  ACCU-CHEK SHAUNA CONTROL SOLN soln  docusate sodium 100 mg tab Take 1 Tab by mouth two (2) times a day.  magnesium oxide (MAG-OX) 400 mg tablet Take 1 Tab by mouth daily.  Blood-Glucose Meter (TRUE METRIX GLUCOSE METER) misc Use as Directed  glucose blood VI test strips (TRUE METRIX GLUCOSE TEST STRIP) strip Test 2 times daily Dx Coed E11.65  Lancets misc Test 2 times daily Dx Code E11.65  
 aspirin 81 mg chewable tablet Take 81 mg by mouth daily.  HYDROcodone-acetaminophen (NORCO)  mg tablet Take 1 Tab by mouth every six (6) hours as needed for Pain for up to 30 days.  metFORMIN (GLUCOPHAGE) 500 mg tablet Take 1 Tab by mouth three (3) times daily (with meals).  fluticasone (FLONASE) 50 mcg/actuation nasal spray 1 spray BL daily  diphenhydrAMINE (BENADRYL) 50 mg capsule Take 25 mg by mouth every six (6) hours as needed.  ezetimibe (ZETIA) 10 mg tablet TAKE ONE TABLET BY MOUTH ONCE DAILY  losartan (COZAAR) 25 mg tablet Take  by mouth daily.  melatonin 3 mg tablet Take 10 mg by mouth nightly.  benzoyl peroxide 5 % topical gel Apply  to affected area nightly. apply to affected area as directed No current facility-administered medications for this visit. Review of Systems Constitutional: Positive for malaise/fatigue. HENT: Positive for hearing loss. Eyes: Positive for blurred vision. Negative for pain and redness. Respiratory: Negative. Cardiovascular: Negative for chest pain, palpitations and leg swelling. Gastrointestinal: Negative. Negative for constipation. Genitourinary: Negative. Musculoskeletal: Positive for myalgias. Skin: Negative. Neurological: Negative. Endo/Heme/Allergies: Negative. Psychiatric/Behavioral: Negative for depression and memory loss. The patient does not have insomnia. Physical Exam  
Vitals reviewed. Constitutional: He is oriented to person, place, and time. He appears well-developed and well-nourished. Obese gentle man HENT:  
Head: Normocephalic. Eyes: Conjunctivae and EOM are normal. Pupils are equal, round, and reactive to light. Neck: Normal range of motion. Neck supple. No JVD present. No tracheal deviation present. No thyromegaly present. Cardiovascular: Normal rate, regular rhythm and normal heart sounds. Pulmonary/Chest: Effort normal and breath sounds normal.  
Abdominal: Soft. Bowel sounds are normal.  
Musculoskeletal: Normal range of motion. Lymphadenopathy:  
  He has no cervical adenopathy. Neurological: He is alert and oriented to person, place, and time. He has normal reflexes. Skin: Skin is warm. Psychiatric: He has a normal mood and affect. Diabetic feet exam : NOV 2018  
 [place] Nicole, podiatrist   
 
H/o partial or complete amputation of foot : n 
H/o previous foot ulceration :n 
H/o pre - ulcerative callus: yes H/o peripheral neuropathy and callus: yes H/o poor circulation     ARA   : yes,   Right side ARA  is less than 7 Foot deformity : crossing over of toes, second onto first toe, third onto second Flat feet Diabetic foot exam: NOV 2018 Left Foot: 
 Visual Exam: normal   HAS HAMMER TOES1. 2,3,4  OVER RIDING ON SECOND ON TO FIRST TOE , BUNION PRESENT Pulse DP: 1+ (weak) Filament test: reduced sensation Vibratory sensation: diminished Right Foot: 
 Visual Exam: normal  HAS HAMMER TOES 1, 2,3,4  OVER RIDING ON SECOND ON TO FIRST TOE , BUNION PRESENT Pulse DP: 1+ (weak) Filament test: reduced sensation Vibratory sensation: diminished Lab Results Component Value Date/Time Hemoglobin A1c 5.5 03/07/2019 04:16 PM  
 Hemoglobin A1c 6.4 (H) 12/05/2018 12:30 PM  
 Hemoglobin A1c 6.2 (H) 09/05/2018 12:41 PM  
 Hemoglobin A1c, External 6.3 10/12/2017 Glucose 73 03/07/2019 04:16 PM  
 Glucose (POC) 132 (H) 09/22/2016 09:07 AM  
 Microalb/Creat ratio (ug/mg creat.) 139.9 (H) 03/07/2019 04:16 PM  
 LDL, calculated 130 (H) 03/07/2019 04:16 PM  
 Creatinine (POC) 1.4 (H) 01/02/2015 10:08 AM  
 Creatinine 1.20 03/07/2019 04:16 PM  
  
Lab Results Component Value Date/Time Cholesterol, total 186 03/07/2019 04:16 PM  
 HDL Cholesterol 41 03/07/2019 04:16 PM  
 LDL, calculated 130 (H) 03/07/2019 04:16 PM  
 Triglyceride 77 03/07/2019 04:16 PM  
 CHOL/HDL Ratio 3.7 10/07/2010 09:27 AM  
 
 
Lab Results Component Value Date/Time ALT (SGPT) 25 03/07/2019 04:16 PM  
 AST (SGOT) 25 03/07/2019 04:16 PM  
 Alk. phosphatase 84 03/07/2019 04:16 PM  
 Bilirubin, total 0.5 03/07/2019 04:16 PM  
 
 
Lab Results Component Value Date/Time GFR est AA 66 03/07/2019 04:16 PM  
 GFR est non-AA 57 (L) 03/07/2019 04:16 PM  
 Creatinine (POC) 1.4 (H) 01/02/2015 10:08 AM  
 Creatinine 1.20 03/07/2019 04:16 PM  
 BUN 10 03/07/2019 04:16 PM  
 Sodium 145 (H) 03/07/2019 04:16 PM  
 Potassium 3.5 03/07/2019 04:16 PM  
 Chloride 104 03/07/2019 04:16 PM  
 CO2 26 03/07/2019 04:16 PM  
  
 
 
 
ASSESSMENT and PLAN 1.  Type 2 DM, uncontrolled : a1c is  5.5 %     From march 2019  Compared to    6.2 %      FROM SEPT 2018     COMPARED TO    8.4 %      From may 2017     Compared to   7.2 %   From feb 2017     compared to   7%    From May 2016  compared to   7.3 %    From nov 2015  Compared to  6 %  From Aug 2015 compared to 6.6 %   From  May 2015   Compared to 7.8 %  From  Feb 2015 GAINED GOOD  glycemic control HE GETS HELP FROM DAUGHTER  
 
NO TRANSPORTATION  FOR FOLLOW UPS Reviewed log , sugars are down to 130s Stay on  jardiance 10  Mg a day Continue on glucophage at 500 mg TID   and  glipizide to 10 mg bid dose Patient is advised about checking blood sugars 2 times a day and maintaining log book. Hypoglycemia management has been explained to the patient. 2. Hypoglycemia :  Educated on treating the hypoglycemia. Discussed Glipizide use and prescribed 3. HTN : controlled,  continue on clonidine 0.3 mg tid , hydralazine 50 MG QID  ,  Labetalol  200 mg HALF TAB  bid,  and norvasc  10 mg a day He is allergic to Losartan , developed Angioedema He is allergic to lisinopril also Proteinuria is worsening from lack of glycemic control Patient is educated about importance of compliance with anti-hypertensives especially ARB/ACEI 4. Dyslipidemia : LDL is good , on  crestor ( noted is on two diff statins) Stopped  lovastatin 40 mg and  zetia 10 mg at night Patient is educated about benefits and adverse effects of statins and explained how benefits outweigh risk. 5. use of aspirin to prevent MI and TIA's discussed 6. Diabetic complications :  
 
A. Retinopathy-YES   educated on this complication,  regular f/u with ophthalmologist encouraged B. Nephropathy - YES Stage 3 ckd from diuretic use for CAD and CHF Proteinuria present  
educated on this disease and indicated stages and its prognosis. Regular care with nephrologist encouraged C. Peripheral Neuropathy - YES  , moderate On gabapentin  
educated on this disease and indicated improvement with good and stable glycemic control D. PAD : YES    Right side is <7  By ARA Junito Blair   From  June 2014 E : CAD : YES 
F/u with Dr. Silas Frausto 
 
 7. Gout - on colchrys and allopurinol 8. Polypharmacy : pt is on several medications > 50 % of time is spent on counseling Patient voiced understanding her plan of care

## 2019-05-20 NOTE — PROGRESS NOTES
1. Have you been to the ER, urgent care clinic since your last visit? Hospitalized since your last visit? No 
 
2. Have you seen or consulted any other health care providers outside of the [address] [place] [address] since your last visit? Include any pap smears or colon screening. No  
 
Chief Complaint Patient presents with  Diabetes  
  follow up Learning assessment complete Abuse Screening Questionnaire 5/20/2019 Do you ever feel afraid of your partner? Lucrecia Host Are you in a relationship with someone who physically or mentally threatens you? Lucrecia Host Is it safe for you to go home?  Magdalena Yeh

## 2019-05-20 NOTE — PATIENT INSTRUCTIONS
Stop lovostatin 40 mg hs STAY on jardiance 10 mg a day Metformin 500 mg ONE PILL THREE   times a day  With meals  
 
 
glipizide 5 mg  To 2 pills ,  twenty minutes  Before b-fast and to 2 pills  pill before dinner  
 
------------------------------------------------------------------------------------------------------------------------------------------------------------------------------------------------------------------- Do not skip meals Do not eat in between meals Reduce carbs- pasta, rice, potatoes, bread Do not drink juices or sodas Donot eat peanut butter Do not eat sugar free cookies and cakes Do not eat peaches, grapes, pineapples and oranges

## 2019-05-20 NOTE — LETTER
5/20/19 Patient: Hammad Abbasi YOB: 1940 Date of Visit: 5/20/2019 Martínez Omalley MD 
[address] [place] [address] VIA In Basket Dear Martínez Omalley MD, Thank you for referring Mr. Zoe Sosa to [address] for evaluation. My notes for this consultation are attached. If you have questions, please do not hesitate to call me. I look forward to following your patient along with you. Sincerely, Rama Banegas MD

## 2019-05-21 ENCOUNTER — TELEPHONE (OUTPATIENT)
Dept: ENDOCRINOLOGY | Age: 79
End: 2019-05-21

## 2019-05-21 NOTE — TELEPHONE ENCOUNTER
Absolutely , no problem     Explain to him that it was not reconciled  And I happened to stop it thinking that he was not on it     Sorry about that

## 2019-05-21 NOTE — TELEPHONE ENCOUNTER
Patient called. States MD Trimble stopped [place]. Patient states he will continue to take it because it is for his back pain and it was not prescribed by Diabetic doctor.

## 2019-05-22 DIAGNOSIS — M51.36 DDD (DEGENERATIVE DISC DISEASE), LUMBAR: Chronic | ICD-10-CM

## 2019-05-22 DIAGNOSIS — M54.50 CHRONIC LEFT-SIDED LOW BACK PAIN WITHOUT SCIATICA: ICD-10-CM

## 2019-05-22 DIAGNOSIS — G89.29 CHRONIC LEFT-SIDED LOW BACK PAIN WITHOUT SCIATICA: ICD-10-CM

## 2019-05-22 RX ORDER — HYDROCODONE BITARTRATE AND ACETAMINOPHEN 10; 325 MG/1; MG/1
1 TABLET ORAL
Qty: 90 TAB | Refills: 0 | Status: SHIPPED | OUTPATIENT
Start: 2019-05-22 | End: 2019-06-21 | Stop reason: SDUPTHER

## 2019-05-22 NOTE — TELEPHONE ENCOUNTER
----- Message from Rodo Grewal sent at 5/22/2019  8:56 AM EDT -----  Regarding: Dr. Charity Panchal  Patient is requesting a prescription for Hydrocodone [place] be picked up in the office. His number is [phone].

## 2019-05-22 NOTE — TELEPHONE ENCOUNTER
Triage for Controlled Substance Refill Request    Pain Diagnosis: Chronic Pain    Last Outpatient Visit: 3/5/19    Next Outpatient Visit:6/5/19    Reason for refill needed outside of office visit?prescribed only on a monthly basis    Pain escalation requiring increased medication- no    Pharmacy: See pain contract    Medication: See Prescription requested       reviewed:today by Dr. Debby Capone and filed in  folder, separate from chart    Date of Urine Drug Screen: 3/22/19      Opioid Safety Handout given: Always prints with the Patient AVS    Appropriate for refill: yes    Action:  Rx sent to Dr. Debby Capone

## 2019-05-24 DIAGNOSIS — E11.42 TYPE 2 DIABETES MELLITUS WITH DIABETIC POLYNEUROPATHY, WITHOUT LONG-TERM CURRENT USE OF INSULIN (HCC): ICD-10-CM

## 2019-05-24 RX ORDER — GLIPIZIDE 5 MG/1
TABLET ORAL
Qty: 360 TAB | Refills: 3 | Status: SHIPPED | OUTPATIENT
Start: 2019-05-24 | End: 2019-06-06

## 2019-06-05 ENCOUNTER — OFFICE VISIT (OUTPATIENT)
Dept: FAMILY MEDICINE CLINIC | Age: 79
End: 2019-06-05

## 2019-06-05 VITALS
SYSTOLIC BLOOD PRESSURE: 132 MMHG | WEIGHT: 249 LBS | TEMPERATURE: 98.5 F | DIASTOLIC BLOOD PRESSURE: 69 MMHG | BODY MASS INDEX: 31.95 KG/M2 | HEIGHT: 74 IN | OXYGEN SATURATION: 98 % | RESPIRATION RATE: 20 BRPM | HEART RATE: 69 BPM

## 2019-06-05 DIAGNOSIS — E11.49 TYPE 2 DIABETES MELLITUS WITH OTHER NEUROLOGIC COMPLICATION, WITHOUT LONG-TERM CURRENT USE OF INSULIN (HCC): Chronic | ICD-10-CM

## 2019-06-05 DIAGNOSIS — J43.2 CENTRILOBULAR EMPHYSEMA (HCC): Chronic | ICD-10-CM

## 2019-06-05 DIAGNOSIS — G89.4 CHRONIC PAIN SYNDROME: Chronic | ICD-10-CM

## 2019-06-05 DIAGNOSIS — I10 ESSENTIAL HYPERTENSION: Chronic | ICD-10-CM

## 2019-06-05 DIAGNOSIS — E55.9 VITAMIN D DEFICIENCY: ICD-10-CM

## 2019-06-05 DIAGNOSIS — E11.21 TYPE 2 DIABETES MELLITUS WITH NEPHROPATHY (HCC): Primary | ICD-10-CM

## 2019-06-05 DIAGNOSIS — N18.30 STAGE 3 CHRONIC KIDNEY DISEASE (HCC): Chronic | ICD-10-CM

## 2019-06-05 DIAGNOSIS — I48.19 PERSISTENT ATRIAL FIBRILLATION (HCC): ICD-10-CM

## 2019-06-05 DIAGNOSIS — I50.32 CHRONIC DIASTOLIC HEART FAILURE (HCC): ICD-10-CM

## 2019-06-05 DIAGNOSIS — I10 ESSENTIAL HYPERTENSION WITH GOAL BLOOD PRESSURE LESS THAN 130/85: Chronic | ICD-10-CM

## 2019-06-05 DIAGNOSIS — G89.29 CHRONIC RADICULAR PAIN OF LOWER BACK: ICD-10-CM

## 2019-06-05 DIAGNOSIS — M54.16 CHRONIC RADICULAR PAIN OF LOWER BACK: ICD-10-CM

## 2019-06-05 DIAGNOSIS — E78.00 PURE HYPERCHOLESTEROLEMIA: ICD-10-CM

## 2019-06-05 RX ORDER — LABETALOL 200 MG/1
TABLET, FILM COATED ORAL
Qty: 90 TAB | Refills: 3 | Status: SHIPPED | OUTPATIENT
Start: 2019-06-05 | End: 2020-01-06

## 2019-06-05 RX ORDER — HYDRALAZINE HYDROCHLORIDE 50 MG/1
TABLET, FILM COATED ORAL
Qty: 360 TAB | Refills: 3 | Status: SHIPPED | OUTPATIENT
Start: 2019-06-05 | End: 2020-01-01

## 2019-06-05 RX ORDER — BUMETANIDE 2 MG/1
TABLET ORAL
Qty: 180 TAB | Refills: 1 | Status: SHIPPED | OUTPATIENT
Start: 2019-06-05 | End: 2021-01-01

## 2019-06-05 RX ORDER — GABAPENTIN 300 MG/1
300 CAPSULE ORAL 3 TIMES DAILY
Qty: 270 CAP | Refills: 2 | Status: SHIPPED | OUTPATIENT
Start: 2019-06-05 | End: 2019-09-16 | Stop reason: SDUPTHER

## 2019-06-05 NOTE — PROGRESS NOTES
[address] [place] [address], [facility]  [phone]           Progress Note    Patient: Livan Dao MRN: [id number]  SSN: [id number]    YOB: 1940  Age: [de-identified] y.o. Sex: male        Chief Complaint   Patient presents with    COPD    Labs         Subjective:     Encounter Diagnoses   Name Primary?  Type 2 diabetes mellitus with nephropathy ([facility] [address]): This patient is managed under a comprehensive plan of care for Diabetes. Overall the patient feels well with good energy level. Key Antihyperglycemic Medications             glipiZIDE (GLUCOTROL) 5 mg tablet (Taking) TAKE 2 TABLETS BY MOUTH 20 MINUTES BEFORE BREAKFAST AND 2 TABLETS 20 MINUTES BEFORE SUPPER    metFORMIN (GLUCOPHAGE) 500 mg tablet (Taking) Take 1 Tab by mouth three (3) times daily (with meals). empagliflozin (JARDIANCE) 10 mg tablet (Taking) Take one tab daily. STOP LOVASTATIN           Pertinent Labs:   Lab Results   Component Value Date/Time    Hemoglobin A1c 5.5 03/07/2019 04:16 PM    Hemoglobin A1c 6.4 (H) 12/05/2018 12:30 PM    Hemoglobin A1c 6.2 (H) 09/05/2018 12:41 PM    Hemoglobin A1c, External 6.3 10/12/2017      Body mass index is 31.97 kg/m². Lab Results   Component Value Date/Time    LDL, calculated 130 (H) 03/07/2019 04:16 PM         Lab Results   Component Value Date/Time    Sodium 145 (H) 03/07/2019 04:16 PM    Potassium 3.5 03/07/2019 04:16 PM    Chloride 104 03/07/2019 04:16 PM    CO2 26 03/07/2019 04:16 PM    Anion gap 12 10/07/2010 09:27 AM    Glucose 73 03/07/2019 04:16 PM    BUN 10 03/07/2019 04:16 PM    Creatinine 1.20 03/07/2019 04:16 PM    BUN/Creatinine ratio 8 (L) 03/07/2019 04:16 PM    GFR est AA 66 03/07/2019 04:16 PM    GFR est non-AA 57 (L) 03/07/2019 04:16 PM    Calcium 9.7 03/07/2019 04:16 PM    AST (SGOT) 25 03/07/2019 04:16 PM    Alk.  phosphatase 84 03/07/2019 04:16 PM    Protein, total 6.7 03/07/2019 04:16 PM    Albumin 4.3 03/07/2019 04:16 PM    Globulin 2.7 10/07/2010 09:27 AM    A-G Ratio 1.8 03/07/2019 04:16 PM    ALT (SGPT) 25 03/07/2019 04:16 PM     Lab Results   Component Value Date/Time    Microalb/Creat ratio (ug/mg creat.) 139.9 (H) 03/07/2019 04:16 PM      Frequency of home glucose testing:daily   Blood Sugar range at home: No logs   Last eye exam: In past 12 months. Last foot exam: This year. Polyuria, polyphagia or polydipsia: No   Retinopathy: No   Neuropathy SX: No    Low blood sugar symptoms: No   Dietary compliance: Good   Medication compliance:Good   On ASA: Yes   Depression: No   CKD:no     Wt Readings from Last 3 Encounters:   06/05/19 249 lb (112.9 kg)   05/20/19 252 lb 1.6 oz (114.4 kg)   03/05/19 250 lb (113.4 kg)        Social History     Tobacco Use   Smoking Status Former Smoker    Types: Cigarettes    Last attempt to quit: 6/20/2003    Years since quitting: 15.9   Smokeless Tobacco Never Used     Body mass index is 31.97 kg/m². Diabetic Consultants: Dr. Gabriele Lizarraga  All the patient's questions regarding medications, diet and exercise were answered. Goal of A1C of less than 7.5% is our goal.   Our overall goal is to reduce or eliminate the long term consequences of poorly controlled diabetes. Yes    Persistent atrial fibrillation [facility]):  Denies chest pain, edema, dyspnea or dizziness. Unaware of irregular heartbeats. No neurologic sx. Avoids caffeine.  Type 2 diabetes mellitus with other neurologic complication, without long-term current use of insulin ([facility] [address]): See above          Essential hypertension:  .  BP Readings from Last 3 Encounters:   06/05/19 132/69   05/20/19 121/46   03/05/19 146/73     The patient reports:  taking medications as instructed, no medication side effects noted, no TIA's, no chest pain on exertion, no dyspnea on exertion, no swelling of ankles.     Key CAD CHF Meds             labetalol (NORMODYNE) 200 mg tablet (Taking) TAKE ONE-HALF TABLET BY MOUTH TWICE DAILY FOR HYPERTENSION    hydrALAZINE (APRESOLINE) 50 mg tablet (Taking) TAKE ONE TABLET BY MOUTH 4 TIMES DAILY FOR  HYPERTENSION    bumetanide (BUMEX) 2 mg tablet (Taking) TAKE ONE TABLET BY MOUTH TWICE DAILY    cloNIDine HCl (CATAPRES) 0.3 mg tablet (Taking) TAKE 1 TABLET BY MOUTH THREE TIMES DAILY    PRADAXA 150 mg capsule (Taking) TAKE ONE CAPSULE BY MOUTH TWICE DAILY    rosuvastatin (CRESTOR) 20 mg tablet (Taking) Take 1 Tab by mouth nightly. amLODIPine (NORVASC) 10 mg tablet (Taking) TAKE ONE TABLET BY MOUTH ONCE DAILY    aspirin 81 mg chewable tablet (Taking) Take 81 mg by mouth daily. Lab Results   Component Value Date/Time    Sodium 145 (H) 03/07/2019 04:16 PM    Potassium 3.5 03/07/2019 04:16 PM    Chloride 104 03/07/2019 04:16 PM    CO2 26 03/07/2019 04:16 PM    Anion gap 12 10/07/2010 09:27 AM    Glucose 73 03/07/2019 04:16 PM    BUN 10 03/07/2019 04:16 PM    Creatinine 1.20 03/07/2019 04:16 PM    BUN/Creatinine ratio 8 (L) 03/07/2019 04:16 PM    GFR est AA 66 03/07/2019 04:16 PM    GFR est non-AA 57 (L) 03/07/2019 04:16 PM    Calcium 9.7 03/07/2019 04:16 PM    Bilirubin, total 0.5 03/07/2019 04:16 PM    AST (SGOT) 25 03/07/2019 04:16 PM    Alk. phosphatase 84 03/07/2019 04:16 PM    Protein, total 6.7 03/07/2019 04:16 PM    Albumin 4.3 03/07/2019 04:16 PM    Globulin 2.7 10/07/2010 09:27 AM    A-G Ratio 1.8 03/07/2019 04:16 PM    ALT (SGPT) 25 03/07/2019 04:16 PM     Low salt diet? yes  Aerobic exercise? no  Our goal is to normalize the blood pressure to decrease the risks of strokes and heart attacks. The patient is in agreement with the plan.  Chronic radicular pain of lower back; Severe degenerative disc disease with lumbar radiculopathy. Requires long-term chronic narcotic therapy. Cannot walk safely.  Chronic diastolic heart failure ([address]): Compensated with only mild edema present.            Essential hypertension with goal blood pressure less than 130/85:  BP Readings from Last 3 Encounters:   06/05/19 132/69   05/20/19 121/46   03/05/19 146/73     The patient reports:  taking medications as instructed, no medication side effects noted, no TIA's, no chest pain on exertion, no dyspnea on exertion, noting swelling of ankles. Key CAD CHF Meds             labetalol (NORMODYNE) 200 mg tablet (Taking) TAKE ONE-HALF TABLET BY MOUTH TWICE DAILY FOR  HYPERTENSION    hydrALAZINE (APRESOLINE) 50 mg tablet (Taking) TAKE ONE TABLET BY MOUTH 4 TIMES DAILY FOR  HYPERTENSION    bumetanide (BUMEX) 2 mg tablet (Taking) TAKE ONE TABLET BY MOUTH TWICE DAILY    cloNIDine HCl (CATAPRES) 0.3 mg tablet (Taking) TAKE 1 TABLET BY MOUTH THREE TIMES DAILY    PRADAXA 150 mg capsule (Taking) TAKE ONE CAPSULE BY MOUTH TWICE DAILY    rosuvastatin (CRESTOR) 20 mg tablet (Taking) Take 1 Tab by mouth nightly. amLODIPine (NORVASC) 10 mg tablet (Taking) TAKE ONE TABLET BY MOUTH ONCE DAILY    aspirin 81 mg chewable tablet (Taking) Take 81 mg by mouth daily. Lab Results   Component Value Date/Time    Sodium 145 (H) 03/07/2019 04:16 PM    Potassium 3.5 03/07/2019 04:16 PM    Chloride 104 03/07/2019 04:16 PM    CO2 26 03/07/2019 04:16 PM    Anion gap 12 10/07/2010 09:27 AM    Glucose 73 03/07/2019 04:16 PM    BUN 10 03/07/2019 04:16 PM    Creatinine 1.20 03/07/2019 04:16 PM    BUN/Creatinine ratio 8 (L) 03/07/2019 04:16 PM    GFR est AA 66 03/07/2019 04:16 PM    GFR est non-AA 57 (L) 03/07/2019 04:16 PM    Calcium 9.7 03/07/2019 04:16 PM    Bilirubin, total 0.5 03/07/2019 04:16 PM    AST (SGOT) 25 03/07/2019 04:16 PM    Alk. phosphatase 84 03/07/2019 04:16 PM    Protein, total 6.7 03/07/2019 04:16 PM    Albumin 4.3 03/07/2019 04:16 PM    Globulin 2.7 10/07/2010 09:27 AM    A-G Ratio 1.8 03/07/2019 04:16 PM    ALT (SGPT) 25 03/07/2019 04:16 PM     Low salt diet? yes  Aerobic exercise? no  Our goal is to normalize the blood pressure to decrease the risks of strokes and heart attacks.  The patient is in agreement with the plan.  Centrilobular emphysema ([address]):  SpO2 Readings from Last 3 Encounters:   06/05/19 98%   05/20/19 98%   03/05/19 99%     Oxygen: He currently is not on home oxygen therapy. Symptoms: chronic dyspnea: severity = mild: course of sx: stable. Patient does not smoke cigarettes. Compliant to medications.  Stage 3 chronic kidney disease [facility]):  Lab Results   Component Value Date/Time    GFR est AA 66 03/07/2019 04:16 PM    GFR est non-AA 57 (L) 03/07/2019 04:16 PM    Creatinine (POC) 1.4 (H) 01/02/2015 10:08 AM    Creatinine 1.20 03/07/2019 04:16 PM    BUN 10 03/07/2019 04:16 PM    Sodium 145 (H) 03/07/2019 04:16 PM    Potassium 3.5 03/07/2019 04:16 PM    Chloride 104 03/07/2019 04:16 PM    CO2 26 03/07/2019 04:16 PM     Lab Results   Component Value Date/Time    WBC 3.7 09/05/2018 12:41 PM    HGB 12.3 (L) 03/07/2019 04:16 PM    HCT 36.1 (L) 09/05/2018 12:41 PM    PLATELET 338 06/48/5763 12:41 PM    MCV 77 (L) 09/05/2018 12:41 PM     Lab Results   Component Value Date/Time    Vitamin D 25-Hydroxy 21 (L) 10/07/2010 09:27 AM    VITAMIN D, 25-HYDROXY 10.6 (L) 03/07/2019 04:16 PM       Lab Results   Component Value Date/Time    Calcium 9.7 03/07/2019 04:16 PM    Phosphorus 1.7 (L) 09/05/2018 12:41 PM    PTH, Intact 101 (H) 03/07/2019 04:16 PM              Chronic pain syndrome: Degenerative disc disease of his back as a cause of this.  BMI 32.0-32.9,adult:  I have reviewed/discussed the above normal BMI with the patient. I have recommended the following interventions: dietary management education, guidance, and counseling . Discussed low starch diabetic diet. He is currently eating too much bread.  Vitamin D deficiency:  No sx. Due for testing.   Lab Results   Component Value Date/Time    Vitamin D 25-Hydroxy 21 (L) 10/07/2010 09:27 AM    VITAMIN D, 25-HYDROXY 10.6 (L) 03/07/2019 04:16 PM            Pure hypercholesterolemia:  Cardiovascular risks for him are: LDL goal is under 80  diabetic  hypertension  hyperlipidemia. Key Antihyperlipidemia Meds             rosuvastatin (CRESTOR) 20 mg tablet (Taking) Take 1 Tab by mouth nightly. Lab Results   Component Value Date/Time    Cholesterol, total 186 03/07/2019 04:16 PM    HDL Cholesterol 41 03/07/2019 04:16 PM    LDL, calculated 130 (H) 03/07/2019 04:16 PM    Triglyceride 77 03/07/2019 04:16 PM    CHOL/HDL Ratio 3.7 10/07/2010 09:27 AM     Lab Results   Component Value Date/Time    ALT (SGPT) 25 03/07/2019 04:16 PM    AST (SGOT) 25 03/07/2019 04:16 PM    Alk. phosphatase 84 03/07/2019 04:16 PM    Bilirubin, total 0.5 03/07/2019 04:16 PM      Myalgias: No   Fatigue: No   Other side effects: no  Wt Readings from Last 3 Encounters:   06/05/19 249 lb (112.9 kg)   05/20/19 252 lb 1.6 oz (114.4 kg)   03/05/19 250 lb (113.4 kg)     The patient is aware of our goal to reduce or eliminate the long term problems (such as strokes and heart attacks) related to poorly controlled hyperlipidemia. Current and past medical information:    Current Medications after this visit[de-identified]     Current Outpatient Medications   Medication Sig    labetalol (NORMODYNE) 200 mg tablet TAKE ONE-HALF TABLET BY MOUTH TWICE DAILY FOR  HYPERTENSION    gabapentin (NEURONTIN) 300 mg capsule Take 1 Cap by mouth three (3) times daily.  hydrALAZINE (APRESOLINE) 50 mg tablet TAKE ONE TABLET BY MOUTH 4 TIMES DAILY FOR  HYPERTENSION    b complex-vitamin c-folic acid 5mg (FOLBEE PLUS) tab tablet TAKE 1 TABLET BY MOUTH ONCE DAILY    bumetanide (BUMEX) 2 mg tablet TAKE ONE TABLET BY MOUTH TWICE DAILY    glipiZIDE (GLUCOTROL) 5 mg tablet TAKE 2 TABLETS BY MOUTH 20 MINUTES BEFORE BREAKFAST AND 2 TABLETS 20 MINUTES BEFORE SUPPER    HYDROcodone-acetaminophen (NORCO)  mg tablet Take 1 Tab by mouth every six (6) hours as needed for Pain for up to 30 days.  metFORMIN (GLUCOPHAGE) 500 mg tablet Take 1 Tab by mouth three (3) times daily (with meals).  empagliflozin (JARDIANCE) 10 mg tablet Take one tab daily. STOP LOVASTATIN    COLCRYS 0.6 mg tablet TAKE ONE TABLET BY MOUTH ONCE DAILY TO  PREVENT  GOUT    cloNIDine HCl (CATAPRES) 0.3 mg tablet TAKE 1 TABLET BY MOUTH THREE TIMES DAILY    omeprazole (PRILOSEC) 20 mg capsule TAKE ONE CAPSULE BY MOUTH ONCE DAILY    tamsulosin (FLOMAX) 0.4 mg capsule TAKE ONE CAPSULE BY MOUTH ONCE DAILY    allopurinol (ZYLOPRIM) 300 mg tablet Take 1 Tab by mouth daily.  baclofen (LIORESAL) 10 mg tablet TAKE ONE TABLET BY MOUTH THREE TIMES DAILY (MUSCLE  RELAXER)    PRADAXA 150 mg capsule TAKE ONE CAPSULE BY MOUTH TWICE DAILY    potassium chloride (K-DUR) 10 mEq tablet Take 2 Tabs by mouth two (2) times a day.  rosuvastatin (CRESTOR) 20 mg tablet Take 1 Tab by mouth nightly.  ergocalciferol (ERGOCALCIFEROL) 50,000 unit capsule Take 1 Cap by mouth every seven (7) days.  albuterol (PROVENTIL HFA, VENTOLIN HFA, PROAIR HFA) 90 mcg/actuation inhaler Take 2 Puffs by inhalation every six (6) hours as needed for Wheezing for up to 360 days. Please use generic that is covered    montelukast (SINGULAIR) 10 mg tablet Take 1 Tab by mouth daily.  amLODIPine (NORVASC) 10 mg tablet TAKE ONE TABLET BY MOUTH ONCE DAILY    ferrous sulfate (IRON) 325 mg (65 mg iron) tablet Take 1 Tab by mouth two (2) times daily (after meals). Indications: Iron Deficiency Anemia    [facility]ets [facility]     ACCU-CHEK SHAUNA CONTROL SOLN soln     docusate sodium 100 mg tab Take 1 Tab by mouth two (2) times a day.  magnesium oxide (MAG-OX) 400 mg tablet Take 1 Tab by mouth daily.  Blood-Glucose Meter (TRUE METRIX GLUCOSE METER) misc Use as Directed    glucose blood VI test strips (TRUE METRIX GLUCOSE TEST STRIP) strip Test 2 times daily Dx Coed E11.65    Lancets misc Test 2 times daily Dx Code E11.65    aspirin 81 mg chewable tablet Take 81 mg by mouth daily. No current facility-administered medications for this visit. Patient Active Problem List    Diagnosis Date Noted    BMI 32.0-32.9,adult 06/05/2019     Priority: 1 - One    Type 2 diabetes mellitus with nephropathy ([address] 01/10/2018     Priority: 1 - One    History of colon polyps 05/02/2016     Priority: 1 - One    Idiopathic gout 05/02/2016     Priority: 1 - One    Essential hypertension with goal blood pressure less than 130/85 05/02/2016     Priority: 1 - One    CKD (chronic kidney disease) 11/23/2015     Priority: 1 - One    COPD (chronic obstructive pulmonary disease) ([address] 04/27/2015     Priority: 1 - One    Diabetes with neurologic complications ([address] 21/25/1225     Priority: 1 - One    Chronic radicular pain of lower back 07/21/2014     Priority: 1 - One    Neuropathy 07/16/2013     Priority: 1 - One    DDD (degenerative disc disease), lumbar 07/16/2013     Priority: 1 - One    Prostate CA ([address] 07/16/2013     Priority: 1 - One    Edema 01/12/2012     Priority: 1 - One    Persistent atrial fibrillation ([address]      Priority: 1 - One    Arthritis 07/11/2011     Priority: 1 - One    Gout, unspecified      Priority: 1 - One    Hypercholesterolemia      Priority: 1 - One    Leg pain      Priority: 1 - One    Chronic pain 05/06/2010     Priority: 1 - One    Hypertension      Priority: 2 - Two    Tubulovillous adenoma polyp of colon 07/16/2013     Priority: 6 - Six    Unspecified arthropathy, ankle and foot 07/14/2011     Priority: 6 - Six    PVD (peripheral vascular disease) ([address]      Priority: 6 - Six       Past Medical History:   Diagnosis Date    Acute on chronic diastolic congestive heart failure ([address] 4/27/2015    Arthritis 7/11/2011    [place] 6/7/2010    Cancer ([address]     prostate    Chronic diastolic heart failure ([address] 8/26/2015    CKD (chronic kidney disease) 11/23/2015    Diabetes ([address]     Gout, unspecified     Hypercholesterolemia     Hypertension     Inguinal lymphadenopathy 2/18/2014    Leg pain     PAF (paroxysmal atrial fibrillation) (HCC)     PAF (paroxysmal atrial fibrillation) (HCC)     PVD (peripheral vascular disease) (HCC)        Allergies   Allergen Reactions    Indocin [Indomethacin Sodium] Unknown (comments)    Lisinopril Angioedema     Dxed in hospital.    Losartan Other (comments)     Face and throat swelling. Past Surgical History:   Procedure Laterality Date    COLONOSCOPY N/A 9/22/2016    COLONOSCOPY performed by Mauricio Del Cid [facility] at [facility] HX ORTHOPAEDIC      back and left shoulder x2       Social History     Socioeconomic History    Marital status: SINGLE     Spouse name: Not on file    Number of children: Not on file    Years of education: Not on file    Highest education level: Not on file   Tobacco Use    Smoking status: Former Smoker     Types: Cigarettes     Last attempt to quit: 6/20/2003     Years since quitting: 15.9    Smokeless tobacco: Never Used   Substance and Sexual Activity    Alcohol use: Yes     Alcohol/week: 0.0 oz     Comment: 2 drinks/month    Drug use: No       Review of Systems   Constitutional: Negative. Negative for chills, fever, malaise/fatigue and weight loss. HENT: Negative. Negative for hearing loss. Eyes: Negative. Negative for blurred vision and double vision. Respiratory: Positive for shortness of breath. Negative for cough and sputum production. Cardiovascular: Negative. Negative for chest pain and palpitations. Gastrointestinal: Negative. Negative for abdominal pain, blood in stool, heartburn, nausea and vomiting. Genitourinary: Negative. Negative for dysuria, frequency and urgency. Musculoskeletal: Positive for back pain. Negative for falls, myalgias and neck pain. Skin: Negative. Negative for rash. Neurological: Negative. Negative for dizziness, tingling, tremors, weakness and headaches. Unstable gait due to weakness and loss of sensation in her right leg. Endo/Heme/Allergies: Negative. Psychiatric/Behavioral: Negative. Negative for depression and memory loss. Objective:     Vitals:    06/05/19 0941   BP: 132/69   Pulse: 69   Resp: 20   Temp: 98.5 °F (36.9 °C)   TempSrc: Oral   SpO2: 98%   Weight: 249 lb (112.9 kg)   Height: 6' 2\" (1.88 m)      Body mass index is 31.97 kg/m². Physical Exam   Constitutional: He is oriented to person, place, and time and well-developed, well-nourished, and in no distress. HENT:   Head: Normocephalic and atraumatic. Mouth/Throat: Oropharynx is clear and moist.   Eyes: Right eye exhibits no discharge. Left eye exhibits no discharge. No scleral icterus. Neck: No thyromegaly present. No bruit. Cardiovascular: Normal rate, regular rhythm and normal heart sounds. Pulmonary/Chest: Effort normal and breath sounds normal. No respiratory distress. He has no wheezes. He has no rales. Abdominal: Soft. He exhibits no distension. There is no tenderness. There is no rebound and no guarding. Neurological: He is alert and oriented to person, place, and time. Skin: No rash noted. No erythema. Psychiatric: Mood and affect normal.   Nursing note and vitals reviewed. There are no preventive care reminders to display for this patient. Assessment and orders:     Encounter Diagnoses     ICD-10-CM ICD-9-CM   1. Type 2 diabetes mellitus with nephropathy ([facility]) E11.21 250.40     583.81   2. Persistent atrial fibrillation ([facility]) I48.1 427.31   3. Type 2 diabetes mellitus with other neurologic complication, without long-term current use of insulin ([facility]) E11.49 250.60   4. Essential hypertension I10 401.9   5. Chronic radicular pain of lower back M54.16 724.4    G89.29 338.29   6. Chronic diastolic heart failure ([facility]) I50.32 428.32   7. Essential hypertension with goal blood pressure less than 130/85 I10 401.9   8. Centrilobular emphysema ([facility]) J43.2 492.8   9. Stage 3 chronic kidney disease ([facility]) N18.3 585.3   10. Chronic pain syndrome G89.4 338.4   11. BMI 32.0-32.9,adult Z68.32 V85.32   12. Vitamin D deficiency E55.9 268.9   13. Pure hypercholesterolemia E78.00 272.0     Diagnoses and all orders for this visit:    1. Type 2 diabetes mellitus with nephropathy (HCC)-retest  -     HEMOGLOBIN A1C WITH EAG  -     LIPID PANEL  -     RENAL FUNCTION PANEL  -     REFERRAL TO OPHTHALMOLOGY    2. Persistent atrial fibrillation (HCC)-  -     labetalol (NORMODYNE) 200 mg tablet; TAKE ONE-HALF TABLET BY MOUTH TWICE DAILY FOR  HYPERTENSION  -     LIPID PANEL  -     RENAL FUNCTION PANEL    3. Type 2 diabetes mellitus with other neurologic complication, without long-term current use of insulin (HCC)  -     LIPID PANEL  -     RENAL FUNCTION PANEL  -     REFERRAL TO OPHTHALMOLOGY    4. Essential hypertension-controlled  -     labetalol (NORMODYNE) 200 mg tablet; TAKE ONE-HALF TABLET BY MOUTH TWICE DAILY FOR  HYPERTENSION  -     RENAL FUNCTION PANEL    5. Chronic radicular pain of lower back-no change  -     gabapentin (NEURONTIN) 300 mg capsule; Take 1 Cap by mouth three (3) times daily. 6. Chronic diastolic heart failure (HCC)-stable  -     hydrALAZINE (APRESOLINE) 50 mg tablet; TAKE ONE TABLET BY MOUTH 4 TIMES DAILY FOR  HYPERTENSION    7. Essential hypertension with goal blood pressure less than 130/85-controlled  -     hydrALAZINE (APRESOLINE) 50 mg tablet; TAKE ONE TABLET BY MOUTH 4 TIMES DAILY FOR  HYPERTENSION  -     LIPID PANEL  -     RENAL FUNCTION PANEL  -     PROT+CREATU (RANDOM)  -     PTH INTACT  -     VITAMIN D, 25 HYDROXY  -     HEMOGLOBIN    8. Centrilobular emphysema (HCC)-stable    9. Stage 3 chronic kidney disease (HCC)-retest  -     RENAL FUNCTION PANEL  -     PROT+CREATU (RANDOM)  -     PTH INTACT  -     VITAMIN D, 25 HYDROXY  -     HEMOGLOBIN  -     ALT+AST    10. Chronic pain syndrome-stable    11. BMI 32.0-32.9,adult-low starch diabetic diet    12. Vitamin D deficiency-retest  -     VITAMIN D, 25 HYDROXY    13.  Pure hypercholesterolemia-retest  - LIPID PANEL  -     ALT+AST    Other orders  -     b complex-vitamin c-folic acid 5mg (FOLBEE PLUS) tab tablet; TAKE 1 TABLET BY MOUTH ONCE DAILY  -     bumetanide (BUMEX) 2 mg tablet; TAKE ONE TABLET BY MOUTH TWICE DAILY            Plan of care:  Discussed diagnoses in detail with patient. Medication risks/benefits/side effects discussed with patient. All of the patient's questions were addressed. The patient understands and agrees with our plan of care. The patient knows to call back if they are unsure of or forget any changes we discussed today or if the symptoms change. The patient received an After-Visit Summary which contains VS, orders, medication list and allergy list. This can be used as a \"mini-medical record\" should they have to seek medical care while out of town. Patient Care Team:  Bryan Greenfield MD as PCP - General (Family Practice)  Tip Bedolla (Orthopedic Surgery)  Radha Fonseca MD (Otolaryngology)  Rebeca Loya MD (Endocrinology)  Seymour Aguero MD as Physician (Internal Medicine)  Danii Salguero MD (Ophthalmology)  Person, Venkatesh Mckinney MD (Podiatry)  Acosta Garcia MD (Cardiology)  Anahi Rader MD (Ophthalmology)        Future Appointments   Date Time Provider Department Center   9/6/2019 10:10 AM Bryan Greenfield MD [facility] SCHED   9/11/2019  9:00 AM Acosta Garcia MD [address]   11/21/2019  9:00 AM Rebeca Loya MD [facility] SCHED       Signed By: Allyn Lewis MD     June 5, 2019      ATTENTION:   This medical record was transcribed using an electronic medical records/speech recognition system. Although proofread, it may and can contain electronic, spelling and other errors. Corrections may be executed at a later time. Please feel free to contact me for any clarifications as needed.

## 2019-06-05 NOTE — PATIENT INSTRUCTIONS

## 2019-06-05 NOTE — PROGRESS NOTES
1. Have you been to the ER, urgent care clinic since your last visit? Hospitalized since your last visit? No    2. Have you seen or consulted any other health care providers outside of the [address] [place] [address] since your last visit? Include any pap smears or colon screening. No  Reviewed record in preparation for visit and have necessary documentation  Pt did not bring medication to office visit for review  Information was given to pt on Advanced Directives, Living Will  Information was given on Shingles Vaccine  opportunity was given for questions  Goals that were addressed and/or need to be completed during or after this appointment include     There are no preventive care reminders to display for this patient.

## 2019-06-06 DIAGNOSIS — E11.42 TYPE 2 DIABETES MELLITUS WITH DIABETIC POLYNEUROPATHY, WITHOUT LONG-TERM CURRENT USE OF INSULIN (HCC): ICD-10-CM

## 2019-06-06 RX ORDER — GLIPIZIDE 5 MG/1
5 TABLET ORAL 2 TIMES DAILY
Qty: 180 TAB | Refills: 3
Start: 2019-06-06 | End: 2019-09-16 | Stop reason: SDUPTHER

## 2019-06-07 LAB
25(OH)D3+25(OH)D2 SERPL-MCNC: 47.2 NG/ML (ref 30–100)
ALBUMIN SERPL-MCNC: 4.4 G/DL (ref 3.5–4.7)
ALT SERPL-CCNC: 24 IU/L (ref 0–44)
AST SERPL-CCNC: 31 IU/L (ref 0–40)
BUN SERPL-MCNC: 18 MG/DL (ref 8–27)
BUN/CREAT SERPL: 12 (ref 10–24)
CALCIUM SERPL-MCNC: 9.6 MG/DL (ref 8.6–10.2)
CHLORIDE SERPL-SCNC: 106 MMOL/L (ref 96–106)
CHOLEST SERPL-MCNC: 125 MG/DL (ref 100–199)
CO2 SERPL-SCNC: 24 MMOL/L (ref 20–29)
CREAT SERPL-MCNC: 1.47 MG/DL (ref 0.76–1.27)
CREAT UR-MCNC: 35 MG/DL
EST. AVERAGE GLUCOSE BLD GHB EST-MCNC: 114 MG/DL
GLUCOSE SERPL-MCNC: 52 MG/DL (ref 65–99)
HBA1C MFR BLD: 5.6 % (ref 4.8–5.6)
HDLC SERPL-MCNC: 38 MG/DL
HGB BLD-MCNC: 11.4 G/DL (ref 13–17.7)
INTERPRETATION: NORMAL
LDLC SERPL CALC-MCNC: 74 MG/DL (ref 0–99)
Lab: NORMAL
PHOSPHATE SERPL-MCNC: 2.7 MG/DL (ref 2.5–4.5)
POTASSIUM SERPL-SCNC: 3.6 MMOL/L (ref 3.5–5.2)
PROT UR-MCNC: 18.6 MG/DL
PROT/CREAT UR: 531 MG/G CREAT (ref 0–200)
PTH-INTACT SERPL-MCNC: 105 PG/ML (ref 15–65)
SODIUM SERPL-SCNC: 147 MMOL/L (ref 134–144)
TRIGL SERPL-MCNC: 66 MG/DL (ref 0–149)
VLDLC SERPL CALC-MCNC: 13 MG/DL (ref 5–40)

## 2019-06-18 RX ORDER — MONTELUKAST SODIUM 10 MG/1
10 TABLET ORAL DAILY
Qty: 90 TAB | Refills: 3 | Status: SHIPPED | OUTPATIENT
Start: 2019-06-18 | End: 2019-11-21 | Stop reason: SDUPTHER

## 2019-06-18 RX ORDER — AMLODIPINE BESYLATE 10 MG/1
TABLET ORAL
Qty: 90 TAB | Refills: 3 | Status: SHIPPED | OUTPATIENT
Start: 2019-06-18 | End: 2019-09-16 | Stop reason: SDUPTHER

## 2019-06-21 DIAGNOSIS — M54.50 CHRONIC LEFT-SIDED LOW BACK PAIN WITHOUT SCIATICA: ICD-10-CM

## 2019-06-21 DIAGNOSIS — G89.29 CHRONIC LEFT-SIDED LOW BACK PAIN WITHOUT SCIATICA: ICD-10-CM

## 2019-06-21 DIAGNOSIS — M51.36 DDD (DEGENERATIVE DISC DISEASE), LUMBAR: Chronic | ICD-10-CM

## 2019-06-21 RX ORDER — HYDROCODONE BITARTRATE AND ACETAMINOPHEN 10; 325 MG/1; MG/1
1 TABLET ORAL
Qty: 90 TAB | Refills: 0 | Status: SHIPPED | OUTPATIENT
Start: 2019-06-21 | End: 2019-07-23 | Stop reason: SDUPTHER

## 2019-06-21 NOTE — TELEPHONE ENCOUNTER
Triage for Controlled Substance Refill Request    Pain Diagnosis: Chronic Pain    Last Outpatient Visit: 6/5/19    Next Outpatient Visit:9/6/19    Reason for refill needed outside of office visit?prescribed only on a monthly basis    Pain escalation requiring increased medication- no    Pharmacy: See pain contract    Medication: See Prescription requested       reviewed:today by Dr. Lincoln Bruno and filed in  folder, separate from chart    Date of Urine Drug Screen:  3/22/19      Opioid Safety Handout given: Always prints with the Patient AVS    Appropriate for refill: yes    Action:  Rx sent to Dr. Lincoln Bruno

## 2019-06-21 NOTE — TELEPHONE ENCOUNTER
Marjorie Malcolm,daughter, requesting a refill for Rx\"Hydrocodone (unsure of mg)\". Contact jo when refill is ready for pickup. Best [phone]     Message taken by Call Center.   Last office visit was 6/5/19

## 2019-07-18 DIAGNOSIS — M54.16 CHRONIC RADICULAR PAIN OF LOWER BACK: ICD-10-CM

## 2019-07-18 DIAGNOSIS — G89.29 CHRONIC RADICULAR PAIN OF LOWER BACK: ICD-10-CM

## 2019-07-18 RX ORDER — BLOOD-GLUCOSE METER
EACH MISCELLANEOUS
Qty: 1 EACH | Refills: 0 | Status: SHIPPED | OUTPATIENT
Start: 2019-07-18 | End: 2019-07-18 | Stop reason: SDUPTHER

## 2019-07-18 RX ORDER — BLOOD-GLUCOSE METER
EACH MISCELLANEOUS
Qty: 1 EACH | Refills: 0 | Status: SHIPPED | OUTPATIENT
Start: 2019-07-18 | End: 2021-01-01

## 2019-07-18 RX ORDER — GABAPENTIN 300 MG/1
300 CAPSULE ORAL 3 TIMES DAILY
Qty: 270 CAP | Refills: 2 | OUTPATIENT
Start: 2019-07-18

## 2019-07-18 RX ORDER — LANCETS
EACH MISCELLANEOUS
Qty: 100 EACH | Refills: 11 | Status: SHIPPED | OUTPATIENT
Start: 2019-07-18 | End: 2021-01-01

## 2019-07-18 NOTE — TELEPHONE ENCOUNTER
Request received via fax from [facility]. Qty:270  Take 1 capsule by mouth 3 times daily.

## 2019-07-23 DIAGNOSIS — M54.9 CHRONIC BACK PAIN: ICD-10-CM

## 2019-07-23 DIAGNOSIS — G89.29 CHRONIC PAIN: ICD-10-CM

## 2019-07-23 DIAGNOSIS — G89.29 CHRONIC BACK PAIN: ICD-10-CM

## 2019-07-23 DIAGNOSIS — M51.36 DDD (DEGENERATIVE DISC DISEASE), LUMBAR: Chronic | ICD-10-CM

## 2019-07-23 DIAGNOSIS — M10.9 GOUT, UNSPECIFIED: ICD-10-CM

## 2019-07-23 DIAGNOSIS — G89.29 CHRONIC LEFT-SIDED LOW BACK PAIN WITHOUT SCIATICA: ICD-10-CM

## 2019-07-23 DIAGNOSIS — M54.50 CHRONIC LEFT-SIDED LOW BACK PAIN WITHOUT SCIATICA: ICD-10-CM

## 2019-07-23 RX ORDER — HYDROCODONE BITARTRATE AND ACETAMINOPHEN 10; 325 MG/1; MG/1
1 TABLET ORAL
Qty: 90 TAB | Refills: 0 | Status: SHIPPED | OUTPATIENT
Start: 2019-07-23 | End: 2019-08-21 | Stop reason: SDUPTHER

## 2019-07-23 RX ORDER — OXYCODONE AND ACETAMINOPHEN 5; 325 MG/1; MG/1
1 TABLET ORAL
Qty: 90 TAB | Refills: 0 | OUTPATIENT
Start: 2019-07-23

## 2019-07-23 NOTE — TELEPHONE ENCOUNTER
Triage for Controlled Substance Refill Request    Pain Diagnosis: Chronic Pain    Last Outpatient Visit: 6/5/19    Next Outpatient Visit:9/6/19    Reason for refill needed outside of office visit?prescribed only on a monthly basis    Pain escalation requiring increased medication- no    Pharmacy: See pain contract    Medication: See Prescription requested       reviewed:today by Dr. Jackie Paulino and filed in  folder, separate from chart    Date of Urine Drug Screen: 3/22/19      Opioid Safety Handout given: Always prints with the Patient AVS    Appropriate for refill: yes    Action:  Rx sent to Dr. Jackie Paulino

## 2019-08-08 ENCOUNTER — TELEPHONE (OUTPATIENT)
Dept: FAMILY MEDICINE CLINIC | Age: 79
End: 2019-08-08

## 2019-08-08 NOTE — TELEPHONE ENCOUNTER
----- Message from [address] [place] [address]. sent at 8/8/2019 11:45 AM EDT -----  Regarding: Dr. Mervat Medina General Message/Vendor Calls    Caller's first and last name: [facility] with ([address])      Reason for call: Regarding verifying if the practice has received a fax order.       Call back required yes/no and why: Yes, verify      Best contact number(s): [phone]       Details to clarify the request:      [address] [place] [address].

## 2019-08-12 NOTE — TELEPHONE ENCOUNTER
Returned phone call to Elma with The  [address] [place] [address] Blvd. Asked Elma to refax form to [phone].

## 2019-08-14 ENCOUNTER — TELEPHONE (OUTPATIENT)
Dept: FAMILY MEDICINE CLINIC | Age: 79
End: 2019-08-14

## 2019-08-15 ENCOUNTER — TELEPHONE (OUTPATIENT)
Dept: FAMILY MEDICINE CLINIC | Age: 79
End: 2019-08-15

## 2019-08-19 NOTE — TELEPHONE ENCOUNTER
Spoke with patient and advised him of above. He states that he would like to keep his appointment for 9/16/19.

## 2019-08-20 ENCOUNTER — TELEPHONE (OUTPATIENT)
Dept: FAMILY MEDICINE CLINIC | Age: 79
End: 2019-08-20

## 2019-08-20 NOTE — TELEPHONE ENCOUNTER
Saeid Cervantes, [facility] Office Pool             General Message/Vendor Calls     Caller's first and last name:Stephen with [address]       Reason for call: Laureano Oliver requesting a call back regarding status of Rx for \"Foot bath\" fax on 08/14/19. Pt stated this is his 3rd attempt for the Rx.        Callback required yes/no and why:Yes       Best contact number(s):[phone] or Fax:[phone]       Details to clarify the request:       Juan Daniel Courser

## 2019-08-20 NOTE — TELEPHONE ENCOUNTER
Spoke with [facility] and made him aware that the patient has an upcoming appointment at the end of September. Dr. Kiko Bermeo will discuss the forms with the patient then.

## 2019-08-21 DIAGNOSIS — M54.50 CHRONIC LEFT-SIDED LOW BACK PAIN WITHOUT SCIATICA: ICD-10-CM

## 2019-08-21 DIAGNOSIS — M51.36 DDD (DEGENERATIVE DISC DISEASE), LUMBAR: Chronic | ICD-10-CM

## 2019-08-21 DIAGNOSIS — G89.29 CHRONIC LEFT-SIDED LOW BACK PAIN WITHOUT SCIATICA: ICD-10-CM

## 2019-08-21 NOTE — TELEPHONE ENCOUNTER
Caller's first/last name:  Mike Cameronlukasz    Relationship to patient and are they on HIPAA:  Daughter    Is caller on HIPAA?   yes    Best contact number:  [phone]

## 2019-08-21 NOTE — TELEPHONE ENCOUNTER
Triage for Controlled Substance Refill Request    Pain Diagnosis: Chronic Pain    Last Outpatient Visit: 6/5/19    Next Outpatient Visit:9/16/19    Reason for refill needed outside of office visit?prescribed only on a monthly basis    Pain escalation requiring increased medication- no    Pharmacy: See pain contract    Medication: See Prescription requested       reviewed:today by Dr. Jackie Paulino and filed in  folder, separate from chart    Date of Urine Drug Screen:  3/22/19    Opioid Safety Handout given: Always prints with the Patient AVS    Appropriate for refill: yes    Action:  Rx sent to Dr. Jackie Paulino

## 2019-08-22 RX ORDER — HYDROCODONE BITARTRATE AND ACETAMINOPHEN 10; 325 MG/1; MG/1
1 TABLET ORAL
Qty: 90 TAB | Refills: 0 | Status: SHIPPED | OUTPATIENT
Start: 2019-08-22 | End: 2019-09-23 | Stop reason: SDUPTHER

## 2019-09-11 ENCOUNTER — OFFICE VISIT (OUTPATIENT)
Dept: CARDIOLOGY CLINIC | Age: 79
End: 2019-09-11

## 2019-09-11 VITALS
TEMPERATURE: 98.7 F | SYSTOLIC BLOOD PRESSURE: 136 MMHG | DIASTOLIC BLOOD PRESSURE: 63 MMHG | WEIGHT: 249 LBS | RESPIRATION RATE: 20 BRPM | BODY MASS INDEX: 31.95 KG/M2 | HEIGHT: 74 IN | OXYGEN SATURATION: 97 % | HEART RATE: 59 BPM

## 2019-09-11 DIAGNOSIS — I51.9 MILD DIASTOLIC DYSFUNCTION: ICD-10-CM

## 2019-09-11 DIAGNOSIS — I73.9 PVD (PERIPHERAL VASCULAR DISEASE) (HCC): ICD-10-CM

## 2019-09-11 DIAGNOSIS — N18.30 STAGE 3 CHRONIC KIDNEY DISEASE (HCC): ICD-10-CM

## 2019-09-11 DIAGNOSIS — I10 ESSENTIAL HYPERTENSION: ICD-10-CM

## 2019-09-11 DIAGNOSIS — E78.00 HYPERCHOLESTEROLEMIA: ICD-10-CM

## 2019-09-11 DIAGNOSIS — I48.19 PERSISTENT ATRIAL FIBRILLATION (HCC): Primary | ICD-10-CM

## 2019-09-11 NOTE — PROGRESS NOTES
Concha More      1940   Gissell Velazquez MD  Date of Visit-9/11/2019     [facility],  PCP=Westley Rivas MD     [facility] [address] [place] [address] [facility] [place]. HPI:   Evelina Donald is a [de-identified] y.o. male   One year fu of atrial fibrillation   Feels no change  Rare heart palpitations   Denies chest pain, edema, syncope or shortness of breath at rest    Edema improved with diuretics, watching renal fx closely  Assessment/Plans:  1.  Persistent A Fib.  Good rate control, Echo -normal EF    Continues on Pradaxa for stroke prophylaxis  , no bleeding  --I dont feel strongly about aspirin since on [address]  --RRR on exam due to likely near normal R-R     2.  Normal LV systolic function.    No significant valve disease.    Likely mild diastolic dysfunction. Edema is resolved     3.  Malignant HTN, on multiple meds.    4.  CKD-renal fu Creat 1.2-1.4  5.  Dyslipidemia & DM cardiovascular disease risk factors   6.  PVD- no current complaints, continue ASA  ---June 2015 Ara 0.39 right and 0.73 on left  Fu one year     Cardiac History:   December 2011  Holter- PAF , afib about 4% showed some high rates at night  --rates highly fluctuated on holter, up to 156, will start metoprolol 50 mg daily  Nuke-his nuke showed no ischemia  Echo with good fx but some LVH and LAE   [facility]- ARA 0.92, >1.0    ARA s 2015 right 0.39 , left 0.73    ECHO 0-02-96=UB 55 % diastolic dysfunction 2+ LAE and TR , 1+ MR, iVC dilated  ECHO 9-6-17= Normal EF, mild regurg, ANGELO     ROS:Cardiac complete  as above. Respiratory as above with no wheezing or hemoptysis.    He denies  symptoms of unusual weight loss , fevers,  BRBPR, hematuria,  or recent stroke    Past Medical History:   Diagnosis Date    Acute on chronic diastolic congestive heart failure ([address] 4/27/2015    Arthritis 7/11/2011    Blackhead 6/7/2010    Cancer (HCC)     prostate    Chronic diastolic heart failure ([address] 8/26/2015    CKD (chronic kidney disease) 11/23/2015    Diabetes ([facility]     Gout, unspecified     Hypercholesterolemia     Hypertension     Inguinal lymphadenopathy 2/18/2014    Leg pain     PAF (paroxysmal atrial fibrillation) (HCC)     PAF (paroxysmal atrial fibrillation) (HCC)     PVD (peripheral vascular disease) (HCC)       Exam and Labs:  Visit Vitals  /63 (BP 1 Location: Left arm, BP Patient Position: Sitting)   Pulse (!) 59   Temp 98.7 °F (37.1 °C) (Oral)   Resp 20   Ht 6' 2\" (1.88 m)   Wt 249 lb (112.9 kg)   SpO2 97%   BMI 31.97 kg/m²     Constitutional:  NAD, comfortable , moist mucous membranesHENT: Head: NC,ATEyes: No scleral icterus. Neck:  Neck supple. No JVD present. No tracheal deviation,mass  Chest: Effort normal & normal respiratory excursion     Lungs:breath sounds normal. No stridor. distress, wheezes or  Rales. Heart:normal rate, regular rhythm, normal S1, S2, no murmurs, rubs, clicks or gallops , PMI non displaced. Edema: Edema is none. Extremities:  no clubbing or cyanosis. Abdominal:  no abnormal distension. Neurological: alert, conversant and oriented . Skin: Skin is not cold. No obvious systemic rash noted. Not diaphoretic. No erythema. Psychiatric:  Grossly normal mood and affect.   Behavior appears normal.     Lab Results   Component Value Date/Time    Cholesterol, total 125 06/05/2019 10:23 AM    HDL Cholesterol 38 (L) 06/05/2019 10:23 AM    LDL, calculated 74 06/05/2019 10:23 AM    Triglyceride 66 06/05/2019 10:23 AM    CHOL/HDL Ratio 3.7 10/07/2010 09:27 AM     Lab Results   Component Value Date/Time    Sodium 147 (H) 06/05/2019 10:23 AM    Potassium 3.6 06/05/2019 10:23 AM    Chloride 106 06/05/2019 10:23 AM    CO2 24 06/05/2019 10:23 AM    Anion gap 12 10/07/2010 09:27 AM    Glucose 52 (L) 06/05/2019 10:23 AM    BUN 18 06/05/2019 10:23 AM    Creatinine 1.47 (H) 06/05/2019 10:23 AM    BUN/Creatinine ratio 12 06/05/2019 10:23 AM    GFR est AA 51 (L) 06/05/2019 10:23 AM    GFR est non-AA 44 (L) 06/05/2019 10:23 AM    Calcium 9.6 06/05/2019 10:23 AM      Wt Readings from Last 3 Encounters:   09/11/19 249 lb (112.9 kg)   06/05/19 249 lb (112.9 kg)   05/20/19 252 lb 1.6 oz (114.4 kg)      BP Readings from Last 3 Encounters:   09/11/19 136/63   06/05/19 132/69   05/20/19 121/46        Current Outpatient Medications   Medication Sig    HYDROcodone-acetaminophen (NORCO)  mg tablet Take 1 Tab by mouth every six (6) hours as needed for Pain for up to 30 days.  Blood-Glucose Meter (TRUE METRIX GLUCOSE METER) misc AS DIRECTED    glucose blood VI test strips (TRUE METRIX GLUCOSE TEST STRIP) strip Test 2 times daily Dx Coed E11.65    lancets misc Test 2 times daily Dx Code E11.65    montelukast (SINGULAIR) 10 mg tablet Take 1 Tab by mouth daily.  amLODIPine (NORVASC) 10 mg tablet TAKE ONE TABLET BY MOUTH ONCE DAILY    glipiZIDE (GLUCOTROL) 5 mg tablet Take 1 Tab by mouth two (2) times a day.  labetalol (NORMODYNE) 200 mg tablet TAKE ONE-HALF TABLET BY MOUTH TWICE DAILY FOR  HYPERTENSION    gabapentin (NEURONTIN) 300 mg capsule Take 1 Cap by mouth three (3) times daily.  hydrALAZINE (APRESOLINE) 50 mg tablet TAKE ONE TABLET BY MOUTH 4 TIMES DAILY FOR  HYPERTENSION    b complex-vitamin c-folic acid 5mg (FOLBEE PLUS) tab tablet TAKE 1 TABLET BY MOUTH ONCE DAILY    bumetanide (BUMEX) 2 mg tablet TAKE ONE TABLET BY MOUTH TWICE DAILY    metFORMIN (GLUCOPHAGE) 500 mg tablet Take 1 Tab by mouth three (3) times daily (with meals).  empagliflozin (JARDIANCE) 10 mg tablet Take one tab daily. STOP LOVASTATIN    COLCRYS 0.6 mg tablet TAKE ONE TABLET BY MOUTH ONCE DAILY TO  PREVENT  GOUT    cloNIDine HCl (CATAPRES) 0.3 mg tablet TAKE 1 TABLET BY MOUTH THREE TIMES DAILY    omeprazole (PRILOSEC) 20 mg capsule TAKE ONE CAPSULE BY MOUTH ONCE DAILY    tamsulosin (FLOMAX) 0.4 mg capsule TAKE ONE CAPSULE BY MOUTH ONCE DAILY    allopurinol (ZYLOPRIM) 300 mg tablet Take 1 Tab by mouth daily.  baclofen (LIORESAL) 10 mg tablet TAKE ONE TABLET BY MOUTH THREE TIMES DAILY (MUSCLE  RELAXER)    PRADAXA 150 mg capsule TAKE ONE CAPSULE BY MOUTH TWICE DAILY    potassium chloride (K-DUR) 10 mEq tablet Take 2 Tabs by mouth two (2) times a day.  rosuvastatin (CRESTOR) 20 mg tablet Take 1 Tab by mouth nightly.  ergocalciferol (ERGOCALCIFEROL) 50,000 unit capsule Take 1 Cap by mouth every seven (7) days.  albuterol (PROVENTIL HFA, VENTOLIN HFA, PROAIR HFA) 90 mcg/actuation inhaler Take 2 Puffs by inhalation every six (6) hours as needed for Wheezing for up to 360 days. Please use generic that is covered    ferrous sulfate (IRON) 325 mg (65 mg iron) tablet Take 1 Tab by mouth two (2) times daily (after meals). Indications: Iron Deficiency Anemia    lancets 30 gauge misc     ACCU-CHEK SHAUNA CONTROL SOLN soln     docusate sodium 100 mg tab Take 1 Tab by mouth two (2) times a day.  magnesium oxide (MAG-OX) 400 mg tablet Take 1 Tab by mouth daily.  aspirin 81 mg chewable tablet Take 81 mg by mouth daily. No current facility-administered medications for this visit. Past Surgical History:   Procedure Laterality Date    COLONOSCOPY N/A 9/22/2016    COLONOSCOPY performed by Mounika Oliveira MD at [facility]      back and left shoulder x2      Social Hx=  reports that he quit smoking about 16 years ago. His smoking use included cigarettes. He has never used smokeless tobacco. He reports that he drinks alcohol. He reports that he does not use drugs. Family Hx= family history includes Cancer in his paternal grandfather; Hypertension in an other family member. Impression see above.

## 2019-09-16 ENCOUNTER — OFFICE VISIT (OUTPATIENT)
Dept: FAMILY MEDICINE CLINIC | Age: 79
End: 2019-09-16

## 2019-09-16 VITALS
WEIGHT: 253.8 LBS | HEIGHT: 74 IN | DIASTOLIC BLOOD PRESSURE: 64 MMHG | HEART RATE: 48 BPM | BODY MASS INDEX: 32.57 KG/M2 | SYSTOLIC BLOOD PRESSURE: 119 MMHG | RESPIRATION RATE: 18 BRPM | OXYGEN SATURATION: 98 % | TEMPERATURE: 97.7 F

## 2019-09-16 DIAGNOSIS — M79.604 PAIN IN BOTH LOWER EXTREMITIES: Chronic | ICD-10-CM

## 2019-09-16 DIAGNOSIS — M10.00 IDIOPATHIC GOUT, UNSPECIFIED CHRONICITY, UNSPECIFIED SITE: ICD-10-CM

## 2019-09-16 DIAGNOSIS — G89.29 CHRONIC RADICULAR PAIN OF LOWER BACK: ICD-10-CM

## 2019-09-16 DIAGNOSIS — I73.9 PVD (PERIPHERAL VASCULAR DISEASE) (HCC): Chronic | ICD-10-CM

## 2019-09-16 DIAGNOSIS — R60.0 LOCALIZED EDEMA: Chronic | ICD-10-CM

## 2019-09-16 DIAGNOSIS — E11.42 TYPE 2 DIABETES MELLITUS WITH DIABETIC POLYNEUROPATHY, WITHOUT LONG-TERM CURRENT USE OF INSULIN (HCC): ICD-10-CM

## 2019-09-16 DIAGNOSIS — I48.19 PERSISTENT ATRIAL FIBRILLATION (HCC): ICD-10-CM

## 2019-09-16 DIAGNOSIS — E11.21 TYPE 2 DIABETES MELLITUS WITH NEPHROPATHY (HCC): Primary | ICD-10-CM

## 2019-09-16 DIAGNOSIS — E78.00 HYPERCHOLESTEROLEMIA: Chronic | ICD-10-CM

## 2019-09-16 DIAGNOSIS — M79.605 PAIN IN BOTH LOWER EXTREMITIES: Chronic | ICD-10-CM

## 2019-09-16 DIAGNOSIS — M51.36 DDD (DEGENERATIVE DISC DISEASE), LUMBAR: Chronic | ICD-10-CM

## 2019-09-16 DIAGNOSIS — E87.6 HYPOKALEMIA: ICD-10-CM

## 2019-09-16 DIAGNOSIS — C61 PROSTATE CA (HCC): Chronic | ICD-10-CM

## 2019-09-16 DIAGNOSIS — Z23 ENCOUNTER FOR IMMUNIZATION: ICD-10-CM

## 2019-09-16 DIAGNOSIS — G62.9 NEUROPATHY: Chronic | ICD-10-CM

## 2019-09-16 DIAGNOSIS — J43.2 CENTRILOBULAR EMPHYSEMA (HCC): Chronic | ICD-10-CM

## 2019-09-16 DIAGNOSIS — I73.9 PAD (PERIPHERAL ARTERY DISEASE) (HCC): ICD-10-CM

## 2019-09-16 DIAGNOSIS — R00.1 BRADYCARDIA: ICD-10-CM

## 2019-09-16 DIAGNOSIS — M54.16 CHRONIC RADICULAR PAIN OF LOWER BACK: ICD-10-CM

## 2019-09-16 DIAGNOSIS — I10 ESSENTIAL HYPERTENSION: Chronic | ICD-10-CM

## 2019-09-16 RX ORDER — POTASSIUM CHLORIDE 750 MG/1
20 TABLET, EXTENDED RELEASE ORAL 2 TIMES DAILY
Qty: 360 TAB | Refills: 3 | Status: SHIPPED | OUTPATIENT
Start: 2019-09-16

## 2019-09-16 RX ORDER — GLIPIZIDE 5 MG/1
5 TABLET ORAL 2 TIMES DAILY
Qty: 180 TAB | Refills: 3 | Status: SHIPPED | OUTPATIENT
Start: 2019-09-16 | End: 2020-01-01

## 2019-09-16 RX ORDER — AMLODIPINE BESYLATE 10 MG/1
TABLET ORAL
Qty: 90 TAB | Refills: 3 | Status: SHIPPED | OUTPATIENT
Start: 2019-09-16

## 2019-09-16 RX ORDER — CLONIDINE HYDROCHLORIDE 0.3 MG/1
0.3 TABLET ORAL 2 TIMES DAILY
Qty: 180 TAB | Refills: 2
Start: 2019-09-16 | End: 2020-01-01

## 2019-09-16 RX ORDER — ERGOCALCIFEROL 1.25 MG/1
50000 CAPSULE ORAL
Qty: 12 CAP | Refills: 2 | Status: SHIPPED | OUTPATIENT
Start: 2019-09-16 | End: 2020-01-01 | Stop reason: SDUPTHER

## 2019-09-16 RX ORDER — METFORMIN HYDROCHLORIDE 500 MG/1
500 TABLET ORAL
Qty: 270 TAB | Refills: 4 | Status: SHIPPED | OUTPATIENT
Start: 2019-09-16 | End: 2019-12-17 | Stop reason: SDUPTHER

## 2019-09-16 RX ORDER — ROSUVASTATIN CALCIUM 20 MG/1
20 TABLET, COATED ORAL
Qty: 90 TAB | Refills: 1 | Status: SHIPPED | OUTPATIENT
Start: 2019-09-16 | End: 2021-01-01 | Stop reason: ALTCHOICE

## 2019-09-16 RX ORDER — GABAPENTIN 300 MG/1
300 CAPSULE ORAL 3 TIMES DAILY
Qty: 270 CAP | Refills: 2 | Status: SHIPPED | OUTPATIENT
Start: 2019-09-16 | End: 2020-01-01

## 2019-09-16 NOTE — PROGRESS NOTES
1. Have you been to the ER, urgent care clinic since your last visit? Hospitalized since your last visit? No    2. Have you seen or consulted any other health care providers outside of the [address] [place] [address] since your last visit? Include any pap smears or colon screening.  No  Reviewed record in preparation for visit and have necessary documentation  Pt did not bring medication to office visit for review    Goals that were addressed and/or need to be completed during or after this appointment include     Health Maintenance Due   Topic Date Due    Shingrix Vaccine Age 50> (2 of 2) 07/18/2019    Influenza Age 5 to Adult  08/01/2019

## 2019-09-16 NOTE — PROGRESS NOTES
[address] [place] [address], [facility]  [phone]           Progress Note    Patient: Catherine Mccurdy MRN: [id number]  SSN: [id number]    YOB: 1940  Age: [de-identified] y.o. Sex: male        Chief Complaint   Patient presents with    Form Completion     diabetic shoes          Subjective:   Multiple diverse medical problems necessitating extensive decision making, prolonged chart review and prolonged OV. Encounter Diagnoses   Name Primary?  Type 2 diabetes mellitus with nephropathy ([facility] [address]): This patient is managed under a comprehensive plan of care for Diabetes. Overall the patient feels well with good energy level. Key Antihyperglycemic Medications             glipiZIDE (GLUCOTROL) 5 mg tablet (Taking) Take 1 Tab by mouth two (2) times a day. metFORMIN (GLUCOPHAGE) 500 mg tablet (Taking) Take 1 Tab by mouth three (3) times daily (with meals). empagliflozin (JARDIANCE) 10 mg tablet (Taking) Take one tab daily. STOP LOVASTATIN           Pertinent Labs:   Lab Results   Component Value Date/Time    Hemoglobin A1c 5.6 06/05/2019 10:23 AM    Hemoglobin A1c 5.5 03/07/2019 04:16 PM    Hemoglobin A1c 6.4 (H) 12/05/2018 12:30 PM    Hemoglobin A1c, External 6.3 10/12/2017      Body mass index is 32.59 kg/m². Lab Results   Component Value Date/Time    LDL, calculated 74 06/05/2019 10:23 AM         Lab Results   Component Value Date/Time    Sodium 147 (H) 06/05/2019 10:23 AM    Potassium 3.6 06/05/2019 10:23 AM    Chloride 106 06/05/2019 10:23 AM    CO2 24 06/05/2019 10:23 AM    Anion gap 12 10/07/2010 09:27 AM    Glucose 52 (L) 06/05/2019 10:23 AM    BUN 18 06/05/2019 10:23 AM    Creatinine 1.47 (H) 06/05/2019 10:23 AM    BUN/Creatinine ratio 12 06/05/2019 10:23 AM    GFR est AA 51 (L) 06/05/2019 10:23 AM    GFR est non-AA 44 (L) 06/05/2019 10:23 AM    Calcium 9.6 06/05/2019 10:23 AM    AST (SGOT) 31 06/05/2019 10:23 AM    Alk.  phosphatase 84 2019 04:16 PM    Protein, total 6.7 2019 04:16 PM    Albumin 4.4 2019 10:23 AM    Globulin 2.7 10/07/2010 09:27 AM    A-G Ratio 1.8 2019 04:16 PM    ALT (SGPT) 24 2019 10:23 AM     Lab Results   Component Value Date/Time    Microalb/Creat ratio (ug/mg creat.) 139.9 (H) 2019 04:16 PM      Frequency of home glucose testing: He did not bring logs in. Blood Sugar range at home:    Last eye exam: In past 12 months. Last foot exam: This year. Polyuria, polyphagia or polydipsia: No   Retinopathy: No   Neuropathy SX: Severe   Low blood sugar symptoms: No   Dietary compliance: Fair   Medication compliance:Good   On ASA: Yes   Depression: No   CKD: CKD 3     Wt Readings from Last 3 Encounters:   19 253 lb 12.8 oz (115.1 kg)   19 249 lb (112.9 kg)   19 249 lb (112.9 kg)        Social History     Tobacco Use   Smoking Status Former Smoker    Types: Cigarettes    Last attempt to quit: 2003    Years since quittin.2   Smokeless Tobacco Never Used     Body mass index is 32.59 kg/m². Diabetic Consultants: Dr. Mahsa Gonzalez  All the patient's questions regarding medications, diet and exercise were answered. Goal of A1C of less than 7.5% is our goal.   Our overall goal is to reduce or eliminate the long term consequences of poorly controlled diabetes. Yes    Type 2 diabetes mellitus with diabetic polyneuropathy, without long-term current use of insulin ([facility] [address]): above         Hypokalemia:-Corrected  Lab Results   Component Value Date/Time    Potassium 3.6 2019 10:23 AM              PVD (peripheral vascular disease) ([facility] [address]): Barely palpable pulses in feet. No open ulcers.  Prostate CA [facility]): No current symptoms.  Persistent atrial fibrillation [facility]):   Denies chest pain, edema, dyspnea or dizziness. Unaware of irregular heartbeats. No neurologic sx. Avoids caffeine.          Centrilobular emphysema ([facility] [address]  SpO2 Readings from Last 3 Encounters:   09/16/19 98%   09/11/19 97%   06/05/19 98%     Oxygen: He currently is not on home oxygen therapy. Symptoms: chronic dyspnea: severity = moderate: course of sx: stable. Patient does not smoke cigarettes. Compliant to medications.  Neuropathy: Bilateral neuropathy of the feet worse on the right leg where his nerve damage is.  Pain in both lower extremities: Due to severe neuropathy.  Idiopathic gout, unspecified chronicity, unspecified site: Symptoms come and go. Lab Results   Component Value Date/Time    Uric acid 4.3 04/16/2018 12:28 PM          Essential hypertension:  BP Readings from Last 3 Encounters:   09/16/19 119/64   09/11/19 136/63   06/05/19 132/69     The patient reports:  taking medications as instructed, no medication side effects noted, no TIA's, no chest pain on exertion, notes stable dyspnea on exertion, no change, noting swelling of ankles. Key CAD CHF Meds             amLODIPine (NORVASC) 10 mg tablet (Taking) TAKE ONE TABLET BY MOUTH ONCE DAILY    rosuvastatin (CRESTOR) 20 mg tablet (Taking) Take 1 Tab by mouth nightly. Indications: high cholesterol    cloNIDine HCl (CATAPRES) 0.3 mg tablet (Taking) Take 1 Tab by mouth two (2) times a day. labetalol (NORMODYNE) 200 mg tablet (Taking) TAKE ONE-HALF TABLET BY MOUTH TWICE DAILY FOR  HYPERTENSION    hydrALAZINE (APRESOLINE) 50 mg tablet (Taking) TAKE ONE TABLET BY MOUTH 4 TIMES DAILY FOR  HYPERTENSION    bumetanide (BUMEX) 2 mg tablet (Taking) TAKE ONE TABLET BY MOUTH TWICE DAILY    PRADAXA 150 mg capsule (Taking) TAKE ONE CAPSULE BY MOUTH TWICE DAILY    aspirin 81 mg chewable tablet (Taking) Take 81 mg by mouth daily.            Lab Results   Component Value Date/Time    Sodium 147 (H) 06/05/2019 10:23 AM    Potassium 3.6 06/05/2019 10:23 AM    Chloride 106 06/05/2019 10:23 AM    CO2 24 06/05/2019 10:23 AM    Anion gap 12 10/07/2010 09:27 AM    Glucose 52 (L) 06/05/2019 10:23 AM    BUN 18 06/05/2019 10:23 AM    Creatinine 1.47 (H) 06/05/2019 10:23 AM    BUN/Creatinine ratio 12 06/05/2019 10:23 AM    GFR est AA 51 (L) 06/05/2019 10:23 AM    GFR est non-AA 44 (L) 06/05/2019 10:23 AM    Calcium 9.6 06/05/2019 10:23 AM    Bilirubin, total 0.5 03/07/2019 04:16 PM    AST (SGOT) 31 06/05/2019 10:23 AM    Alk. phosphatase 84 03/07/2019 04:16 PM    Protein, total 6.7 03/07/2019 04:16 PM    Albumin 4.4 06/05/2019 10:23 AM    Globulin 2.7 10/07/2010 09:27 AM    A-G Ratio 1.8 03/07/2019 04:16 PM    ALT (SGPT) 24 06/05/2019 10:23 AM     Low salt diet? yes  Aerobic exercise? no  Our goal is to normalize the blood pressure to decrease the risks of strokes and heart attacks. The patient is in agreement with the plan.  Hypercholesterolemia:  Cardiovascular risks for him are: LDL goal is under 80  diabetic  hypertension  hyperlipidemia  former smoker. Key Antihyperlipidemia Meds             rosuvastatin (CRESTOR) 20 mg tablet (Taking) Take 1 Tab by mouth nightly. Indications: high cholesterol        Lab Results   Component Value Date/Time    Cholesterol, total 125 06/05/2019 10:23 AM    HDL Cholesterol 38 (L) 06/05/2019 10:23 AM    LDL, calculated 74 06/05/2019 10:23 AM    Triglyceride 66 06/05/2019 10:23 AM    CHOL/HDL Ratio 3.7 10/07/2010 09:27 AM     Lab Results   Component Value Date/Time    ALT (SGPT) 24 06/05/2019 10:23 AM    AST (SGOT) 31 06/05/2019 10:23 AM    Alk. phosphatase 84 03/07/2019 04:16 PM    Bilirubin, total 0.5 03/07/2019 04:16 PM      Myalgias: No   Fatigue: No   Other side effects: no  Wt Readings from Last 3 Encounters:   09/16/19 253 lb 12.8 oz (115.1 kg)   09/11/19 249 lb (112.9 kg)   06/05/19 249 lb (112.9 kg)     The patient is aware of our goal to reduce or eliminate the long term problems (such as strokes and heart attacks) related to poorly controlled hyperlipidemia.  Chronic radicular pain of lower back: Pretty much wheelchair-bound. Can transfer from seat to wheelchair.          Localized edema: Both lower extremities and feet.  DDD (degenerative disc disease), lumbar: Dense neuropathy right lower leg.  BMI 32.0-32.9,adult: Due to his inactivity will suspect his BMI of 32 is the best we can attain.  PAD (peripheral artery disease) ([address]): Decreased pulses bilaterally.  Bradycardia: This is a new finding. EKG has changed axis as well as voltage. We will set him up to see Dr. Maddy Patel in follow-up even though he is asymptomatic. I will also cut back his clonidine to 0.3 mg twice daily see if this corrects the bradycardia.  Encounter for immunization: Flu shot given today. Current and past medical information:    Current Medications after this visit[de-identified]     Current Outpatient Medications   Medication Sig    amLODIPine (NORVASC) 10 mg tablet TAKE ONE TABLET BY MOUTH ONCE DAILY    b complex-vitamin c-folic acid 5mg (FOLBEE PLUS) tab tablet TAKE 1 TABLET BY MOUTH ONCE DAILY    ergocalciferol (ERGOCALCIFEROL) 50,000 unit capsule Take 1 Cap by mouth every seven (7) days.  gabapentin (NEURONTIN) 300 mg capsule Take 1 Cap by mouth three (3) times daily.  glipiZIDE (GLUCOTROL) 5 mg tablet Take 1 Tab by mouth two (2) times a day.  metFORMIN (GLUCOPHAGE) 500 mg tablet Take 1 Tab by mouth three (3) times daily (with meals).  rosuvastatin (CRESTOR) 20 mg tablet Take 1 Tab by mouth nightly. Indications: high cholesterol    potassium chloride (K-DUR) 10 mEq tablet Take 2 Tabs by mouth two (2) times a day. Indications: low amount of potassium in the blood    cloNIDine HCl (CATAPRES) 0.3 mg tablet Take 1 Tab by mouth two (2) times a day.  HYDROcodone-acetaminophen (NORCO)  mg tablet Take 1 Tab by mouth every six (6) hours as needed for Pain for up to 30 days.     Blood-Glucose Meter (TRUE METRIX GLUCOSE METER) misc AS DIRECTED    glucose blood VI test strips (TRUE METRIX GLUCOSE TEST STRIP) strip Test 2 times daily Dx Coed E11.65    lancets misc Test 2 times daily Dx Code E11.65    montelukast (SINGULAIR) 10 mg tablet Take 1 Tab by mouth daily.  labetalol (NORMODYNE) 200 mg tablet TAKE ONE-HALF TABLET BY MOUTH TWICE DAILY FOR  HYPERTENSION    hydrALAZINE (APRESOLINE) 50 mg tablet TAKE ONE TABLET BY MOUTH 4 TIMES DAILY FOR  HYPERTENSION    bumetanide (BUMEX) 2 mg tablet TAKE ONE TABLET BY MOUTH TWICE DAILY    empagliflozin (JARDIANCE) 10 mg tablet Take one tab daily. STOP LOVASTATIN    COLCRYS 0.6 mg tablet TAKE ONE TABLET BY MOUTH ONCE DAILY TO  PREVENT  GOUT    omeprazole (PRILOSEC) 20 mg capsule TAKE ONE CAPSULE BY MOUTH ONCE DAILY    tamsulosin (FLOMAX) 0.4 mg capsule TAKE ONE CAPSULE BY MOUTH ONCE DAILY    allopurinol (ZYLOPRIM) 300 mg tablet Take 1 Tab by mouth daily.  baclofen (LIORESAL) 10 mg tablet TAKE ONE TABLET BY MOUTH THREE TIMES DAILY (MUSCLE  RELAXER)    PRADAXA 150 mg capsule TAKE ONE CAPSULE BY MOUTH TWICE DAILY    albuterol (PROVENTIL HFA, VENTOLIN HFA, PROAIR HFA) 90 mcg/actuation inhaler Take 2 Puffs by inhalation every six (6) hours as needed for Wheezing for up to 360 days. Please use generic that is covered    ferrous sulfate (IRON) 325 mg (65 mg iron) tablet Take 1 Tab by mouth two (2) times daily (after meals). Indications: Iron Deficiency Anemia    lancets 30 gauge misc     ACCU-CHEK SHAUNA CONTROL SOLN soln     docusate sodium 100 mg tab Take 1 Tab by mouth two (2) times a day.  magnesium oxide (MAG-OX) 400 mg tablet Take 1 Tab by mouth daily.  aspirin 81 mg chewable tablet Take 81 mg by mouth daily. No current facility-administered medications for this visit.         Patient Active Problem List    Diagnosis Date Noted    BMI 32.0-32.9,adult 06/05/2019     Priority: 1 - One    [facility] 01/10/2018     Priority: 1 - One    History of colon polyps 05/02/2016     Priority: 1 - One    Idiopathic gout 05/02/2016     Priority: 1 - One    Essential hypertension with goal blood pressure less than 130/85 05/02/2016     Priority: 1 - One    CKD (chronic kidney disease) 11/23/2015     Priority: 1 - One    COPD [facility]chronic obstructive pulmonary disease) ([facility] 04/27/2015     Priority: 1 - One    Diabetes with neurologic complications ([facility] 86/41/1991     Priority: 1 - One    Chronic radicular pain of lower back 07/21/2014     Priority: 1 - One    Neuropathy 07/16/2013     Priority: 1 - One    DDD (degenerative disc disease), lumbar 07/16/2013     Priority: 1 - One    Prostate CA ([facility] [address] 07/16/2013     Priority: 1 - One    Edema 01/12/2012     Priority: 1 - One    Persistent atrial fibrillation ([facility]      Priority: 1 - One    Arthritis 07/11/2011     Priority: 1 - One    Gout, unspecified      Priority: 1 - One    Hypercholesterolemia      Priority: 1 - One    Leg pain      Priority: 1 - One    Chronic pain 05/06/2010     Priority: 1 - One    Hypertension      Priority: 2 - Two    Tubulovillous adenoma polyp of colon 07/16/2013     Priority: 6 - Six    Unspecified arthropathy, ankle and foot 07/14/2011     Priority: 6 - Six    PVD (peripheral vascular disease) ([facility]      Priority: 6 - Six       Past Medical History:   Diagnosis Date    Acute on chronic diastolic congestive heart failure ([facility] [address] 4/27/2015    Arthritis 7/11/2011    [place] 6/7/2010    Cancer ([facility] [address]     prostate    Chronic diastolic heart failure ([facility] 8/26/2015    CKD (chronic kidney disease) 11/23/2015    Diabetes ([facility]     Gout, unspecified     Hypercholesterolemia     Hypertension     Inguinal lymphadenopathy 2/18/2014    Leg pain     PAF (paroxysmal atrial fibrillation) (HCC)     PAF (paroxysmal atrial fibrillation) (HCC)     PVD (peripheral vascular disease) (HCC)        Allergies   Allergen Reactions    Indocin [Indomethacin Sodium] Unknown (comments)    Lisinopril Angioedema     Dxed in hospital.    Losartan Other (comments)     Face and throat swelling. Past Surgical History:   Procedure Laterality Date    COLONOSCOPY N/A 2016    COLONOSCOPY performed by Fitz Pedraza MD at [address] HX ORTHOPAEDIC      back and left shoulder x2       Social History     Socioeconomic History    Marital status: SINGLE     Spouse name: Not on file    Number of children: Not on file    Years of education: Not on file    Highest education level: Not on file   Tobacco Use    Smoking status: Former Smoker     Types: Cigarettes     Last attempt to quit: 2003     Years since quittin.2    Smokeless tobacco: Never Used   Substance and Sexual Activity    Alcohol use: Yes     Alcohol/week: 0.0 standard drinks     Comment: 2 drinks/month    Drug use: No       Review of Systems   Constitutional: Negative. Negative for chills, fever, malaise/fatigue and weight loss. Diabetic foot exam done forms filled out for him to get custom shoes. HENT: Negative. Negative for hearing loss. Eyes: Negative. Negative for blurred vision and double vision. Respiratory: Positive for shortness of breath. Negative for cough and sputum production. Cardiovascular: Negative. Negative for chest pain and palpitations. Gastrointestinal: Negative. Negative for abdominal pain, blood in stool, heartburn, nausea and vomiting. Genitourinary: Negative. Negative for dysuria, frequency and urgency. Musculoskeletal: Positive for back pain and joint pain. Negative for falls, myalgias and neck pain. Wheelchair-bound. Skin: Negative. Negative for rash. Neurological: Negative. Negative for dizziness, tingling, tremors, weakness and headaches. Endo/Heme/Allergies: Negative. Psychiatric/Behavioral: Negative. Negative for depression.         Objective:     Vitals:    19 0921   BP: 119/64   Pulse: (!) 48   Resp: 18   Temp: 97.7 °F (36.5 °C)   TempSrc: Oral   SpO2: 98%   Weight: 253 lb 12.8 oz (115.1 kg)   Height: 6' 2\" (1.88 m)      Body mass index is 32.59 kg/m². Physical Exam   Constitutional: He is oriented to person, place, and time and well-developed, well-nourished, and in no distress. HENT:   Head: Normocephalic and atraumatic. Mouth/Throat: Oropharynx is clear and moist.   Eyes: Right eye exhibits no discharge. Left eye exhibits no discharge. No scleral icterus. Neck: No thyromegaly present. No bruit. Cardiovascular: Normal rate, regular rhythm and normal heart sounds. Exam reveals no gallop and no friction rub. No murmur heard. Pulmonary/Chest: Effort normal. No respiratory distress. He has no wheezes. He has no rales. He has diffusely decreased breath sounds with no rales wheezes or rhonchi. Abdominal: Soft. He exhibits no distension. There is no tenderness. There is no rebound and no guarding. Musculoskeletal: He exhibits no edema. Neurological: He is alert and oriented to person, place, and time. Diabetic foot exam:     Left Foot:   Visual Exam: Severe fungal nails. Overlapping first and second toes. Pulse DP: 2+ barely palpable   Filament test: reduced sensation    Vibratory sensation: diminished      Right Foot:   Visual Exam: callous -overlapping first and second toes. Fungal nails. Pulse DP: trace   Filament test: Absent sensation    Vibratory sensation: absent     Skin: No rash noted. No erythema. Psychiatric: Mood and affect normal.   Nursing note and vitals reviewed. There are no preventive care reminders to display for this patient. Assessment and orders:     Encounter Diagnoses     ICD-10-CM ICD-9-CM   1. Type 2 diabetes mellitus with nephropathy ([facility]) E11.21 250.40     583.81   2. Type 2 diabetes mellitus with diabetic polyneuropathy, without long-term current use of insulin ([facility]) E11.42 250.60     357.2   3. Hypokalemia E87.6 276.8   4. PVD (peripheral vascular disease) ([facility]) I73.9 443.9   5. [facility] ([facility] C61 185   6. Persistent atrial fibrillation ([facility]) I48.1 427.31   7.  Centrilobular emphysema ([facility]) J43.2 492.8   8. Neuropathy G62.9 355.9   9. Pain in both lower extremities M79.604 729.5    M79.605    10. Idiopathic gout, unspecified chronicity, unspecified site M10.00 274.9   11. Essential hypertension I10 401.9   12. Hypercholesterolemia E78.00 272.0   13. Chronic radicular pain of lower back M54.16 724.4    G89.29 338.29   14. Localized edema R60.0 782.3   15. DDD (degenerative disc disease), lumbar M51.36 722.52   16. BMI 32.0-32.9,adult Z68.32 V85.32   17. PAD (peripheral artery disease) ([facility]) I73.9 443.9   18. Bradycardia R00.1 427.89   19. Encounter for immunization Z23 V03.89     Diagnoses and all orders for this visit:    1. Type 2 diabetes mellitus with nephropathy ([facility])  -     HEMOGLOBIN A1C WITH EAG  -     LIPID PANEL  -     METABOLIC PANEL, COMPREHENSIVE  -      DIABETES FOOT EXAM    2. Type 2 diabetes mellitus with diabetic polyneuropathy, without long-term current use of insulin ([facility])  -     glipiZIDE (GLUCOTROL) 5 mg tablet; Take 1 Tab by mouth two (2) times a day. -     HEMOGLOBIN A1C WITH EAG  -     LIPID PANEL  -     METABOLIC PANEL, COMPREHENSIVE  -     HM DIABETES FOOT EXAM    3. Hypokalemia  -     potassium chloride (K-DUR) 10 mEq tablet; Take 2 Tabs by mouth two (2) times a day. Indications: low amount of potassium in the blood  -     METABOLIC PANEL, COMPREHENSIVE    4. PVD (peripheral vascular disease) ([facility] [address]    5. Prostate CA ([facility] [address]    6. Persistent atrial fibrillation ([facility] [address]    7. Centrilobular emphysema ([facility] [address]    8. Neuropathy  -     HEMOGLOBIN A1C WITH EAG  -     LIPID PANEL  -     METABOLIC PANEL, COMPREHENSIVE  -      DIABETES FOOT EXAM    9. Pain in both lower extremities  -      DIABETES FOOT EXAM    10. Idiopathic gout, unspecified chronicity, unspecified site  -     URIC ACID    11. Essential hypertension  -     LIPID PANEL  -     METABOLIC PANEL, COMPREHENSIVE  -     cloNIDine HCl (CATAPRES) 0.3 mg tablet; Take 1 Tab by mouth two (2) times a day.     12. Hypercholesterolemia  -     LIPID PANEL  -     METABOLIC PANEL, COMPREHENSIVE    13. Chronic radicular pain of lower back  -     gabapentin (NEURONTIN) 300 mg capsule; Take 1 Cap by mouth three (3) times daily. 14. Localized edema  -     METABOLIC PANEL, COMPREHENSIVE    15. DDD (degenerative disc disease), lumbar    16. BMI 32.0-32.9,adult    17. PAD (peripheral artery disease) ([facility] [address]    18. Bradycardia  -     AMB POC EKG ROUTINE W/ 12 LEADS, INTER & REP  -     REFERRAL TO CARDIOLOGY    19. Encounter for immunization  -     ADMIN INFLUENZA VIRUS VAC  -     INFLUENZA VACCINE INACTIVATED (IIV), SUBUNIT, ADJUVANTED, IM    Other orders  -     amLODIPine (NORVASC) 10 mg tablet; TAKE ONE TABLET BY MOUTH ONCE DAILY  -     b complex-vitamin c-folic acid 5mg (FOLBEE PLUS) tab tablet; TAKE 1 TABLET BY MOUTH ONCE DAILY  -     ergocalciferol (ERGOCALCIFEROL) 50,000 unit capsule; Take 1 Cap by mouth every seven (7) days. -     metFORMIN (GLUCOPHAGE) 500 mg tablet; Take 1 Tab by mouth three (3) times daily (with meals). -     rosuvastatin (CRESTOR) 20 mg tablet; Take 1 Tab by mouth nightly. Indications: high cholesterol            Plan of care:  Discussed diagnoses in detail with patient. Medication risks/benefits/side effects discussed with patient. All of the patient's questions were addressed. The patient understands and agrees with our plan of care. The patient knows to call back if they are unsure of or forget any changes we discussed today or if the symptoms change. The patient received an After-Visit Summary which contains VS, orders, medication list and allergy list. This can be used as a \"mini-medical record\" should they have to seek medical care while out of town.     Patient Care Team:  Gregorio Reyes MD as PCP - General (Family Practice)  [facility] (Orthopedic Surgery)  Venkatesh Chun MD (Otolaryngology)  Heather Panda MD (Endocrinology)  Claire Jennings MD as Physician (Internal Medicine)  Dread Ward MD (Ophthalmology)  Person, An Francis MD (Podiatry)  Renata Hernandez MD (Cardiology)  Dread Ward MD (Ophthalmology)    Follow-up and Dispositions    · Return in about 1 day (around 9/17/2019) for 4 pm.         Future Appointments   Date Time Provider Elisha Sandoval   9/17/2019  1:00 PM Kimberly Haines MD [facility]ITA SCHED   11/21/2019  9:00 AM Jamin Mills MD [facility]   9/9/2020 10:40 AM [facility]       Signed By: Dana Olson MD     September 16, 2019      ATTENTION:   This medical record was transcribed using an electronic medical records/speech recognition system. Although proofread, it may and can contain electronic, spelling and other errors. Corrections may be executed at a later time. Please feel free to contact me for any clarifications as needed.

## 2019-09-17 ENCOUNTER — OFFICE VISIT (OUTPATIENT)
Dept: FAMILY MEDICINE CLINIC | Age: 79
End: 2019-09-17

## 2019-09-17 VITALS
WEIGHT: 253 LBS | RESPIRATION RATE: 18 BRPM | SYSTOLIC BLOOD PRESSURE: 174 MMHG | HEIGHT: 74 IN | DIASTOLIC BLOOD PRESSURE: 74 MMHG | BODY MASS INDEX: 32.47 KG/M2 | OXYGEN SATURATION: 99 % | HEART RATE: 65 BPM | TEMPERATURE: 98.4 F

## 2019-09-17 DIAGNOSIS — I10 ESSENTIAL HYPERTENSION WITH GOAL BLOOD PRESSURE LESS THAN 130/85: Chronic | ICD-10-CM

## 2019-09-17 DIAGNOSIS — R00.1 BRADYCARDIA: Primary | ICD-10-CM

## 2019-09-17 DIAGNOSIS — R60.0 LOCALIZED EDEMA: Chronic | ICD-10-CM

## 2019-09-17 DIAGNOSIS — I48.19 PERSISTENT ATRIAL FIBRILLATION (HCC): ICD-10-CM

## 2019-09-17 DIAGNOSIS — N18.30 STAGE 3 CHRONIC KIDNEY DISEASE (HCC): Chronic | ICD-10-CM

## 2019-09-17 LAB
ALBUMIN SERPL-MCNC: 4.2 G/DL (ref 3.5–4.7)
ALBUMIN/GLOB SERPL: 2.2 {RATIO} (ref 1.2–2.2)
ALP SERPL-CCNC: 93 IU/L (ref 39–117)
ALT SERPL-CCNC: 28 IU/L (ref 0–44)
AST SERPL-CCNC: 33 IU/L (ref 0–40)
BILIRUB SERPL-MCNC: 0.7 MG/DL (ref 0–1.2)
BUN SERPL-MCNC: 16 MG/DL (ref 8–27)
BUN/CREAT SERPL: 12 (ref 10–24)
CALCIUM SERPL-MCNC: 9.4 MG/DL (ref 8.6–10.2)
CHLORIDE SERPL-SCNC: 106 MMOL/L (ref 96–106)
CHOLEST SERPL-MCNC: 107 MG/DL (ref 100–199)
CO2 SERPL-SCNC: 23 MMOL/L (ref 20–29)
CREAT SERPL-MCNC: 1.34 MG/DL (ref 0.76–1.27)
EST. AVERAGE GLUCOSE BLD GHB EST-MCNC: 111 MG/DL
GLOBULIN SER CALC-MCNC: 1.9 G/DL (ref 1.5–4.5)
GLUCOSE SERPL-MCNC: 57 MG/DL (ref 65–99)
HBA1C MFR BLD: 5.5 % (ref 4.8–5.6)
HDLC SERPL-MCNC: 31 MG/DL
LDLC SERPL CALC-MCNC: 63 MG/DL (ref 0–99)
POTASSIUM SERPL-SCNC: 3.9 MMOL/L (ref 3.5–5.2)
PROT SERPL-MCNC: 6.1 G/DL (ref 6–8.5)
SODIUM SERPL-SCNC: 144 MMOL/L (ref 134–144)
TRIGL SERPL-MCNC: 63 MG/DL (ref 0–149)
URATE SERPL-MCNC: 4.5 MG/DL (ref 3.7–8.6)
VLDLC SERPL CALC-MCNC: 13 MG/DL (ref 5–40)

## 2019-09-17 NOTE — PROGRESS NOTES
[address]  [phone]           Progress Note    Patient: Clayton Tobar MRN: [id number]  SSN: [id number]    YOB: 1940  Age: [de-identified] y.o. Sex: male        Chief Complaint   Patient presents with    Slow Heart Rate         Subjective:     Encounter Diagnoses   Name Primary?  Bradycardia: His bradycardia has improved by decreasing clonidine by 30%. Yes    Persistent atrial fibrillation [facility]): today his EKG clearly shows low voltage A. Fib.  Localized edema: he has 2+ edema still.  Stage 3 chronic kidney disease ([facility] [address]): Stable         Essential hypertension with goal blood pressure less than 130/85: I am surprised that his blood pressure shot up this time after decreasing his clonidine by only 33%. He has missed 1 dose of hydralazine. BP Readings from Last 3 Encounters:   09/17/19 174/74   09/16/19 119/64   09/11/19 136/63     The patient reports:  taking medications as instructed, no medication side effects noted, no TIA's, no chest pain on exertion, no dyspnea on exertion, no swelling of ankles. Key CAD CHF Meds             amLODIPine (NORVASC) 10 mg tablet (Taking) TAKE ONE TABLET BY MOUTH ONCE DAILY    rosuvastatin (CRESTOR) 20 mg tablet (Taking) Take 1 Tab by mouth nightly. Indications: high cholesterol    cloNIDine HCl (CATAPRES) 0.3 mg tablet (Taking) Take 1 Tab by mouth two (2) times a day. labetalol (NORMODYNE) 200 mg tablet (Taking) TAKE ONE-HALF TABLET BY MOUTH TWICE DAILY FOR  HYPERTENSION    hydrALAZINE (APRESOLINE) 50 mg tablet (Taking) TAKE ONE TABLET BY MOUTH 4 TIMES DAILY FOR  HYPERTENSION    bumetanide (BUMEX) 2 mg tablet (Taking) TAKE ONE TABLET BY MOUTH TWICE DAILY    PRADAXA 150 mg capsule (Taking) TAKE ONE CAPSULE BY MOUTH TWICE DAILY    aspirin 81 mg chewable tablet (Taking) Take 81 mg by mouth daily.            Lab Results   Component Value Date/Time    Sodium 144 09/16/2019 10:31 AM    Potassium 3.9 09/16/2019 10:31 AM    Chloride 106 09/16/2019 10:31 AM    CO2 23 09/16/2019 10:31 AM    Anion gap 12 10/07/2010 09:27 AM    Glucose 57 (L) 09/16/2019 10:31 AM    BUN 16 09/16/2019 10:31 AM    Creatinine 1.34 (H) 09/16/2019 10:31 AM    BUN/Creatinine ratio 12 09/16/2019 10:31 AM    GFR est AA 57 (L) 09/16/2019 10:31 AM    GFR est non-AA 50 (L) 09/16/2019 10:31 AM    Calcium 9.4 09/16/2019 10:31 AM    Bilirubin, total 0.7 09/16/2019 10:31 AM    AST (SGOT) 33 09/16/2019 10:31 AM    Alk. phosphatase 93 09/16/2019 10:31 AM    Protein, total 6.1 09/16/2019 10:31 AM    Albumin 4.2 09/16/2019 10:31 AM    Globulin 2.7 10/07/2010 09:27 AM    A-G Ratio 2.2 09/16/2019 10:31 AM    ALT (SGPT) 28 09/16/2019 10:31 AM     Low salt diet? yes  Aerobic exercise? no  Our goal is to normalize the blood pressure to decrease the risks of strokes and heart attacks. The patient is in agreement with the plan. Current and past medical information:    Current Medications after this visit[de-identified]     Current Outpatient Medications   Medication Sig    amLODIPine (NORVASC) 10 mg tablet TAKE ONE TABLET BY MOUTH ONCE DAILY    b complex-vitamin c-folic acid 5mg (FOLBEE PLUS) tab tablet TAKE 1 TABLET BY MOUTH ONCE DAILY    ergocalciferol (ERGOCALCIFEROL) 50,000 unit capsule Take 1 Cap by mouth every seven (7) days.  gabapentin (NEURONTIN) 300 mg capsule Take 1 Cap by mouth three (3) times daily.  glipiZIDE (GLUCOTROL) 5 mg tablet Take 1 Tab by mouth two (2) times a day.  metFORMIN (GLUCOPHAGE) 500 mg tablet Take 1 Tab by mouth three (3) times daily (with meals).  rosuvastatin (CRESTOR) 20 mg tablet Take 1 Tab by mouth nightly. Indications: high cholesterol    potassium chloride (K-DUR) 10 mEq tablet Take 2 Tabs by mouth two (2) times a day. Indications: low amount of potassium in the blood    cloNIDine HCl (CATAPRES) 0.3 mg tablet Take 1 Tab by mouth two (2) times a day.     HYDROcodone-acetaminophen (NORCO)  mg tablet Take 1 Tab by mouth every six (6) hours as needed for Pain for up to 30 days.  Blood-Glucose Meter (TRUE METRIX GLUCOSE METER) [facility] AS DIRECTED    glucose blood VI test strips (TRUE METRIX GLUCOSE TEST STRIP) strip Test 2 times daily Dx Coed E11.65    lancets misc Test 2 times daily Dx Code E11.65    montelukast (SINGULAIR) 10 mg tablet Take 1 Tab by mouth daily.  labetalol (NORMODYNE) 200 mg tablet TAKE ONE-HALF TABLET BY MOUTH TWICE DAILY FOR  HYPERTENSION    hydrALAZINE (APRESOLINE) 50 mg tablet TAKE ONE TABLET BY MOUTH 4 TIMES DAILY FOR  HYPERTENSION    bumetanide (BUMEX) 2 mg tablet TAKE ONE TABLET BY MOUTH TWICE DAILY    empagliflozin (JARDIANCE) 10 mg tablet Take one tab daily. STOP LOVASTATIN    COLCRYS 0.6 mg tablet TAKE ONE TABLET BY MOUTH ONCE DAILY TO  PREVENT  GOUT    omeprazole (PRILOSEC) 20 mg capsule TAKE ONE CAPSULE BY MOUTH ONCE DAILY    tamsulosin (FLOMAX) 0.4 mg capsule TAKE ONE CAPSULE BY MOUTH ONCE DAILY    allopurinol (ZYLOPRIM) 300 mg tablet Take 1 Tab by mouth daily.  baclofen (LIORESAL) 10 mg tablet TAKE ONE TABLET BY MOUTH THREE TIMES DAILY (MUSCLE  RELAXER)    PRADAXA 150 mg capsule TAKE ONE CAPSULE BY MOUTH TWICE DAILY    albuterol (PROVENTIL HFA, VENTOLIN HFA, PROAIR HFA) 90 mcg/actuation inhaler Take 2 Puffs by inhalation every six (6) hours as needed for Wheezing for up to 360 days. Please use generic that is covered    ferrous sulfate (IRON) 325 mg (65 mg iron) tablet Take 1 Tab by mouth two (2) times daily (after meals). Indications: Iron Deficiency Anemia    lancets 30 gauge misc     ACCU-CHEK SHAUNA CONTROL SOLN soln     docusate sodium 100 mg tab Take 1 Tab by mouth two (2) times a day.  magnesium oxide (MAG-OX) 400 mg tablet Take 1 Tab by mouth daily.  aspirin 81 mg chewable tablet Take 81 mg by mouth daily. No current facility-administered medications for this visit.         Patient Active Problem List    Diagnosis Date Noted    BMI 32.0-32.9,adult 06/05/2019     Priority: 1 - One    Type 2 diabetes mellitus with nephropathy ([address] 01/10/2018     Priority: 1 - One    History of colon polyps 05/02/2016     Priority: 1 - One    Idiopathic gout 05/02/2016     Priority: 1 - One    Essential hypertension with goal blood pressure less than 130/85 05/02/2016     Priority: 1 - One    CKD (chronic kidney disease) 11/23/2015     Priority: 1 - One    COPD (chronic obstructive pulmonary disease) ([address] 04/27/2015     Priority: 1 - One    Diabetes with neurologic complications ([facility] 90/31/4608     Priority: 1 - One    Chronic radicular pain of lower back 07/21/2014     Priority: 1 - One    Neuropathy 07/16/2013     Priority: 1 - One    DDD (degenerative disc disease), lumbar 07/16/2013     Priority: 1 - One    Prostate CA ([address] 07/16/2013     Priority: 1 - One    Edema 01/12/2012     Priority: 1 - One    Persistent atrial fibrillation ([address]      Priority: 1 - One    Arthritis 07/11/2011     Priority: 1 - One    Gout, unspecified      Priority: 1 - One    Hypercholesterolemia      Priority: 1 - One    Leg pain      Priority: 1 - One    Chronic pain 05/06/2010     Priority: 1 - One    Hypertension      Priority: 2 - Two    Tubulovillous adenoma polyp of colon 07/16/2013     Priority: 6 - Six    Unspecified arthropathy, ankle and foot 07/14/2011     Priority: 6 - Six    PVD (peripheral vascular disease) ([address]      Priority: 6 - Six       Past Medical History:   Diagnosis Date    Acute on chronic diastolic congestive heart failure ([address] 4/27/2015    Arthritis 7/11/2011    [place] 6/7/2010    Cancer ([address]     prostate    Chronic diastolic heart failure ([address] 8/26/2015    CKD (chronic kidney disease) 11/23/2015    Diabetes ([address]     Gout, unspecified     Hypercholesterolemia     Hypertension     Inguinal lymphadenopathy 2/18/2014    Leg pain     PA[facility]oxysmal [facility] fibrillation) ([facility]     [facility]     PVD (peripheral vascular disease) (HCC)        Allergies   Allergen Reactions    Indocin [Indomethacin Sodium] Unknown (comments)    Lisinopril Angioedema     Dxed in hospital.    Losartan Other (comments)     Face and throat swelling. Past Surgical History:   Procedure Laterality Date    COLONOSCOPY N/A 2016    COLONOSCOPY performed by Zamzam Winkler MD at [facility] HX ORTHOPAEDIC      back and left shoulder x2       Social History     Socioeconomic History    Marital status: SINGLE     Spouse name: Not on file    Number of children: Not on file    Years of education: Not on file    Highest education level: Not on file   Tobacco Use    Smoking status: Former Smoker     Types: Cigarettes     Last attempt to quit: 2003     Years since quittin.2    Smokeless tobacco: Never Used   Substance and Sexual Activity    Alcohol use: Yes     Alcohol/week: 0.0 standard drinks     Comment: 2 drinks/month    Drug use: No       Review of Systems   Constitutional: Negative. Negative for chills, fever, malaise/fatigue and weight loss. Very tired after trip to [facility] today. HENT: Negative. Negative for hearing loss. Eyes: Negative. Negative for blurred vision and double vision. Respiratory: Positive for shortness of breath. Negative for cough and sputum production. Cardiovascular: Negative. Negative for chest pain and palpitations. Unaware of irregular heartbeat. Gastrointestinal: Negative. Negative for abdominal pain, blood in stool, heartburn, nausea and vomiting. Genitourinary: Negative. Negative for dysuria, frequency and urgency. Musculoskeletal: Positive for back pain and joint pain. Negative for falls, myalgias and neck pain. Skin: Negative. Negative for rash. Neurological: Negative. Negative for dizziness, tingling, tremors, weakness and headaches. Endo/Heme/Allergies: Negative. Psychiatric/Behavioral: Negative. Negative for depression. Objective:     Vitals:    09/17/19 1311 09/17/19 1312 09/17/19 1338   BP: (!) 211/62 192/64 174/74   Pulse: 65     Resp: 18     Temp: 98.4 °F (36.9 °C)     TempSrc: Oral     SpO2: 99%     Weight: 253 lb (114.8 kg)     Height: 6' 2\" (1.88 m)        Body mass index is 32.48 kg/m². Physical Exam   Constitutional: He is oriented to person, place, and time and well-developed, well-nourished, and in no distress. HENT:   Head: Normocephalic and atraumatic. Mouth/Throat: Oropharynx is clear and moist.   Eyes: Right eye exhibits no discharge. Left eye exhibits no discharge. No scleral icterus. Neck: No thyromegaly present. No bruit. Cardiovascular: Normal rate and regular rhythm. Exam reveals no friction rub. No murmur heard. Soft heart sounds. Pulmonary/Chest: Effort normal. No respiratory distress. He has no wheezes. He has no rales. Decreased BS. Abdominal: Soft. He exhibits no distension. There is no tenderness. There is no rebound. Musculoskeletal: He exhibits edema. Neurological: He is alert and oriented to person, place, and time. Skin: No rash noted. No erythema. Psychiatric: Mood and affect normal.   Nursing note and vitals reviewed. EKG reviewed. Clearly in AFIB today. Rate up to 60    There are no preventive care reminders to display for this patient. Dehydration a little brown polyp about give you the pleasure  Assessment and orders:     Encounter Diagnoses     ICD-10-CM ICD-9-CM   1. Bradycardia R00.1 427.89   2. Persistent atrial fibrillation (HCC) I48.1 427.31   3. Localized edema R60.0 782.3   4. Stage 3 chronic kidney disease (HCC) N18.3 585.3   5. Essential hypertension with goal blood pressure less than 130/85 I10 401.9     Diagnoses and all orders for this visit:    1.  Bradycardia- better  -     AMB POC EKG ROUTINE W/ 12 LEADS, INTER & REP    2. Persistent atrial fibrillation ([address]- unaware of this.    3. Localized edema-still 2+, extra    4. Stage 3 chronic kidney disease ([address]    5. Essential hypertension with goal blood pressure less than 130/85  **Take a hydralazine capsule when youget home and take the other 2 doses before bedtime. Take an extra 1/2 Bumex when you get home. Return in 2 days for a blood pressure check. Plan of care:  Discussed diagnoses in detail with patient. Medication risks/benefits/side effects discussed with patient. All of the patient's questions were addressed. The patient understands and agrees with our plan of care. The patient knows to call back if they are unsure of or forget any changes we discussed today or if the symptoms change. The patient received an After-Visit Summary which contains VS, orders, medication list and allergy list. This can be used as a \"mini-medical record\" should they have to seek medical care while out of town. Patient Care Team:  Kimberly Haines MD as PCP - General (Family Practice)  Tip Barton (Orthopedic Surgery)  Kathryn Mchugh MD (Otolaryngology)  Jamin Mills MD (Endocrinology)  Franck Lovell MD as Physician (Internal Medicine)  Dread Ward MD (Ophthalmology)  Person, An Francis MD (Podiatry)  Renata Hernandez MD (Cardiology)  Dread Ward MD (Ophthalmology)    Follow-up and Dispositions    · Return in about 2 days (around 9/19/2019) for BP check. Future Appointments   Date Time Provider Department Center   9/19/2019  2:10 PM Maira Arana MD [facility]   11/21/2019  9:00 AM Jamin Mills MD [address]   9/9/2020 10:40 AM [facility]       Signed By: Dana Olson MD     September 17, 2019      ATTENTION:   This medical record was transcribed using an electronic medical records/speech recognition system. Although proofread, it may and can contain electronic, spelling and other errors.   Corrections may be executed at a later time. Please feel free to contact me for any clarifications as needed.

## 2019-09-17 NOTE — LETTER
9/17/2019 4:47 PM 
 
Mr. Dimitri Carranza [address] [place] [address] [address] Dear Dimitri Carranza: 
 
Please find your most recent results below. Your labs are stable. No changes are needed. Call if you have questions. Keep up the good work! Resulted Orders HEMOGLOBIN A1C WITH EAG (Collected: 9/16/2019 10:31 AM) Result Value Ref Range Hemoglobin A1c 5.5 4.8 - 5.6 % Comment:  
            Prediabetes: 5.7 - 6.4 Diabetes: >6.4 Glycemic control for adults with diabetes: <7.0 Estimated average glucose 111 mg/dL Narrative Performed at:  [address]  [id number] : Karen Jimenez MD, Phone:  [phone] LIPID PANEL (Collected: 9/16/2019 10:31 AM) Result Value Ref Range Cholesterol, total 107 100 - 199 mg/dL Triglyceride 63 0 - 149 mg/dL HDL Cholesterol 31 (L) >39 mg/dL VLDL, calculated 13 5 - 40 mg/dL LDL, calculated 63 0 - 99 mg/dL Narrative Performed at:  [facility]  [address]  [id number] : Karen Jimenez MD, Phone:  [phone] METABOLIC PANEL, COMPREHENSIVE (Collected: 9/16/2019 10:31 AM) Result Value Ref Range Glucose 57 (L) 65 - 99 mg/dL BUN 16 8 - 27 mg/dL Creatinine 1.34 (H) 0.76 - 1.27 mg/dL GFR est non-AA 50 (L) >59 mL/min/1.73 GFR est AA 57 (L) >59 mL/min/1.73  
 BUN/Creatinine ratio 12 10 - 24 Sodium 144 134 - 144 mmol/L Potassium 3.9 3.5 - 5.2 mmol/L Chloride 106 96 - 106 mmol/L  
 CO2 23 20 - 29 mmol/L Calcium 9.4 8.6 - 10.2 mg/dL Protein, total 6.1 6.0 - 8.5 g/dL Albumin 4.2 3.5 - 4.7 g/dL GLOBULIN, TOTAL 1.9 1.5 - 4.5 g/dL A-G Ratio 2.2 1.2 - 2.2 Bilirubin, total 0.7 0.0 - 1.2 mg/dL Alk. phosphatase 93 39 - 117 IU/L  
 AST (SGOT) 33 0 - 40 IU/L  
 ALT (SGPT) 28 0 - 44 IU/L Narrative Performed at:  [place] [address] [address]  [id number] : Michel Kim MD, Phone:  [phone] URIC ACID (Collected: 9/16/2019 10:31 AM) Result Value Ref Range Uric acid 4.5 3.7 - 8.6 mg/dL Comment:  
              Therapeutic target for gout patients: <6.0 Narrative Performed at:  [address]  [id number] : Michel Kim MD, Phone:  [phone] Please call me if you have any questions: [phone] Sincerely, 
 
 
Luisa Grant MD

## 2019-09-17 NOTE — PATIENT INSTRUCTIONS
Take a hydralazine capsule when youget home and take the other 2 doses before bedtime. Take an extra 1/2 Bumex when you get home. Return in 2 days for a blood pressure check.

## 2019-09-19 ENCOUNTER — OFFICE VISIT (OUTPATIENT)
Dept: FAMILY MEDICINE CLINIC | Age: 79
End: 2019-09-19

## 2019-09-19 VITALS
RESPIRATION RATE: 18 BRPM | HEIGHT: 74 IN | DIASTOLIC BLOOD PRESSURE: 61 MMHG | TEMPERATURE: 97.7 F | OXYGEN SATURATION: 98 % | BODY MASS INDEX: 32.85 KG/M2 | WEIGHT: 256 LBS | SYSTOLIC BLOOD PRESSURE: 134 MMHG | HEART RATE: 57 BPM

## 2019-09-19 DIAGNOSIS — E78.00 HYPERCHOLESTEROLEMIA: ICD-10-CM

## 2019-09-19 DIAGNOSIS — I10 ESSENTIAL HYPERTENSION: Primary | ICD-10-CM

## 2019-09-19 NOTE — PROGRESS NOTES
1. Have you been to the ER, urgent care clinic since your last visit? Hospitalized since your last visit? No    2. Have you seen or consulted any other health care providers outside of the [address] [place] [address] since your last visit? Include any pap smears or colon screening. No    Reviewed record in preparation for visit and have necessary documentation  Goals that were addressed and/or need to be completed during or after this appointment include     There are no preventive care reminders to display for this patient. Patient is accompanied by daughter I have received verbal consent from Austen Guajardo to discuss any/all medical information while they are present in the room.

## 2019-09-19 NOTE — PROGRESS NOTES
I discussed the findings, assessment and plan in detail with the resident and agree with the resident's findings and plan as documented in the resident's note. Johnathan Solorio M.D.

## 2019-09-19 NOTE — PATIENT INSTRUCTIONS
Learning About High Blood Pressure  What is high blood pressure? Blood pressure is a measure of how hard the blood pushes against the walls of your arteries. It's normal for blood pressure to go up and down throughout the day, but if it stays up, you have high blood pressure. Another name for high blood pressure is hypertension. Two numbers tell you your blood pressure. The first number is the systolic pressure. It shows how hard the blood pushes when your heart is pumping. The second number is the diastolic pressure. It shows how hard the blood pushes between heartbeats, when your heart is relaxed and filling with blood. Your doctor will give you a goal for your blood pressure. Your goal will be based on your health and your age. High blood pressure (hypertension) means that the top number stays high, or the bottom number stays high, or both. High blood pressure increases the risk of stroke, heart attack, and other problems. You and your doctor will talk about your risks of these problems based on your blood pressure. What happens when you have high blood pressure? · Blood flows through your arteries with too much force. Over time, this damages the walls of your arteries. But you can't feel it. High blood pressure usually doesn't cause symptoms. · Fat and calcium start to build up in your arteries. This buildup is called plaque. Plaque makes your arteries narrower and stiffer. Blood can't flow through them as easily. · This lack of good blood flow starts to damage some of the organs in your body. This can lead to problems such as coronary artery disease and heart attack, heart failure, stroke, kidney failure, and eye damage. How can you prevent high blood pressure? · Stay at a healthy weight. · Try to limit how much sodium you eat to less than 2,300 milligrams (mg) a day. If you limit your sodium to 1,500 mg a day, you can lower your blood pressure even more.   ? Buy foods that are labeled \"unsalted,\" \"sodium-free,\" or \"low-sodium. \" Foods labeled \"reduced-sodium\" and \"light sodium\" may still have too much sodium. ? Flavor your food with garlic, lemon juice, onion, vinegar, herbs, and spices instead of salt. Do not use soy sauce, steak sauce, onion salt, garlic salt, mustard, or ketchup on your food. ? Use less salt (or none) when recipes call for it. You can often use half the salt a recipe calls for without losing flavor. · Be physically active. Get at least 30 minutes of exercise on most days of the week. Walking is a good choice. You also may want to do other activities, such as running, swimming, cycling, or playing tennis or team sports. · Limit alcohol to 2 drinks a day for men and 1 drink a day for women. · Eat plenty of fruits, vegetables, and low-fat dairy products. Eat less saturated and total fats. How is high blood pressure treated? · Your doctor will suggest making lifestyle changes to help your heart. For example, your doctor may ask you to eat healthy foods, quit smoking, lose extra weight, and be more active. · If lifestyle changes don't help enough, your doctor may recommend that you take medicine. · When blood pressure is very high, medicines are needed to lower it. Follow-up care is a key part of your treatment and safety. Be sure to make and go to all appointments, and call your doctor if you are having problems. It's also a good idea to know your test results and keep a list of the medicines you take. Where can you learn more? Go to http://otto-cindy.info/. Enter P501 in the search box to learn more about \"Learning About High Blood Pressure. \"  Current as of: July 22, 2018  Content Version: 12.1  © 5670-6809 [employer]. Care instructions adapted under license by [employer] (which disclaims liability or warranty for this information).  If you have questions about a medical condition or this instruction, always ask your healthcare professional. [address] any warranty or liability for your use of this information.

## 2019-09-19 NOTE — PROGRESS NOTES
Progress Note    Patient: Yao Aaron MRN: [id number]  SSN: [id number]    YOB: 1940  Age: [de-identified] y.o. Sex: male        Chief Complaint   Patient presents with    Blood Pressure Check         Subjective:     Problems addressed:  Encounter Diagnoses     ICD-10-CM ICD-9-CM   1. Essential hypertension I10 401.9   2. Hypercholesterolemia E78.00 272.0     [de-identified] y/o M with PMH of HTN, HLD, COPD, and CKD presents for BP follow up. Pt with elevated BP to 174/74 in clinic 2 days ago, reports taking BP medications as prescribed and has home BP measurements over past 2 days: 137/60, 128/75, 129/64. Pt reports taking medications as prescribed and denies any headache, dizziness, chest pain, or SOB. Current and past medical information:    Current Medications after this visit[de-identified]     Current Outpatient Medications   Medication Sig    amLODIPine (NORVASC) 10 mg tablet TAKE ONE TABLET BY MOUTH ONCE DAILY    b complex-vitamin c-folic acid 5mg (FOLBEE PLUS) tab tablet TAKE 1 TABLET BY MOUTH ONCE DAILY    ergocalciferol (ERGOCALCIFEROL) 50,000 unit capsule Take 1 Cap by mouth every seven (7) days.  gabapentin (NEURONTIN) 300 mg capsule Take 1 Cap by mouth three (3) times daily.  glipiZIDE (GLUCOTROL) 5 mg tablet Take 1 Tab by mouth two (2) times a day.  metFORMIN (GLUCOPHAGE) 500 mg tablet Take 1 Tab by mouth three (3) times daily (with meals).  rosuvastatin (CRESTOR) 20 mg tablet Take 1 Tab by mouth nightly. Indications: high cholesterol    potassium chloride (K-DUR) 10 mEq tablet Take 2 Tabs by mouth two (2) times a day. Indications: low amount of potassium in the blood    cloNIDine HCl (CATAPRES) 0.3 mg tablet Take 1 Tab by mouth two (2) times a day.     Blood-Glucose Meter (TRUE METRIX GLUCOSE METER) misc AS DIRECTED    glucose blood VI test strips (TRUE METRIX GLUCOSE TEST STRIP) strip Test 2 times daily Dx Coed E11.65    lancets misc Test 2 times daily Dx Code E11.65    montelukast (SINGULAIR) 10 mg tablet Take 1 Tab by mouth daily.  labetalol (NORMODYNE) 200 mg tablet TAKE ONE-HALF TABLET BY MOUTH TWICE DAILY FOR  HYPERTENSION    hydrALAZINE (APRESOLINE) 50 mg tablet TAKE ONE TABLET BY MOUTH 4 TIMES DAILY FOR  HYPERTENSION    bumetanide (BUMEX) 2 mg tablet TAKE ONE TABLET BY MOUTH TWICE DAILY    empagliflozin (JARDIANCE) 10 mg tablet Take one tab daily. STOP LOVASTATIN    COLCRYS 0.6 mg tablet TAKE ONE TABLET BY MOUTH ONCE DAILY TO  PREVENT  GOUT    omeprazole (PRILOSEC) 20 mg capsule TAKE ONE CAPSULE BY MOUTH ONCE DAILY    tamsulosin (FLOMAX) 0.4 mg capsule TAKE ONE CAPSULE BY MOUTH ONCE DAILY    allopurinol (ZYLOPRIM) 300 mg tablet Take 1 Tab by mouth daily.  baclofen (LIORESAL) 10 mg tablet TAKE ONE TABLET BY MOUTH THREE TIMES DAILY (MUSCLE  RELAXER)    PRADAXA 150 mg capsule TAKE ONE CAPSULE BY MOUTH TWICE DAILY    albuterol (PROVENTIL HFA, VENTOLIN HFA, PROAIR HFA) 90 mcg/actuation inhaler Take 2 Puffs by inhalation every six (6) hours as needed for Wheezing for up to 360 days. Please use generic that is covered    ferrous sulfate (IRON) 325 mg (65 mg iron) tablet Take 1 Tab by mouth two (2) times daily (after meals). Indications: Iron Deficiency Anemia    [facility]     ACCU-CHEK SHAUNA CONTROL SOLN soln     docusate sodium 100 mg tab Take 1 Tab by mouth two (2) times a day.  magnesium oxide (MAG-OX) 400 mg tablet Take 1 Tab by mouth daily.  aspirin 81 mg chewable tablet Take 81 mg by mouth daily. No current facility-administered medications for this visit.         Patient Active Problem List    Diagnosis Date Noted    BMI 32.0-32.9,adult 06/05/2019    Type 2 diabetes mellitus with nephropathy ([facility] 01/10/2018    History of colon polyps 05/02/2016    Idiopathic gout 05/02/2016    Essential hypertension with goal blood pressure less than 130/85 05/02/2016    CKD (chronic kidney disease) 11/23/2015    COPD (chronic obstructive pulmonary disease) ([facility] 2015    Diabetes with neurolog[facility] complications [facility] 7810    Chronic radicular pain of lower back 2014    Neuropathy 2013    DDD (degenerative disc disease), lumbar 2013    Prostate CA ([facility] 2013    Tubulovillous adenoma polyp of colon 2013    Edema 2012    Persistent atrial fibrillation (HCC)     Unspecified arthropathy, ankle and foot 2011    Arthritis 2011    Gout, unspecified     PVD (peripheral vascular disease) ([facility]     Hypertension     Hypercholesterolemia     Leg pain     Chronic pain 2010       Past Medical History:   Diagnosis Date    Acute on chronic diastolic congestive heart failure ([facility] 2015    Arthritis 2011    Blackhead 2010    Cancer ([facility]     prostate    Chronic diastolic heart failure ([facility] 2015    CKD (chronic kidney disease) 2015    Diabetes [facility]     Gout, unspecified     Hypercholesterolemia     Hypertension     Inguinal lymphadenopathy 2014    Leg pain     PAF (paroxysmal atrial fibrillation) (HCC)     PAF (paroxysmal atrial fibrillation) (HCC)     PVD (peripheral vascular disease) (HCC)        Allergies   Allergen Reactions    Indocin [Indomethacin Sodium] Unknown (comments)    Lisinopril Angioedema     Dxed in hospital.    Losartan Other (comments)     Face and throat swelling.        Past Surgical History:   Procedure Laterality Date    COLONOSCOPY N/A 2016    COLONOSCOPY performed by Claudell Bos, MD at [address] [place] [address] HX ORTHOPAEDIC      back and left shoulder x2       Social History     Socioeconomic History    Marital status: SINGLE     Spouse name: Not on file    Number of children: Not on file    Years of education: Not on file    Highest education level: Not on file   Tobacco Use    Smoking status: Former Smoker     Types: Cigarettes     Last attempt to quit: 2003     Years since quittin.2    Smokeless tobacco: Never Used   Substance and Sexual Activity    Alcohol use: Yes     Alcohol/week: 0.0 standard drinks     Comment: 2 drinks/month    Drug use: No         Review of Systems   Constitutional: Negative for chills and fever. Respiratory: Negative for shortness of breath. Cardiovascular: Negative for chest pain. Neurological: Negative for dizziness and headaches. Objective:     Vitals:    09/19/19 1402 09/19/19 1442   BP: 143/67 134/61   Pulse: (!) 57    Resp: 18    Temp: 97.7 °F (36.5 °C)    TempSrc: Oral    SpO2: 98%    Weight: 256 lb (116.1 kg)    Height: 6' 2\" (1.88 m)       Body mass index is 32.87 kg/m². Physical Exam   Constitutional: No distress. HENT:   Head: Normocephalic and atraumatic. Right Ear: External ear normal.   Left Ear: External ear normal.   Mouth/Throat: Oropharynx is clear and moist. No oropharyngeal exudate. Eyes: Pupils are equal, round, and reactive to light. Conjunctivae are normal.   Neck: Normal range of motion. Neck supple. Cardiovascular: Regular rhythm and intact distal pulses. Pulmonary/Chest: Effort normal and breath sounds normal. No respiratory distress. He has no wheezes. Neurological: He is alert. Skin: Skin is warm and dry. He is not diaphoretic. Psychiatric: He has a normal mood and affect. His behavior is normal.   Vitals reviewed. There are no preventive care reminders to display for this patient. Assessment and orders:     Encounter Diagnoses     ICD-10-CM ICD-9-CM   1. Essential hypertension I10 401.9   2. Hypercholesterolemia E78.00 272.0       [de-identified] y/o M with improved BP    1. Essential hypertension - Well controled, /74 2 days ago, now 134/61  -Continue Norvasc 10mg daily  -Continue Clonidine 0.3mg BID  -Continue Labetalol 200mg BID  -Continue Hydralazine 50mg QID    2. Hypercholesterolemia - LDL 63 9/16/19  -Continue Crestor 20mg nightly        Plan of care:  Discussed diagnoses in detail with patient.      Medication risks/benefits/side effects discussed with patient. All of the patient's questions were addressed. The patient understands and agrees with our plan of care. The patient knows to call back if they are unsure of or forget any changes we discussed today or if the symptoms change. The patient received an After-Visit Summary which contains VS, orders, medication list and allergy list. This can be used as a \"mini-medical record\" should they have to seek medical care while out of town. Follow-up and Dispositions    · Return in about 2 weeks (around 10/3/2019) for Blood Pressure Check.          Future Appointments   Date Time Provider Elisha Sandoval   10/9/2019  2:40 PM Melodye Cogan, MD [address]   10/16/2019  9:55 AM Hermes Fry MD [facility]   11/21/2019  9:00 AM Glen Rai MD [facility]   9/9/2020 10:40 AM Melodye Cogan, MD [address]       Signed By: Chano Christie MD     September 22, 2019

## 2019-09-23 DIAGNOSIS — G89.29 CHRONIC LEFT-SIDED LOW BACK PAIN WITHOUT SCIATICA: ICD-10-CM

## 2019-09-23 DIAGNOSIS — M54.50 CHRONIC LEFT-SIDED LOW BACK PAIN WITHOUT SCIATICA: ICD-10-CM

## 2019-09-23 DIAGNOSIS — M51.36 DDD (DEGENERATIVE DISC DISEASE), LUMBAR: Chronic | ICD-10-CM

## 2019-09-23 RX ORDER — HYDROCODONE BITARTRATE AND ACETAMINOPHEN 10; 325 MG/1; MG/1
1 TABLET ORAL
Qty: 90 TAB | Refills: 0 | Status: SHIPPED | OUTPATIENT
Start: 2019-09-23 | End: 2019-10-22 | Stop reason: SDUPTHER

## 2019-09-27 NOTE — ASSESSMENT & PLAN NOTE
Spoke with patient sts she is feeling much better . Stable, based on history, physical exam and review of pertinent labs, studies and medications; meds reconciled; continue current treatment plan. Key CAD CHF Meds             cloNIDine HCl (CATAPRES) 0.3 mg tablet  (Taking) TAKE ONE TABLET BY MOUTH THREE TIMES DAILY    PRADAXA 150 mg capsule  (Taking) TAKE ONE CAPSULE BY MOUTH TWICE DAILY    hydrALAZINE (APRESOLINE) 50 mg tablet  (Taking) Take 1 Tab by mouth four (4) times daily. Indications: hypertension    amLODIPine (NORVASC) 10 mg tablet  (Taking) TAKE ONE TABLET BY MOUTH ONCE DAILY    lovastatin (MEVACOR) 40 mg tablet  (Taking) Take 1 Tab by mouth nightly. bumetanide (BUMEX) 2 mg tablet  (Taking) Take 1 Tab by mouth two (2) times a day. labetalol (NORMODYNE) 200 mg tablet  (Taking) TAKE ONE-HALF TABLET BY MOUTH TWICE DAILY FOR HYPERTENSION    ezetimibe (ZETIA) 10 mg tablet  (Taking) Take 1 Tab by mouth daily. aspirin 81 mg chewable tablet  (Taking) Take 81 mg by mouth daily.

## 2019-10-07 NOTE — PATIENT INSTRUCTIONS
October 7, 2019  Denise Jack   [id number] [address] [address]      Dear Ludwin Osorio:    Thank you for requesting access to [employer]. Please follow the instructions below to view your test results, access and download parts of your medical record, view details of your past and upcoming appointments, and view your medications online. How Do I Sign Up? 1. In your internet browser, go to [email or web address]  2. Click on the First Time User? Click Here link in the Sign In box. You will see the New Member Sign Up page. 3. Enter your CHNL[facility] Access Code exactly as it appears below. You will not need to use this code after youve completed the sign-up process. If you do not sign up before the expiration date, you must request a new code. · [facility] Access Code: Activation code not generated  · Current [facility] Status: Patient Declined    4. Enter the last four digits of your Social Security Number (xxxx) and Date of Birth (mm/dd/yyyy) as indicated and click Submit. You will be taken to the next sign-up page. 5. Create a [employer] [facility] ID. This will be your [employer] login ID and cannot be changed, so think of one that is secure and easy to remember. 6. Create a [employer] password. You can change your password at any time. 7. Enter your Password Reset Question and Answer. This can be used at a later time if you forget your password. 8. Enter your e-mail address. You will receive e-mail notification when new information is available in [address]. 9. Click Sign Up. You can now view and download portions of your medical record. 10. Click the Download Summary menu link to download a portable copy of your medical information. Additional Information:              If you require any assistance or have any questions, please contact our [address] at [phone], email at [email or web address] or check online in our Frequently Asked Questions. Remember, [employer] is NOT to be used for urgent needs. For medical emergencies, dial 911. Now available from your iPhone and Android!     Sincerely,   Delfin Coleman

## 2019-10-09 ENCOUNTER — OFFICE VISIT (OUTPATIENT)
Dept: CARDIOLOGY CLINIC | Age: 79
End: 2019-10-09

## 2019-10-09 VITALS
DIASTOLIC BLOOD PRESSURE: 57 MMHG | TEMPERATURE: 96.7 F | HEART RATE: 52 BPM | SYSTOLIC BLOOD PRESSURE: 155 MMHG | WEIGHT: 247 LBS | RESPIRATION RATE: 16 BRPM | BODY MASS INDEX: 31.7 KG/M2 | HEIGHT: 74 IN | OXYGEN SATURATION: 100 %

## 2019-10-09 DIAGNOSIS — I48.19 PERSISTENT ATRIAL FIBRILLATION (HCC): Primary | ICD-10-CM

## 2019-10-09 DIAGNOSIS — I10 ESSENTIAL HYPERTENSION: ICD-10-CM

## 2019-10-09 DIAGNOSIS — I73.9 PVD (PERIPHERAL VASCULAR DISEASE) (HCC): ICD-10-CM

## 2019-10-09 DIAGNOSIS — E78.00 HYPERCHOLESTEROLEMIA: ICD-10-CM

## 2019-10-09 DIAGNOSIS — N18.30 STAGE 3 CHRONIC KIDNEY DISEASE (HCC): ICD-10-CM

## 2019-10-09 NOTE — PROGRESS NOTES
Francine Blackmon      1940   Adrian Tamez MD  Date of Visit-10/9/2019     [facility],  PCP=Lissa Rivas MD     [facility] [address] [place] [address] [facility] [place]. HPI:   Alexandra Cervantes is a [de-identified] y.o. male   follow up of atrial fibrillation   Seen last month 9-11-19 , back due to EKG done 9-17-19   Mr. Chilo Gastelum was seen one month ago for persistent atrial fibrillation and given yearly follow up. In the meantime he has had some BP issues. He saw Dr. Ani Moore. Dr. Ani Moore did an EKG on the 17th of September and some concern with the EKG. In talking to Mr. Chilo Gastelum, he has no symptoms of chest pain, chest pressure, SOB or palpitations and feels well. Says breathing and walking are improved. Assessment/Plans:  He had an EKG prior in 2017 that did not show atrial fibrillation. There is some change in R wave forces, but we can often see this in patients with increased body habitus. I do not think this represents likely an infarction. It is reasonable to go ahead and do an echocardiogram, that is not urgent and can be done at a later time. 1. Persistent atrial fibrillation    2. Essential hypertension    3. Hypercholesterolemia    4. Stage 3 chronic kidney disease ([facility]    5. PVD (peripheral vascular disease) ([facility]     Plan: We will arrange for his echocardiogram and he will keep his follow up next September. 1.  Persistent A Fib. Good rate control, Echo confirms normal EF     Continues on Pradaxa for stroke prophylaxis  , no bleeding  --I dont feel strongly about aspirin since on [address]  --RRR on exam due to likely near normal R-R     2.  Normal LV systolic function. No significant valve disease. Likely mild diastolic dysfunction. Edema is resolved     3. Malignant HTN, on multiple meds. BP Readings from Last 3 Encounters:   10/09/19 155/57   09/19/19 134/61   09/17/19 174/74      4.   CKD-renal fu     Lab Results   Component Value Date/Time    Creatinine (POC) 1.4 (H) 01/02/2015 10:08 AM    Creatinine 1.34 (H) 09/16/2019 10:31 AM      5. Dyslipidemia & DM cardiovascular disease risk factors on Jardiance     Lab Results   Component Value Date/Time    Hemoglobin A1c 5.5 09/16/2019 10:31 AM    Hemoglobin A1c (POC) 7.2 02/07/2017 11:43 AM    Hemoglobin A1c, External 6.3 10/12/2017      Lab Results   Component Value Date/Time    LDL, calculated 63 09/16/2019 10:31 AM      6. PVD- no current complaints, continue ASA  ---June 2015 Ara 0.39 right and 0.73 on left         Future Appointments   Date Time Provider [place]   10/16/2019  9:55 AM Kat Carlos MD [facility] SCHED   11/21/2019  9:00 AM Nita Ulloa MD [facility] SCHED   9/9/2020 10:40 AM Nicole Hutchinson MD [facility]ITA SCHED       Cardiac History:   December 2011  Holter- PAF , afib about 4% showed some high rates at night  --rates highly fluctuated on holter, up to 156, will start metoprolol 50 mg daily  Nuke-his nuke showed no ischemia  Echo with good fx but some LVH and LAE   [facility]- ARA 0.92, >1.0    ARA s 2015 right 0.39 , left 0.73    ECHO 6-77-68=HO 55 % diastolic dysfunction 2+ LAE and TR , 1+ MR, iVC dilated  ECHO 9-6-17= Normal EF, mild regurg, ANGELO     ROS:Cardiac complete  as above. Respiratory as above with no wheezing or hemoptysis.    He denies  symptoms of unusual weight loss , fevers,  BRBPR, hematuria,  or recent stroke    Past Medical History:   Diagnosis Date    Acute on chronic diastolic congestive heart failure ([facility] 4/27/2015    Arthritis 7/11/2011    Blackhead 6/7/2010    Cancer (HCC)     prostate    Chronic diastolic heart failure ([facility] 8/26/2015    CKD (chronic kidney disease) 11/23/2015    Diabetes ([facility]     Gout, unspecified     Hypercholesterolemia     Hypertension     Inguinal lymphadenopathy 2/18/2014    Leg pain     PAF (paroxysmal atrial fibrillation) (HCC)     PAF (paroxysmal atrial fibrillation) (HCC)     PVD (peripheral vascular disease) ([facility]       Exam and Labs:  Visit Vitals  /57 (BP 1 Location: Left arm, BP Patient Position: Sitting)   Pulse (!) 52   Temp 96.7 °F (35.9 °C) (Oral)   Resp 16   Ht 6' 2\" (1.88 m)   Wt 247 lb (112 kg)   SpO2 100%   BMI 31.71 kg/m²     Constitutional:  NAD, comfortable , moist mucous membranesHENT: Head: NC,ATEyes: No scleral icterus. Neck:  Neck supple. No JVD present. No tracheal deviation,mass  Chest: Effort normal & normal respiratory excursion     Lungs:breath sounds normal. No stridor. distress, wheezes or  Rales. Heart:normal rate, regular rhythm, normal S1, S2, no murmurs, rubs, clicks or gallops , PMI non displaced. Edema: Edema is none. Extremities:  no clubbing or cyanosis. Abdominal:  no abnormal distension. Neurological: alert, conversant and oriented . Skin: Skin is not cold. No obvious systemic rash noted. Not diaphoretic. No erythema. Psychiatric:  Grossly normal mood and affect.   Behavior appears normal.     Lab Results   Component Value Date/Time    Cholesterol, total 107 09/16/2019 10:31 AM    HDL Cholesterol 31 (L) 09/16/2019 10:31 AM    LDL, calculated 63 09/16/2019 10:31 AM    Triglyceride 63 09/16/2019 10:31 AM    CHOL/HDL Ratio 3.7 10/07/2010 09:27 AM     Lab Results   Component Value Date/Time    Sodium 144 09/16/2019 10:31 AM    Potassium 3.9 09/16/2019 10:31 AM    Chloride 106 09/16/2019 10:31 AM    CO2 23 09/16/2019 10:31 AM    Anion gap 12 10/07/2010 09:27 AM    Glucose 57 (L) 09/16/2019 10:31 AM    BUN 16 09/16/2019 10:31 AM    Creatinine 1.34 (H) 09/16/2019 10:31 AM    BUN/Creatinine ratio 12 09/16/2019 10:31 AM    GFR est AA 57 (L) 09/16/2019 10:31 AM    GFR est non-AA 50 (L) 09/16/2019 10:31 AM    Calcium 9.4 09/16/2019 10:31 AM      Wt Readings from Last 3 Encounters:   10/09/19 247 lb (112 kg)   09/19/19 256 lb (116.1 kg)   09/17/19 253 lb (114.8 kg)      BP Readings from Last 3 Encounters:   10/09/19 155/57   09/19/19 134/61   09/17/19 174/74        Current Outpatient Medications   Medication Sig    HYDROcodone-acetaminophen (NORCO)  mg tablet Take 1 Tab by mouth every six (6) hours as needed for Pain for up to 30 days.  amLODIPine (NORVASC) 10 mg tablet TAKE ONE TABLET BY MOUTH ONCE DAILY    b complex-vitamin c-folic acid 5mg (FOLBEE PLUS) tab tablet TAKE 1 TABLET BY MOUTH ONCE DAILY    ergocalciferol (ERGOCALCIFEROL) 50,000 unit capsule Take 1 Cap by mouth every seven (7) days.  gabapentin (NEURONTIN) 300 mg capsule Take 1 Cap by mouth three (3) times daily.  glipiZIDE (GLUCOTROL) 5 mg tablet Take 1 Tab by mouth two (2) times a day.  rosuvastatin (CRESTOR) 20 mg tablet Take 1 Tab by mouth nightly. Indications: high cholesterol    potassium chloride (K-DUR) 10 mEq tablet Take 2 Tabs by mouth two (2) times a day. Indications: low amount of potassium in the blood    cloNIDine HCl (CATAPRES) 0.3 mg tablet Take 1 Tab by mouth two (2) times a day.  Blood-Glucose Meter (TRUE METRIX GLUCOSE METER) misc AS DIRECTED    glucose blood VI test strips (TRUE METRIX GLUCOSE TEST STRIP) strip Test 2 times daily Dx Coed E11.65    lancets misc Test 2 times daily Dx Code E11.65    montelukast (SINGULAIR) 10 mg tablet Take 1 Tab by mouth daily.  labetalol (NORMODYNE) 200 mg tablet TAKE ONE-HALF TABLET BY MOUTH TWICE DAILY FOR  HYPERTENSION    hydrALAZINE (APRESOLINE) 50 mg tablet TAKE ONE TABLET BY MOUTH 4 TIMES DAILY FOR  HYPERTENSION    bumetanide (BUMEX) 2 mg tablet TAKE ONE TABLET BY MOUTH TWICE DAILY    COLCRYS 0.6 mg tablet TAKE ONE TABLET BY MOUTH ONCE DAILY TO  PREVENT  GOUT    omeprazole (PRILOSEC) 20 mg capsule TAKE ONE CAPSULE BY MOUTH ONCE DAILY    tamsulosin (FLOMAX) 0.4 mg capsule TAKE ONE CAPSULE BY MOUTH ONCE DAILY    allopurinol (ZYLOPRIM) 300 mg tablet Take 1 Tab by mouth daily.     baclofen (LIORESAL) 10 mg tablet TAKE ONE TABLET BY MOUTH THREE TIMES DAILY (MUSCLE  RELAXER)    PRADAXA 150 mg capsule TAKE ONE CAPSULE BY MOUTH TWICE DAILY    ferrous sulfate (IRON) 325 mg (65 mg iron) tablet Take 1 Tab by mouth two (2) times daily (after meals). Indications: Iron Deficiency Anemia    lancets 30 gauge misc     ACCU-CHEK SHAUNA CONTROL SOLN soln     docusate sodium 100 mg tab Take 1 Tab by mouth two (2) times a day.  magnesium oxide (MAG-OX) 400 mg tablet Take 1 Tab by mouth daily.  aspirin 81 mg chewable tablet Take 81 mg by mouth daily.  metFORMIN (GLUCOPHAGE) 500 mg tablet Take 1 Tab by mouth three (3) times daily (with meals).  empagliflozin (JARDIANCE) 10 mg tablet Take one tab daily. STOP LOVASTATIN    albuterol (PROVENTIL HFA, VENTOLIN HFA, PROAIR HFA) 90 mcg/actuation inhaler Take 2 Puffs by inhalation every six (6) hours as needed for Wheezing for up to 360 days. Please use generic that is covered     No current facility-administered medications for this visit. Past Surgical History:   Procedure Laterality Date    COLONOSCOPY N/A 9/22/2016    COLON[facility]uri [facility]cutt [facility]      back and left shoulder x2      Social Hx=  reports that he quit smoking about 16 years ago. His smoking use included cigarettes. He has never used smokeless tobacco. He reports that he drinks alcohol. He reports that he does not use drugs. Family Hx= family history includes Cancer in his paternal grandfather; Hypertension in an other family member. Impression see above.

## 2019-10-16 ENCOUNTER — OFFICE VISIT (OUTPATIENT)
Dept: FAMILY MEDICINE CLINIC | Age: 79
End: 2019-10-16

## 2019-10-16 VITALS
DIASTOLIC BLOOD PRESSURE: 60 MMHG | OXYGEN SATURATION: 99 % | HEART RATE: 49 BPM | SYSTOLIC BLOOD PRESSURE: 122 MMHG | WEIGHT: 253 LBS | RESPIRATION RATE: 16 BRPM | HEIGHT: 74 IN | BODY MASS INDEX: 32.47 KG/M2 | TEMPERATURE: 97.6 F

## 2019-10-16 DIAGNOSIS — I48.19 PERSISTENT ATRIAL FIBRILLATION (HCC): ICD-10-CM

## 2019-10-16 DIAGNOSIS — G62.9 NEUROPATHY: Chronic | ICD-10-CM

## 2019-10-16 DIAGNOSIS — I73.9 PVD (PERIPHERAL VASCULAR DISEASE) (HCC): Chronic | ICD-10-CM

## 2019-10-16 DIAGNOSIS — R60.0 LOCALIZED EDEMA: Chronic | ICD-10-CM

## 2019-10-16 DIAGNOSIS — J43.2 CENTRILOBULAR EMPHYSEMA (HCC): Chronic | ICD-10-CM

## 2019-10-16 DIAGNOSIS — E78.00 HYPERCHOLESTEROLEMIA: Chronic | ICD-10-CM

## 2019-10-16 DIAGNOSIS — E11.49 TYPE 2 DIABETES MELLITUS WITH OTHER NEUROLOGIC COMPLICATION, WITHOUT LONG-TERM CURRENT USE OF INSULIN (HCC): Primary | Chronic | ICD-10-CM

## 2019-10-16 DIAGNOSIS — E11.21 TYPE 2 DIABETES MELLITUS WITH NEPHROPATHY (HCC): ICD-10-CM

## 2019-10-16 DIAGNOSIS — G89.4 CHRONIC PAIN SYNDROME: Chronic | ICD-10-CM

## 2019-10-16 DIAGNOSIS — I10 ESSENTIAL HYPERTENSION: Chronic | ICD-10-CM

## 2019-10-16 DIAGNOSIS — D50.9 IRON DEFICIENCY ANEMIA, UNSPECIFIED IRON DEFICIENCY ANEMIA TYPE: ICD-10-CM

## 2019-10-16 DIAGNOSIS — M10.00 IDIOPATHIC GOUT, UNSPECIFIED CHRONICITY, UNSPECIFIED SITE: Chronic | ICD-10-CM

## 2019-10-16 DIAGNOSIS — C61 PROSTATE CA (HCC): Chronic | ICD-10-CM

## 2019-10-16 DIAGNOSIS — N18.30 STAGE 3 CHRONIC KIDNEY DISEASE (HCC): Chronic | ICD-10-CM

## 2019-10-16 RX ORDER — ACETAMINOPHEN, DIPHENHYDRAMINE HCL, PHENYLEPHRINE HCL 325; 25; 5 MG/1; MG/1; MG/1
TABLET ORAL
COMMUNITY
End: 2021-01-01

## 2019-10-16 NOTE — PROGRESS NOTES
[address]  [phone]           Progress Note    Patient: Georgi Query MRN: [id number]  SSN: [id number]    YOB: 1940  Age: [de-identified] y.o. Sex: male        Chief Complaint   Patient presents with    Follow-up         Subjective:     Encounter Diagnoses   Name Primary?  Type 2 diabetes mellitus with other neurologic complication, without long-term current use of insulin ([facility]): This patient is managed under a comprehensive plan of care for Diabetes. Overall the patient feels well with good energy level. Key Antihyperglycemic Medications             metFORMIN (GLUCOPHAGE) 500 mg tablet (Taking) Take 1 Tab by mouth three (3) times daily (with meals). glipiZIDE (GLUCOTROL) 5 mg tablet Take 1 Tab by mouth two (2) times a day. empagliflozin (JARDIANCE) 10 mg tablet Take one tab daily. STOP LOVASTATIN           Pertinent Labs:   Lab Results   Component Value Date/Time    Hemoglobin A1c 5.5 09/16/2019 10:31 AM    Hemoglobin A1c 5.6 06/05/2019 10:23 AM    Hemoglobin A1c 5.5 03/07/2019 04:16 PM    Hemoglobin A1c, External 6.3 10/12/2017      Body mass index is 32.48 kg/m². Lab Results   Component Value Date/Time    LDL, calculated 47 10/16/2019 10:37 AM         Lab Results   Component Value Date/Time    Sodium 146 (H) 10/16/2019 10:37 AM    Potassium 3.9 10/16/2019 10:37 AM    Chloride 107 (H) 10/16/2019 10:37 AM    CO2 25 10/16/2019 10:37 AM    Anion gap 12 10/07/2010 09:27 AM    Glucose 77 10/16/2019 10:37 AM    BUN 12 10/16/2019 10:37 AM    Creatinine 1.32 (H) 10/16/2019 10:37 AM    BUN/Creatinine ratio 9 (L) 10/16/2019 10:37 AM    GFR est AA 58 (L) 10/16/2019 10:37 AM    GFR est non-AA 51 (L) 10/16/2019 10:37 AM    Calcium 9.2 10/16/2019 10:37 AM    AST (SGOT) 33 09/16/2019 10:31 AM    Alk.  phosphatase 93 09/16/2019 10:31 AM    Protein, total 6.1 09/16/2019 10:31 AM    Albumin 4.1 10/16/2019 10:37 AM Globulin 2.7 10/07/2010 09:27 AM    A-G Ratio 2.2 2019 10:31 AM    ALT (SGPT) 28 2019 10:31 AM     Lab Results   Component Value Date/Time    Microalb/Creat ratio (ug/mg creat.) 139.9 (H) 2019 04:16 PM      Frequency of home glucose testing: No logs today. Blood Sugar range at home:    Last eye exam: In past 12 months. Last foot exam: This year. Polyuria, polyphagia or polydipsia: No   Retinopathy: No   Neuropathy SX: Severe right leg DDD, lumbar   Low blood sugar symptoms: No   Dietary compliance: Good   Medication compliance:Good   On ASA: Yes   Depression: No   CKD:3   Wt Readings from Last 3 Encounters:   10/16/19 253 lb (114.8 kg)   10/09/19 247 lb (112 kg)   19 256 lb (116.1 kg)        Social History     Tobacco Use   Smoking Status Former Smoker    Types: Cigarettes    Last attempt to quit: 2003    Years since quittin.3   Smokeless Tobacco Never Used     Body mass index is 32.48 kg/m². Diabetic Consultants: All the patient's questions regarding medications, diet and exercise were answered. Goal of A1C of less than 7.5% is our goal.   Our overall goal is to reduce or eliminate the long term consequences of poorly controlled diabetes. Yes    Type 2 diabetes mellitus with nephropathy ([address]):  See above.  Centrilobular emphysema ([address]):  SpO2 Readings from Last 3 Encounters:   10/16/19 99%   10/09/19 100%   19 98%     Oxygen: He currently is not on home oxygen therapy. Symptoms: chronic dyspnea: severity = moderate: course of sx: stable. Patient does not smoke cigarettes. Compliant to medications.  BMI 32.0-32.9,adult: Body mass index is 32.48 kg/m². Wt Readings from Last 3 Encounters:   10/16/19 253 lb (114.8 kg)   10/09/19 247 lb (112 kg)   19 256 lb (116.1 kg)     Exercise capacity:poor, due to DDD  I have reviewed/discussed the above normal BMI with the patient.   I have recommended the following interventions: dietary management education, guidance, and counseling . Recommended diet:low starch diabetic.  Prostate CA [facility]):  No symptoms of recurrence.  Essential hypertension:  BP Readings from Last 3 Encounters:   10/16/19 122/60   10/09/19 155/57   09/19/19 134/61     The patient reports:  taking medications as instructed, no medication side effects noted, no TIA's, no chest pain on exertion, no dyspnea on exertion, no swelling of ankles. Key CAD CHF Meds             amLODIPine (NORVASC) 10 mg tablet (Taking) TAKE ONE TABLET BY MOUTH ONCE DAILY    rosuvastatin (CRESTOR) 20 mg tablet (Taking) Take 1 Tab by mouth nightly. Indications: high cholesterol    cloNIDine HCl (CATAPRES) 0.3 mg tablet (Taking) Take 1 Tab by mouth two (2) times a day. labetalol (NORMODYNE) 200 mg tablet (Taking) TAKE ONE-HALF TABLET BY MOUTH TWICE DAILY FOR  HYPERTENSION    hydrALAZINE (APRESOLINE) 50 mg tablet (Taking) TAKE ONE TABLET BY MOUTH 4 TIMES DAILY FOR  HYPERTENSION    bumetanide (BUMEX) 2 mg tablet (Taking) TAKE ONE TABLET BY MOUTH TWICE DAILY    aspirin 81 mg chewable tablet (Taking) Take 81 mg by mouth daily. PRADAXA 150 mg capsule TAKE ONE CAPSULE BY MOUTH TWICE DAILY           Lab Results   Component Value Date/Time    Sodium 146 (H) 10/16/2019 10:37 AM    Potassium 3.9 10/16/2019 10:37 AM    Chloride 107 (H) 10/16/2019 10:37 AM    CO2 25 10/16/2019 10:37 AM    Anion gap 12 10/07/2010 09:27 AM    Glucose 77 10/16/2019 10:37 AM    BUN 12 10/16/2019 10:37 AM    Creatinine 1.32 (H) 10/16/2019 10:37 AM    BUN/Creatinine ratio 9 (L) 10/16/2019 10:37 AM    GFR est AA 58 (L) 10/16/2019 10:37 AM    GFR est non-AA 51 (L) 10/16/2019 10:37 AM    Calcium 9.2 10/16/2019 10:37 AM    Bilirubin, total 0.7 09/16/2019 10:31 AM    AST (SGOT) 33 09/16/2019 10:31 AM    Alk.  phosphatase 93 09/16/2019 10:31 AM    Protein, total 6.1 09/16/2019 10:31 AM    Albumin 4.1 10/16/2019 10:37 AM    Globulin 2.7 10/07/2010 09:27 AM    A-G Ratio 2.2 09/16/2019 10:31 AM    ALT (SGPT) 28 09/16/2019 10:31 AM     Low salt diet? yes  Aerobic exercise? no  Our goal is to normalize the blood pressure to decrease the risks of strokes and heart attacks. The patient is in agreement with the plan.  Hypercholesterolemia:  Cardiovascular risks for him are: LDL goal is under 80  diabetic  hypertension  hyperlipidemia. Key Antihyperlipidemia Meds             rosuvastatin (CRESTOR) 20 mg tablet (Taking) Take 1 Tab by mouth nightly. Indications: high cholesterol        Lab Results   Component Value Date/Time    Cholesterol, total 94 (L) 10/16/2019 10:37 AM    HDL Cholesterol 38 (L) 10/16/2019 10:37 AM    LDL, calculated 47 10/16/2019 10:37 AM    Triglyceride 45 10/16/2019 10:37 AM    CHOL/HDL Ratio 3.7 10/07/2010 09:27 AM     Lab Results   Component Value Date/Time    ALT (SGPT) 28 09/16/2019 10:31 AM    AST (SGOT) 33 09/16/2019 10:31 AM    Alk. phosphatase 93 09/16/2019 10:31 AM    Bilirubin, total 0.7 09/16/2019 10:31 AM      Myalgias: No   Fatigue: No   Other side effects: no  Wt Readings from Last 3 Encounters:   10/16/19 253 lb (114.8 kg)   10/09/19 247 lb (112 kg)   09/19/19 256 lb (116.1 kg)     The patient is aware of our goal to reduce or eliminate the long term problems (such as strokes and heart attacks) related to poorly controlled hyperlipidemia.  PVD (peripheral vascular disease) ([facility]):  No symptoms of vascular insufficiency.  Idiopathic gout, unspecified chronicity, unspecified site:  Lab Results   Component Value Date/Time    Uric acid 4.5 09/16/2019 10:31 AM          Chronic pain syndrome: Severe daily chronic pain. On hydrocodone.  Localized edema: Edema to above ankles.  Neuropathy: Both diabetic and radicular due to degenerative disc disease.          Stage 3 chronic kidney disease [facility]):  Nephrologist:   Lab Results   Component Value Date/Time    GFR est AA 58 (L) 10/16/2019 10:37 AM    GFR est non-AA 51 (L) 10/16/2019 10:37 AM    Creatinine (POC) 1.4 (H) 01/02/2015 10:08 AM    Creatinine 1.32 (H) 10/16/2019 10:37 AM    BUN 12 10/16/2019 10:37 AM    Sodium 146 (H) 10/16/2019 10:37 AM    Potassium 3.9 10/16/2019 10:37 AM    Chloride 107 (H) 10/16/2019 10:37 AM    CO2 25 10/16/2019 10:37 AM     Lab Results   Component Value Date/Time    WBC 3.7 09/05/2018 12:41 PM    HGB 8.7 (L) 10/16/2019 10:37 AM    HCT 36.1 (L) 09/05/2018 12:41 PM    PLATELET 909 72/46/9862 12:41 PM    MCV 77 (L) 09/05/2018 12:41 PM     Lab Results   Component Value Date/Time    Vitamin D 25-Hydroxy 21 (L) 10/07/2010 09:27 AM    VITAMIN D, 25-HYDROXY 63.3 10/16/2019 10:37 AM       Lab Results   Component Value Date/Time    Calcium 9.2 10/16/2019 10:37 AM    Phosphorus 3.0 10/16/2019 10:37 AM    PTH, Intact 73 (H) 10/16/2019 10:37 AM          Persistent atrial fibrillation: No new symptoms. Unaware of heartbeat. Current and past medical information:    Current Medications after this visit[de-identified]     Current Outpatient Medications   Medication Sig    melatonin 10 mg tab Take  by mouth.  HYDROcodone-acetaminophen (NORCO)  mg tablet Take 1 Tab by mouth every six (6) hours as needed for Pain for up to 30 days.  amLODIPine (NORVASC) 10 mg tablet TAKE ONE TABLET BY MOUTH ONCE DAILY    b complex-vitamin c-folic acid 5mg (FOLBEE PLUS) tab tablet TAKE 1 TABLET BY MOUTH ONCE DAILY    ergocalciferol (ERGOCALCIFEROL) 50,000 unit capsule Take 1 Cap by mouth every seven (7) days.  gabapentin (NEURONTIN) 300 mg capsule Take 1 Cap by mouth three (3) times daily.  metFORMIN (GLUCOPHAGE) 500 mg tablet Take 1 Tab by mouth three (3) times daily (with meals).  rosuvastatin (CRESTOR) 20 mg tablet Take 1 Tab by mouth nightly. Indications: high cholesterol    potassium chloride (K-DUR) 10 mEq tablet Take 2 Tabs by mouth two (2) times a day.  Indications: low amount of potassium in the blood    cloNIDine HCl (CATAPRES) 0.3 mg tablet Take 1 Tab by mouth two (2) times a day.  Blood-Glucose Meter (TRUE METRIX GLUCOSE METER) [facility] AS DIRECTED    glucose blood VI test strips (TRUE METRIX GLUCOSE TEST STRIP) strip Test 2 times daily Dx [place] E11.65    lancets misc Test 2 times daily Dx Code E11.65    montelukast (SINGULAIR) 10 mg tablet Take 1 Tab by mouth daily.  labetalol (NORMODYNE) 200 mg tablet TAKE ONE-HALF TABLET BY MOUTH TWICE DAILY FOR  HYPERTENSION    hydrALAZINE (APRESOLINE) 50 mg tablet TAKE ONE TABLET BY MOUTH 4 TIMES DAILY FOR  HYPERTENSION    bumetanide (BUMEX) 2 mg tablet TAKE ONE TABLET BY MOUTH TWICE DAILY    COLCRYS 0.6 mg tablet TAKE ONE TABLET BY MOUTH ONCE DAILY TO  PREVENT  GOUT    omeprazole (PRILOSEC) 20 mg capsule TAKE ONE CAPSULE BY MOUTH ONCE DAILY    tamsulosin (FLOMAX) 0.4 mg capsule TAKE ONE CAPSULE BY MOUTH ONCE DAILY    allopurinol (ZYLOPRIM) 300 mg tablet Take 1 Tab by mouth daily.  baclofen (LIORESAL) 10 mg tablet TAKE ONE TABLET BY MOUTH THREE TIMES DAILY (MUSCLE  RELAXER)    albuterol (PROVENTIL HFA, VENTOLIN HFA, PROAIR HFA) 90 mcg/actuation inhaler Take 2 Puffs by inhalation every six (6) hours as needed for Wheezing for up to 360 days. Please use generic that is covered    ferrous sulfate (IRON) 325 mg (65 mg iron) tablet Take 1 Tab by mouth two (2) times daily (after meals). Indications: Iron Deficiency Anemia    lancets 30 gauge misc     ACCU-CHEK SHAUNA CONTROL SOLN soln     docusate sodium 100 mg tab Take 1 Tab by mouth two (2) times a day.  magnesium oxide (MAG-OX) 400 mg tablet Take 1 Tab by mouth daily.  aspirin 81 mg chewable tablet Take 81 mg by mouth daily.  glipiZIDE (GLUCOTROL) 5 mg tablet Take 1 Tab by mouth two (2) times a day.  empagliflozin (JARDIANCE) 10 mg tablet Take one tab daily. STOP LOVASTATIN    PRADAXA 150 mg capsule TAKE ONE CAPSULE BY MOUTH TWICE DAILY     No current facility-administered medications for this visit.         Patient Active Problem List    Diagnosis Date Noted    BMI 32.0-32.9,adult 06/05/2019     Priority: 1 - One    Type 2 diabetes mellitus with nephropathy ([facility] 01/10/2018     Priority: 1 - One    History of colon polyps 05/02/2016     Priority: 1 - One    Idiopathic gout 05/02/2016     Priority: 1 - One    Essential hypertension with goal blood pressure less than 130/85 05/02/2016     Priority: 1 - One    CKD (chronic kidney disease) 11/23/2015     Priority: 1 - One    COPD (chronic obstructive pulmonary disease) ([facility] 04/27/2015     Priority: 1 - One    Diabetes with neurologic complications ([facility] 16/83/8510     Priority: 1 - One    Chronic radicular pain of lower back 07/21/2014     Priority: 1 - One    Neuropathy 07/16/2013     Priority: 1 - One    DDD (degenerative disc disease), lumbar 07/16/2013     Priority: 1 - One    Prostate CA ([facility] 07/16/2013     Priority: 1 - One    Edema 01/12/2012     Priority: 1 - One    Persistent atrial fibrillation      Priority: 1 - One    Arthritis 07/11/2011     Priority: 1 - One    Gout, unspecified      Priority: 1 - One    Hypercholesterolemia      Priority: 1 - One    Leg pain      Priority: 1 - One    Chronic pain 05/06/2010     Priority: 1 - One    Hypertension      Priority: 2 - Two    Tubulovillous adenoma polyp of colon 07/16/2013     Priority: 6 - Six    Unspecified arthropathy, ankle and foot 07/14/2011     Priority: 6 - Six    PVD (peripheral vascular disease) ([facility]      Priority: 6 - Six       Past Medical History:   Diagnosis Date    Acute on chronic diastolic congestive heart failure ([facility] 4/27/2015    Arthritis 7/11/2011    [place] 6/7/2010    Cancer ([address]     prostate    Chronic diastolic heart failure ([facility] 8/26/2015    CKD (chronic kidney disease) 11/23/2015    Diabetes ([facility]     Gout, unspecified     Hypercholesterolemia     Hypertension     Inguinal lymphadenopathy 2/18/2014    Leg pain     PAF (paroxysmal atrial fibrillation) (HCC)     PA[facility]ox[facility]mal [facility] fibrillation) [facility]     PVD (peripheral vascular disease) (HCC)        Allergies   Allergen Reactions    Indocin [Indomethacin Sodium] Unknown (comments)    Lisinopril Angioedema     Dxed in hospital.    Losartan Other (comments)     Face and throat swelling. Past Surgical History:   Procedure Laterality Date    COLONOSCOPY N/A 2016    COLONOSCOPY performed by Dwaine Villalba MD at [address] HX ORTHOPAEDIC      back and left shoulder x2       Social History     Socioeconomic History    Marital status: SINGLE     Spouse name: Not on file    Number of children: Not on file    Years of education: Not on file    Highest education level: Not on file   Tobacco Use    Smoking status: Former Smoker     Types: Cigarettes     Last attempt to quit: 2003     Years since quittin.3    Smokeless tobacco: Never Used   Substance and Sexual Activity    Alcohol use: Yes     Alcohol/week: 0.0 standard drinks     Comment: 2 drinks/month    Drug use: No       Review of Systems   Constitutional: Negative. Negative for chills, fever, malaise/fatigue and weight loss. HENT: Negative. Negative for hearing loss. Eyes: Negative. Negative for blurred vision and double vision. Respiratory: Negative. Negative for cough, sputum production and shortness of breath. Cardiovascular: Negative. Negative for chest pain and palpitations. Gastrointestinal: Negative. Negative for abdominal pain, blood in stool, heartburn, nausea and vomiting. Genitourinary: Negative. Negative for dysuria, frequency and urgency. Musculoskeletal: Negative. Negative for back pain, falls, myalgias and neck pain. Skin: Negative. Negative for rash. Neurological: Negative. Negative for dizziness, tingling, tremors, weakness and headaches. Endo/Heme/Allergies: Negative. Psychiatric/Behavioral: Negative. Negative for depression.         Objective:     Vitals:    10/16/19 0958   BP: 122/60   Pulse: (!) 49   Resp: 16   Temp: 97.6 °F (36.4 °C)   TempSrc: Oral   SpO2: 99%   Weight: 253 lb (114.8 kg)   Height: 6' 2\" (1.88 m)      Body mass index is 32.48 kg/m². Physical Exam   Constitutional: He is oriented to person, place, and time and well-developed, well-nourished, and in no distress. HENT:   Head: Normocephalic and atraumatic. Mouth/Throat: Oropharynx is clear and moist.   Eyes: Right eye exhibits no discharge. Left eye exhibits no discharge. No scleral icterus. Neck: No thyromegaly present. No bruit. Cardiovascular: Normal rate, regular rhythm and normal heart sounds. Pulmonary/Chest: Effort normal and breath sounds normal. No respiratory distress. He has no wheezes. He has no rales. Abdominal: Soft. He exhibits no distension. There is no tenderness. There is no rebound. Musculoskeletal: He exhibits edema. Neurological: He is alert and oriented to person, place, and time. Skin: No rash noted. No erythema. Psychiatric: Mood and affect normal.   Nursing note and vitals reviewed. There are no preventive care reminders to display for this patient. Assessment and orders:     Encounter Diagnoses     ICD-10-CM ICD-9-CM   1. Type 2 diabetes mellitus with other neurologic complication, without long-term current use of insulin ([facility]) E11.49 250.60   2. Type 2 diabetes mellitus with nephropathy ([facility]) E11.21 250.40     583.81   3. Centrilobular emphysema ([facility]) J43.2 492.8   4. BMI 32.0-32.9,adult Z68.32 V85.32   5. Prostate CA ([facility] C61 185   6. Essential hypertension I10 401.9   7. Hypercholesterolemia E78.00 272.0   8. PVD (peripheral vascular disease) ([facility]) I73.9 443.9   9. Idiopathic gout, unspecified chronicity, unspecified site M10.00 274.9   10. Chronic pain syndrome G89.4 338.4   11. Localized edema R60.0 782.3   12. Neuropathy G62.9 355.9   13. Stage 3 chronic kidney disease ([facility]) N18.3 585.3   14. Persistent atrial fibrillation I48.19 427.31     Diagnoses and all orders for this visit:    1. Type 2 diabetes mellitus with other neurologic complication, without long-term current use of insulin (HCC)-A1c is excellent  -     LIPID PANEL  -     RENAL FUNCTION PANEL    2. Type 2 diabetes mellitus with nephropathy (HCC)-see lab letter  -     LIPID PANEL  -     RENAL FUNCTION PANEL  -     PROT+CREATU (RANDOM)  -     PTH INTACT  -     VITAMIN D, 25 HYDROXY  -     HEMOGLOBIN    3. Centrilobular emphysema (HCC)-clinically stable    4. BMI 32.0-32.9,adult-not able to lose weight.  -     LIPID PANEL    5. Prostate CA (HCC)-no evidence of recurrence    6. Essential hypertension-controlled  -     LIPID PANEL  -     RENAL FUNCTION PANEL  -     PROT+CREATU (RANDOM)  -     PTH INTACT  -     VITAMIN D, 25 HYDROXY  -     HEMOGLOBIN    7. Hypercholesterolemia-  Lab Results   Component Value Date/Time    LDL, calculated 47 10/16/2019 10:37 AM       8. PVD (peripheral vascular disease) ([facility]- controlled. 9. Idiopathic gout, unspecified chronicity, unspecified site-no recent symptoms    10. Chronic pain syndrome-stable but severe    11. Localized edema-stable    12. Neuropathy-incapacitating    13. Stage 3 chronic kidney disease (HCC)-stable  -     LIPID PANEL  -     RENAL FUNCTION PANEL  -     PROT+CREATU (RANDOM)  -     PTH INTACT  -     VITAMIN D, 25 HYDROXY  -     HEMOGLOBIN    14. Persistent atrial fibrillation-no symptoms or complications    15. Iron deficiency anemia discovered on lab work. Has started iron while collecting stools for occult blood. May need GI work-up.  -     IRON PROFILE  -     SPECIMEN STATUS REPORT            Plan of care:  Discussed diagnoses in detail with patient. Medication risks/benefits/side effects discussed with patient. All of the patient's questions were addressed. The patient understands and agrees with our plan of care. The patient knows to call back if they are unsure of or forget any changes we discussed today or if the symptoms change.      The patient received an After-Visit Summary which contains VS, orders, medication list and allergy list. This can be used as a \"mini-medical record\" should they have to seek medical care while out of town. Patient Care Team:  Alda Sotelo MD as PCP - General (Family Practice)  Martina Petty, [address] (Orthopedic Surgery)  Linda Richardson MD (Otolaryngology)  Christiano Carpenter MD (Endocrinology)  Michael Morgan MD as Physician (Internal Medicine)  Karoline Padilla MD (Ophthalmology)  Law, Nalini Mcbride MD (Podiatry)  Mookie Ceballos MD (Cardiology)  Karoline Padilla MD (Ophthalmology)    Follow-up and Dispositions    · Return in about 3 months (around 1/16/2020). Future Appointments   Date Time Provider Elisha Lori   11/21/2019  9:00 AM Christiano Carpenetr MD CDE [facility]   9/9/2020 10:40 AM Mookie Ceballos MD [address]       Signed By: Sriram Licona MD     October 16, 2019      ATTENTION:   This medical record was transcribed using an electronic medical records/speech recognition system. Although proofread, it may and can contain electronic, spelling and other errors. Corrections may be executed at a later time. Please feel free to contact me for any clarifications as needed.

## 2019-10-16 NOTE — PROGRESS NOTES
1. Have you been to the ER, urgent care clinic since your last visit? Hospitalized since your last visit? No    2. Have you seen or consulted any other health care providers outside of the [address] [place] [address] since your last visit? Include any pap smears or colon screening. No  Reviewed record in preparation for visit and have necessary documentation  Pt did not bring medication to office visit for review    Goals that were addressed and/or need to be completed during or after this appointment include     There are no preventive care reminders to display for this patient.

## 2019-10-17 LAB
25(OH)D3+25(OH)D2 SERPL-MCNC: 63.3 NG/ML (ref 30–100)
ALBUMIN SERPL-MCNC: 4.1 G/DL (ref 3.5–4.7)
BUN SERPL-MCNC: 12 MG/DL (ref 8–27)
BUN/CREAT SERPL: 9 (ref 10–24)
CALCIUM SERPL-MCNC: 9.2 MG/DL (ref 8.6–10.2)
CHLORIDE SERPL-SCNC: 107 MMOL/L (ref 96–106)
CHOLEST SERPL-MCNC: 94 MG/DL (ref 100–199)
CO2 SERPL-SCNC: 25 MMOL/L (ref 20–29)
CREAT SERPL-MCNC: 1.32 MG/DL (ref 0.76–1.27)
CREAT UR-MCNC: 221.2 MG/DL
GLUCOSE SERPL-MCNC: 77 MG/DL (ref 65–99)
HDLC SERPL-MCNC: 38 MG/DL
HGB BLD-MCNC: 8.7 G/DL (ref 13–17.7)
INTERPRETATION: NORMAL
LDLC SERPL CALC-MCNC: 47 MG/DL (ref 0–99)
Lab: NORMAL
PHOSPHATE SERPL-MCNC: 3 MG/DL (ref 2.5–4.5)
POTASSIUM SERPL-SCNC: 3.9 MMOL/L (ref 3.5–5.2)
PROT UR-MCNC: 72.2 MG/DL
PROT/CREAT UR: 326 MG/G CREAT (ref 0–200)
PTH-INTACT SERPL-MCNC: 73 PG/ML (ref 15–65)
SODIUM SERPL-SCNC: 146 MMOL/L (ref 134–144)
TRIGL SERPL-MCNC: 45 MG/DL (ref 0–149)
VLDLC SERPL CALC-MCNC: 9 MG/DL (ref 5–40)

## 2019-10-17 NOTE — PROGRESS NOTES
He has a new anemia. Please add an iron profile to his lab and have him collect 2 stools for FOBT. See if she reports any melena or blood in stools.   Thank you,  Dr. Oliveira Cluster

## 2019-10-18 ENCOUNTER — TELEPHONE (OUTPATIENT)
Dept: FAMILY MEDICINE CLINIC | Age: 79
End: 2019-10-18

## 2019-10-18 NOTE — TELEPHONE ENCOUNTER
----- Message from Grant Najera MD sent at 10/17/2019  4:11 PM EDT -----  He has a new anemia. Please add an iron profile to his lab and have him collect 2 stools for FOBT. See if she reports any melena or blood in stools.   Thank you,  Dr. Gillian Lopez

## 2019-10-18 NOTE — TELEPHONE ENCOUNTER
Iron profile added to lads yesterday. Spoke to patient. He denies any discolored or noticeable blood in stool. Friend in office this morning and picked up FOBT cards x2. Patient is to call if he has any question how to used the cards.

## 2019-10-22 DIAGNOSIS — M54.50 CHRONIC LEFT-SIDED LOW BACK PAIN WITHOUT SCIATICA: ICD-10-CM

## 2019-10-22 DIAGNOSIS — G89.29 CHRONIC LEFT-SIDED LOW BACK PAIN WITHOUT SCIATICA: ICD-10-CM

## 2019-10-22 DIAGNOSIS — M51.36 DDD (DEGENERATIVE DISC DISEASE), LUMBAR: Chronic | ICD-10-CM

## 2019-10-22 LAB
IRON SATN MFR SERPL: 7 % (ref 15–55)
IRON SERPL-MCNC: 25 UG/DL (ref 38–169)
SPECIMEN STATUS REPORT, ROLRST: NORMAL
TIBC SERPL-MCNC: 351 UG/DL (ref 250–450)
UIBC SERPL-MCNC: 326 UG/DL (ref 111–343)

## 2019-10-23 NOTE — TELEPHONE ENCOUNTER
----- Message from Gregoriosusanna Valle sent at 10/22/2019  4:38 PM EDT -----  Regarding: /Refill   Medication Refill    Caller (if not patient):Trinh       Relationship of caller (if not patient):Daughter       Best contact number(s):[phone]      Name of medication and dosage if known: Hydrocodone (unsure of mg)      Is patient out of this medication (yes/no):(Trinh unsure)      Pharmacy name: [address] listed in chart? (yes/no):  Pharmacy phone number:      Details to clarify the request:Contact pt when Rx is ready for .       Lauren Conroy

## 2019-10-23 NOTE — TELEPHONE ENCOUNTER
Last office visit 10/16/19  [facility] [place]   Last drug screen 3/22/19

## 2019-10-24 LAB
HEMOCCULT STL QL: NEGATIVE
HEMOCCULT STL QL: NEGATIVE
VALID INTERNAL CONTROL?: YES
VALID INTERNAL CONTROL?: YES

## 2019-10-24 RX ORDER — HYDROCODONE BITARTRATE AND ACETAMINOPHEN 10; 325 MG/1; MG/1
1 TABLET ORAL
Qty: 90 TAB | Refills: 0 | Status: SHIPPED | OUTPATIENT
Start: 2019-10-24 | End: 2019-11-19 | Stop reason: SDUPTHER

## 2019-10-29 DIAGNOSIS — D50.9 IRON DEFICIENCY ANEMIA, UNSPECIFIED IRON DEFICIENCY ANEMIA TYPE: Primary | ICD-10-CM

## 2019-10-29 DIAGNOSIS — K63.5 POLYP OF COLON, UNSPECIFIED PART OF COLON, UNSPECIFIED TYPE: ICD-10-CM

## 2019-10-29 NOTE — PROGRESS NOTES
I will set up a colonoscopy for him. He is currently due and has an iron deficiency anemia.   Thank you,  Dr. Florian Gosselin

## 2019-11-01 ENCOUNTER — TELEPHONE (OUTPATIENT)
Dept: FAMILY MEDICINE CLINIC | Age: 79
End: 2019-11-01

## 2019-11-01 ENCOUNTER — OFFICE VISIT (OUTPATIENT)
Dept: FAMILY MEDICINE CLINIC | Age: 79
End: 2019-11-01

## 2019-11-01 ENCOUNTER — DOCUMENTATION ONLY (OUTPATIENT)
Dept: ENDOCRINOLOGY | Age: 79
End: 2019-11-01

## 2019-11-01 VITALS
HEIGHT: 74 IN | TEMPERATURE: 97 F | DIASTOLIC BLOOD PRESSURE: 74 MMHG | HEART RATE: 51 BPM | WEIGHT: 251 LBS | OXYGEN SATURATION: 98 % | RESPIRATION RATE: 20 BRPM | BODY MASS INDEX: 32.21 KG/M2 | SYSTOLIC BLOOD PRESSURE: 179 MMHG

## 2019-11-01 DIAGNOSIS — N18.30 STAGE 3 CHRONIC KIDNEY DISEASE (HCC): ICD-10-CM

## 2019-11-01 DIAGNOSIS — E11.21 TYPE 2 DIABETES MELLITUS WITH NEPHROPATHY (HCC): ICD-10-CM

## 2019-11-01 DIAGNOSIS — D50.9 IRON DEFICIENCY ANEMIA, UNSPECIFIED IRON DEFICIENCY ANEMIA TYPE: Primary | ICD-10-CM

## 2019-11-01 DIAGNOSIS — L72.9 CYST OF SKIN: ICD-10-CM

## 2019-11-01 NOTE — PROGRESS NOTES
I got a form for shoes request faxed on oct 22 2019     Cannot fill it  , as the exam is past due date must be within 6 months   Last seen in may 2019     Yanet Jackson MD

## 2019-11-01 NOTE — PROGRESS NOTES
[address] [place] [address]  [phone]           Progress Note    Patient: Thompson Meier MRN: [id number]  SSN: [id number]    YOB: 1940  Age: [de-identified] y.o. Sex: male        Chief Complaint   Patient presents with    Anemia         Subjective:     Encounter Diagnoses   Name Primary?  Iron deficiency anemia, unspecified iron deficiency anemia type: Stools for FOBT x2-. No GI symptoms. Nonetheless he is due for colonoscopy. No sx. Lab Results   Component Value Date/Time    HGB 8.7 (L) 10/16/2019 10:37 AM     Lab Results   Component Value Date/Time    Iron 25 (L) 10/16/2019 10:37 AM    TIBC 351 10/16/2019 10:37 AM    Iron % saturation 7 (LL) 10/16/2019 10:37 AM    Ferritin 36 07/02/2018 03:27 PM        Yes    Type 2 diabetes mellitus with nephropathy ([address]): Excellent control. This patient is managed under a comprehensive plan of care for Diabetes. Overall the patient feels well with good energy level. Key Antihyperglycemic Medications             glipiZIDE (GLUCOTROL) 5 mg tablet (Taking) Take 1 Tab by mouth two (2) times a day. metFORMIN (GLUCOPHAGE) 500 mg tablet (Taking) Take 1 Tab by mouth three (3) times daily (with meals). empagliflozin (JARDIANCE) 10 mg tablet (Taking) Take one tab daily. STOP LOVASTATIN           Pertinent Labs:   Lab Results   Component Value Date/Time    Hemoglobin A1c 5.5 09/16/2019 10:31 AM    Hemoglobin A1c 5.6 06/05/2019 10:23 AM    Hemoglobin A1c 5.5 03/07/2019 04:16 PM    Hemoglobin A1c, External 6.3 10/12/2017      Body mass index is 32.23 kg/m².      Lab Results   Component Value Date/Time    LDL, calculated 47 10/16/2019 10:37 AM         Lab Results   Component Value Date/Time    Sodium 146 (H) 10/16/2019 10:37 AM    Potassium 3.9 10/16/2019 10:37 AM    Chloride 107 (H) 10/16/2019 10:37 AM    CO2 25 10/16/2019 10:37 AM    Anion gap 12 10/07/2010 09:27 AM    Glucose 77 10/16/2019 10:37 AM BUN 12 10/16/2019 10:37 AM    Creatinine 1.32 (H) 10/16/2019 10:37 AM    BUN/Creatinine ratio 9 (L) 10/16/2019 10:37 AM    GFR est AA 58 (L) 10/16/2019 10:37 AM    GFR est non-AA 51 (L) 10/16/2019 10:37 AM    Calcium 9.2 10/16/2019 10:37 AM    AST (SGOT) 33 2019 10:31 AM    Alk. phosphatase 93 2019 10:31 AM    Protein, total 6.1 2019 10:31 AM    Albumin 4.1 10/16/2019 10:37 AM    Globulin 2.7 10/07/2010 09:27 AM    A-G Ratio 2.2 2019 10:31 AM    ALT (SGPT) 28 2019 10:31 AM     Lab Results   Component Value Date/Time    Microalb/Creat ratio (ug/mg creat.) 139.9 (H) 2019 04:16 PM        Wt Readings from Last 3 Encounters:   19 251 lb (113.9 kg)   10/16/19 253 lb (114.8 kg)   10/09/19 247 lb (112 kg)        Social History     Tobacco Use   Smoking Status Former Smoker    Types: Cigarettes    Last attempt to quit: 2003    Years since quittin.3   Smokeless Tobacco Never Used     Body mass index is 32.23 kg/m². Diabetic Consultants: All the patient's questions regarding medications, diet and exercise were answered. Goal of A1C of less than 7.5% is our goal.   Our overall goal is to reduce or eliminate the long term consequences of poorly controlled diabetes.            Stage 3 chronic kidney disease [facility]):  Nephrologist:   Lab Results   Component Value Date/Time    GFR est AA 58 (L) 10/16/2019 10:37 AM    GFR est non-AA 51 (L) 10/16/2019 10:37 AM    Creatinine (POC) 1.4 (H) 2015 10:08 AM    Creatinine 1.32 (H) 10/16/2019 10:37 AM    BUN 12 10/16/2019 10:37 AM    Sodium 146 (H) 10/16/2019 10:37 AM    Potassium 3.9 10/16/2019 10:37 AM    Chloride 107 (H) 10/16/2019 10:37 AM    CO2 25 10/16/2019 10:37 AM     Lab Results   Component Value Date/Time    WBC 3.7 2018 12:41 PM    HGB 8.7 (L) 10/16/2019 10:37 AM    HCT 36.1 (L) 2018 12:41 PM    PLATELET 071  12:41 PM    MCV 77 (L) 2018 12:41 PM     Lab Results   Component Value Date/Time    Vitamin D 25-Hydroxy 21 (L) 10/07/2010 09:27 AM    VITAMIN D, 25-HYDROXY 63.3 10/16/2019 10:37 AM       Lab Results   Component Value Date/Time    Calcium 9.2 10/16/2019 10:37 AM    Phosphorus 3.0 10/16/2019 10:37 AM    PTH, Intact 73 (H) 10/16/2019 10:37 AM            Cyst of skin: He has several new small cyst underneath his skin on his right forearm. These do not feel like lymph nodes. Denies in the right location. Will follow these for now. Current and past medical information:    Current Medications after this visit[de-identified]     Current Outpatient Medications   Medication Sig    HYDROcodone-acetaminophen (NORCO)  mg tablet Take 1 Tab by mouth every six (6) hours as needed for Pain for up to 30 days.  melatonin 10 mg tab Take  by mouth.  amLODIPine (NORVASC) 10 mg tablet TAKE ONE TABLET BY MOUTH ONCE DAILY    b complex-vitamin c-folic acid 5mg (FOLBEE PLUS) tab tablet TAKE 1 TABLET BY MOUTH ONCE DAILY    ergocalciferol (ERGOCALCIFEROL) 50,000 unit capsule Take 1 Cap by mouth every seven (7) days.  gabapentin (NEURONTIN) 300 mg capsule Take 1 Cap by mouth three (3) times daily.  glipiZIDE (GLUCOTROL) 5 mg tablet Take 1 Tab by mouth two (2) times a day.  metFORMIN (GLUCOPHAGE) 500 mg tablet Take 1 Tab by mouth three (3) times daily (with meals).  rosuvastatin (CRESTOR) 20 mg tablet Take 1 Tab by mouth nightly. Indications: high cholesterol    potassium chloride (K-DUR) 10 mEq tablet Take 2 Tabs by mouth two (2) times a day. Indications: low amount of potassium in the blood    cloNIDine HCl (CATAPRES) 0.3 mg tablet Take 1 Tab by mouth two (2) times a day.  Blood-Glucose Meter (TRUE METRIX GLUCOSE METER) mis AS DIRECTED    glucose blood VI test strips (TRUE METRIX GLUCOSE TEST STRIP) strip Test 2 times daily Dx Coed E11.65    lancets misc Test 2 times daily Dx Code E11.65    montelukast (SINGULAIR) 10 mg tablet Take 1 Tab by mouth daily.     labetalol (NORMODYNE) 200 mg tablet TAKE ONE-HALF TABLET BY MOUTH TWICE DAILY FOR  HYPERTENSION    hydrALAZINE (APRESOLINE) 50 mg tablet TAKE ONE TABLET BY MOUTH 4 TIMES DAILY FOR  HYPERTENSION    bumetanide (BUMEX) 2 mg tablet TAKE ONE TABLET BY MOUTH TWICE DAILY    empagliflozin (JARDIANCE) 10 mg tablet Take one tab daily. STOP LOVASTATIN    COLCRYS 0.6 mg tablet TAKE ONE TABLET BY MOUTH ONCE DAILY TO  PREVENT  GOUT    omeprazole (PRILOSEC) 20 mg capsule TAKE ONE CAPSULE BY MOUTH ONCE DAILY    tamsulosin (FLOMAX) 0.4 mg capsule TAKE ONE CAPSULE BY MOUTH ONCE DAILY    allopurinol (ZYLOPRIM) 300 mg tablet Take 1 Tab by mouth daily.  baclofen (LIORESAL) 10 mg tablet TAKE ONE TABLET BY MOUTH THREE TIMES DAILY (MUSCLE  RELAXER)    PRADAXA 150 mg capsule TAKE ONE CAPSULE BY MOUTH TWICE DAILY    albuterol (PROVENTIL HFA, VENTOLIN HFA, PROAIR HFA) 90 mcg/actuation inhaler Take 2 Puffs by inhalation every six (6) hours as needed for Wheezing for up to 360 days. Please use generic that is covered    ferrous sulfate (IRON) 325 mg (65 mg iron) tablet Take 1 Tab by mouth two (2) times daily (after meals). Indications: Iron Deficiency Anemia    lancets [facility]     ACCU-CHEK SHAUNA CONTROL SOLN soln     docusate sodium 100 mg tab Take 1 Tab by mouth two (2) times a day.  magnesium oxide (MAG-OX) 400 mg tablet Take 1 Tab by mouth daily.  aspirin 81 mg chewable tablet Take 81 mg by mouth daily. No current facility-administered medications for this visit.         Patient Active Problem List    Diagnosis Date Noted    BMI 32.0-32.9,adult 06/05/2019     Priority: 1 - One    Type 2 diabetes mellitus with nephropathy ([facility] 01/10/2018     Priority: 1 - One    History of colon polyps 05/02/2016     Priority: 1 - One    Idiopathic gout 05/02/2016     Priority: 1 - One    Essential hypertension with goal blood pressure less than 130/85 05/02/2016     Priority: 1 - One    CKD (chronic kidney disease) 11/23/2015     Priority: 1 - One    COPD (chronic obstructive pulmonary disease) ([facility] 04/27/2015     Priority: 1 - One    [facility] 07/37/1550     Priority: 1 - One    Chronic radicular pain of lower back 07/21/2014     Priority: 1 - One    Neuropathy 07/16/2013     Priority: 1 - One    DDD (degenerative disc disease), lumbar 07/16/2013     Priority: 1 - One    Prostate CA ([facility] 07/16/2013     Priority: 1 - One    Edema 01/12/2012     Priority: 1 - One    Persistent atrial fibrillation      Priority: 1 - One    Arthritis 07/11/2011     Priority: 1 - One    Gout, unspecified      Priority: 1 - One    Hypercholesterolemia      Priority: 1 - One    Leg pain      Priority: 1 - One    Chronic pain 05/06/2010     Priority: 1 - One    Hypertension      Priority: 2 - Two    Tubulovillous adenoma polyp of colon 07/16/2013     Priority: 6 - Six    Unspecified arthropathy, ankle and foot 07/14/2011     Priority: 6 - Six    PVD (peripheral vascular disease) [facility]      Priority: 6 - Six       Past Medical History:   Diagnosis Date    Acute on chronic diastolic congestive heart failure ([facility] 4/27/2015    Arthritis 7/11/2011    [place] 6/7/2010    Cancer ([facility]     prostate    Chronic diastolic heart failure [facility] 8/26/2015    CKD (chronic kidney disease) 11/23/2015    Diabetes ([facility]     Gout, unspecified     Hypercholesterolemia     Hypertension     Inguinal lymphadenopathy 2/18/2014    Leg pain     PAF (paroxysmal atrial fibrillation) (HCC)     PAF (paroxysmal atrial fibrillation) (HCC)     PVD (peripheral vascular disease) (HCC)        Allergies   Allergen Reactions    Indocin [Indomethacin Sodium] Unknown (comments)    Lisinopril Angioedema     Dxed in hospital.    Losartan Other (comments)     Face and throat swelling.        Past Surgical History:   Procedure Laterality Date    COLONOSCOPY N/A 9/22/2016    COLONOSCOPY performed by Hayley Paris [facility] at [facility] back and left shoulder x2       Social History     Socioeconomic History    Marital status: SINGLE     Spouse name: Not on file    Number of children: Not on file    Years of education: Not on file    Highest education level: Not on file   Tobacco Use    Smoking status: Former Smoker     Types: Cigarettes     Last attempt to quit: 2003     Years since quittin.3    Smokeless tobacco: Never Used   Substance and Sexual Activity    Alcohol use: Yes     Alcohol/week: 0.0 standard drinks     Comment: 2 drinks/month    Drug use: No       Review of Systems   Constitutional: Negative. Negative for chills, fever, malaise/fatigue and weight loss. HENT: Negative. Negative for hearing loss. Eyes: Negative. Negative for blurred vision and double vision. Respiratory: Negative. Negative for cough, sputum production and shortness of breath. Cardiovascular: Negative. Negative for chest pain and palpitations. Gastrointestinal: Negative. Negative for abdominal pain, blood in stool, heartburn, melena, nausea and vomiting. Genitourinary: Negative. Negative for dysuria, frequency and urgency. Musculoskeletal: Negative. Negative for back pain, falls, myalgias and neck pain. Skin: Negative. Negative for rash. Neurological: Negative. Negative for dizziness, tingling, tremors, weakness and headaches. Endo/Heme/Allergies: Negative. Psychiatric/Behavioral: Negative. Negative for depression. Objective:     Vitals:    19 1309   BP: 184/76   Pulse: (!) 51   Resp: 20   Temp: 97 °F (36.1 °C)   TempSrc: Oral   SpO2: 98%   Weight: 251 lb (113.9 kg)   Height: 6' 2\" (1.88 m)      Body mass index is 32.23 kg/m². Physical Exam   Constitutional: He is oriented to person, place, and time and well-developed, well-nourished, and in no distress. HENT:   Head: Normocephalic and atraumatic. Mouth/Throat: Oropharynx is clear and moist.   Eyes: Right eye exhibits no discharge.  Left eye exhibits no discharge. No scleral icterus. Neck: No thyromegaly present. No bruit. Cardiovascular: Normal rate, regular rhythm and normal heart sounds. Exam reveals no friction rub. No murmur heard. Pulmonary/Chest: Effort normal and breath sounds normal. No respiratory distress. He has no wheezes. He has no rales. Abdominal: Soft. He exhibits no distension. There is no tenderness. There is no rebound. Neurological: He is alert and oriented to person, place, and time. Skin: No rash noted. No erythema. Psychiatric: Mood and affect normal.   Nursing note and vitals reviewed. Health Maintenance Due   Topic Date Due    COLONOSCOPY  09/22/2019         Assessment and orders:     Encounter Diagnoses     ICD-10-CM ICD-9-CM   1. Iron deficiency anemia, unspecified iron deficiency anemia type D50.9 280.9   2. Type 2 diabetes mellitus with nephropathy (HCC) E11.21 250.40     583.81   3. Stage 3 chronic kidney disease (HCC) N18.3 585.3   4. Cyst of skin L72.9 706.2     Diagnoses and all orders for this visit:    1. Iron deficiency anemia, unspecified iron deficiency anemia type-retest.  Last hemoglobin was 8.7.  -     CBC WITH AUTOMATED DIFF  -     FERRITIN  -     IRON PROFILE    2. Type 2 diabetes mellitus with nephropathy (HCC)-no changes    3. Stage 3 chronic kidney disease (HCC)-stable    4. Cyst of skin-right forearm. We will follow these. Plan of care:  Discussed diagnoses in detail with patient. Medication risks/benefits/side effects discussed with patient. All of the patient's questions were addressed. The patient understands and agrees with our plan of care. The patient knows to call back if they are unsure of or forget any changes we discussed today or if the symptoms change.      The patient received an After-Visit Summary which contains VS, orders, medication list and allergy list. This can be used as a \"mini-medical record\" should they have to seek medical care while out of town. Patient Care Team:  Mary Carmen Arreaga MD as PCP - General (Family Practice)  Mary Carmen Arreaga MD as PCP - [facility] Empaneled Provider  Loye Aase, PA (Orthopedic Surgery)  Candi Byrd MD (Otolaryngology)  Cooper Hyde MD (Endocrinology)  Terry Diamond MD as Physician (Internal Medicine)  Irina Stafford MD (Ophthalmology)  Person, Brittany Walter MD (Podiatry)  Rogelio Xiao MD (Cardiology)  Irina Stafford MD (Ophthalmology)  Dai Preea MD (Gastroenterology)    Follow-up and Dispositions    · Return in about 4 weeks (around 11/29/2019). Future Appointments   Date Time Provider Elisha Lori   11/21/2019  9:00 AM Cooper Hyde MD [facility]   2/17/2020 10:10 AM Mary Carmen Arreaga MD [facility]   9/9/2020 10:40 AM Rogelio Xiao MD [facility]       Signed By: Oanh Cohen MD     November 1, 2019      ATTENTION:   This medical record was transcribed using an electronic medical records/speech recognition system. Although proofread, it may and can contain electronic, spelling and other errors. Corrections may be executed at a later time. Please feel free to contact me for any clarifications as needed.

## 2019-11-01 NOTE — PROGRESS NOTES
1. Have you been to the ER, urgent care clinic since your last visit? Hospitalized since your last visit? No    2. Have you seen or consulted any other health care providers outside of the [address] [place] [address] since your last visit? Include any pap smears or colon screening. No    Reviewed record in preparation for visit and have necessary documentation  Goals that were addressed and/or need to be completed during or after this appointment include     Health Maintenance Due   Topic Date Due    COLONOSCOPY  09/22/2019       Patient is accompanied by self I have received verbal consent from Georgi Pinto to discuss any/all medical information while they are present in the room.

## 2019-11-02 DIAGNOSIS — D50.9 IRON DEFICIENCY ANEMIA, UNSPECIFIED IRON DEFICIENCY ANEMIA TYPE: Primary | ICD-10-CM

## 2019-11-02 DIAGNOSIS — D50.8 IRON DEFICIENCY ANEMIA SECONDARY TO INADEQUATE DIETARY IRON INTAKE: Chronic | ICD-10-CM

## 2019-11-02 LAB
BASOPHILS # BLD AUTO: 0 X10E3/UL (ref 0–0.2)
BASOPHILS NFR BLD AUTO: 0 %
EOSINOPHIL # BLD AUTO: 0.1 X10E3/UL (ref 0–0.4)
EOSINOPHIL NFR BLD AUTO: 3 %
ERYTHROCYTE [DISTWIDTH] IN BLOOD BY AUTOMATED COUNT: 15 % (ref 12.3–15.4)
FERRITIN SERPL-MCNC: 33 NG/ML (ref 30–400)
HCT VFR BLD AUTO: 29.1 % (ref 37.5–51)
HGB BLD-MCNC: 8.9 G/DL (ref 13–17.7)
IMM GRANULOCYTES # BLD AUTO: 0 X10E3/UL (ref 0–0.1)
IMM GRANULOCYTES NFR BLD AUTO: 0 %
IRON SATN MFR SERPL: 7 % (ref 15–55)
IRON SERPL-MCNC: 25 UG/DL (ref 38–169)
LYMPHOCYTES # BLD AUTO: 1 X10E3/UL (ref 0.7–3.1)
LYMPHOCYTES NFR BLD AUTO: 27 %
MCH RBC QN AUTO: 23.5 PG (ref 26.6–33)
MCHC RBC AUTO-ENTMCNC: 30.6 G/DL (ref 31.5–35.7)
MCV RBC AUTO: 77 FL (ref 79–97)
MONOCYTES # BLD AUTO: 0.4 X10E3/UL (ref 0.1–0.9)
MONOCYTES NFR BLD AUTO: 10 %
NEUTROPHILS # BLD AUTO: 2.1 X10E3/UL (ref 1.4–7)
NEUTROPHILS NFR BLD AUTO: 60 %
PLATELET # BLD AUTO: 159 X10E3/UL (ref 150–450)
RBC # BLD AUTO: 3.78 X10E6/UL (ref 4.14–5.8)
TIBC SERPL-MCNC: 359 UG/DL (ref 250–450)
UIBC SERPL-MCNC: 334 UG/DL (ref 111–343)
WBC # BLD AUTO: 3.6 X10E3/UL (ref 3.4–10.8)

## 2019-11-14 ENCOUNTER — TELEPHONE (OUTPATIENT)
Dept: FAMILY MEDICINE CLINIC | Age: 79
End: 2019-11-14

## 2019-11-18 ENCOUNTER — HOSPITAL ENCOUNTER (OUTPATIENT)
Dept: LAB | Age: 79
Discharge: HOME | End: 2019-11-18

## 2019-11-18 DIAGNOSIS — E11.42 TYPE 2 DIABETES MELLITUS WITH DIABETIC POLYNEUROPATHY, WITHOUT LONG-TERM CURRENT USE OF INSULIN (HCC): ICD-10-CM

## 2019-11-18 DIAGNOSIS — E78.2 MIXED HYPERLIPIDEMIA: ICD-10-CM

## 2019-11-19 ENCOUNTER — TELEPHONE (OUTPATIENT)
Dept: ENDOCRINOLOGY | Age: 79
End: 2019-11-19

## 2019-11-19 DIAGNOSIS — M51.36 DDD (DEGENERATIVE DISC DISEASE), LUMBAR: Chronic | ICD-10-CM

## 2019-11-19 DIAGNOSIS — G89.29 CHRONIC LEFT-SIDED LOW BACK PAIN WITHOUT SCIATICA: ICD-10-CM

## 2019-11-19 DIAGNOSIS — M54.50 CHRONIC LEFT-SIDED LOW BACK PAIN WITHOUT SCIATICA: ICD-10-CM

## 2019-11-19 LAB
ALBUMIN SERPL-MCNC: 4 G/DL (ref 3.5–5)
ALBUMIN/GLOB SERPL: 1.4 {RATIO} (ref 1.1–2.2)
ALP SERPL-CCNC: 97 U/L (ref 45–117)
ALT SERPL-CCNC: 21 U/L (ref 12–78)
ANION GAP SERPL CALC-SCNC: 7 MMOL/L (ref 5–15)
AST SERPL-CCNC: 22 U/L (ref 15–37)
BILIRUB SERPL-MCNC: 0.8 MG/DL (ref 0.2–1)
BUN SERPL-MCNC: 16 MG/DL (ref 6–20)
BUN/CREAT SERPL: 11 (ref 12–20)
CALCIUM SERPL-MCNC: 9.3 MG/DL (ref 8.5–10.1)
CHLORIDE SERPL-SCNC: 108 MMOL/L (ref 97–108)
CHOLEST SERPL-MCNC: 97 MG/DL
CO2 SERPL-SCNC: 27 MMOL/L (ref 21–32)
CREAT SERPL-MCNC: 1.5 MG/DL (ref 0.7–1.3)
CREAT UR-MCNC: 25.6 MG/DL
EST. AVERAGE GLUCOSE BLD GHB EST-MCNC: 120 MG/DL
GLOBULIN SER CALC-MCNC: 2.8 G/DL (ref 2–4)
GLUCOSE SERPL-MCNC: 41 MG/DL (ref 65–100)
HBA1C MFR BLD: 5.8 % (ref 4–5.6)
HDLC SERPL-MCNC: 35 MG/DL
HDLC SERPL: 2.8 {RATIO} (ref 0–5)
LDLC SERPL CALC-MCNC: 51.8 MG/DL (ref 0–100)
LIPID PROFILE,FLP: NORMAL
MICROALBUMIN UR-MCNC: 9.08 MG/DL
MICROALBUMIN/CREAT UR-RTO: 355 MG/G (ref 0–30)
POTASSIUM SERPL-SCNC: 3.7 MMOL/L (ref 3.5–5.1)
PROT SERPL-MCNC: 6.8 G/DL (ref 6.4–8.2)
SODIUM SERPL-SCNC: 142 MMOL/L (ref 136–145)
TRIGL SERPL-MCNC: 51 MG/DL (ref ?–150)
VLDLC SERPL CALC-MCNC: 10.2 MG/DL

## 2019-11-19 RX ORDER — HYDROCODONE BITARTRATE AND ACETAMINOPHEN 10; 325 MG/1; MG/1
1 TABLET ORAL
Qty: 90 TAB | Refills: 0 | Status: SHIPPED | OUTPATIENT
Start: 2019-11-19 | End: 2019-12-16 | Stop reason: SDUPTHER

## 2019-11-19 NOTE — TELEPHONE ENCOUNTER
Last office visit 11/1/19  [facility] on file  Last drug screen 3/21/19

## 2019-11-19 NOTE — TELEPHONE ENCOUNTER
Caller's first/last name:  Eddie Escobedo, daughter, on HIPAA    Name and dosage of medication:  Hydrocodone    Best contact number:  [phone]    Further clarification of call:      Marjorie called to request a refill on Hydrocodone.

## 2019-11-20 DIAGNOSIS — J44.9 CHRONIC OBSTRUCTIVE PULMONARY DISEASE, UNSPECIFIED COPD TYPE (HCC): Chronic | ICD-10-CM

## 2019-11-20 RX ORDER — MONTELUKAST SODIUM 10 MG/1
TABLET ORAL
Qty: 90 TAB | Refills: 3 | Status: SHIPPED | OUTPATIENT
Start: 2019-11-20 | End: 2020-01-01 | Stop reason: SDUPTHER

## 2019-11-20 RX ORDER — FLUTICASONE PROPIONATE 50 MCG
SPRAY, SUSPENSION (ML) NASAL
Qty: 3 BOTTLE | Refills: 3 | Status: SHIPPED | OUTPATIENT
Start: 2019-11-20 | End: 2020-03-10 | Stop reason: SDUPTHER

## 2019-11-20 RX ORDER — ALBUTEROL SULFATE 90 UG/1
AEROSOL, METERED RESPIRATORY (INHALATION)
Qty: 3 INHALER | Refills: 3 | Status: SHIPPED | OUTPATIENT
Start: 2019-11-20 | End: 2020-03-11 | Stop reason: SDUPTHER

## 2019-11-21 ENCOUNTER — OFFICE VISIT (OUTPATIENT)
Dept: ENDOCRINOLOGY | Age: 79
End: 2019-11-21

## 2019-11-21 VITALS
TEMPERATURE: 97.1 F | HEIGHT: 74 IN | RESPIRATION RATE: 18 BRPM | OXYGEN SATURATION: 100 % | SYSTOLIC BLOOD PRESSURE: 194 MMHG | WEIGHT: 242.5 LBS | DIASTOLIC BLOOD PRESSURE: 73 MMHG | HEART RATE: 60 BPM | BODY MASS INDEX: 31.12 KG/M2

## 2019-11-21 DIAGNOSIS — I10 ESSENTIAL HYPERTENSION WITH GOAL BLOOD PRESSURE LESS THAN 130/85: ICD-10-CM

## 2019-11-21 DIAGNOSIS — N18.30 CKD STAGE 3 DUE TO TYPE 2 DIABETES MELLITUS (HCC): ICD-10-CM

## 2019-11-21 DIAGNOSIS — E11.42 TYPE 2 DIABETES MELLITUS WITH DIABETIC POLYNEUROPATHY, WITHOUT LONG-TERM CURRENT USE OF INSULIN (HCC): Chronic | ICD-10-CM

## 2019-11-21 DIAGNOSIS — E78.2 MIXED HYPERLIPIDEMIA: ICD-10-CM

## 2019-11-21 DIAGNOSIS — I10 ESSENTIAL HYPERTENSION WITH GOAL BLOOD PRESSURE LESS THAN 130/85: Chronic | ICD-10-CM

## 2019-11-21 DIAGNOSIS — E11.22 CKD STAGE 3 DUE TO TYPE 2 DIABETES MELLITUS (HCC): ICD-10-CM

## 2019-11-21 DIAGNOSIS — E11.42 TYPE 2 DIABETES MELLITUS WITH DIABETIC POLYNEUROPATHY, WITHOUT LONG-TERM CURRENT USE OF INSULIN (HCC): Primary | ICD-10-CM

## 2019-11-21 NOTE — PROGRESS NOTES
1. Have you been to the ER, urgent care clinic since your last visit? No  Hospitalized since your last visit? No 
 
2. Have you seen or consulted any other health care providers outside of the [address] [place] [address] since your last visit? Include any pap smears or colon screening. No  
 
 
Wt Readings from Last 3 Encounters:  
11/21/19 242 lb 8 oz (110 kg) 11/01/19 251 lb (113.9 kg) 10/16/19 253 lb (114.8 kg) Temp Readings from Last 3 Encounters:  
11/21/19 97.1 °F (36.2 °C) (Oral) 11/01/19 97 °F (36.1 °C) (Oral) 10/16/19 97.6 °F (36.4 °C) (Oral) BP Readings from Last 3 Encounters:  
11/21/19 194/73  
11/01/19 179/74  
10/16/19 122/60 Pulse Readings from Last 3 Encounters:  
11/21/19 60  
11/01/19 (!) 51  
10/16/19 (!) 49 Lab Results Component Value Date/Time Hemoglobin A1c 5.8 (H) 11/18/2019 10:50 AM  
 Hemoglobin A1c (POC) 7.2 02/07/2017 11:43 AM  
 Hemoglobin A1c, External 6.3 10/12/2017 Patient has meter today.

## 2019-11-21 NOTE — PROGRESS NOTES
HISTORY OF PRESENT ILLNESS Eddie Gastelum is a [de-identified] y.o. male. HPI Patient is here  after last visit of Type 2 diabetes mellitus from  May 2019 Lost 10 lbs He got the diabetic shoes Nice pleasant man Hard of hearing He is doing well Old history Sugars are good on log He has been well he says Reviewed all the med bottles : 
 
Got all the med bottles Noticing he is on two diff statins Old history :  
 
A GAP OF 16 MONTHS  
LOST 6 LBS Gotten diet compliant He got the log with high sugars Old history He is told to come back with dietary changes while on current meds No hospitalizations No med changes Brought the log , has better sugars, but gained 5 lbs Not a great historian Taking glipizide and metformin NOT Accompanied by his daughter Prior history :  
Referred : by PCP 
 
H/o diabetes for 10 years He has hearing issues  
accomapnied by his daughter Current A1C is 8.1 %   From jan 2015  and symptoms/problems include fluctutating sugars Current diabetic medications include metformin   500 mg TID He lives alone, manages his own meds . Current monitoring regimen: home blood tests - daily Home blood sugar records: trend: fluctuating a bit Any episodes of hypoglycemia? no 
 
Weight trend: fluctuating a bit Prior visit with dietician: no 
Current diet: \"unhealthy\" diet in general 
Current exercise: walking Known diabetic complications: retinopathy, nephropathy, peripheral neuropathy and cardiovascular disease Cardiovascular risk factors: dyslipidemia, diabetes mellitus, obesity, sedentary life style, male gender, stress Eye exam current (within one year): yes ARA: no  
 
Past Medical History:  
Diagnosis Date  Acute on chronic diastolic congestive heart failure ([facility] [address] [facility] 4/27/2015  Arthritis 7/11/2011  [place] 6/7/2010  Cancer [facility])   
 prostate  Chronic diastolic heart failure ([facility] 8/26/2015  CKD (chronic kidney disease) 11/23/2015  Diabetes ([facility]  Gout, unspecified  Hypercholesterolemia  Hypertension  Inguinal lymphadenopathy 2/18/2014  Leg pain  Persistent atrial fibrillation  PVD (peripheral vascular disease) ([facility] Past Surgical History:  
Procedure Laterality Date  COLONOSCOPY N/A 9/22/2016 COLONOSCOPY performed by Mounika Oliveira MD at [facility]    
 back and left shoulder x2 Current Outpatient Medications Medication Sig  
 fluticasone propionate (FLONASE) 50 mcg/actuation nasal spray USE ONE SPRAY(S) IN EACH NOSTRIL ONCE DAILY  albuterol (PROVENTIL HFA, VENTOLIN HFA, PROAIR HFA) 90 mcg/actuation inhaler INHALE TWO PUFFS BY MOUTH EVERY 6 HOURS AS NEEDED FOR WHEEZING FOR  UP  TO  ONE  YEAR  
 montelukast (SINGULAIR) 10 mg tablet TAKE 1 TABLET BY MOUTH ONCE DAILY  HYDROcodone-acetaminophen (NORCO)  mg tablet Take 1 Tab by mouth every six (6) hours as needed for Pain for up to 30 days.  melatonin 10 mg tab Take  by mouth.  amLODIPine (NORVASC) 10 mg tablet TAKE ONE TABLET BY MOUTH ONCE DAILY  b complex-vitamin c-folic acid 5mg (FOLBEE PLUS) tab tablet TAKE 1 TABLET BY MOUTH ONCE DAILY  ergocalciferol (ERGOCALCIFEROL) 50,000 unit capsule Take 1 Cap by mouth every seven (7) days.  gabapentin (NEURONTIN) 300 mg capsule Take 1 Cap by mouth three (3) times daily.  glipiZIDE (GLUCOTROL) 5 mg tablet Take 1 Tab by mouth two (2) times a day.  metFORMIN (GLUCOPHAGE) 500 mg tablet Take 1 Tab by mouth three (3) times daily (with meals).  rosuvastatin (CRESTOR) 20 mg tablet Take 1 Tab by mouth nightly. Indications: high cholesterol  potassium chloride (K-DUR) 10 mEq tablet Take 2 Tabs by mouth two (2) times a day. Indications: low amount of potassium in the blood  cloNIDine HCl (CATAPRES) 0.3 mg tablet Take 1 Tab by mouth two (2) times a day.  lancets misc Test 2 times daily Dx Code E11.65  
 labetalol (NORMODYNE) 200 mg tablet TAKE ONE-HALF TABLET BY MOUTH TWICE DAILY FOR  HYPERTENSION  hydrALAZINE (APRESOLINE) 50 mg tablet TAKE ONE TABLET BY MOUTH 4 TIMES DAILY FOR  HYPERTENSION  
 bumetanide (BUMEX) 2 mg tablet TAKE ONE TABLET BY MOUTH TWICE DAILY  empagliflozin (JARDIANCE) 10 mg tablet Take one tab daily. STOP LOVASTATIN  
 COLCRYS 0.6 mg tablet TAKE ONE TABLET BY MOUTH ONCE DAILY TO  PREVENT  GOUT  
 omeprazole (PRILOSEC) 20 mg capsule TAKE ONE CAPSULE BY MOUTH ONCE DAILY  tamsulosin (FLOMAX) 0.4 mg capsule TAKE ONE CAPSULE BY MOUTH ONCE DAILY  allopurinol (ZYLOPRIM) 300 mg tablet Take 1 Tab by mouth daily.  baclofen (LIORESAL) 10 mg tablet TAKE ONE TABLET BY MOUTH THREE TIMES DAILY (MUSCLE  RELAXER)  PRADAXA 150 mg capsule TAKE ONE CAPSULE BY MOUTH TWICE DAILY  ferrous sulfate (IRON) 325 mg (65 mg iron) tablet Take 1 Tab by mouth two (2) times daily (after meals). Indications: Iron Deficiency Anemia  lancets 30 gauge misc  ACCU-CHEK SHAUNA CONTROL SOLN soln  docusate sodium 100 mg tab Take 1 Tab by mouth two (2) times a day.  magnesium oxide (MAG-OX) 400 mg tablet Take 1 Tab by mouth daily.  aspirin 81 mg chewable tablet Take 81 mg by mouth daily.  Blood-Glucose Meter (TRUE METRIX GLUCOSE METER) [facility] [place] DIRECTED  glucose blood VI test strips (TRUE METRIX GLUCOSE TEST STRIP) strip Test 2 times daily Dx Coed E11.65 No current facility-administered medications for this visit. Review of Systems Constitutional: Positive for malaise/fatigue. HENT: Positive for hearing loss. Eyes: Positive for blurred vision. Negative for pain and redness. Respiratory: Negative. Cardiovascular: Negative for chest pain, palpitations and leg swelling. Gastrointestinal: Negative. Negative for constipation. Genitourinary: Negative. Musculoskeletal: Positive for myalgias. Skin: Negative. Neurological: Negative. Endo/Heme/Allergies: Negative. Psychiatric/Behavioral: Negative for depression and memory loss. The patient does not have insomnia. Physical Exam  
Vitals reviewed. Constitutional: He is oriented to person, place, and time. He appears well-developed and well-nourished. Obese gentle man HENT:  
Head: Normocephalic. Eyes: Conjunctivae and EOM are normal. Pupils are equal, round, and reactive to light. Neck: Normal range of motion. Neck supple. No JVD present. No tracheal deviation present. No thyromegaly present. Cardiovascular: Normal rate, regular rhythm and normal heart sounds. Pulmonary/Chest: Effort normal and breath sounds normal.  
Abdominal: Soft. Bowel sounds are normal.  
Musculoskeletal: Normal range of motion. Lymphadenopathy:  
  He has no cervical adenopathy. Neurological: He is alert and oriented to person, place, and time. He has normal reflexes. Skin: Skin is warm. Psychiatric: He has a normal mood and affect. Diabetic feet exam : NOV 2018  
 Taina Neal podiatrist   
 
H/o partial or complete amputation of foot : n 
H/o previous foot ulceration :n 
H/o pre - ulcerative callus: yes H/o peripheral neuropathy and callus: yes H/o poor circulation     ARA   : yes,   Right side ARA  is less than 7 Foot deformity : crossing over of toes, second onto first toe, third onto second Flat feet Diabetic foot exam: NOV 2019 Left Foot: 
 Visual Exam: normal   HAS HAMMER TOES1. 2,3,4  OVER RIDING ON SECOND ON TO FIRST TOE , BUNION PRESENT Dystrophic toe nails Pulse DP: 1+ (weak) Filament test: reduced sensation Vibratory sensation: diminished Right Foot: 
 Visual Exam: normal  HAS HAMMER TOES 1, 2,3,4  OVER RIDING ON SECOND ON TO FIRST TOE , BUNION PRESENT Dystrophic toe nails Pulse DP: 1+ (weak) Filament test: reduced sensation Vibratory sensation: diminished Lab Results Component Value Date/Time Hemoglobin A1c 5.8 (H) 11/18/2019 10:50 AM  
 Hemoglobin A1c 5.5 09/16/2019 10:31 AM  
 Hemoglobin A1c 5.6 06/05/2019 10:23 AM  
 Hemoglobin A1c, External 6.3 10/12/2017 Glucose 41 (LL) 11/18/2019 10:50 AM  
 Glucose (POC) 132 (H) 09/22/2016 09:07 AM  
 Microalbumin/Creat ratio (mg/g creat) 355 (H) 11/18/2019 10:50 AM  
 Microalbumin,urine random 9.08 11/18/2019 10:50 AM  
 LDL, calculated 51.8 11/18/2019 10:50 AM  
 Creatinine (POC) 1.4 (H) 01/02/2015 10:08 AM  
 Creatinine 1.50 (H) 11/18/2019 10:50 AM  
  
Lab Results Component Value Date/Time Cholesterol, total 97 11/18/2019 10:50 AM  
 HDL Cholesterol 35 11/18/2019 10:50 AM  
 LDL, calculated 51.8 11/18/2019 10:50 AM  
 Triglyceride 51 11/18/2019 10:50 AM  
 CHOL/HDL Ratio 2.8 11/18/2019 10:50 AM  
 
 
Lab Results Component Value Date/Time ALT (SGPT) 21 11/18/2019 10:50 AM  
 AST (SGOT) 22 11/18/2019 10:50 AM  
 Alk. phosphatase 97 11/18/2019 10:50 AM  
 Bilirubin, total 0.8 11/18/2019 10:50 AM  
 
 
Lab Results Component Value Date/Time GFR est AA 55 (L) 11/18/2019 10:50 AM  
 GFR est non-AA 45 (L) 11/18/2019 10:50 AM  
 Creatinine (POC) 1.4 (H) 01/02/2015 10:08 AM  
 Creatinine 1.50 (H) 11/18/2019 10:50 AM  
 BUN 16 11/18/2019 10:50 AM  
 Sodium 142 11/18/2019 10:50 AM  
 Potassium 3.7 11/18/2019 10:50 AM  
 Chloride 108 11/18/2019 10:50 AM  
 CO2 27 11/18/2019 10:50 AM  
  
 
 
 
ASSESSMENT and PLAN 1. Type 2 DM, uncontrolled : a1c is 5.8 %    From nov 2019  compared to    5.5 %     From march 2019  Compared to    6.2 %      FROM SEPT 2018     COMPARED TO    8.4 %      From may 2017     Compared to   7.2 %   From feb 2017     compared to   7%    From   May 2016  compared to   7.3 %    From nov 2015  Compared to  6 %  From Aug 2015 compared to 6.6 %   From  May 2015   Compared to 7.8 %  From  Feb 2015 GAINED GOOD  glycemic control HE GETS HELP FROM DAUGHTER  
NO TRANSPORTATION  FOR FOLLOW UPS Reviewed log , sugars are down to 130s Stay on  jardiance 10  Mg a day Continue on glucophage at 500 mg TID   and  glipizide to 10 mg bid dose Patient is advised about checking blood sugars 2 times a day and maintaining log book. Hypoglycemia management has been explained to the patient. 2. Hypoglycemia :  Educated on treating the hypoglycemia. Discussed Glipizide use and prescribed 3. HTN : controlled,  continue on clonidine 0.3 mg tid , hydralazine 50 MG QID  ,  Labetalol  200 mg HALF TAB  bid,  and norvasc  10 mg a day He is allergic to Losartan , developed Angioedema He is allergic to lisinopril also Proteinuria is worsening from lack of glycemic control Patient is educated about importance of compliance with anti-hypertensives especially ARB/ACEI 4. Dyslipidemia : LDL is good , on  crestor Patient is educated about benefits and adverse effects of statins and explained how benefits outweigh risk. 5. use of aspirin to prevent MI and TIA's discussed 6. Diabetic complications :  
 
A. Retinopathy-YES   educated on this complication,  regular f/u with ophthalmologist encouraged B. Nephropathy - YES Stage 3 ckd from diuretic use for CAD and CHF Proteinuria present  
educated on this disease and indicated stages and its prognosis. C. Peripheral Neuropathy - YES  , moderate On gabapentin  
educated on this disease and indicated improvement with good and stable glycemic control D. PAD : YES    Right side is <7  By ARA Isaias Iniguez   From  June 2014 Will repeat ARA, pt is deferring it E : CAD : YES 
F/u with Dr. Ren Hart 
 
 
 
7. Gout - on colchrys and allopurinol 8. Polypharmacy : pt is on several medications > 50 % of time is spent on counseling Patient voiced understanding her plan of care

## 2019-11-21 NOTE — LETTER
11/23/19 Patient: Torie Zavala YOB: 1940 Date of Visit: 11/21/2019 Andreas Sheridan MD 
[address] [place] [address] VIA In Basket Dear Andreas Sheridan MD, Thank you for referring Mr. Minh Quevedo to [address] [address] for evaluation. My notes for this consultation are attached. If you have questions, please do not hesitate to call me. I look forward to following your patient along with you. Sincerely, Kim Palacios MD

## 2019-12-16 DIAGNOSIS — M51.36 DDD (DEGENERATIVE DISC DISEASE), LUMBAR: Chronic | ICD-10-CM

## 2019-12-16 DIAGNOSIS — M54.50 CHRONIC LEFT-SIDED LOW BACK PAIN WITHOUT SCIATICA: ICD-10-CM

## 2019-12-16 DIAGNOSIS — G89.29 CHRONIC LEFT-SIDED LOW BACK PAIN WITHOUT SCIATICA: ICD-10-CM

## 2019-12-16 NOTE — TELEPHONE ENCOUNTER
Triage for Controlled Substance Refill Request    Pain Diagnosis: Chronic Pain    Last Outpatient Visit: 11/1/19    Next Outpatient Visit:2/17/20    Reason for refill needed outside of office visit?prescribed only on a monthly basis    Pain escalation requiring increased medication- no    Pharmacy: See pain contract    Medication: See Prescription requested       reviewed:today by Dr. Geni Kennedy and filed in  folder, separate from chart    Date of Urine Drug Screen: 3/22/19       Opioid Safety Handout given: Always prints with the Patient AVS    Appropriate for refill: yes    Action:  Rx sent to Dr. Geni Kennedy

## 2019-12-17 RX ORDER — METFORMIN HYDROCHLORIDE 500 MG/1
500 TABLET ORAL
Qty: 270 TAB | Refills: 4 | Status: SHIPPED | OUTPATIENT
Start: 2019-12-17 | End: 2020-01-24 | Stop reason: SDUPTHER

## 2019-12-17 RX ORDER — HYDROCODONE BITARTRATE AND ACETAMINOPHEN 10; 325 MG/1; MG/1
1 TABLET ORAL
Qty: 90 TAB | Refills: 0 | Status: SHIPPED | OUTPATIENT
Start: 2019-12-17 | End: 2020-01-23 | Stop reason: SDUPTHER

## 2019-12-17 NOTE — TELEPHONE ENCOUNTER
Caller's first/last name:  [address]    Name and dosage of medication:  Metformin Hydrochloride ER     Name of Pharmacy:  [address] [place] [address] contact number:  [phone]    Further clarification of call:      Refill request received for Metformin Hydrochloride ER Tabs

## 2020-01-01 ENCOUNTER — OFFICE VISIT (OUTPATIENT)
Dept: FAMILY MEDICINE CLINIC | Age: 80
End: 2020-01-01

## 2020-01-01 ENCOUNTER — VIRTUAL VISIT (OUTPATIENT)
Dept: FAMILY MEDICINE CLINIC | Age: 80
End: 2020-01-01
Payer: MEDICARE

## 2020-01-01 ENCOUNTER — TELEPHONE (OUTPATIENT)
Dept: FAMILY MEDICINE CLINIC | Age: 80
End: 2020-01-01

## 2020-01-01 ENCOUNTER — LAB ONLY (OUTPATIENT)
Dept: FAMILY MEDICINE CLINIC | Age: 80
End: 2020-01-01

## 2020-01-01 ENCOUNTER — APPOINTMENT (OUTPATIENT)
Dept: GENERAL RADIOLOGY | Age: 80
End: 2020-01-01
Attending: NURSE PRACTITIONER
Payer: MEDICARE

## 2020-01-01 ENCOUNTER — VIRTUAL VISIT (OUTPATIENT)
Dept: FAMILY MEDICINE CLINIC | Age: 80
End: 2020-01-01

## 2020-01-01 ENCOUNTER — HOSPITAL ENCOUNTER (OUTPATIENT)
Dept: LAB | Age: 80
Discharge: HOME | End: 2020-07-10

## 2020-01-01 ENCOUNTER — HOSPITAL ENCOUNTER (EMERGENCY)
Age: 80
Discharge: HOME | End: 2020-10-04
Payer: MEDICARE

## 2020-01-01 ENCOUNTER — OFFICE VISIT (OUTPATIENT)
Dept: FAMILY MEDICINE CLINIC | Age: 80
End: 2020-01-01
Payer: MEDICARE

## 2020-01-01 ENCOUNTER — TELEPHONE (OUTPATIENT)
Dept: ONCOLOGY | Age: 80
End: 2020-01-01

## 2020-01-01 VITALS
DIASTOLIC BLOOD PRESSURE: 71 MMHG | HEART RATE: 60 BPM | SYSTOLIC BLOOD PRESSURE: 156 MMHG | RESPIRATION RATE: 20 BRPM | WEIGHT: 258 LBS | BODY MASS INDEX: 33.11 KG/M2 | TEMPERATURE: 97.9 F | HEIGHT: 74 IN | OXYGEN SATURATION: 99 %

## 2020-01-01 VITALS
HEIGHT: 74 IN | OXYGEN SATURATION: 98 % | TEMPERATURE: 98.6 F | SYSTOLIC BLOOD PRESSURE: 165 MMHG | DIASTOLIC BLOOD PRESSURE: 107 MMHG | BODY MASS INDEX: 31.83 KG/M2 | RESPIRATION RATE: 16 BRPM | HEART RATE: 62 BPM | WEIGHT: 248 LBS

## 2020-01-01 VITALS
WEIGHT: 254.4 LBS | RESPIRATION RATE: 18 BRPM | OXYGEN SATURATION: 100 % | DIASTOLIC BLOOD PRESSURE: 83 MMHG | TEMPERATURE: 97.5 F | HEIGHT: 74 IN | SYSTOLIC BLOOD PRESSURE: 160 MMHG | BODY MASS INDEX: 32.65 KG/M2 | HEART RATE: 56 BPM

## 2020-01-01 VITALS
DIASTOLIC BLOOD PRESSURE: 74 MMHG | TEMPERATURE: 97.5 F | OXYGEN SATURATION: 99 % | BODY MASS INDEX: 31.83 KG/M2 | SYSTOLIC BLOOD PRESSURE: 181 MMHG | HEART RATE: 67 BPM | WEIGHT: 248 LBS | HEIGHT: 74 IN | RESPIRATION RATE: 18 BRPM

## 2020-01-01 DIAGNOSIS — I10 ESSENTIAL HYPERTENSION: Primary | Chronic | ICD-10-CM

## 2020-01-01 DIAGNOSIS — J43.2 CENTRILOBULAR EMPHYSEMA (HCC): Primary | Chronic | ICD-10-CM

## 2020-01-01 DIAGNOSIS — M51.36 DDD (DEGENERATIVE DISC DISEASE), LUMBAR: Chronic | ICD-10-CM

## 2020-01-01 DIAGNOSIS — D50.8 IRON DEFICIENCY ANEMIA SECONDARY TO INADEQUATE DIETARY IRON INTAKE: Chronic | ICD-10-CM

## 2020-01-01 DIAGNOSIS — G89.29 CHRONIC LEFT-SIDED LOW BACK PAIN WITHOUT SCIATICA: ICD-10-CM

## 2020-01-01 DIAGNOSIS — E11.21 TYPE 2 DIABETES MELLITUS WITH NEPHROPATHY (HCC): Primary | ICD-10-CM

## 2020-01-01 DIAGNOSIS — N18.30 STAGE 3 CHRONIC KIDNEY DISEASE (HCC): Chronic | ICD-10-CM

## 2020-01-01 DIAGNOSIS — M54.50 CHRONIC LEFT-SIDED LOW BACK PAIN WITHOUT SCIATICA: ICD-10-CM

## 2020-01-01 DIAGNOSIS — M54.16 CHRONIC RADICULAR PAIN OF LOWER BACK: ICD-10-CM

## 2020-01-01 DIAGNOSIS — J43.2 CENTRILOBULAR EMPHYSEMA (HCC): ICD-10-CM

## 2020-01-01 DIAGNOSIS — I10 ESSENTIAL HYPERTENSION: Chronic | ICD-10-CM

## 2020-01-01 DIAGNOSIS — M19.90 ARTHRITIS: ICD-10-CM

## 2020-01-01 DIAGNOSIS — E78.2 MIXED HYPERLIPIDEMIA: ICD-10-CM

## 2020-01-01 DIAGNOSIS — N18.30 CKD STAGE 3 DUE TO TYPE 2 DIABETES MELLITUS (HCC): ICD-10-CM

## 2020-01-01 DIAGNOSIS — M85.60 SUBCHONDRAL BONE CYST: ICD-10-CM

## 2020-01-01 DIAGNOSIS — E78.00 HYPERCHOLESTEROLEMIA: Chronic | ICD-10-CM

## 2020-01-01 DIAGNOSIS — I10 HTN (HYPERTENSION), MALIGNANT: Primary | ICD-10-CM

## 2020-01-01 DIAGNOSIS — E11.42 TYPE 2 DIABETES MELLITUS WITH DIABETIC POLYNEUROPATHY, WITHOUT LONG-TERM CURRENT USE OF INSULIN (HCC): ICD-10-CM

## 2020-01-01 DIAGNOSIS — E11.21 TYPE 2 DIABETES MELLITUS WITH NEPHROPATHY (HCC): ICD-10-CM

## 2020-01-01 DIAGNOSIS — E11.49 TYPE 2 DIABETES MELLITUS WITH OTHER NEUROLOGIC COMPLICATION, WITHOUT LONG-TERM CURRENT USE OF INSULIN (HCC): Chronic | ICD-10-CM

## 2020-01-01 DIAGNOSIS — E61.1 IRON DEFICIENCY: ICD-10-CM

## 2020-01-01 DIAGNOSIS — G89.29 CHRONIC RADICULAR PAIN OF LOWER BACK: ICD-10-CM

## 2020-01-01 DIAGNOSIS — I50.32 CHRONIC DIASTOLIC HEART FAILURE (HCC): ICD-10-CM

## 2020-01-01 DIAGNOSIS — I10 ESSENTIAL HYPERTENSION WITH GOAL BLOOD PRESSURE LESS THAN 130/85: ICD-10-CM

## 2020-01-01 DIAGNOSIS — I73.9 PVD (PERIPHERAL VASCULAR DISEASE) (HCC): Chronic | ICD-10-CM

## 2020-01-01 DIAGNOSIS — M54.16 CHRONIC RADICULAR PAIN OF LOWER BACK: Primary | ICD-10-CM

## 2020-01-01 DIAGNOSIS — G62.9 NEUROPATHY: Chronic | ICD-10-CM

## 2020-01-01 DIAGNOSIS — M1A.39X0 CHRONIC GOUT DUE TO RENAL IMPAIRMENT OF MULTIPLE SITES WITHOUT TOPHUS: ICD-10-CM

## 2020-01-01 DIAGNOSIS — I48.19 PERSISTENT ATRIAL FIBRILLATION (HCC): ICD-10-CM

## 2020-01-01 DIAGNOSIS — J44.9 CHRONIC OBSTRUCTIVE PULMONARY DISEASE, UNSPECIFIED COPD TYPE (HCC): Chronic | ICD-10-CM

## 2020-01-01 DIAGNOSIS — E11.49 TYPE 2 DIABETES MELLITUS WITH OTHER NEUROLOGIC COMPLICATION, WITHOUT LONG-TERM CURRENT USE OF INSULIN (HCC): Primary | Chronic | ICD-10-CM

## 2020-01-01 DIAGNOSIS — I10 ESSENTIAL HYPERTENSION WITH GOAL BLOOD PRESSURE LESS THAN 130/85: Chronic | ICD-10-CM

## 2020-01-01 DIAGNOSIS — E11.22 CKD STAGE 3 DUE TO TYPE 2 DIABETES MELLITUS (HCC): ICD-10-CM

## 2020-01-01 DIAGNOSIS — G89.29 CHRONIC RADICULAR PAIN OF LOWER BACK: Primary | ICD-10-CM

## 2020-01-01 DIAGNOSIS — M17.11 PRIMARY OSTEOARTHRITIS OF RIGHT KNEE: Primary | ICD-10-CM

## 2020-01-01 DIAGNOSIS — M10.00 IDIOPATHIC GOUT, UNSPECIFIED CHRONICITY, UNSPECIFIED SITE: Chronic | ICD-10-CM

## 2020-01-01 DIAGNOSIS — Z00.00 MEDICARE ANNUAL WELLNESS VISIT, INITIAL: Primary | ICD-10-CM

## 2020-01-01 DIAGNOSIS — D50.8 IRON DEFICIENCY ANEMIA SECONDARY TO INADEQUATE DIETARY IRON INTAKE: Primary | Chronic | ICD-10-CM

## 2020-01-01 DIAGNOSIS — J43.2 CENTRILOBULAR EMPHYSEMA (HCC): Chronic | ICD-10-CM

## 2020-01-01 DIAGNOSIS — G89.4 CHRONIC PAIN SYNDROME: Chronic | ICD-10-CM

## 2020-01-01 DIAGNOSIS — E55.9 VITAMIN D DEFICIENCY: ICD-10-CM

## 2020-01-01 LAB
ALBUMIN SERPL-MCNC: 3.8 G/DL (ref 3.5–5)
ALBUMIN SERPL-MCNC: 4 G/DL (ref 3.5–5)
ALBUMIN/GLOB SERPL: 1.4 {RATIO} (ref 1.1–2.2)
ALBUMIN/GLOB SERPL: 1.5 {RATIO} (ref 1.1–2.2)
ALP SERPL-CCNC: 87 U/L (ref 45–117)
ALP SERPL-CCNC: 87 U/L (ref 45–117)
ALT SERPL-CCNC: 26 U/L (ref 12–78)
ALT SERPL-CCNC: 35 U/L (ref 12–78)
AMPHET UR QL SCN: NEGATIVE
ANION GAP SERPL CALC-SCNC: 7 MMOL/L (ref 5–15)
ANION GAP SERPL CALC-SCNC: 7 MMOL/L (ref 5–15)
AST SERPL-CCNC: 20 U/L (ref 15–37)
AST SERPL-CCNC: 26 U/L (ref 15–37)
BARBITURATES UR QL SCN: NEGATIVE
BASOPHILS # BLD: 0 K/UL (ref 0–0.1)
BASOPHILS NFR BLD: 0 % (ref 0–1)
BENZODIAZ UR QL: NEGATIVE
BILIRUB SERPL-MCNC: 0.6 MG/DL (ref 0.2–1)
BILIRUB SERPL-MCNC: 0.7 MG/DL (ref 0.2–1)
BUN SERPL-MCNC: 11 MG/DL (ref 6–20)
BUN SERPL-MCNC: 16 MG/DL (ref 6–20)
BUN/CREAT SERPL: 10 (ref 12–20)
BUN/CREAT SERPL: 8 (ref 12–20)
CALCIUM SERPL-MCNC: 10 MG/DL (ref 8.5–10.1)
CALCIUM SERPL-MCNC: 8.8 MG/DL (ref 8.5–10.1)
CANNABINOIDS UR QL SCN: NEGATIVE
CHLORIDE SERPL-SCNC: 108 MMOL/L (ref 97–108)
CHLORIDE SERPL-SCNC: 111 MMOL/L (ref 97–108)
CHOLEST SERPL-MCNC: 169 MG/DL
CHOLEST SERPL-MCNC: 206 MG/DL
CO2 SERPL-SCNC: 23 MMOL/L (ref 21–32)
CO2 SERPL-SCNC: 28 MMOL/L (ref 21–32)
COCAINE UR QL SCN: NEGATIVE
CREAT SERPL-MCNC: 1.32 MG/DL (ref 0.7–1.3)
CREAT SERPL-MCNC: 1.67 MG/DL (ref 0.7–1.3)
CREAT UR-MCNC: 156 MG/DL
CREAT UR-MCNC: 47.9 MG/DL
DIFFERENTIAL METHOD BLD: ABNORMAL
DRUG SCRN COMMENT,DRGCM: ABNORMAL
DRUGS UR: NORMAL
EOSINOPHIL # BLD: 0.2 K/UL (ref 0–0.4)
EOSINOPHIL NFR BLD: 4 % (ref 0–7)
ERYTHROCYTE [DISTWIDTH] IN BLOOD BY AUTOMATED COUNT: 16.1 % (ref 11.5–14.5)
ERYTHROCYTE [DISTWIDTH] IN BLOOD BY AUTOMATED COUNT: 19.9 % (ref 11.5–14.5)
EST. AVERAGE GLUCOSE BLD GHB EST-MCNC: 108 MG/DL
EST. AVERAGE GLUCOSE BLD GHB EST-MCNC: 120 MG/DL
FERRITIN SERPL-MCNC: 42 NG/ML (ref 26–388)
FERRITIN SERPL-MCNC: 46 NG/ML (ref 26–388)
GLOBULIN SER CALC-MCNC: 2.6 G/DL (ref 2–4)
GLOBULIN SER CALC-MCNC: 2.9 G/DL (ref 2–4)
GLUCOSE SERPL-MCNC: 75 MG/DL (ref 65–100)
GLUCOSE SERPL-MCNC: 79 MG/DL (ref 65–100)
HBA1C MFR BLD: 5.4 % (ref 4–5.6)
HBA1C MFR BLD: 5.8 % (ref 4–5.6)
HCT VFR BLD AUTO: 39.2 % (ref 36.6–50.3)
HCT VFR BLD AUTO: 40.2 % (ref 36.6–50.3)
HDLC SERPL-MCNC: 41 MG/DL
HDLC SERPL-MCNC: 42 MG/DL
HDLC SERPL: 4 {RATIO} (ref 0–5)
HDLC SERPL: 5 {RATIO} (ref 0–5)
HGB BLD-MCNC: 11.5 G/DL (ref 12.1–17)
HGB BLD-MCNC: 11.6 G/DL (ref 12.1–17)
IMM GRANULOCYTES # BLD AUTO: 0 K/UL (ref 0–0.04)
IMM GRANULOCYTES NFR BLD AUTO: 0 % (ref 0–0.5)
IRON SATN MFR SERPL: 9 % (ref 20–50)
IRON SATN MFR SERPL: 9 % (ref 20–50)
IRON SERPL-MCNC: 36 UG/DL (ref 35–150)
IRON SERPL-MCNC: 36 UG/DL (ref 35–150)
LDLC SERPL CALC-MCNC: 114.6 MG/DL (ref 0–100)
LDLC SERPL CALC-MCNC: 148.8 MG/DL (ref 0–100)
LIPID PROFILE,FLP: ABNORMAL
LIPID PROFILE,FLP: ABNORMAL
LYMPHOCYTES # BLD: 0.9 K/UL (ref 0.8–3.5)
LYMPHOCYTES NFR BLD: 19 % (ref 12–49)
MCH RBC QN AUTO: 23.6 PG (ref 26–34)
MCH RBC QN AUTO: 25.9 PG (ref 26–34)
MCHC RBC AUTO-ENTMCNC: 28.9 G/DL (ref 30–36.5)
MCHC RBC AUTO-ENTMCNC: 29.3 G/DL (ref 30–36.5)
MCV RBC AUTO: 81.9 FL (ref 80–99)
MCV RBC AUTO: 88.3 FL (ref 80–99)
METHADONE UR QL: NEGATIVE
MICROALBUMIN UR-MCNC: 17.6 MG/DL
MICROALBUMIN UR-MCNC: 223 MG/DL
MICROALBUMIN/CREAT UR-RTO: 113 MG/G (ref 0–30)
MICROALBUMIN/CREAT UR-RTO: 4656 MG/G (ref 0–30)
MONOCYTES # BLD: 0.4 K/UL (ref 0–1)
MONOCYTES NFR BLD: 9 % (ref 5–13)
NEUTS SEG # BLD: 3 K/UL (ref 1.8–8)
NEUTS SEG NFR BLD: 68 % (ref 32–75)
NRBC # BLD: 0 K/UL (ref 0–0.01)
NRBC # BLD: 0 K/UL (ref 0–0.01)
NRBC BLD-RTO: 0 PER 100 WBC
NRBC BLD-RTO: 0 PER 100 WBC
OPIATES UR QL: POSITIVE
PCP UR QL: NEGATIVE
PLATELET # BLD AUTO: 159 K/UL (ref 150–400)
PLATELET # BLD AUTO: 185 K/UL (ref 150–400)
PMV BLD AUTO: 12.1 FL (ref 8.9–12.9)
PMV BLD AUTO: 12.8 FL (ref 8.9–12.9)
POTASSIUM SERPL-SCNC: 3.1 MMOL/L (ref 3.5–5.1)
POTASSIUM SERPL-SCNC: 3.9 MMOL/L (ref 3.5–5.1)
PROT SERPL-MCNC: 6.4 G/DL (ref 6.4–8.2)
PROT SERPL-MCNC: 6.9 G/DL (ref 6.4–8.2)
RBC # BLD AUTO: 4.44 M/UL (ref 4.1–5.7)
RBC # BLD AUTO: 4.91 M/UL (ref 4.1–5.7)
SODIUM SERPL-SCNC: 141 MMOL/L (ref 136–145)
SODIUM SERPL-SCNC: 143 MMOL/L (ref 136–145)
TIBC SERPL-MCNC: 404 UG/DL (ref 250–450)
TIBC SERPL-MCNC: 419 UG/DL (ref 250–450)
TRIGL SERPL-MCNC: 62 MG/DL (ref ?–150)
TRIGL SERPL-MCNC: 81 MG/DL (ref ?–150)
VLDLC SERPL CALC-MCNC: 12.4 MG/DL
VLDLC SERPL CALC-MCNC: 16.2 MG/DL
WBC # BLD AUTO: 4.4 K/UL (ref 4.1–11.1)
WBC # BLD AUTO: 4.5 K/UL (ref 4.1–11.1)

## 2020-01-01 PROCEDURE — G8417 CALC BMI ABV UP PARAM F/U: HCPCS | Performed by: FAMILY MEDICINE

## 2020-01-01 PROCEDURE — G8753 SYS BP > OR = 140: HCPCS | Performed by: FAMILY MEDICINE

## 2020-01-01 PROCEDURE — G8754 DIAS BP LESS 90: HCPCS | Performed by: FAMILY MEDICINE

## 2020-01-01 PROCEDURE — 3288F FALL RISK ASSESSMENT DOCD: CPT | Performed by: FAMILY MEDICINE

## 2020-01-01 PROCEDURE — G8427 DOCREV CUR MEDS BY ELIG CLIN: HCPCS | Performed by: FAMILY MEDICINE

## 2020-01-01 PROCEDURE — 1101F PT FALLS ASSESS-DOCD LE1/YR: CPT | Performed by: FAMILY MEDICINE

## 2020-01-01 PROCEDURE — 73562 X-RAY EXAM OF KNEE 3: CPT

## 2020-01-01 PROCEDURE — 99442 PR PHYS/QHP TELEPHONE EVALUATION 11-20 MIN: CPT | Performed by: FAMILY MEDICINE

## 2020-01-01 PROCEDURE — G8536 NO DOC ELDER MAL SCRN: HCPCS | Performed by: FAMILY MEDICINE

## 2020-01-01 PROCEDURE — 1100F PTFALLS ASSESS-DOCD GE2>/YR: CPT | Performed by: FAMILY MEDICINE

## 2020-01-01 PROCEDURE — G8510 SCR DEP NEG, NO PLAN REQD: HCPCS | Performed by: FAMILY MEDICINE

## 2020-01-01 PROCEDURE — G8432 DEP SCR NOT DOC, RNG: HCPCS | Performed by: FAMILY MEDICINE

## 2020-01-01 PROCEDURE — 99285 EMERGENCY DEPT VISIT HI MDM: CPT

## 2020-01-01 PROCEDURE — 74011250637 HC RX REV CODE- 250/637: Performed by: NURSE PRACTITIONER

## 2020-01-01 PROCEDURE — 99214 OFFICE O/P EST MOD 30 MIN: CPT | Performed by: FAMILY MEDICINE

## 2020-01-01 RX ORDER — HYDROCODONE BITARTRATE AND ACETAMINOPHEN 10; 325 MG/1; MG/1
1 TABLET ORAL
Qty: 120 TAB | Refills: 0 | Status: SHIPPED | OUTPATIENT
Start: 2020-01-01 | End: 2020-01-01 | Stop reason: SDUPTHER

## 2020-01-01 RX ORDER — GLIPIZIDE 5 MG/1
TABLET ORAL
Qty: 360 TAB | Refills: 0 | Status: SHIPPED | OUTPATIENT
Start: 2020-01-01 | End: 2020-01-01

## 2020-01-01 RX ORDER — GUAIFENESIN 100 MG/5ML
81 LIQUID (ML) ORAL
Status: COMPLETED | OUTPATIENT
Start: 2020-01-01 | End: 2020-01-01

## 2020-01-01 RX ORDER — HYDROCODONE BITARTRATE AND ACETAMINOPHEN 10; 325 MG/1; MG/1
1 TABLET ORAL
Qty: 62 TAB | Refills: 0 | Status: SHIPPED | OUTPATIENT
Start: 2020-01-01 | End: 2020-01-01 | Stop reason: SDUPTHER

## 2020-01-01 RX ORDER — CLONIDINE 0.3 MG/24H
1 PATCH, EXTENDED RELEASE TRANSDERMAL
Qty: 13 PATCH | Refills: 1 | Status: SHIPPED | OUTPATIENT
Start: 2020-01-01 | End: 2021-01-01

## 2020-01-01 RX ORDER — LABETALOL 100 MG/1
100 TABLET, FILM COATED ORAL ONCE
Status: COMPLETED | OUTPATIENT
Start: 2020-01-01 | End: 2020-01-01

## 2020-01-01 RX ORDER — FLUTICASONE PROPIONATE 50 MCG
SPRAY, SUSPENSION (ML) NASAL
Qty: 3 BOTTLE | Refills: 3 | Status: SHIPPED | OUTPATIENT
Start: 2020-01-01

## 2020-01-01 RX ORDER — DABIGATRAN ETEXILATE 150 MG/1
150 CAPSULE ORAL ONCE
Status: DISCONTINUED | OUTPATIENT
Start: 2020-01-01 | End: 2020-01-01 | Stop reason: HOSPADM

## 2020-01-01 RX ORDER — AMLODIPINE BESYLATE 5 MG/1
10 TABLET ORAL
Status: COMPLETED | OUTPATIENT
Start: 2020-01-01 | End: 2020-01-01

## 2020-01-01 RX ORDER — ERGOCALCIFEROL 1.25 MG/1
50000 CAPSULE ORAL
Qty: 12 CAP | Refills: 1 | Status: SHIPPED | OUTPATIENT
Start: 2020-01-01 | End: 2020-01-01 | Stop reason: SDUPTHER

## 2020-01-01 RX ORDER — GLIPIZIDE 5 MG/1
TABLET ORAL
Qty: 360 TAB | Refills: 0 | Status: SHIPPED | OUTPATIENT
Start: 2020-01-01

## 2020-01-01 RX ORDER — FOLIC ACID/VIT B COMPLEX AND C 5 MG
TABLET ORAL
Qty: 90 TAB | Refills: 1 | Status: SHIPPED | OUTPATIENT
Start: 2020-01-01 | End: 2021-01-01

## 2020-01-01 RX ORDER — ALBUTEROL SULFATE 90 UG/1
AEROSOL, METERED RESPIRATORY (INHALATION)
Qty: 3 INHALER | Refills: 3 | Status: SHIPPED | OUTPATIENT
Start: 2020-01-01 | End: 2021-01-01

## 2020-01-01 RX ORDER — DICLOFENAC SODIUM 10 MG/G
1 GEL TOPICAL 4 TIMES DAILY
Qty: 150 G | Refills: 1 | Status: SHIPPED | OUTPATIENT
Start: 2020-01-01 | End: 2021-01-01

## 2020-01-01 RX ORDER — BACLOFEN 10 MG/1
TABLET ORAL
Qty: 270 TAB | Refills: 0 | Status: SHIPPED | OUTPATIENT
Start: 2020-01-01 | End: 2021-01-01

## 2020-01-01 RX ORDER — ERGOCALCIFEROL 1.25 MG/1
50000 CAPSULE ORAL
Qty: 12 CAP | Refills: 1 | Status: SHIPPED | OUTPATIENT
Start: 2020-01-01 | End: 2021-01-01

## 2020-01-01 RX ORDER — MONTELUKAST SODIUM 10 MG/1
10 TABLET ORAL DAILY
Qty: 90 TAB | Refills: 1 | Status: SHIPPED | OUTPATIENT
Start: 2020-01-01

## 2020-01-01 RX ORDER — HYDRALAZINE HYDROCHLORIDE 25 MG/1
50 TABLET, FILM COATED ORAL
Status: COMPLETED | OUTPATIENT
Start: 2020-01-01 | End: 2020-01-01

## 2020-01-01 RX ORDER — HYDROCODONE BITARTRATE AND ACETAMINOPHEN 10; 325 MG/1; MG/1
1 TABLET ORAL
Qty: 120 TAB | Refills: 0 | Status: SHIPPED | OUTPATIENT
Start: 2020-01-01 | End: 2021-01-01

## 2020-01-01 RX ORDER — BUMETANIDE 1 MG/1
2 TABLET ORAL ONCE
Status: DISCONTINUED | OUTPATIENT
Start: 2020-01-01 | End: 2020-01-01 | Stop reason: HOSPADM

## 2020-01-01 RX ORDER — COLCHICINE 0.6 MG/1
TABLET, FILM COATED ORAL
Qty: 90 TAB | Refills: 0 | Status: SHIPPED | OUTPATIENT
Start: 2020-01-01 | End: 2021-01-01

## 2020-01-01 RX ORDER — HYDROCODONE BITARTRATE AND ACETAMINOPHEN 5; 325 MG/1; MG/1
1 TABLET ORAL
Status: COMPLETED | OUTPATIENT
Start: 2020-01-01 | End: 2020-01-01

## 2020-01-01 RX ORDER — CLONIDINE HYDROCHLORIDE 0.3 MG/1
0.3 TABLET ORAL 2 TIMES DAILY
Qty: 180 TAB | Refills: 2 | Status: CANCELLED | OUTPATIENT
Start: 2020-01-01

## 2020-01-01 RX ORDER — BLOOD SUGAR DIAGNOSTIC
STRIP MISCELLANEOUS
Qty: 200 STRIP | Refills: 3 | Status: SHIPPED | OUTPATIENT
Start: 2020-01-01 | End: 2021-01-01

## 2020-01-01 RX ORDER — GABAPENTIN 300 MG/1
300 CAPSULE ORAL
Qty: 120 CAP | Refills: 2 | Status: SHIPPED | OUTPATIENT
Start: 2020-01-01 | End: 2021-01-01

## 2020-01-01 RX ORDER — CLONIDINE HYDROCHLORIDE 0.1 MG/1
0.3 TABLET ORAL
Status: COMPLETED | OUTPATIENT
Start: 2020-01-01 | End: 2020-01-01

## 2020-01-01 RX ADMIN — ASPIRIN 81 MG CHEWABLE TABLET 81 MG: 81 TABLET CHEWABLE at 17:10

## 2020-01-01 RX ADMIN — CLONIDINE HYDROCHLORIDE 0.3 MG: 0.1 TABLET ORAL at 17:10

## 2020-01-01 RX ADMIN — AMLODIPINE BESYLATE 10 MG: 5 TABLET ORAL at 17:10

## 2020-01-01 RX ADMIN — LABETALOL HYDROCHLORIDE 100 MG: 100 TABLET, FILM COATED ORAL at 17:10

## 2020-01-01 RX ADMIN — HYDRALAZINE HYDROCHLORIDE 50 MG: 25 TABLET, FILM COATED ORAL at 17:10

## 2020-01-01 RX ADMIN — HYDROCODONE BITARTRATE AND ACETAMINOPHEN 1 TABLET: 5; 325 TABLET ORAL at 13:59

## 2020-01-01 NOTE — TELEPHONE ENCOUNTER
Immunization chart prep completed.  Immunization records verified.  Luis Hilliard  All Vacinations Up To Date No Vaccinations Needed     Hep B given on 09/23/20   LOV: 11/1/19

## 2020-01-15 ENCOUNTER — TELEPHONE (OUTPATIENT)
Dept: FAMILY MEDICINE CLINIC | Age: 80
End: 2020-01-15

## 2020-01-15 NOTE — TELEPHONE ENCOUNTER
[address] Phone:  [phone])  requesting the following med:    ( I didn't see it on current med list)    Metoprolol Succinate ER/HCTZ tablets  Dosage:  25/12.5 mg /12.5mg  90 day supply with 1 refill  Directions: Take prescibed daily dosage once daily unless otherwise indicated.   Initiial dose is 25/12.5mg, may titrate at 2-week intervals  Max dose is 200/12.5mg

## 2020-01-15 NOTE — TELEPHONE ENCOUNTER
Metoprolol Succinate ER/HCTZ is not on patient's current med list. Phone call to patient to find out if this is something he is taking and if so, who prescribes it? Patient not at home and unable to remember if he takes it. He is going to have his daughter, Ketan Morrell look at his medications and call the office.

## 2020-01-23 DIAGNOSIS — G89.29 CHRONIC LEFT-SIDED LOW BACK PAIN WITHOUT SCIATICA: ICD-10-CM

## 2020-01-23 DIAGNOSIS — M54.50 CHRONIC LEFT-SIDED LOW BACK PAIN WITHOUT SCIATICA: ICD-10-CM

## 2020-01-23 DIAGNOSIS — M51.36 DDD (DEGENERATIVE DISC DISEASE), LUMBAR: Chronic | ICD-10-CM

## 2020-01-23 NOTE — TELEPHONE ENCOUNTER
----- Message from Cecil Penn sent at 1/23/2020  1:37 PM EST -----  Regarding: Dr. Karolina Cooley: [phone]    Cierra Caldwell (if not patient): SOFYA BARRON  Relationship of caller (if not patient): Child  Best contact number(s):  [phone]  Name of medication and dosage if known: Hydrocodone-dosage unknown   Is patient out of this medication (yes/no): Yes  Pharmacy name: [facility] in [address] [facility] listed in chart? (yes/no): Yes  Pharmacy phone number:  [phone]  Date of last visit:     Monday, February 17, 2020 10:10 AM  Details to clarify the request: n/a

## 2020-01-24 RX ORDER — METFORMIN HYDROCHLORIDE 500 MG/1
500 TABLET ORAL
Qty: 270 TAB | Refills: 2 | Status: SHIPPED | OUTPATIENT
Start: 2020-01-24 | End: 2020-01-27 | Stop reason: SDUPTHER

## 2020-01-24 RX ORDER — HYDROCODONE BITARTRATE AND ACETAMINOPHEN 10; 325 MG/1; MG/1
1 TABLET ORAL
Qty: 90 TAB | Refills: 0 | Status: SHIPPED | OUTPATIENT
Start: 2020-01-24 | End: 2020-01-30 | Stop reason: SDUPTHER

## 2020-01-29 RX ORDER — METFORMIN HYDROCHLORIDE 500 MG/1
500 TABLET ORAL
Qty: 270 TAB | Refills: 2 | Status: SHIPPED | OUTPATIENT
Start: 2020-01-29 | End: 2021-01-01

## 2020-01-30 DIAGNOSIS — G89.29 CHRONIC LEFT-SIDED LOW BACK PAIN WITHOUT SCIATICA: ICD-10-CM

## 2020-01-30 DIAGNOSIS — M54.50 CHRONIC LEFT-SIDED LOW BACK PAIN WITHOUT SCIATICA: ICD-10-CM

## 2020-01-30 DIAGNOSIS — M51.36 DDD (DEGENERATIVE DISC DISEASE), LUMBAR: Chronic | ICD-10-CM

## 2020-01-30 RX ORDER — HYDROCODONE BITARTRATE AND ACETAMINOPHEN 10; 325 MG/1; MG/1
1 TABLET ORAL
Qty: 62 TAB | Refills: 0 | Status: SHIPPED | OUTPATIENT
Start: 2020-01-30 | End: 2020-02-20 | Stop reason: SDUPTHER

## 2020-01-30 NOTE — TELEPHONE ENCOUNTER
Triage for Controlled Substance Refill Request    Pain Diagnosis: Chronic Pain    Last Outpatient Visit: 11/1/19    Next Outpatient Visit:2/17/2020    Reason for refill needed outside of office visit?prescribed only on a monthly basis    Pain escalation requiring increased medication- no    Pharmacy: See pain contract    Medication: See Prescription requested       reviewed:today by Dr. Debby Capone and filed in  folder, separate from chart    Date of Urine Drug Screen:3/22/19      Opioid Safety Handout given: Always prints with the Patient AVS    Appropriate for refill: yes    Action:  Rx sent to Dr. Debby Capone

## 2020-01-30 NOTE — TELEPHONE ENCOUNTER
With the new insurance they only paid for 28 at a time. Now they need a script for 62. After this time Dr. José Manuel Blackmon can go back to doing it for 90 at a time.

## 2020-02-10 DIAGNOSIS — I10 ESSENTIAL HYPERTENSION WITH GOAL BLOOD PRESSURE LESS THAN 130/85: Chronic | ICD-10-CM

## 2020-02-10 DIAGNOSIS — E11.42 TYPE 2 DIABETES MELLITUS WITH DIABETIC POLYNEUROPATHY, WITHOUT LONG-TERM CURRENT USE OF INSULIN (HCC): Chronic | ICD-10-CM

## 2020-02-17 ENCOUNTER — HOSPITAL ENCOUNTER (OUTPATIENT)
Dept: LAB | Age: 80
Discharge: HOME | End: 2020-02-17

## 2020-02-17 ENCOUNTER — OFFICE VISIT (OUTPATIENT)
Dept: FAMILY MEDICINE CLINIC | Age: 80
End: 2020-02-17

## 2020-02-17 VITALS
HEART RATE: 59 BPM | BODY MASS INDEX: 32.08 KG/M2 | WEIGHT: 250 LBS | RESPIRATION RATE: 20 BRPM | SYSTOLIC BLOOD PRESSURE: 135 MMHG | HEIGHT: 74 IN | DIASTOLIC BLOOD PRESSURE: 59 MMHG | OXYGEN SATURATION: 99 % | TEMPERATURE: 97.8 F

## 2020-02-17 DIAGNOSIS — E11.21 TYPE 2 DIABETES MELLITUS WITH NEPHROPATHY (HCC): ICD-10-CM

## 2020-02-17 DIAGNOSIS — I10 ESSENTIAL HYPERTENSION WITH GOAL BLOOD PRESSURE LESS THAN 130/85: Chronic | ICD-10-CM

## 2020-02-17 DIAGNOSIS — G89.4 CHRONIC PAIN SYNDROME: Chronic | ICD-10-CM

## 2020-02-17 DIAGNOSIS — E11.49 TYPE 2 DIABETES MELLITUS WITH OTHER NEUROLOGIC COMPLICATION, WITHOUT LONG-TERM CURRENT USE OF INSULIN (HCC): Chronic | ICD-10-CM

## 2020-02-17 DIAGNOSIS — I50.32 CHRONIC DIASTOLIC HEART FAILURE (HCC): ICD-10-CM

## 2020-02-17 DIAGNOSIS — M51.36 DDD (DEGENERATIVE DISC DISEASE), LUMBAR: Chronic | ICD-10-CM

## 2020-02-17 DIAGNOSIS — N18.30 STAGE 3 CHRONIC KIDNEY DISEASE (HCC): Chronic | ICD-10-CM

## 2020-02-17 DIAGNOSIS — D50.8 IRON DEFICIENCY ANEMIA SECONDARY TO INADEQUATE DIETARY IRON INTAKE: Chronic | ICD-10-CM

## 2020-02-17 DIAGNOSIS — C61 PROSTATE CA (HCC): Chronic | ICD-10-CM

## 2020-02-17 DIAGNOSIS — E11.49 TYPE 2 DIABETES MELLITUS WITH OTHER NEUROLOGIC COMPLICATION, WITHOUT LONG-TERM CURRENT USE OF INSULIN (HCC): Primary | Chronic | ICD-10-CM

## 2020-02-17 DIAGNOSIS — J43.2 CENTRILOBULAR EMPHYSEMA (HCC): Chronic | ICD-10-CM

## 2020-02-17 DIAGNOSIS — M10.00 IDIOPATHIC GOUT, UNSPECIFIED CHRONICITY, UNSPECIFIED SITE: ICD-10-CM

## 2020-02-17 DIAGNOSIS — K63.5 POLYP OF COLON, UNSPECIFIED PART OF COLON, UNSPECIFIED TYPE: ICD-10-CM

## 2020-02-17 DIAGNOSIS — I48.19 PERSISTENT ATRIAL FIBRILLATION (HCC): ICD-10-CM

## 2020-02-17 LAB
ALBUMIN SERPL-MCNC: 3.9 G/DL (ref 3.5–5)
ALBUMIN/GLOB SERPL: 1.6 {RATIO} (ref 1.1–2.2)
ALP SERPL-CCNC: 82 U/L (ref 45–117)
ALT SERPL-CCNC: 28 U/L (ref 12–78)
ANION GAP SERPL CALC-SCNC: 6 MMOL/L (ref 5–15)
AST SERPL-CCNC: 20 U/L (ref 15–37)
BASOPHILS # BLD: 0 K/UL (ref 0–0.1)
BASOPHILS NFR BLD: 0 % (ref 0–1)
BILIRUB SERPL-MCNC: 0.5 MG/DL (ref 0.2–1)
BUN SERPL-MCNC: 9 MG/DL (ref 6–20)
BUN/CREAT SERPL: 7 (ref 12–20)
CALCIUM SERPL-MCNC: 8.5 MG/DL (ref 8.5–10.1)
CHLORIDE SERPL-SCNC: 111 MMOL/L (ref 97–108)
CHOLEST SERPL-MCNC: 125 MG/DL
CO2 SERPL-SCNC: 26 MMOL/L (ref 21–32)
CREAT SERPL-MCNC: 1.26 MG/DL (ref 0.7–1.3)
DIFFERENTIAL METHOD BLD: ABNORMAL
EOSINOPHIL # BLD: 0.1 K/UL (ref 0–0.4)
EOSINOPHIL NFR BLD: 4 % (ref 0–7)
ERYTHROCYTE [DISTWIDTH] IN BLOOD BY AUTOMATED COUNT: 28.2 % (ref 11.5–14.5)
EST. AVERAGE GLUCOSE BLD GHB EST-MCNC: 100 MG/DL
GLOBULIN SER CALC-MCNC: 2.5 G/DL (ref 2–4)
GLUCOSE SERPL-MCNC: 72 MG/DL (ref 65–100)
HBA1C MFR BLD: 5.1 % (ref 4–5.6)
HCT VFR BLD AUTO: 38.6 % (ref 36.6–50.3)
HDLC SERPL-MCNC: 44 MG/DL
HDLC SERPL: 2.8 {RATIO} (ref 0–5)
HGB BLD-MCNC: 11 G/DL (ref 12.1–17)
IMM GRANULOCYTES # BLD AUTO: 0 K/UL (ref 0–0.04)
IMM GRANULOCYTES NFR BLD AUTO: 0 % (ref 0–0.5)
IRON SATN MFR SERPL: 16 % (ref 20–50)
IRON SERPL-MCNC: 46 UG/DL (ref 35–150)
LDLC SERPL CALC-MCNC: 71 MG/DL (ref 0–100)
LIPID PROFILE,FLP: NORMAL
LYMPHOCYTES # BLD: 0.7 K/UL (ref 0.8–3.5)
LYMPHOCYTES NFR BLD: 20 % (ref 12–49)
MCH RBC QN AUTO: 23.6 PG (ref 26–34)
MCHC RBC AUTO-ENTMCNC: 28.5 G/DL (ref 30–36.5)
MCV RBC AUTO: 82.7 FL (ref 80–99)
MONOCYTES # BLD: 0.3 K/UL (ref 0–1)
MONOCYTES NFR BLD: 8 % (ref 5–13)
NEUTS SEG # BLD: 2.6 K/UL (ref 1.8–8)
NEUTS SEG NFR BLD: 68 % (ref 32–75)
NRBC # BLD: 0 K/UL (ref 0–0.01)
NRBC BLD-RTO: 0 PER 100 WBC
PLATELET # BLD AUTO: 194 K/UL (ref 150–400)
PLATELET COMMENTS,PCOM: ABNORMAL
POTASSIUM SERPL-SCNC: 3.3 MMOL/L (ref 3.5–5.1)
PROT SERPL-MCNC: 6.4 G/DL (ref 6.4–8.2)
RBC # BLD AUTO: 4.67 M/UL (ref 4.1–5.7)
RBC MORPH BLD: ABNORMAL
SODIUM SERPL-SCNC: 143 MMOL/L (ref 136–145)
TIBC SERPL-MCNC: 292 UG/DL (ref 250–450)
TRIGL SERPL-MCNC: 50 MG/DL (ref ?–150)
VLDLC SERPL CALC-MCNC: 10 MG/DL
WBC # BLD AUTO: 3.7 K/UL (ref 4.1–11.1)

## 2020-02-17 NOTE — PROGRESS NOTES
1. Have you been to the ER, urgent care clinic since your last visit? Hospitalized since your last visit? No    2. Have you seen or consulted any other health care providers outside of the [address] [place] [address] since your last visit? Include any pap smears or colon screening. No    Reviewed record in preparation for visit and have necessary documentation  Goals that were addressed and/or need to be completed during or after this appointment include     Health Maintenance Due   Topic Date Due    Colonoscopy  09/22/2019    Medicare Yearly Exam  02/06/2020       Patient is accompanied by self I have received verbal consent from Christian Elizabeth to discuss any/all medical information while they are present in the room.

## 2020-02-17 NOTE — PROGRESS NOTES
[address]  [phone]           Progress Note    Patient: Anthony Menon MRN: [id number]  SSN: [id number]    YOB: 1940  Age: [de-identified] y.o. Sex: male        Chief Complaint   Patient presents with    Labs    Hypertension         Subjective:   Multiple diverse medical problems necessitating extensive decision making, prolonged chart review and prolonged OV. Encounter Diagnoses   Name Primary?  Type 2 diabetes mellitus with other neurologic complication, without long-term current use of insulin ([facility] [address]): This patient is managed under a comprehensive plan of care for Diabetes. Overall the patient feels well with good energy level. Key Antihyperglycemic Medications             metFORMIN (GLUCOPHAGE) 500 mg tablet (Taking) Take 1 Tab by mouth three (3) times daily (with meals). empagliflozin (JARDIANCE) 10 mg tablet (Taking) Take one tab daily. STOP LOVASTATIN    glipiZIDE (GLUCOTROL) 5 mg tablet (Taking) Take 1 Tab by mouth two (2) times a day. Pertinent Labs:   Lab Results   Component Value Date/Time    Hemoglobin A1c 5.8 (H) 11/18/2019 10:50 AM    Hemoglobin A1c 5.5 09/16/2019 10:31 AM    Hemoglobin A1c 5.6 06/05/2019 10:23 AM    Hemoglobin A1c, External 6.3 10/12/2017      Body mass index is 32.1 kg/m².      Lab Results   Component Value Date/Time    LDL, calculated 51.8 11/18/2019 10:50 AM         Lab Results   Component Value Date/Time    Sodium 142 11/18/2019 10:50 AM    Potassium 3.7 11/18/2019 10:50 AM    Chloride 108 11/18/2019 10:50 AM    CO2 27 11/18/2019 10:50 AM    Anion gap 7 11/18/2019 10:50 AM    Glucose 41 (LL) 11/18/2019 10:50 AM    BUN 16 11/18/2019 10:50 AM    Creatinine 1.50 (H) 11/18/2019 10:50 AM    BUN/Creatinine ratio 11 (L) 11/18/2019 10:50 AM    GFR est AA 55 (L) 11/18/2019 10:50 AM    GFR est non-AA 45 (L) 11/18/2019 10:50 AM    Calcium 9.3 11/18/2019 10:50 AM    AST (SGOT) 22 2019 10:50 AM    Alk. phosphatase 97 2019 10:50 AM    Protein, total 6.8 2019 10:50 AM    Albumin 4.0 2019 10:50 AM    Globulin 2.8 2019 10:50 AM    A-G Ratio 1.4 2019 10:50 AM    ALT (SGPT) 21 2019 10:50 AM     Lab Results   Component Value Date/Time    Microalbumin/Creat ratio (mg/g creat) 355 (H) 2019 10:50 AM    Microalbumin,urine random 9.08 2019 10:50 AM      Frequency of home glucose testing:daily   Blood Sugar range at home: Last reading at home was 113. Last eye exam: In past 12 months. Last foot exam: This year. Polyuria, polyphagia or polydipsia: No   Retinopathy: No   Neuropathy SX: Severe   Low blood sugar symptoms: No   Dietary compliance: Good   Medication compliance:Good   On ASA: Yes   Depression: No   CKD: CKD 3     Wt Readings from Last 3 Encounters:   20 250 lb (113.4 kg)   19 242 lb 8 oz (110 kg)   19 251 lb (113.9 kg)        Social History     Tobacco Use   Smoking Status Former Smoker    Types: Cigarettes    Last attempt to quit: 2003    Years since quittin.6   Smokeless Tobacco Never Used     Body mass index is 32.1 kg/m². Diabetic Consultants: Dr. Jocelyn Valle  All the patient's questions regarding medications, diet and exercise were answered. Goal of A1C of less than 7.5% is our goal.   Our overall goal is to reduce or eliminate the long term consequences of poorly controlled diabetes. Yes    Centrilobular emphysema ([facility] [address]):  SpO2 Readings from Last 3 Encounters:   20 99%   19 100%   19 98%     Oxygen: He currently is not on home oxygen therapy. Symptoms: chronic dyspnea: severity = moderate: course of sx: stable. Patient does not smoke cigarettes. Compliant to medications.  Chronic diastolic heart failure ([address]): Stable but has leg edema.  Prostate CA [facility]): Passing his urine okay.  Type 2 diabetes mellitus with nephropathy ([facility] [address]): See above.          Chronic pain syndrome: Due to degenerative disc disease in his lower back and severe right sciatica with muscle weakness.  Stage 3 chronic kidney disease [facility]):  Nephrologist:   Lab Results   Component Value Date/Time    GFR est AA 55 (L) 11/18/2019 10:50 AM    GFR est non-AA 45 (L) 11/18/2019 10:50 AM    Creatinine (POC) 1.4 (H) 01/02/2015 10:08 AM    Creatinine 1.50 (H) 11/18/2019 10:50 AM    BUN 16 11/18/2019 10:50 AM    Sodium 142 11/18/2019 10:50 AM    Potassium 3.7 11/18/2019 10:50 AM    Chloride 108 11/18/2019 10:50 AM    CO2 27 11/18/2019 10:50 AM     Lab Results   Component Value Date/Time    WBC 3.6 11/01/2019 01:40 PM    HGB 8.9 (L) 11/01/2019 01:40 PM    HCT 29.1 (L) 11/01/2019 01:40 PM    PLATELET 470 70/69/5917 01:40 PM    MCV 77 (L) 11/01/2019 01:40 PM     Lab Results   Component Value Date/Time    Vitamin D 25-Hydroxy 21 (L) 10/07/2010 09:27 AM    VITAMIN D, 25-HYDROXY 63.3 10/16/2019 10:37 AM       Lab Results   Component Value Date/Time    Calcium 9.3 11/18/2019 10:50 AM    Phosphorus 3.0 10/16/2019 10:37 AM    PTH, Intact 73 (H) 10/16/2019 10:37 AM              DDD (degenerative disc disease), lumbar: This is a source of his chronic pain.  Essential hypertension with goal blood pressure less than 130/85:  BP Readings from Last 3 Encounters:   02/17/20 135/59   11/21/19 194/73   11/01/19 179/74     The patient reports:  taking medications as instructed, no medication side effects noted, no TIA's, no chest pain on exertion, no dyspnea on exertion, noting swelling of ankles. Key CAD CHF Meds             labetalol (NORMODYNE) 200 mg tablet (Taking) TAKE 1/2 (ONE-HALF) TABLET BY MOUTH TWICE DAILY    amLODIPine (NORVASC) 10 mg tablet (Taking) TAKE ONE TABLET BY MOUTH ONCE DAILY    rosuvastatin (CRESTOR) 20 mg tablet (Taking) Take 1 Tab by mouth nightly. Indications: high cholesterol    cloNIDine HCl (CATAPRES) 0.3 mg tablet (Taking) Take 1 Tab by mouth two (2) times a day. hydrALAZINE (APRESOLINE) 50 mg tablet (Taking) TAKE ONE TABLET BY MOUTH 4 TIMES DAILY FOR  HYPERTENSION    bumetanide (BUMEX) 2 mg tablet (Taking) TAKE ONE TABLET BY MOUTH TWICE DAILY    PRADAXA 150 mg capsule (Taking) TAKE ONE CAPSULE BY MOUTH TWICE DAILY    aspirin 81 mg chewable tablet (Taking) Take 81 mg by mouth daily. Lab Results   Component Value Date/Time    Sodium 142 11/18/2019 10:50 AM    Potassium 3.7 11/18/2019 10:50 AM    Chloride 108 11/18/2019 10:50 AM    CO2 27 11/18/2019 10:50 AM    Anion gap 7 11/18/2019 10:50 AM    Glucose 41 (LL) 11/18/2019 10:50 AM    BUN 16 11/18/2019 10:50 AM    Creatinine 1.50 (H) 11/18/2019 10:50 AM    BUN/Creatinine ratio 11 (L) 11/18/2019 10:50 AM    GFR est AA 55 (L) 11/18/2019 10:50 AM    GFR est non-AA 45 (L) 11/18/2019 10:50 AM    Calcium 9.3 11/18/2019 10:50 AM    Bilirubin, total 0.8 11/18/2019 10:50 AM    AST (SGOT) 22 11/18/2019 10:50 AM    Alk. phosphatase 97 11/18/2019 10:50 AM    Protein, total 6.8 11/18/2019 10:50 AM    Albumin 4.0 11/18/2019 10:50 AM    Globulin 2.8 11/18/2019 10:50 AM    A-G Ratio 1.4 11/18/2019 10:50 AM    ALT (SGPT) 21 11/18/2019 10:50 AM     Low salt diet? yes  Aerobic exercise? no  Our goal is to normalize the blood pressure to decrease the risks of strokes and heart attacks. The patient is in agreement with the plan.  Idiopathic gout, unspecified chronicity, unspecified site:  Lab Results   Component Value Date/Time    Uric acid 4.5 09/16/2019 10:31 AM            Iron deficiency anemia secondary to inadequate dietary iron intake: He says he has seen the hematologist and had to iron infusions. I have no records of this.  Persistent atrial fibrillation: Stable and anticoagulated.  Polyp of colon, unspecified part of colon, unspecified type; he missed his appointment with Dr. Alcon Diallo office. We will have to have him reschedule. He has had a problem with transportation recently.               Current and past medical information:    Current Medications after this visit[de-identified]     Current Outpatient Medications   Medication Sig    HYDROcodone-acetaminophen (NORCO)  mg tablet Take 1 Tab by mouth every six (6) hours as needed for Pain for up to 30 days.  metFORMIN (GLUCOPHAGE) 500 mg tablet Take 1 Tab by mouth three (3) times daily (with meals).  labetalol (NORMODYNE) 200 mg tablet TAKE 1/2 (ONE-HALF) TABLET BY MOUTH TWICE DAILY    empagliflozin (JARDIANCE) 10 mg tablet Take one tab daily. STOP LOVASTATIN    fluticasone propionate (FLONASE) 50 mcg/actuation nasal spray USE ONE SPRAY(S) IN EACH NOSTRIL ONCE DAILY    albuterol (PROVENTIL HFA, VENTOLIN HFA, PROAIR HFA) 90 mcg/actuation inhaler INHALE TWO PUFFS BY MOUTH EVERY 6 HOURS AS NEEDED FOR WHEEZING FOR  UP  TO  ONE  YEAR    montelukast (SINGULAIR) 10 mg tablet TAKE 1 TABLET BY MOUTH ONCE DAILY    melatonin 10 mg tab Take  by mouth.  amLODIPine (NORVASC) 10 mg tablet TAKE ONE TABLET BY MOUTH ONCE DAILY    b complex-vitamin c-folic acid 5mg (FOLBEE PLUS) tab tablet TAKE 1 TABLET BY MOUTH ONCE DAILY    ergocalciferol (ERGOCALCIFEROL) 50,000 unit capsule Take 1 Cap by mouth every seven (7) days.  gabapentin (NEURONTIN) 300 mg capsule Take 1 Cap by mouth three (3) times daily.  glipiZIDE (GLUCOTROL) 5 mg tablet Take 1 Tab by mouth two (2) times a day.  rosuvastatin (CRESTOR) 20 mg tablet Take 1 Tab by mouth nightly. Indications: high cholesterol    potassium chloride (K-DUR) 10 mEq tablet Take 2 Tabs by mouth two (2) times a day. Indications: low amount of potassium in the blood    cloNIDine HCl (CATAPRES) 0.3 mg tablet Take 1 Tab by mouth two (2) times a day.     Blood-Glucose Meter (TRUE METRIX GLUCOSE METER) [facility] AS DIRECTED    glucose blood VI test strips (TRUE METRIX GLUCOSE TEST STRIP) strip Test 2 times daily Dx Coed E11.65    lancets misc Test 2 times daily Dx Code E11.65    hydrALAZINE (APRESOLINE) 50 mg tablet TAKE ONE TABLET BY MOUTH 4 TIMES DAILY FOR  HYPERTENSION    bumetanide (BUMEX) 2 mg tablet TAKE ONE TABLET BY MOUTH TWICE DAILY    COLCRYS 0.6 mg tablet TAKE ONE TABLET BY MOUTH ONCE DAILY TO  PREVENT  GOUT    omeprazole (PRILOSEC) 20 mg capsule TAKE ONE CAPSULE BY MOUTH ONCE DAILY    tamsulosin (FLOMAX) 0.4 mg capsule TAKE ONE CAPSULE BY MOUTH ONCE DAILY    allopurinol (ZYLOPRIM) 300 mg tablet Take 1 Tab by mouth daily.  baclofen (LIORESAL) 10 mg tablet TAKE ONE TABLET BY MOUTH THREE TIMES DAILY (MUSCLE  RELAXER)    PRADAXA 150 mg capsule TAKE ONE CAPSULE BY MOUTH TWICE DAILY    ferrous sulfate (IRON) 325 mg (65 mg iron) tablet Take 1 Tab by mouth two (2) times daily (after meals). Indications: Iron Deficiency Anemia    lancets 30 gauge misc     ACCU-CHEK SHAUNA CONTROL SOLN soln     docusate sodium 100 mg tab Take 1 Tab by mouth two (2) times a day.  magnesium oxide (MAG-OX) 400 mg tablet Take 1 Tab by mouth daily.  aspirin 81 mg chewable tablet Take 81 mg by mouth daily. No current facility-administered medications for this visit.         Patient Active Problem List    Diagnosis Date Noted    BMI 32.0-32.9,adult 06/05/2019     Priority: 1 - One    Type [facility] diabetes mellitus [facility] 01/10/2018     Priority: 1 - One    History of colon polyps 05/02/2016     Priority: 1 - One    Idiopathic gout 05/02/2016     Priority: 1 - One    Essential hypertension with goal blood pressure less than 130/85 05/02/2016     Priority: 1 - One    CKD (chronic kidney disease) 11/23/2015     Priority: 1 - One    COPD (chronic obstructive pulmonary disease) ([facility] [address] 04/27/2015     Priority: 1 - One    Diabetes with neurologic complications ([facility] 30/14/3570     Priority: 1 - One    Chronic radicular pain of lower back 07/21/2014     Priority: 1 - One    Neuropathy 07/16/2013     Priority: 1 - One    DDD (degenerative disc disease), lumbar 07/16/2013     Priority: 1 - One    Prostate CA ([facility] [address] 07/16/2013 Priority: 1 - One    Edema 2012     Priority: 1 - One    Persistent atrial fibrillation      Priority: 1 - One    Arthritis 2011     Priority: 1 - One    Gout, unspecified      Priority: 1 - One    Hypercholesterolemia      Priority: 1 - One    Leg pain      Priority: 1 - One    Chronic pain 2010     Priority: 1 - One    Hypertension      Priority: 2 - Two    Tubulovillous adenoma polyp of colon 2013     Priority: 6 - Six    Unspecified arthropathy, ankle and foot 2011     Priority: 6 - Six    PVD (peripheral vascular disease) ([facility] [address]      Priority: 6 - Six    Iron deficiency anemia secondary to inadequate dietary iron intake 2019       Past Medical History:   Diagnosis Date    Acute on chronic diastolic congestive heart failure ([facility] [address] 2015    Arthritis 2011    [place] 2010    Cancer ([facility] [address]     prostate    Chronic diastolic heart failure ([facility] [address] 2015    CKD (chronic kidney disease) 2015    Diabetes ([facility] [address]     Gout, unspecified     Hypercholesterolemia     Hypertension     Inguinal lymphadenopathy 2014    Leg pain     Persistent atrial fibrillation     PVD (peripheral vascular disease) (HCC)        Allergies   Allergen Reactions    Indocin [Indomethacin Sodium] Unknown (comments)    Lisinopril Angioedema     Dxed in hospital.    Losartan Other (comments)     Face and throat swelling.        Past Surgical History:   Procedure Laterality Date    COLONOSCOPY N/A 2016    COLONOSCOPY performed by Bryn Handley MD at [address] [place] [address] HX ORTHOPAEDIC      back and left shoulder x2       Social History     Socioeconomic History    Marital status: SINGLE     Spouse name: Not on file    Number of children: Not on file    Years of education: Not on file    Highest education level: Not on file   Tobacco Use    Smoking status: Former Smoker     Types: Cigarettes     Last attempt to quit: 2003     Years since quittin.6    Smokeless tobacco: Never Used   Substance and Sexual Activity    Alcohol use: Yes     Alcohol/week: 0.0 standard drinks     Comment: 2 drinks/month    Drug use: No       Review of Systems   Constitutional: Negative. Negative for chills, fever, malaise/fatigue and weight loss. HENT: Negative. Negative for hearing loss. Eyes: Negative. Negative for blurred vision and double vision. Respiratory: Positive for cough and shortness of breath. Negative for sputum production and wheezing. Cardiovascular: Negative. Negative for chest pain and palpitations. Gastrointestinal: Negative. Negative for abdominal pain, blood in stool, heartburn, nausea and vomiting. Genitourinary: Negative. Musculoskeletal: Positive for back pain and joint pain. Negative for falls, myalgias and neck pain. Skin: Negative. Negative for rash. Neurological: Positive for sensory change. Negative for dizziness, tremors, weakness and headaches. Endo/Heme/Allergies: Negative. Psychiatric/Behavioral: Negative. Negative for depression. Objective:     Vitals:    02/17/20 1024   BP: 135/59   Pulse: (!) 59   Resp: 20   Temp: 97.8 °F (36.6 °C)   TempSrc: Oral   SpO2: 99%   Weight: 250 lb (113.4 kg)   Height: 6' 2\" (1.88 m)      Body mass index is 32.1 kg/m². Physical Exam  Vitals signs and nursing note reviewed. Constitutional:       General: He is not in acute distress. Appearance: Normal appearance. He is well-developed. He is not toxic-appearing. HENT:      Head: Normocephalic and atraumatic. Ears:      Comments: Moderately severe hearing loss noted on exam     Nose: No congestion. Mouth/Throat:      Pharynx: No oropharyngeal exudate or posterior oropharyngeal erythema. Eyes:      General: No scleral icterus. Right eye: No discharge. Left eye: No discharge. Cardiovascular:      Rate and Rhythm: Normal rate and regular rhythm. Heart sounds: Normal heart sounds. No murmur.  No friction rub. No gallop. Pulmonary:      Effort: Pulmonary effort is normal. No respiratory distress. Breath sounds: No wheezing, rhonchi or rales. Comments: Severely decreased breath sounds. No rales or rhonchi. Abdominal:      General: There is no distension. Musculoskeletal:         General: No swelling. Right lower leg: Edema present. Left lower leg: Edema present. Skin:     General: Skin is warm. Coloration: Skin is not jaundiced. Findings: No erythema or rash. Neurological:      Mental Status: He is alert. Psychiatric:         Mood and Affect: Mood normal.         Behavior: Behavior normal.           Health Maintenance Due   Topic Date Due    Colonoscopy  09/22/2019    Medicare Yearly Exam  02/06/2020         Assessment and orders:     Encounter Diagnoses     ICD-10-CM ICD-9-CM   1. Type 2 diabetes mellitus with other neurologic complication, without long-term current use of insulin ([facility]) E11.49 250.60   2. Centrilobular emphysema ([facility]) J43.2 492.8   3. Chronic diastolic heart failure ([facility]) I50.32 428.32   4. Prostate CA ([facility] [address] C61 185   5. Type 2 diabetes mellitus with nephropathy ([facility]) E11.21 250.40     583.81   6. Chronic pain syndrome G89.4 338.4   7. Stage 3 chronic kidney disease ([facility]) N18.3 585.3   8. DDD (degenerative disc disease), lumbar M51.36 722.52   9. Essential hypertension with goal blood pressure less than 130/85 I10 401.9   10. Idiopathic gout, unspecified chronicity, unspecified site M10.00 274.9   11. Iron deficiency anemia secondary to inadequate dietary iron intake D50.8 280.1   12. Persistent atrial fibrillation I48.19 427.31   13. Polyp of colon, unspecified part of colon, unspecified type K63.5 211.3     Diagnoses and all orders for this visit:    1. Type 2 diabetes mellitus with other neurologic complication, without long-term current use of insulin (HCC)  -     HEMOGLOBIN A1C WITH EAG; Future  -     LIPID PANEL;  Future  -     METABOLIC PANEL, COMPREHENSIVE; Future    2. Centrilobular emphysema ([facility] [address]    3. Chronic diastolic heart failure (HCC)  -     LIPID PANEL; Future  -     METABOLIC PANEL, COMPREHENSIVE; Future    4. Prostate CA ([facility] [address]    5. Type 2 diabetes mellitus with nephropathy (HCC)  -     HEMOGLOBIN A1C WITH EAG; Future  -     LIPID PANEL; Future  -     METABOLIC PANEL, COMPREHENSIVE; Future    6. Chronic pain syndrome    7. Stage 3 chronic kidney disease (HCC)  -     METABOLIC PANEL, COMPREHENSIVE; Future    8. DDD (degenerative disc disease), lumbar    9. Essential hypertension with goal blood pressure less than 130/85  -     LIPID PANEL; Future  -     METABOLIC PANEL, COMPREHENSIVE; Future    10. Idiopathic gout, unspecified chronicity, unspecified site    11. Iron deficiency anemia secondary to inadequate dietary iron intake  -     CBC WITH AUTOMATED DIFF; Future  -     IRON PROFILE; Future  -     REFERRAL TO GASTROENTEROLOGY    12. Persistent atrial fibrillation    13. Polyp of colon, unspecified part of colon, unspecified type  -     REFERRAL TO GASTROENTEROLOGY            Plan of care:  Discussed diagnoses in detail with patient. Medication risks/benefits/side effects discussed with patient. All of the patient's questions were addressed. The patient understands and agrees with our plan of care. The patient knows to call back if they are unsure of or forget any changes we discussed today or if the symptoms change. The patient received an After-Visit Summary which contains VS, orders, medication list and allergy list. This can be used as a \"mini-medical record\" should they have to seek medical care while out of town.     Patient Care Team:  Yanely Liang MD as PCP - General (Family Practice)  Yanely Liang MD as PCP - [facility] Provider  ERAN Orosco (Orthopedic Surgery)  Ahsan Guzman MD (Otolaryngology)  Ebonie Arreola MD (Endocrinology)  Holly Ashford MD as Physician (Internal Medicine)  Tim Penaloza MD (Ophthalmology)  Person, Lindsey Dejesus MD (Podiatry)  Julius Singh MD (Cardiology)  Tim Penaloza MD (Ophthalmology)  Dorothy Arriaga MD (Gastroenterology)  [place] MD (Hematology and Oncology)    Follow-up and Dispositions    · Return in about 3 months (around 5/17/2020). Signed By: Jenise Davidson MD     February 17, 2020      ATTENTION:   This medical record was transcribed using an electronic medical records/speech recognition system. Although proofread, it may and can contain electronic, spelling and other errors. Corrections may be executed at a later time. Please feel free to contact me for any clarifications as needed.

## 2020-02-17 NOTE — PATIENT INSTRUCTIONS
Iron Deficiency Anemia: Care Instructions  Your Care Instructions    Anemia means that you do not have enough red blood cells. Red blood cells carry oxygen around your body. When you have anemia, it can make you pale, weak, and tired. Many things can cause anemia. The most common cause is loss of blood. This can happen if you have heavy menstrual periods. It can also happen if you have bleeding in your stomach or bowel. You can also get anemia if you don't have enough iron in your diet or if it's hard for your body to absorb iron. In some cases, pregnancy causes anemia. That's because a pregnant woman needs more iron. Your doctor may do more tests to find the cause of your anemia. If a disease or other health problem is causing it, your doctor will treat that problem. It's important to follow up with your doctor to make sure that your iron level returns to normal.  Follow-up care is a key part of your treatment and safety. Be sure to make and go to all appointments, and call your doctor if you are having problems. It's also a good idea to know your test results and keep a list of the medicines you take. How can you care for yourself at home? · If your doctor recommended iron pills, take them as directed. ? Try to take the pills on an empty stomach. You can do this about 1 hour before or 2 hours after meals. But you may need to take iron with food to avoid an upset stomach. ? Do not take antacids or drink milk or anything with caffeine within 2 hours of when you take your iron. They can keep your body from absorbing the iron well. ? Vitamin C helps your body absorb iron. You may want to take iron pills with a glass of orange juice or some other food high in vitamin C.  ? Iron pills may cause stomach problems. These include heartburn, nausea, diarrhea, constipation, and cramps. It can help to drink plenty of fluids and include fruits, vegetables, and fiber in your diet.   ? It's normal for iron pills to make your stool a greenish or grayish black. But internal bleeding can also cause dark stool. So it's important to tell your doctor about any color changes. ? Call your doctor if you think you are having a problem with your iron pills. Even after you start to feel better, it will take several months for your body to build up its supply of iron. ? If you miss a pill, don't take a double dose. ? Keep iron pills out of the reach of small children. Too much iron can be very dangerous. · Eat foods with a lot of iron. These include red meat, shellfish, poultry, and eggs. They also include beans, raisins, whole-grain bread, and leafy green vegetables. · Steam your vegetables. This is the best way to prepare them if you want to get as much iron as possible. · Be safe with medicines. Do not take nonsteroidal anti-inflammatory pain relievers unless your doctor tells you to. These include aspirin, naproxen (Aleve), and ibuprofen (Advil, Motrin). · Liquid iron can stain your teeth. But you can mix it with water or juice and drink it with a straw. Then it won't get on your teeth. When should you call for help? Call 911 anytime you think you may need emergency care. For example, call if:    · You passed out (lost consciousness).    Call your doctor now or seek immediate medical care if:    · You are short of breath.     · You are dizzy or light-headed, or you feel like you may faint.     · You have new or worse bleeding.    Watch closely for changes in your health, and be sure to contact your doctor if:    · You feel weaker or more tired than usual.     · You do not get better as expected. Where can you learn more? Go to http://otto-cindy.info/. Enter H762 in the search box to learn more about \"Iron Deficiency Anemia: Care Instructions. \"  Current as of: March 28, 2019  Content Version: 12.2  © 4242-3943 [employer].  Care instructions adapted under license by [employer] (which disclaims liability or warranty for this information). If you have questions about a medical condition or this instruction, always ask your healthcare professional. [address] any warranty or liability for your use of this information.

## 2020-02-18 ENCOUNTER — TELEPHONE (OUTPATIENT)
Dept: FAMILY MEDICINE CLINIC | Age: 80
End: 2020-02-18

## 2020-02-18 NOTE — TELEPHONE ENCOUNTER
----- Message from Baron Cohen sent at 2/18/2020  1:56 PM EST -----  Regarding: Osei Crandall MD  General Message/Vendor Calls    Caller's first and last name: Walker       Reason for call: Lab/Office notes request     Callback required yes/no and why: Yes    Best contact number(s): [phone]; Fax: [phone]      Details to clarify the request: last office notes and confirmation pt has been diagnosed with diabetes type [address] [facility] [facility]

## 2020-02-20 DIAGNOSIS — M51.36 DDD (DEGENERATIVE DISC DISEASE), LUMBAR: Chronic | ICD-10-CM

## 2020-02-20 DIAGNOSIS — G89.29 CHRONIC LEFT-SIDED LOW BACK PAIN WITHOUT SCIATICA: ICD-10-CM

## 2020-02-20 DIAGNOSIS — M54.50 CHRONIC LEFT-SIDED LOW BACK PAIN WITHOUT SCIATICA: ICD-10-CM

## 2020-02-20 RX ORDER — HYDROCODONE BITARTRATE AND ACETAMINOPHEN 10; 325 MG/1; MG/1
1 TABLET ORAL
Qty: 62 TAB | Refills: 0 | Status: SHIPPED | OUTPATIENT
Start: 2020-02-20 | End: 2020-03-09 | Stop reason: SDUPTHER

## 2020-02-20 NOTE — TELEPHONE ENCOUNTER
----- Message from Prateek Vega sent at 2/20/2020 12:20 PM EST -----  Regarding: Dr. Annamarie Meyer: [phone] (if not patient):  SOFYA BARRON  Relationship of caller (if not patient): Child  Best contact number(s): [phone]  Name of medication and dosage if known: Hydrocodone- dosage unknown   Is patient out of this medication (yes/no): No, has 6 pill left  Pharmacy name: [address] listed in chart? (yes/no): Yes  Pharmacy phone number: [phone]  Date of last visit:  Monday, February 17, 2020 10:10 AM  Details to clarify the request: n/a

## 2020-02-20 NOTE — TELEPHONE ENCOUNTER
Triage for Controlled Substance Refill Request    Pain Diagnosis: Chronic Pain    Last Outpatient Visit: 2/17/20    Next Outpatient Visit:5/18/20    Reason for refill needed outside of office visit?prescribed only on a monthly basis    Pain escalation requiring increased medication- no    Pharmacy: See pain contract    Medication: See Prescription requested       reviewed:today by Dr. Debby Capone and filed in  folder, separate from chart    Date of Urine Drug Screen:  3/22/19      Opioid Safety Handout given: Always prints with the Patient AVS    Appropriate for refill: yes    Action:  Rx sent to Dr. Debby Capone

## 2020-03-09 DIAGNOSIS — G89.29 CHRONIC LEFT-SIDED LOW BACK PAIN WITHOUT SCIATICA: ICD-10-CM

## 2020-03-09 DIAGNOSIS — J44.9 CHRONIC OBSTRUCTIVE PULMONARY DISEASE, UNSPECIFIED COPD TYPE (HCC): Chronic | ICD-10-CM

## 2020-03-09 DIAGNOSIS — M51.36 DDD (DEGENERATIVE DISC DISEASE), LUMBAR: Chronic | ICD-10-CM

## 2020-03-09 DIAGNOSIS — M54.50 CHRONIC LEFT-SIDED LOW BACK PAIN WITHOUT SCIATICA: ICD-10-CM

## 2020-03-09 NOTE — TELEPHONE ENCOUNTER
Triage for Controlled Substance Refill Request    Pain Diagnosis: Chronic Pain    Last Outpatient Visit: 2/17/2020    Next Outpatient Visit:5/18/2020    Reason for refill needed outside of office visit?prescribed only on a monthly basis    Pain escalation requiring increased medication- no    Pharmacy: See pain contract    Medication: See Prescription requested       reviewed:today by Dr. Wendy Schmid and filed in  folder, separate from chart    Date of Urine Drug Screen:3/22/19    Opioid Safety Handout given: Always prints with the Patient AVS    Appropriate for refill: yes    Action:  Rx sent to Dr. Wendy Schmid

## 2020-03-09 NOTE — TELEPHONE ENCOUNTER
----- Message from Trev Davis sent at 3/9/2020  8:27 AM EDT -----  Regarding: Dr. Marleni Rodriguez      Needs refill for hydrocodone. Please send to [facility]. Best contact number is [phone].

## 2020-03-10 NOTE — TELEPHONE ENCOUNTER
----- Message from Raymond Fuentes sent at 3/10/2020 11:57 AM EDT -----  Regarding: Dr. Radhames Walton (if not patient): Haritha Jung  Relationship of caller (if not patient): daughter  Best contact number(s): [phone]  Name of medication and dosage if known: albuterol inhaler and flonase spray, hydrocodone  Is patient out of this medication (yes/no): yes  Pharmacy name: [address] listed in chart? (yes/no): yes  Pharmacy phone number: on file  Date of last visit: 2/17/2020  Details to clarify the request:

## 2020-03-11 RX ORDER — HYDROCODONE BITARTRATE AND ACETAMINOPHEN 10; 325 MG/1; MG/1
1 TABLET ORAL
Qty: 62 TAB | Refills: 0 | Status: SHIPPED | OUTPATIENT
Start: 2020-03-11 | End: 2020-01-01 | Stop reason: SDUPTHER

## 2020-03-11 RX ORDER — ALBUTEROL SULFATE 90 UG/1
AEROSOL, METERED RESPIRATORY (INHALATION)
Qty: 3 INHALER | Refills: 3 | Status: SHIPPED | OUTPATIENT
Start: 2020-03-11 | End: 2020-01-01 | Stop reason: SDUPTHER

## 2020-03-11 RX ORDER — FLUTICASONE PROPIONATE 50 MCG
SPRAY, SUSPENSION (ML) NASAL
Qty: 3 BOTTLE | Refills: 3 | Status: SHIPPED | OUTPATIENT
Start: 2020-03-11 | End: 2020-01-01 | Stop reason: SDUPTHER

## 2020-03-11 NOTE — TELEPHONE ENCOUNTER
Triage for Controlled Substance Refill Request    Pain Diagnosis: Chronic Pain    Last Outpatient Visit: 2/17/20    Next Outpatient Visit:5/18/20    Reason for refill needed outside of office visit?prescribed only on a monthly basis    Pain escalation requiring increased medication- no    Pharmacy: See pain contract    Medication: See Prescription requested       reviewed:today by Dr. Rahel Nicole and filed in  folder, separate from chart    Date of Urine Drug Screen: 3/22/19      Opioid Safety Handout given: Always prints with the Patient AVS    Appropriate for refill: yes    Action:  Rx sent to Dr. Rahel Nicole

## 2020-04-07 NOTE — TELEPHONE ENCOUNTER
Triage for Controlled Substance Refill Request     Pain Diagnosis: Chronic Pain     Last Outpatient Visit: 2/17/2020     Next Outpatient Visit:5/18/2020     Reason for refill needed outside of office visit?prescribed only on a monthly basis     Pain escalation requiring increased medication- no     Pharmacy: See pain contract     Medication: See Prescription requested         reviewed:today by Dr. Harl Holstein and filed in  folder, separate from chart     Date of Urine Drug Screen:3/22/19     Opioid Safety Handout given: Always prints with the Patient AVS     Appropriate for refill: yes     Action:  Rx sent to Dr. Harl Holstein

## 2020-04-21 NOTE — TELEPHONE ENCOUNTER
Triage for Controlled Substance Refill Request    Pain Diagnosis: Chronic Pain    Last Outpatient Visit: 2/17/2020    Next Outpatient Visit:5/18/2020    Reason for refill needed outside of office visit?prescribed only on a monthly basis    Pain escalation requiring increased medication- no    Pharmacy: See pain contract    Medication: See Prescription requested       reviewed:today by Dr. Sheba Pearson and filed in  folder, separate from chart    Date of Urine Drug Screen:  3/22/19      Opioid Safety Handout given: Always prints with the Patient AVS    Appropriate for refill: yes    Action:  Rx sent to Dr. Sheba Pearson

## 2020-04-21 NOTE — TELEPHONE ENCOUNTER
----- Message from Bettie Grajeda sent at 4/20/2020  6:06 PM EDT -----  Regarding: Dr. He Butts first and last name: Bradlaly Blocker, No Pain DME  Reason for call: Caller requested the notes from the last visit for the pt.   Callback required yes/no and why: Yes  Best contact number(s): [phone]  Details to clarify the request: N/A

## 2020-04-27 NOTE — TELEPHONE ENCOUNTER
Spoke with pharmacist at [facility]. Prescription was sent on 4/21/2020 and it is there and ready for . Patient notified.

## 2020-06-08 NOTE — PROGRESS NOTES
[facility] 2005 A [facility], [address] [place] [address] Date of visit: 6/8/2020 This is a Subsequent Medicare Annual Wellness Visit (AWV), (Performed more than 12 months after effective date of Medicare Part B enrollment and 12 months after last wellness visit) I have reviewed the patient's medical history in detail and updated the computerized patient record. Lance Mcneal is a 79 y.o. male History obtained from: the patient. Concerns today (Patient understands that medical problems addressed today may incur additional notes andcost as this is a preventive visit) History Patient Active Problem List  
Diagnosis Code  Chronic pain G89.29  
 Gout, unspecified M10.9  PVD (peripheral vascular disease) ([facility]) I73.9  Hypertension I10  
 Hypercholesterolemia E78.00  Leg pain M79.606  Arthritis M19.90  
 Unspecified arthropathy, ankle and foot M19.079  Persistent atrial fibrillation (HCC) I48.19  
 Edema R60.9  Neuropathy G62.9  DDD (degenerative disc disease), lumbar M51.36  
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  
 COPD (chronic obstructive pulmonary disease) (HCC) J44.9  CKD (chronic kidney disease) N18.9  History of colon polyps Z86.010  
 Idiopathic gout M10.00  Essential hypertension with goal blood pressure less than 130/85 I10  Type 2 diabetes mellitus with nephropathy ([facility]) E11.21  
 BMI 32.0-32.9,adult Z68.32  
 Iron deficiency anemia secondary to inadequate dietary iron intake D50.8 Past Medical History:  
Diagnosis Date  Acute on chronic diastolic congestive heart failure ([address] 4/27/2015  Arthritis 7/11/2011  [place] 6/7/2010  Cancer [facility])   
 prostate  Chronic diastolic heart failure ([address] 8/26/2015  CKD (chronic kidney disease) 11/23/2015  Diabetes ([facility]  Gout, unspecified  Hypercholesterolemia  Hypertension  Inguinal lymphadenopathy 2/18/2014  Leg pain  Persistent atrial fibrillation (HCC)  [facility] Past Surgical History:  
Procedure Laterality Date  COLONOSCOPY N/A 9/22/2016 COLONOSCOPY performed by Jennifer Barraza MD at [place] [address]    
 back and left shoulder x2 Allergies Allergen Reactions  Indocin [Indomethacin Sodium] Unknown (comments)  Lisinopril Angioedema Dxed in hospital.  
 Losartan Other (comments) Face and throat swelling. Current Outpatient Medications Medication Sig Dispense Refill  
 HYDROcodone-acetaminophen (NORCO)  mg tablet Take 1 Tab by mouth every six (6) hours as needed for Pain for up to 30 days. 62 Tab 0  
 omeprazole (PRILOSEC) 20 mg capsule Take 1 capsule by mouth once daily 90 Cap 1  
 tamsulosin (FLOMAX) 0.4 mg capsule Take 1 capsule by mouth once daily 90 Cap 2  
 allopurinoL (ZYLOPRIM) 300 mg tablet Take 1 tablet by mouth once daily 90 Tab 2  
 Pradaxa 150 mg capsule Take 1 capsule by mouth twice daily 180 Cap 1  
 Colcrys 0.6 mg tablet TAKE 1 TABLET BY MOUTH ONCE DAILY TO  PREVENT  GOUT 90 Tab 1  
 fluticasone propionate (FLONASE) 50 mcg/actuation nasal spray USE ONE SPRAY(S) IN EACH NOSTRIL ONCE DAILY 3 Bottle 3  
 albuterol (PROVENTIL HFA, VENTOLIN HFA, PROAIR HFA) 90 mcg/actuation inhaler INHALE TWO PUFFS BY MOUTH EVERY 6 HOURS AS NEEDED FOR WHEEZING FOR  UP  TO  ONE  YEAR 3 Inhaler 3  
 metFORMIN (GLUCOPHAGE) 500 mg tablet Take 1 Tab by mouth three (3) times daily (with meals). 270 Tab 2  
 labetalol (NORMODYNE) 200 mg tablet TAKE 1/2 (ONE-HALF) TABLET BY MOUTH TWICE DAILY 90 Tab 3  
 empagliflozin (JARDIANCE) 10 mg tablet Take one tab daily.  STOP LOVASTATIN 90 Tab 4  
 montelukast (SINGULAIR) 10 mg tablet TAKE 1 TABLET BY MOUTH ONCE DAILY 90 Tab 3  
 melatonin 10 mg tab Take  by mouth.  amLODIPine (NORVASC) 10 mg tablet TAKE ONE TABLET BY MOUTH ONCE DAILY 90 Tab 3  
 b complex-vitamin c-folic acid 5mg (FOLBEE PLUS) tab tablet TAKE 1 TABLET BY MOUTH ONCE DAILY 90 Tab 2  
 ergocalciferol (ERGOCALCIFEROL) 50,000 unit capsule Take 1 Cap by mouth every seven (7) days. 12 Cap 2  
 gabapentin (NEURONTIN) 300 mg capsule Take 1 Cap by mouth three (3) times daily. 270 Cap 2  
 glipiZIDE (GLUCOTROL) 5 mg tablet Take 1 Tab by mouth two (2) times a day. 180 Tab 3  
 rosuvastatin (CRESTOR) 20 mg tablet Take 1 Tab by mouth nightly. Indications: high cholesterol 90 Tab 1  potassium chloride (K-DUR) 10 mEq tablet Take 2 Tabs by mouth two (2) times a day. Indications: low amount of potassium in the blood 360 Tab 3  cloNIDine HCl (CATAPRES) 0.3 mg tablet Take 1 Tab by mouth two (2) times a day. 180 Tab 2  
 lancets misc Test 2 times daily Dx Code E11.65 100 Each 11  
 hydrALAZINE (APRESOLINE) 50 mg tablet TAKE ONE TABLET BY MOUTH 4 TIMES DAILY FOR  HYPERTENSION 360 Tab 3  
 bumetanide (BUMEX) 2 mg tablet TAKE ONE TABLET BY MOUTH TWICE DAILY 180 Tab 1  
 baclofen (LIORESAL) 10 mg tablet TAKE ONE TABLET BY MOUTH THREE TIMES DAILY (MUSCLE  RELAXER) 270 Tab 3  
 ferrous sulfate (IRON) 325 mg (65 mg iron) tablet Take 1 Tab by mouth two (2) times daily (after meals). Indications: Iron Deficiency Anemia 100 Tab 3  
 lancets 30 gauge misc  ACCU-CHEK SHAUNA CONTROL SOLN soln  docusate sodium 100 mg tab Take 1 Tab by mouth two (2) times a day.  magnesium oxide (MAG-OX) 400 mg tablet Take 1 Tab by mouth daily. 90 Tab 3  
 aspirin 81 mg chewable tablet Take 81 mg by mouth daily.  Blood-Glucose Meter (TRUE METRIX GLUCOSE METER) [facility] AS DIRECTED 1 Each 0  
 glucose blood VI test strips (TRUE METRIX GLUCOSE TEST STRIP) strip Test 2 times daily Dx Coed E11.65 100 Strip 11 Family History Problem Relation Age of Onset  Cancer Paternal Grandfather   
     colon  Hypertension Other   
     son Social History Tobacco Use  Smoking status: Former Smoker Types: Cigarettes Last attempt to quit: 2003 Years since quittin.9  Smokeless tobacco: Never Used Substance Use Topics  Alcohol use: Yes Alcohol/week: 0.0 standard drinks Comment: 2 drinks/month Specialists/Care Team  
Jean Pierre Frankel has established care with the following healthcare providers: 
Patient Care Team: 
Stefanie Hare MD as PCP - [facility]) [facility] ERAN Kahn (Orthopedic Surgery) Higinio Haro MD (Otolaryngology) Andre Bloton MD (Endocrinology) Holley Emmanuel MD as Physician (Internal Medicine) Yesenia Arguelles MD (Ophthalmology) Person, Alden Quintanilla MD (Podiatry) Pancho Esposito MD (Cardiology) Yesenia Arguelles MD (Ophthalmology) Monika Lafleur MD (Gastroenterology) Juancarlos Dotson MD (Hematology and Oncology) Health Risk Assessment Demographics  
male 79 y.o. General Health Questions  
-During the past 4 weeks: 
 -how would you rate your health in general? Fair -how often have you been bothered by feeling dizzy when standing up? never -how much have you been bothered by bodily pain? moderately 
 -Have you noticed any hearing difficulties? no 
 -has your physical and emotional health limited your social activities with family or friends? yes Emotional Health Questions  
-Do you have a history of depression, anxiety, or emotional problems? no 
-Over the past 2 weeks, have you felt down, depressed or hopeless? no 
-Over the past 2 weeks, have you felt little interest or pleasure in doing things? no 
-Are you being threatened or harassed by anyone? Health Habits Please describe your diet habits: Self grade B Do you get 5 servings of fruits or vegetables daily? yes Do you exercise regularly? no 
 
Alcohol Risk Factor Screening: On any occasion during the past 3 months, have you had more than 4 drinks containing alcohol? No 
 Do you average more than 14 drinks per week? No 
  
Activities of Daily Living and Functional Status  
-Do you need help with eating, walking, dressing, bathing, toileting, the phone, transportation, shopping, preparing meals, housework, laundry, medications or managing money? yes 
-In the past four weeks, was someone available to help you if you needed and wanted help with anything? yes 
-Are you confident are you that you can control and manage most of your health problems? yes 
-Have you been given information to help you keep track of your medications? yes 
-How often do you have trouble taking your medications as prescribed? never Fall Risk and Home Safety Have you fallen 2 or more times in the past year? no 
Does your home have rugs in the hallway, lack grab bars in the bathroom, lack handrails on the stairs or have poor lighting? no 
Do you have smoke detectors and check them regularly? yes Do you have difficulties driving a car? Does not drive. Do you always fasten your seat belt when you are in a car? yes Review of Systems (if indicated for problems addressed today) Review of Systems Constitutional: Negative. Negative for chills, fever, malaise/fatigue and weight loss. HENT: Positive for hearing loss. Eyes: Negative. Negative for blurred vision and double vision. Respiratory: Negative. Negative for cough, sputum production and shortness of breath. Cardiovascular: Negative. Negative for chest pain and palpitations. Gastrointestinal: Negative. Negative for abdominal pain, blood in stool, heartburn, nausea and vomiting. Genitourinary: Negative. Negative for dysuria, frequency and urgency. Musculoskeletal: Positive for back pain. Negative for falls, myalgias and neck pain. Skin: Negative. Negative for rash. Neurological: Negative. Negative for dizziness, tingling, tremors, weakness and headaches. Endo/Heme/Allergies: Negative. Psychiatric/Behavioral: Negative. Negative for depression. Physical Examination Wt Readings from Last 3 Encounters:  
06/08/20 258 lb (117 kg) 02/17/20 250 lb (113.4 kg)  
11/21/19 242 lb 8 oz (110 kg) Temp Readings from Last 3 Encounters:  
06/08/20 97.9 °F (36.6 °C) (Oral) 02/17/20 97.8 °F (36.6 °C) (Oral)  
11/21/19 97.1 °F (36.2 °C) (Oral) BP Readings from Last 3 Encounters:  
06/08/20 173/72  
02/17/20 135/59  
11/21/19 194/73 Pulse Readings from Last 3 Encounters:  
06/08/20 60  
02/17/20 (!) 59  
11/21/19 60 Body mass index is 33.13 kg/m². No exam data present Was the patient's timed Up & Go test unsteady or longer than 10 seconds? yes Evaluation of Cognitive Function Mood/affect:  happy Orientation: Person, Place, Time and Situation Appearance: age appropriate and casually dressed Family member/caregiver input: no. 
 
Additional exam if indicated for problems addressed today: 
 
 
Physical Exam 
Vitals signs and nursing note reviewed. Constitutional:   
   General: He is not in acute distress. Appearance: He is well-developed. He is obese. He is not ill-appearing or diaphoretic. Comments: He was examined in his wheelchair. His back is so bad he cannot walk to the room. HENT:  
   Head: Normocephalic and atraumatic. Eyes:  
   General: No scleral icterus. Right eye: No discharge. Left eye: No discharge. Conjunctiva/sclera: Conjunctivae normal.  
Neck:  
   Thyroid: No thyromegaly. Comments: No carotid bruit. Cardiovascular:  
   Rate and Rhythm: Normal rate and regular rhythm. Heart sounds: Normal heart sounds. No murmur. No friction rub. No gallop. Pulmonary:  
   Effort: Pulmonary effort is normal. No respiratory distress. Breath sounds: Normal breath sounds. No wheezing or rales. Chest:  
   Chest wall: No tenderness. Abdominal:  
   General: Bowel sounds are normal. There is no distension. Palpations: Abdomen is soft. There is no mass. Tenderness: There is no abdominal tenderness. There is no guarding. Musculoskeletal:     
   General: No tenderness. Lymphadenopathy:  
   Cervical: No cervical adenopathy. Skin: 
   General: Skin is warm and dry. Coloration: Skin is not pale. Findings: No erythema or rash. Neurological:  
   Mental Status: He is alert and oriented to person, place, and time. Cranial Nerves: No cranial nerve deficit. Psychiatric:     
   Behavior: Behavior normal.  
 
 
Advice/Referrals/Counseling (as indicated) Preventive Services (Preventive care checklist to be included in patient instructions) Discussed today Done Previously Not Needed   
 x  Pneumococcal vaccines  
 x  Flu vaccine  
  x Hepatitis B vaccine (if at risk) x  Shingles vaccine  
 x  TDAP vaccine  
 x  Digital rectal exam  
 x  PSA  
 x  Colorectal cancer screening  
  x Low-dose CT for lung cancer screening  
 x  Bone density test  
 x  Glaucoma screening  
 x  Cholesterol test  
  x Diabetes screening test   
  x Diabetes self-management class X Nutritionist referral for diabetes or renal disease Discussion of Advance Directive Discussed with Jean Pierre Kinney his ability to prepare and advance directive in the case that an injury or illness causes him to be unable to make health care decisions. Assessment/Plan  
Z00.00 ICD-10-CM ICD-9-CM 1. Medicare annual wellness visit, initial Z00.00 V70.0   
 
 
UTD on preventive measures. Patient Care Team: 
Sg Medina MD as PCP - [facility]) Sg Medina MD as PCP - [address] [place] [address] Provider ERAN Wall (Orthopedic Surgery) Jadiel Lomas MD (Otolaryngology) Gavino Galvan MD (Endocrinology) Esdras Foy MD as Physician (Internal Medicine) Pipo Pruitt MD (Ophthalmology) Person, Adarsh Dee MD (Podiatry) Cristiane Marshall MD (Cardiology) Pipo Pruitt MD (Ophthalmology) Jeanna Gonzales MD (Gastroenterology) Sekou Loredo MD (Hematology and Oncology) Future Appointments Date Time Provider Elisha Sandoval 9/9/2020 10:40 AM MD [facility]  
11/20/2020 11:00 AM Sanjuana Wolf MD [address] Giles Triplett MD

## 2020-06-08 NOTE — PROGRESS NOTES
Chief Complaint Patient presents with [facility] Annual Wellness Visit Body mass index is 33.13 kg/m². 1. Have you been to the ER, urgent care clinic since your last visit? Hospitalized since your last visit? No 
 
2. Have you seen or consulted any other health care providers outside of the [address] [place] [address] since your last visit? Include any pap smears or colon screening. No 
 
Reviewed record in preparation for visit and have necessary documentation Pt did not bring medication to office visit for review Information was given to pt on Advanced Directives, Living Will Information was given on Shingles Vaccine Opportunity was given for questions Goals that were addressed and/or need to be completed after this appointment include:  
 
Health Maintenance Due Topic Date Due  Medicare Yearly Exam  02/06/2020

## 2020-07-06 NOTE — PROGRESS NOTES
[address] 
[phone] Progress Note Patient: Roly Rodriguez MRN: [id number]  SSN: [id number] YOB: 1940  Age: 79 y.o. Sex: male Chief Complaint Patient presents with  Follow Up Chronic Condition Roly Rodriguez is a 79 y.o. male evaluated via telephone on 7/6/2020. Nurse involved in / Alejo Bynum Location of patient:Home Location of physician:My office Consent: 
He and/or health care decision maker is aware that that he may receive a bill for this telephone service, depending on his insurance coverage, and has provided verbal consent to proceed: Yes Documentation: I communicated with the patient and/or health care decision maker about multiple problems. Details of this discussion including any medical advice provided: See Below. I affirm this is a Patient Initiated Episode with an Established Patient who has not had a related appointment within my department in the past 7 days or scheduled within the next 24 hours. Total Time: minutes: 21-30 minutes Note: not billable if this call serves to triage the patient into an appointment for the relevant concern Alecia Mccallum MD  
__________________________________________________________________________________________ Subjective:  
 
Encounter Diagnoses Name Primary?  Type 2 diabetes mellitus with other neurologic complication, without long-term current use of insulin ([facility]): This patient is managed under a comprehensive plan of care for Diabetes. Overall the patient feels well with good energy level. Key Antihyperglycemic Medications   
    
  
 glipiZIDE (GLUCOTROL) 5 mg tablet TAKE 2 TABLETS BY MOUTH 20 MINUTES BEFORE BREAKFAST AND 2 TABLETS 20 MINUTES BEFORE SUPPER DAILY  
 metFORMIN (GLUCOPHAGE) 500 mg tablet Take 1 Tab by mouth three (3) times daily (with meals). empagliflozin (JARDIANCE) 10 mg tablet Take one tab daily. STOP LOVASTATIN Pertinent Labs:  
Lab Results Component Value Date/Time Hemoglobin A1c 5.1 02/17/2020 10:49 AM  
 Hemoglobin A1c 5.8 (H) 11/18/2019 10:50 AM  
 Hemoglobin A1c 5.5 09/16/2019 10:31 AM  
 Hemoglobin A1c, External 6.3 10/12/2017 There is no height or weight on file to calculate BMI. Lab Results Component Value Date/Time LDL, calculated 71 02/17/2020 10:49 AM  
     
Lab Results Component Value Date/Time Sodium 143 02/17/2020 10:49 AM  
 Potassium 3.3 (L) 02/17/2020 10:49 AM  
 Chloride 111 (H) 02/17/2020 10:49 AM  
 CO2 26 02/17/2020 10:49 AM  
 Anion gap 6 02/17/2020 10:49 AM  
 Glucose 72 02/17/2020 10:49 AM  
 BUN 9 02/17/2020 10:49 AM  
 Creatinine 1.26 02/17/2020 10:49 AM  
 BUN/Creatinine ratio 7 (L) 02/17/2020 10:49 AM  
 GFR est AA >60 02/17/2020 10:49 AM  
 GFR est non-AA 55 (L) 02/17/2020 10:49 AM  
 Calcium 8.5 02/17/2020 10:49 AM  
 Alk. phosphatase 82 02/17/2020 10:49 AM  
 Protein, total 6.4 02/17/2020 10:49 AM  
 Albumin 3.9 02/17/2020 10:49 AM  
 Globulin 2.5 02/17/2020 10:49 AM  
 A-G Ratio 1.6 02/17/2020 10:49 AM  
 ALT (SGPT) 28 02/17/2020 10:49 AM  
 
Lab Results Component Value Date/Time Microalbumin/Creat ratio (mg/g creat) 355 (H) 11/18/2019 10:50 AM  
 Microalbumin,urine random 9.08 11/18/2019 10:50 AM  
 
 Frequency of home glucose testing:daily, today 114 Blood Sugar range at home:  
 Last eye exam: In past 12 months. Last foot exam: This year. Polyuria, polyphagia or polydipsia: No 
 Retinopathy: No 
 Neuropathy SX: Severe plus lumbar stenosis Low blood sugar symptoms: No 
 Dietary compliance: Good Medication compliance:Good On ASA: Yes Depression: No 
 CKD:no Wt Readings from Last 3 Encounters:  
06/08/20 258 lb (117 kg) 02/17/20 250 lb (113.4 kg)  
11/21/19 242 lb 8 oz (110 kg) Social History Tobacco Use Smoking Status Former Smoker  Types: Cigarettes  Last attempt to quit: 2003  Years since quittin.0 Smokeless Tobacco Never Used There is no height or weight on file to calculate BMI. Diabetic Consultants: All the patient's questions regarding medications, diet and exercise were answered. Goal of A1C of less than 7.5% is our goal.  
Our overall goal is to reduce or eliminate the long term consequences of poorly controlled diabetes. Yes  PVD (peripheral vascular disease) ([facility]): no symptoms of vascular insufficiency.  Type 2 diabetes mellitus with nephropathy ([facility]): See above.  Chronic diastolic heart failure [facility]): No shortness of breath or chest pain noted.  Essential hypertension: 
BP Readings from Last 3 Encounters:  
20 156/71  
20 135/59  
19 194/73 The patient reports:  taking medications as instructed, no medication side effects noted, no TIA's, no chest pain on exertion, no dyspnea on exertion, no swelling of ankles. Key CAD CHF Meds Pradaxa 150 mg capsule Take 1 capsule by mouth twice daily  
 labetalol (NORMODYNE) 200 mg tablet TAKE 1/2 (ONE-HALF) TABLET BY MOUTH TWICE DAILY  
 amLODIPine (NORVASC) 10 mg tablet TAKE ONE TABLET BY MOUTH ONCE DAILY  
 rosuvastatin (CRESTOR) 20 mg tablet Take 1 Tab by mouth nightly. Indications: high cholesterol  
 cloNIDine HCl (CATAPRES) 0.3 mg tablet Take 1 Tab by mouth two (2) times a day. hydrALAZINE (APRESOLINE) 50 mg tablet TAKE ONE TABLET BY MOUTH 4 TIMES DAILY FOR  HYPERTENSION  
 bumetanide (BUMEX) 2 mg tablet TAKE ONE TABLET BY MOUTH TWICE DAILY  
 aspirin 81 mg chewable tablet Take 81 mg by mouth daily. Lab Results Component Value Date/Time  Sodium 143 2020 10:49 AM  
 Potassium 3.3 (L) 2020 10:49 AM  
 Chloride 111 (H) 2020 10:49 AM  
 CO2 26 2020 10:49 AM  
 Anion gap 6 2020 10:49 AM  
 Glucose 72 2020 10:49 AM  
 BUN 9 2020 10:49 AM  
 Creatinine 1.26 02/17/2020 10:49 AM  
 BUN/Creatinine ratio 7 (L) 02/17/2020 10:49 AM  
 GFR est AA >60 02/17/2020 10:49 AM  
 GFR est non-AA 55 (L) 02/17/2020 10:49 AM  
 Calcium 8.5 02/17/2020 10:49 AM  
 Bilirubin, total 0.5 02/17/2020 10:49 AM  
 Alk. phosphatase 82 02/17/2020 10:49 AM  
 Protein, total 6.4 02/17/2020 10:49 AM  
 Albumin 3.9 02/17/2020 10:49 AM  
 Globulin 2.5 02/17/2020 10:49 AM  
 A-G Ratio 1.6 02/17/2020 10:49 AM  
 ALT (SGPT) 28 02/17/2020 10:49 AM  
 
Low salt diet? yes Aerobic exercise? no 
Our goal is to normalize the blood pressure to decrease the risks of strokes and heart attacks. The patient is in agreement with the plan.  Chronic pain syndrome: His back pain is not well controlled. Is requesting an increase in his hydrocodone dose however he has had maximum dose already. He will need an increase in his gabapentin dose to 300 mg 4 times a day.  Stage 3 chronic kidney disease ([address]): His last kidney function was normal. 
Lab Results Component Value Date/Time GFR est AA >60 02/17/2020 10:49 AM  
 GFR est non-AA 55 (L) 02/17/2020 10:49 AM  
 Creatinine (POC) 1.4 (H) 01/02/2015 10:08 AM  
 Creatinine 1.26 02/17/2020 10:49 AM  
 BUN 9 02/17/2020 10:49 AM  
 Sodium 143 02/17/2020 10:49 AM  
 Potassium 3.3 (L) 02/17/2020 10:49 AM  
 Chloride 111 (H) 02/17/2020 10:49 AM  
 CO2 26 02/17/2020 10:49 AM  
 
Lab Results Component Value Date/Time WBC 3.7 (L) 02/17/2020 10:49 AM  
 HGB 11.0 (L) 02/17/2020 10:49 AM  
 HCT 38.6 02/17/2020 10:49 AM  
 PLATELET 762 70/32/6285 10:49 AM  
 MCV 82.7 02/17/2020 10:49 AM  
 
Lab Results Component Value Date/Time Vitamin D 25-Hydroxy 21 (L) 10/07/2010 09:27 AM  
 VITAMIN D, 25-HYDROXY 63.3 10/16/2019 10:37 AM  
   
Lab Results Component Value Date/Time  Calcium 8.5 02/17/2020 10:49 AM  
 Phosphorus 3.0 10/16/2019 10:37 AM  
 PTH, Intact 73 (H) 10/16/2019 10:37 AM  
 
 
 
   
  DDD (degenerative disc disease), lumbar: Worsening chronic pain. Increase gabapentin today. He can take 1 of his gabapentin pills with breakfast dinner and then 2 at bedtime rather than 4 times daily.  Idiopathic gout, unspecified chronicity, unspecified site: No recent gout. Lab Results Component Value Date/Time Uric acid 4.5 09/16/2019 10:31 AM  
 
   
 Hypercholesterolemia: 
Cardiovascular risks for him are: LDL goal is under 80 
diabetic 
hypertension 
hyperlipidemia. Key Antihyperlipidemia Meds   
    
  
 rosuvastatin (CRESTOR) 20 mg tablet Take 1 Tab by mouth nightly. Indications: high cholesterol Lab Results Component Value Date/Time Cholesterol, total 125 02/17/2020 10:49 AM  
 HDL Cholesterol 44 02/17/2020 10:49 AM  
 LDL, calculated 71 02/17/2020 10:49 AM  
 Triglyceride 50 02/17/2020 10:49 AM  
 CHOL/HDL Ratio 2.8 02/17/2020 10:49 AM  
 
Lab Results Component Value Date/Time ALT (SGPT) 28 02/17/2020 10:49 AM  
 Alk. phosphatase 82 02/17/2020 10:49 AM  
 Bilirubin, total 0.5 02/17/2020 10:49 AM  
 
 Myalgias: No 
 Fatigue: No 
 Other side effects: no Wt Readings from Last 3 Encounters:  
06/08/20 258 lb (117 kg) 02/17/20 250 lb (113.4 kg)  
11/21/19 242 lb 8 oz (110 kg) The patient is aware of our goal to reduce or eliminate the long term problems (such as strokes and heart attacks) related to poorly controlled hyperlipidemia.  Chronic radicular pain of lower back: See above.  Iron deficiency: Recheck labs. No sx. Lab Results Component Value Date/Time HGB 11.0 (L) 02/17/2020 10:49 AM  
 
Lab Results Component Value Date/Time Iron 46 02/17/2020 10:49 AM  
 TIBC 292 02/17/2020 10:49 AM  
 Iron % saturation 16 (L) 02/17/2020 10:49 AM  
 Ferritin 33 11/01/2019 01:40 PM  
 
   
 
 
 
 
 
Current and past medical information: 
 
Current Medications after this visit[de-identified]    
Current Outpatient Medications Medication Sig  
  gabapentin (NEURONTIN) 300 mg capsule Take 1 Cap by mouth Before breakfast, lunch, dinner and at bedtime. Max Daily Amount: 1,200 mg.  
 baclofen (LIORESAL) 10 mg tablet TAKE 1 TABLET BY MOUTH THREE TIMES DAILY (MUSCLE  RELAXER)  glipiZIDE (GLUCOTROL) 5 mg tablet TAKE 2 TABLETS BY MOUTH 20 MINUTES BEFORE BREAKFAST AND 2 TABLETS 20 MINUTES BEFORE SUPPER DAILY  HYDROcodone-acetaminophen (NORCO)  mg tablet Take 1 Tab by mouth every six (6) hours as needed for Pain for up to 30 days.  ergocalciferol (ERGOCALCIFEROL) 1,250 mcg (50,000 unit) capsule Take 1 Cap by mouth every seven (7) days.  omeprazole (PRILOSEC) 20 mg capsule Take 1 capsule by mouth once daily  tamsulosin (FLOMAX) 0.4 mg capsule Take 1 capsule by mouth once daily  allopurinoL (ZYLOPRIM) 300 mg tablet Take 1 tablet by mouth once daily  Pradaxa 150 mg capsule Take 1 capsule by mouth twice daily  Colcrys 0.6 mg tablet TAKE 1 TABLET BY MOUTH ONCE DAILY TO  PREVENT  GOUT  
 fluticasone propionate (FLONASE) 50 mcg/actuation nasal spray USE ONE SPRAY(S) IN EACH NOSTRIL ONCE DAILY  albuterol (PROVENTIL HFA, VENTOLIN HFA, PROAIR HFA) 90 mcg/actuation inhaler INHALE TWO PUFFS BY MOUTH EVERY 6 HOURS AS NEEDED FOR WHEEZING FOR  UP  TO  ONE  YEAR  
 metFORMIN (GLUCOPHAGE) 500 mg tablet Take 1 Tab by mouth three (3) times daily (with meals).  labetalol (NORMODYNE) 200 mg tablet TAKE 1/2 (ONE-HALF) TABLET BY MOUTH TWICE DAILY  empagliflozin (JARDIANCE) 10 mg tablet Take one tab daily. STOP LOVASTATIN  
 montelukast (SINGULAIR) 10 mg tablet TAKE 1 TABLET BY MOUTH ONCE DAILY  melatonin 10 mg tab Take  by mouth.  amLODIPine (NORVASC) 10 mg tablet TAKE ONE TABLET BY MOUTH ONCE DAILY  b complex-vitamin c-folic acid 5mg (FOLBEE PLUS) tab tablet TAKE 1 TABLET BY MOUTH ONCE DAILY  rosuvastatin (CRESTOR) 20 mg tablet Take 1 Tab by mouth nightly. Indications: high cholesterol  potassium chloride (K-DUR) 10 mEq tablet Take 2 Tabs by mouth two (2) times a day. Indications: low amount of potassium in the blood  cloNIDine HCl (CATAPRES) 0.3 mg tablet Take 1 Tab by mouth two (2) times a day.  Blood-Glucose Meter (TRUE METRIX GLUCOSE METER) misc AS DIRECTED  glucose blood VI test strips (TRUE METRIX GLUCOSE TEST STRIP) strip Test 2 times daily Dx Coed E11.65  
 lancets misc Test 2 times daily Dx Code E11.65  
 hydrALAZINE (APRESOLINE) 50 mg tablet TAKE ONE TABLET BY MOUTH 4 TIMES DAILY FOR  HYPERTENSION  
 bumetanide (BUMEX) 2 mg tablet TAKE ONE TABLET BY MOUTH TWICE DAILY  ferrous sulfate (IRON) 325 mg (65 mg iron) tablet Take 1 Tab by mouth two (2) times daily (after meals). Indications: Iron Deficiency Anemia  lancets 30 gauge misc  ACCU-CHEK SHAUNA CONTROL SOLN soln  docusate sodium 100 mg tab Take 1 Tab by mouth two (2) times a day.  magnesium oxide (MAG-OX) 400 mg tablet Take 1 Tab by mouth daily.  aspirin 81 mg chewable tablet Take 81 mg by mouth daily. No current facility-administered medications for this visit. Patient Active Problem List  
 Diagnosis Date Noted  BMI 32.0-32.9,adult 06/05/2019 Priority: 1 - One  Type 2 diabetes mellitus with nephropathy ([facility] 01/10/2018 Priority: 1 - One  
 History of colon polyps 05/02/2016 Priority: 1 - One  
 Idiopathic gout 05/02/2016 Priority: 1 - One  
 Essential hypertension with goal blood pressure less than 130/85 05/02/2016 Priority: 1 - One  
 CKD (chronic kidney disease) 11/23/2015 Priority: 1 - One  
 COPD (chronic obstructive pulmonary disease) ([facility] 04/27/2015 Priority: 1 - One  
 Diabetes with neurologic complications ([facility] 53/83/4212 Priority: 1 - One  Chronic radicular pain of lower back 07/21/2014 Priority: 1 - One  
 Neuropathy 07/16/2013 Priority: 1 - One  DDD (degenerative disc disease), lumbar 07/16/2013   Priority: 1 - One  
  Prostate CA ([facility] 07/16/2013 Priority: 1 - One  
 Edema 01/12/2012 Priority: 1 - One  Persistent atrial fibrillation (HCC) Priority: 1 - One  Arthritis 07/11/2011 Priority: 1 - One  Gout, unspecified Priority: 1 - One  
 Hypercholesterolemia Priority: 1 - One  Leg pain Priority: 1 - One  Chronic pain 05/06/2010 Priority: 1 - One  
 Hypertension Priority: 2 - Two  Tubulovillous adenoma polyp of colon 07/16/2013 Priority: 6 - Six  Unspecified arthropathy, ankle and foot 07/14/2011 Priority: 6 - Six  PVD (peripheral vascular disease) ([facility] Priority: 6 - Six  Iron deficiency anemia secondary to inadequate dietary iron intake 11/02/2019 Past Medical History:  
Diagnosis Date  Acute on chronic diastolic congestive heart failure ([facility] 4/27/2015  Arthritis 7/11/2011  [place] 6/7/2010  [facility])   
 prostate  Chronic diastolic heart failure ([facility] 8/26/2015  CKD (chronic kidney disease) 11/23/2015  Diabetes ([facility]  Gout, unspecified  Hypercholesterolemia  Hypertension  Inguinal lymphadenopathy 2/18/2014  Leg pain  Persistent atrial fibrillation (HCC)  PVD (peripheral vascular disease) ([facility] Allergies Allergen Reactions  Indocin [Indomethacin Sodium] Unknown (comments)  Lisinopril Angioedema Dxed in hospital.  
 Losartan Other (comments) Face and throat swelling. Past Surgical History:  
Procedure Laterality Date  COLONOSCOPY N/A 9/22/2016 COLONOSCOPY performed by Chaka Dan MD at [facility]    
 back and left shoulder x2 Social History Socioeconomic History  Marital status: SINGLE Spouse name: Not on file  Number of children: Not on file  Years of education: Not on file  Highest education level: Not on file Tobacco Use  Smoking status: Former Smoker Types: Cigarettes Last attempt to quit: 2003 Years since quittin.0  Smokeless tobacco: Never Used Substance and Sexual Activity  Alcohol use: Yes Alcohol/week: 0.0 standard drinks Comment: 2 drinks/month  Drug use: No  
 
 
Review of Systems Constitutional: Negative. Negative for chills, fever, malaise/fatigue and weight loss. HENT: Negative. Negative for hearing loss. Eyes: Negative. Negative for blurred vision and double vision. Respiratory: Negative. Negative for cough, sputum production and shortness of breath. Cardiovascular: Negative. Negative for chest pain and palpitations. Gastrointestinal: Negative. Negative for abdominal pain, blood in stool, heartburn, nausea and vomiting. Genitourinary: Negative. Negative for dysuria, frequency and urgency. Musculoskeletal: Positive for back pain. Negative for falls, myalgias and neck pain. Worsening back pain. Physical therapy would be difficult for him and is not likely to help. Skin: Negative. Negative for rash. Neurological: Negative. Negative for dizziness, tingling, tremors, weakness and headaches. Endo/Heme/Allergies: Negative. Psychiatric/Behavioral: Negative. Negative for depression. Objective:  
Blood pressure reported 138/58. Blood sugar 114. There is no height or weight on file to calculate BMI. There are no preventive care reminders to display for this patient. Assessment and orders:  
 
Encounter Diagnoses ICD-10-CM ICD-9-CM 1. Type 2 diabetes mellitus with other neurologic complication, without long-term current use of insulin ([facility]) E11.49 250.60 2. PVD (peripheral vascular disease) ([facility]) I73.9 443.9 3. Type 2 diabetes mellitus with nephropathy ([facility]) E11.21 250.40  
  583.81  
4. Chronic diastolic heart failure ([facility]) I50.32 428.32  
5. Essential hypertension I10 401.9 6. Chronic pain syndrome G89.4 338.4 7. Stage 3 chronic kidney disease ([facility]) N18.3 585. 3 8. DDD (degenerative disc disease), lumbar M51.36 722.52  
9. Idiopathic gout, unspecified chronicity, unspecified site M10.00 274.9 10. Hypercholesterolemia E78.00 272.0 11. Chronic radicular pain of lower back M54.16 724.4 G89.29 338.29  
12. Iron deficiency E61.1 280.9 Diagnoses and all orders for this visit: 
 
1. Type 2 diabetes mellitus with other neurologic complication, without long-term current use of insulin (HCC) 
-     HEMOGLOBIN A1C WITH EAG; Future -     MICROALBUMIN, UR, RAND W/ MICROALB/CREAT RATIO; Future -     LIPID PANEL; Future -     METABOLIC PANEL, COMPREHENSIVE; Future 2. PVD (peripheral vascular disease) ([facility] 3. Type 2 diabetes mellitus with nephropathy (HCC) 
-     HEMOGLOBIN A1C WITH EAG; Future -     MICROALBUMIN, UR, RAND W/ MICROALB/CREAT RATIO; Future -     LIPID PANEL; Future -     METABOLIC PANEL, COMPREHENSIVE; Future 4. Chronic diastolic heart failure ([facility] 5. Essential hypertension -     MICROALBUMIN, UR, RAND W/ MICROALB/CREAT RATIO; Future -     LIPID PANEL; Future -     METABOLIC PANEL, COMPREHENSIVE; Future 6. Chronic pain syndrome 7. Stage 3 chronic kidney disease ([facility] -     MICROALBUMIN, UR, RAND W/ MICROALB/CREAT RATIO; Future -     LIPID PANEL; Future -     METABOLIC PANEL, COMPREHENSIVE; Future 8. DDD (degenerative disc disease), lumbar 9. Idiopathic gout, unspecified chronicity, unspecified site 10. Hypercholesterolemia -     LIPID PANEL; Future -     METABOLIC PANEL, COMPREHENSIVE; Future 11. Chronic radicular pain of lower back 
-     gabapentin (NEURONTIN) 300 mg capsule; Take 1 Cap by mouth Before breakfast, lunch, dinner and at bedtime. Max Daily Amount: 1,200 mg. 12. Iron deficiency 
-     IRON PROFILE; Future -     CBC WITH AUTOMATED DIFF; Future -     FERRITIN; Future Plan of care: 
Discussed diagnoses in detail with patient. Medication risks/benefits/side effects discussed with patient. All of the patient's questions were addressed. The patient understands and agrees with our plan of care. The patient knows to call back if they are unsure of or forget any changes we discussed today or if the symptoms change. The patient received an After-Visit Summary which contains VS, orders, medication list and allergy list. This can be used as a \"mini-medical record\" should they have to seek medical care while out of town. Patient Care Team: 
Ivy Dhillon MD as PCP - [facility]) Ivy Dhillon MD as PCP - [facility] Provider ERAN Jackson (Orthopedic Surgery) Sudheer Tirado MD (Otolaryngology) Dennie Creed, MD (Endocrinology) Cale Gonzalez MD as Physician (Internal Medicine) Kierra Farrell MD (Ophthalmology) Person, Aniya Abbott MD (Podiatry) Josette Pena MD (Cardiology) Kierra Farrell MD (Ophthalmology) Laurie Novoa MD (Gastroenterology) Darwin Mcdaniel MD (Hematology and Oncology) Signed By: Lestine Sever, MD   
 July 6, 2020 ATTENTION:  
This medical record was transcribed using an electronic medical records/speech recognition system. Although proofread, it may and can contain electronic, spelling and other errors. Corrections may be executed at a later time. Please feel free to contact me for any clarifications as needed.

## 2020-07-10 NOTE — TELEPHONE ENCOUNTER
----- Message from Didier Jones sent at 7/10/2020 11:32 AM EDT -----  Regarding: Dr Natali Lauren / Refill  To: [facility]  Subject: Dr. Tyrese Haro  Patient's first and last name: Rafaela Reddy  : [birth year]-    ID numbers: #[id number]    Caller (if not patient): Randa Kramer   Relationship of caller (if not patient): daughter  Best contact number(s): [phone]  Name of medication and dosage if known: Hydrocodone  Is patient out of this medication (yes/no): yes  Pharmacy name: [facility] in  [address] listed in chart? (yes/no): yes  Pharmacy phone number: n/a  Date of last visit: Friday, July 10, 2020 09:00 AM  Details to clarify the request: n/a    Vito Guerra

## 2020-09-08 NOTE — TELEPHONE ENCOUNTER
----- Message from Dwain Arriaga sent at 9/8/2020  1:22 PM EDT -----  Regarding: Audra Crandall MD  Medication Refill    Caller (if not patient): Chivo Judit -Pt's Daughter      Relationship of caller (if not patient): N/A      Best contact number(s): [phone]      Name of medication and dosage if known: \"Hydrocodone\"      Is patient out of this medication (yes/no): Yes      Pharmacy name: [address] [place] [address] [place]     Pharmacy listed in chart? (yes/no):  Pharmacy phone number: [phone]      Details to clarify the request:      Dwain Arriaga

## 2020-10-04 NOTE — ED NOTES
Pt was discharged at this time. pt verbalized understanding of all discharge instructions. Pt remains a&ox3. Resps are even and unlabored. Skin warm and dry. No distress noted. Pt was assisted out of via wheelchair.

## 2020-10-04 NOTE — ED PROVIDER NOTES
EMERGENCY DEPARTMENT HISTORY AND PHYSICAL EXAM 
 
 
Date: 10/4/2020 Patient Name: Dutsin Morejon History of Presenting Illness Chief Complaint Patient presents with  Fall  Knee Pain History Provided By: Patient HPI: Dustin Morejon, 79 y.o. male with a past medical history significant  diabetes, hypertension, hyperlipidemia and Back surgery presents to the ED with cc of knee pain. He reports was walking when his right leg gave out causing him to fall to the floor. He reports unable to bear weight to get back up reports being on the ground for several hours. Complains of pain 10 out of 10 and swelling to the right knee. Denies any numbness or tingling at this time. Patient reports living alone denies any fever, chills, nausea, vomiting, chest pain, shortness of breath. Patient allergies noted. Reports use of hydrocodone at home however was unable to get some medication at this time. Alert and oriented. There are no other complaints, changes, or physical findings at this time. PCP: Cortney Howard MD 
 
Current Facility-Administered Medications Medication Dose Route Frequency Provider Last Rate Last Dose  amLODIPine (NORVASC) tablet 10 mg  10 mg Oral NOW Anurag Castañeda NP      
 cloNIDine HCL (CATAPRES) tablet 0.3 mg  0.3 mg Oral NOW Anurag Castañeda NP      
 aspirin chewable tablet 81 mg  81 mg Oral NOW Elizabeth Castañeda NP      
 hydrALAZINE (APRESOLINE) tablet 50 mg  50 mg Oral NOW Elizabeth Castañeda NP      
 labetaloL (NORMODYNE) tablet 100 mg  100 mg Oral ONCE Elizabeth Castañeda NP      
 dabigatran etexilate (PRADAXA) capsule 150 mg  150 mg Oral ONCE Elizabeth Castañeda NP      
 bumetanide (BUMEX) tablet 2 mg  2 mg Oral ONCE Anurag Castañeda NP Current Outpatient Medications Medication Sig Dispense Refill  b complex-vitamin c-folic acid 5mg (Folbee Plus) tab tablet Take 1 tablet by mouth once daily 90 Tab 1  
  HYDROcodone-acetaminophen (NORCO)  mg tablet Take 1 Tab by mouth every six (6) hours as needed for Pain for up to 30 days. 120 Tab 0  
 hydrALAZINE (APRESOLINE) 50 mg tablet TAKE 1 TABLET BY MOUTH 4 TIMES DAILY FOR  HYPERTENSION 360 Tab 1  
 glucose blood VI test strips (Infinity Test strips) strip Use to test twice daily as directed 200 Strip 3  
 gabapentin (NEURONTIN) 300 mg capsule Take 1 Cap by mouth Before breakfast, lunch, dinner and at bedtime. Max Daily Amount: 1,200 mg. 120 Cap 2  
 baclofen (LIORESAL) 10 mg tablet TAKE 1 TABLET BY MOUTH THREE TIMES DAILY (MUSCLE  RELAXER) 270 Tab 0  
 glipiZIDE (GLUCOTROL) 5 mg tablet TAKE 2 TABLETS BY MOUTH 20 MINUTES BEFORE BREAKFAST AND 2 TABLETS 20 MINUTES BEFORE SUPPER DAILY 360 Tab 0  
 ergocalciferol (ERGOCALCIFEROL) 1,250 mcg (50,000 unit) capsule Take 1 Cap by mouth every seven (7) days. 12 Cap 1  
 omeprazole (PRILOSEC) 20 mg capsule Take 1 capsule by mouth once daily 90 Cap 1  
 tamsulosin (FLOMAX) 0.4 mg capsule Take 1 capsule by mouth once daily 90 Cap 2  
 allopurinoL (ZYLOPRIM) 300 mg tablet Take 1 tablet by mouth once daily 90 Tab 2  
 Pradaxa 150 mg capsule Take 1 capsule by mouth twice daily 180 Cap 1  
 Colcrys 0.6 mg tablet TAKE 1 TABLET BY MOUTH ONCE DAILY TO  PREVENT  GOUT 90 Tab 1  
 fluticasone propionate (FLONASE) 50 mcg/actuation nasal spray USE ONE SPRAY(S) IN EACH NOSTRIL ONCE DAILY 3 Bottle 3  
 albuterol (PROVENTIL HFA, VENTOLIN HFA, PROAIR HFA) 90 mcg/actuation inhaler INHALE TWO PUFFS BY MOUTH EVERY 6 HOURS AS NEEDED FOR WHEEZING FOR  UP  TO  ONE  YEAR 3 Inhaler 3  
 metFORMIN (GLUCOPHAGE) 500 mg tablet Take 1 Tab by mouth three (3) times daily (with meals). 270 Tab 2  
 labetalol (NORMODYNE) 200 mg tablet TAKE 1/2 (ONE-HALF) TABLET BY MOUTH TWICE DAILY 90 Tab 3  
 empagliflozin (JARDIANCE) 10 mg tablet Take one tab daily. STOP LOVASTATIN 90 Tab 4  montelukast (SINGULAIR) 10 mg tablet TAKE 1 TABLET BY MOUTH ONCE DAILY 90 Tab 3  
 melatonin 10 mg tab Take  by mouth.  amLODIPine (NORVASC) 10 mg tablet TAKE ONE TABLET BY MOUTH ONCE DAILY 90 Tab 3  
 rosuvastatin (CRESTOR) 20 mg tablet Take 1 Tab by mouth nightly. Indications: high cholesterol 90 Tab 1  potassium chloride (K-DUR) 10 mEq tablet Take 2 Tabs by mouth two (2) times a day. Indications: low amount of potassium in the blood 360 Tab 3  cloNIDine HCl (CATAPRES) 0.3 mg tablet Take 1 Tab by mouth two (2) times a day. 180 Tab 2  Blood-Glucose Meter (TRUE METRIX GLUCOSE METER) misc AS DIRECTED 1 Each 0  
 lancets misc Test 2 times daily Dx Code E11.65 100 Each 11  
 bumetanide (BUMEX) 2 mg tablet TAKE ONE TABLET BY MOUTH TWICE DAILY 180 Tab 1  
 ferrous sulfate (IRON) 325 mg (65 mg iron) tablet Take 1 Tab by mouth two (2) times daily (after meals). Indications: Iron Deficiency Anemia 100 Tab 3  
 lancets 30 gauge misc  ACCU-CHEK SHAUNA CONTROL SOLN soln  docusate sodium 100 mg tab Take 1 Tab by mouth two (2) times a day.  magnesium oxide (MAG-OX) 400 mg tablet Take 1 Tab by mouth daily. 90 Tab 3  
 aspirin 81 mg chewable tablet Take 81 mg by mouth daily. Past History Past Medical History: 
Past Medical History:  
Diagnosis Date  Acute on chronic diastolic congestive heart failure ([address] 4/27/2015  Arthritis 7/11/2011  [place] 6/7/2010  Cancer [facility])   
 prostate  Chronic diastolic heart failure ([address] 8/26/2015  CKD (chronic kidney disease) 11/23/2015  Diabetes ([address]  Gout, unspecified  Hypercholesterolemia  Hypertension  Inguinal lymphadenopathy 2/18/2014  Leg pain  Persistent atrial fibrillation ([facility])  PVD (peripheral vascular disease) ([address] Past Surgical History: 
Past Surgical History:  
Procedure Laterality Date  COLONOSCOPY N/A 9/22/2016 COLONOSCOPY performed by Klever Esquivel MD at [address] [place] [address]  HX ORTHOPAEDIC    
 back and left shoulder x2 Family History: 
Family History Problem Relation Age of Onset  Cancer Paternal Grandfather   
     colon  Hypertension Other   
     son Social History: 
Social History Tobacco Use  Smoking status: Former Smoker Types: Cigarettes Last attempt to quit: 2003 Years since quittin.3  Smokeless tobacco: Never Used Substance Use Topics  Alcohol use: Yes Alcohol/week: 0.0 standard drinks Comment: 2 drinks/month  Drug use: No  
 
 
Allergies: Allergies Allergen Reactions  Indocin [Indomethacin Sodium] Unknown (comments)  Lisinopril Angioedema Dxed in hospital.  
 Losartan Other (comments) Face and throat swelling. Review of Systems Review of Systems Constitutional: Positive for activity change. Negative for chills, fatigue and fever. HENT: Negative. Respiratory: Negative. Cardiovascular: Negative. Musculoskeletal: Positive for gait problem and joint swelling. Skin: Negative. Neurological: Negative for numbness. Physical Exam  
 
Physical Exam 
Constitutional:   
   Appearance: Normal appearance. HENT:  
   Head: Normocephalic and atraumatic. Nose: Nose normal.  
   Mouth/Throat:  
   Mouth: Mucous membranes are moist.  
Eyes:  
   Extraocular Movements: Extraocular movements intact. Neck: Musculoskeletal: Normal range of motion and neck supple. Cardiovascular:  
   Rate and Rhythm: Normal rate and regular rhythm. Pulses: Normal pulses. Heart sounds: Normal heart sounds. Pulmonary:  
   Effort: Pulmonary effort is normal.  
   Breath sounds: Normal breath sounds. Musculoskeletal:     
   General: Swelling, tenderness and signs of injury present. Right knee: He exhibits decreased range of motion and swelling. Tenderness found. Legs: 
 
Skin: 
   General: Skin is warm and dry. Capillary Refill: Capillary refill takes less than 2 seconds. Neurological:  
   Mental Status: He is alert and oriented to person, place, and time. Diagnostic Study Results Labs - No results found for this or any previous visit (from the past 12 hour(s)). Radiologic Studies -  
XR Results (most recent): 
Results from Hospital Encounter encounter on 10/04/20 XR KNEE RT 3 V Narrative Comparison is with right knee x-rays dated 8/16/2013. History is acute process. 3 views of the right knee demonstrate severe medial compartment narrowing with 
bone-on-bone appearance, eburnation and subchondral cysts. There are mild 
diffuse articular osteophytes. No acute fracture, dislocation or suprapatellar 
effusion is identified. There is no lytic or blastic lesion. The soft tissues 
are intact. Impression IMPRESSION: Degenerative change of the right knee. No acute trauma identified. No joint effusion seen. CT Results  (Last 48 hours) None CXR Results  (Last 48 hours) None Medical Decision Making and ED Course I am the first provider for this patient. I reviewed the vital signs, available nursing notes, past medical history, past surgical history, family history and social history. Vital Signs-Reviewed the patient's vital signs. Patient Vitals for the past 12 hrs: 
 Temp Pulse Resp BP SpO2  
10/04/20 1242 98.6 °F (37 °C) 63 16 (!) 183/100 98 % The patient presents with right knee pain with a differential diagnosis of knee fx, internal knee injury, DJD, osteoarthritis, effsuion Provider Notes (Medical Decision Making): X-ray negative for acute injury or trauma to the knee. Patient advised of severe arthritis with subchondral cyst.  Patient placed in knee immobilizer advised of rice therapy crutches given patient is setting up home health assistance for 24 hours.   Patient advised of rice therapy pain management follow-up orthopedic stable at time of discharge. Patient's family Alban Heath notified daughter's phone number [phone]. ED Course:  
Initial assessment performed. The patients presenting problems have been discussed, and they are in agreement with the care plan formulated and outlined with them. I have encouraged them to ask questions as they arise throughout their visit. ED Course as of Oct 04 1703 Lary Blackburn Oct 04, 2020  
0515 With patient's daughter Alban Heath per patient's request telephone number [phone]. [AM] 1701 Stable with use of knee immobilizer and crutches. Patient noted to have hypertensive blood pressure patient was given p.o. medications reports he had not had them prior to being seen in the ED. Stable at time of discharge.  
 [AM] ED Course User Index [AM] Elias Huddleston NP Procedures ROB Mathew, ROB Disposition DISCHARGE PLAN: 
1. Current Discharge Medication List  
  
CONTINUE these medications which have NOT CHANGED Details  
b complex-vitamin c-folic acid 5mg (Folbee Plus) tab tablet Take 1 tablet by mouth once daily 
Qty: 90 Tab, Refills: 1 HYDROcodone-acetaminophen (NORCO)  mg tablet Take 1 Tab by mouth every six (6) hours as needed for Pain for up to 30 days. Qty: 120 Tab, Refills: 0 Associated Diagnoses: DDD (degenerative disc disease), lumbar; Chronic left-sided low back pain without sciatica  
  
hydrALAZINE (APRESOLINE) 50 mg tablet TAKE 1 TABLET BY MOUTH 4 TIMES DAILY FOR  HYPERTENSION Qty: 360 Tab, Refills: 1 Associated Diagnoses: Chronic diastolic heart failure ([address] Essential hypertension with goal blood pressure less than 130/85  
  
glucose blood VI test strips (Infinity Test strips) strip Use to test twice daily as directed Qty: 200 Strip, Refills: 3  
  
gabapentin (NEURONTIN) 300 mg capsule Take 1 Cap by mouth Before breakfast, lunch, dinner and at bedtime. Max Daily Amount: 1,200 mg. Qty: 120 Cap, Refills: 2 Associated Diagnoses: Chronic radicular pain of lower back  
  
baclofen (LIORESAL) 10 mg tablet TAKE 1 TABLET BY MOUTH THREE TIMES DAILY (MUSCLE  RELAXER) Qty: 270 Tab, Refills: 0 Associated Diagnoses: DDD (degenerative disc disease), lumbar  
  
glipiZIDE (GLUCOTROL) 5 mg tablet TAKE 2 TABLETS BY MOUTH 20 MINUTES BEFORE BREAKFAST AND 2 TABLETS 20 MINUTES BEFORE SUPPER DAILY Qty: 360 Tab, Refills: 0 Associated Diagnoses: Type 2 diabetes mellitus with diabetic polyneuropathy, without long-term current use of insulin ([facility] ergocalciferol (ERGOCALCIFEROL) 1,250 mcg (50,000 unit) capsule Take 1 Cap by mouth every seven (7) days. Qty: 12 Cap, Refills: 1  
  
omeprazole (PRILOSEC) 20 mg capsule Take 1 capsule by mouth once daily 
Qty: 90 Cap, Refills: 1 Associated Diagnoses: Gastroesophageal reflux disease without esophagitis  
  
tamsulosin (FLOMAX) 0.4 mg capsule Take 1 capsule by mouth once daily 
Qty: 90 Cap, Refills: 2  
  
allopurinoL (ZYLOPRIM) 300 mg tablet Take 1 tablet by mouth once daily 
Qty: 90 Tab, Refills: 2 Pradaxa 150 mg capsule Take 1 capsule by mouth twice daily 
Qty: 180 Cap, Refills: 1 Associated Diagnoses: PAF (paroxysmal atrial fibrillation) ([facility] Colcrys 0.6 mg tablet TAKE 1 TABLET BY MOUTH ONCE DAILY TO  PREVENT  GOUT 
Qty: 90 Tab, Refills: 1 Associated Diagnoses: Chronic gout due to renal impairment of multiple sites without tophus  
  
fluticasone propionate (FLONASE) 50 mcg/actuation nasal spray USE ONE SPRAY(S) IN EACH NOSTRIL ONCE DAILY Qty: 3 Bottle, Refills: 3  
  
albuterol (PROVENTIL HFA, VENTOLIN HFA, PROAIR HFA) 90 mcg/actuation inhaler INHALE TWO PUFFS BY MOUTH EVERY 6 HOURS AS NEEDED FOR WHEEZING FOR  UP  TO  ONE  YEAR Qty: 3 Inhaler, Refills: 3 Comments: Please consider 90 day supplies to promote better adherence Associated Diagnoses: Chronic obstructive pulmonary disease, unspecified COPD type ([facility] metFORMIN (GLUCOPHAGE) 500 mg tablet Take 1 Tab by mouth three (3) times daily (with meals). Qty: 270 Tab, Refills: 2  
  
labetalol (NORMODYNE) 200 mg tablet TAKE 1/2 (ONE-HALF) TABLET BY MOUTH TWICE DAILY Qty: 90 Tab, Refills: 3 Associated Diagnoses: Persistent atrial fibrillation ([facility] Essential hypertension  
  
empagliflozin (JARDIANCE) 10 mg tablet Take one tab daily. STOP LOVASTATIN Qty: 90 Tab, Refills: 4 Associated Diagnoses: Type 2 diabetes mellitus with diabetic polyneuropathy, without long-term current use of insulin ([facility] Essential hypertension with goal blood pressure less than 130/85  
  
montelukast (SINGULAIR) 10 mg tablet TAKE 1 TABLET BY MOUTH ONCE DAILY Qty: 90 Tab, Refills: 3  
  
melatonin 10 mg tab Take  by mouth. amLODIPine (NORVASC) 10 mg tablet TAKE ONE TABLET BY MOUTH ONCE DAILY Qty: 90 Tab, Refills: 3  
  
rosuvastatin (CRESTOR) 20 mg tablet Take 1 Tab by mouth nightly. Indications: high cholesterol 
Qty: 90 Tab, Refills: 1  
  
potassium chloride (K-DUR) 10 mEq tablet Take 2 Tabs by mouth two (2) times a day. Indications: low amount of potassium in the blood Qty: 360 Tab, Refills: 3 Associated Diagnoses: Hypokalemia  
  
cloNIDine HCl (CATAPRES) 0.3 mg tablet Take 1 Tab by mouth two (2) times a day. Qty: 180 Tab, Refills: 2 Associated Diagnoses: Essential hypertension Blood-Glucose Meter (TRUE METRIX GLUCOSE METER) misc AS DIRECTED Qty: 1 Each, Refills: 0 Comments: Please consider 90 day supplies to promote better adherence  
  
!! lancets misc Test 2 times daily Dx Code E11.65 Qty: 100 Each, Refills: 11  
  
bumetanide (BUMEX) 2 mg tablet TAKE ONE TABLET BY MOUTH TWICE DAILY Qty: 180 Tab, Refills: 1  
  
ferrous sulfate (IRON) 325 mg (65 mg iron) tablet Take 1 Tab by mouth two (2) times daily (after meals). Indications: Iron Deficiency Anemia Qty: 100 Tab, Refills: 3 Associated Diagnoses: Iron deficiency  
  
!! lancets 30 gauge misc Associated Diagnoses: Type 2 diabetes mellitus with diabetic polyneuropathy, without long-term current use of insulin ([address] Essential hypertension with goal blood pressure less than 130/85 ACCU-CHEK SHAUNA CONTROL SOLN soln Associated Diagnoses: Type 2 diabetes mellitus with diabetic polyneuropathy, without long-term current use of insulin ([address] Essential hypertension with goal blood pressure less than 130/85  
  
docusate sodium 100 mg tab Take 1 Tab by mouth two (2) times a day. Associated Diagnoses: Drug-induced constipation  
  
magnesium oxide (MAG-OX) 400 mg tablet Take 1 Tab by mouth daily. Qty: 90 Tab, Refills: 3 Associated Diagnoses: Hypokalemia  
  
aspirin 81 mg chewable tablet Take 81 mg by mouth daily. !! - Potential duplicate medications found. Please discuss with provider. 2.  
Follow-up Information Follow up With Specialties Details Why Contact Info Sandra Mckeon MD Family Medicine Schedule an appointment as soon as possible for a visit in 1 day  [address] [place] [address] 
[phone] Yaneth Meija MD Orthopedic Surgery Schedule an appointment as soon as possible for a visit in 1 day [place] [id number] [phone] 3. Return to ED if worse Diagnosis Clinical Impression: 1. Primary osteoarthritis of right knee 2. Subchondral bone cyst   
 
 
Attestations: 
 
Negrita Merlos NP Please note that this dictation was completed with [employer], the computer voice recognition software. Quite often unanticipated grammatical, syntax, homophones, and other interpretive errors are inadvertently transcribed by the computer software. Please disregard these errors. Please excuse any errors that have escaped final proofreading. Thank you.

## 2020-10-14 NOTE — TELEPHONE ENCOUNTER
----- Message from Marlene Plascencia sent at 10/13/2020  4:12 PM EDT -----  Regarding: Dr. Dennie Lyme first and last name:  Marcin Adan, No Pain DMT      Reason for call:  Mr. Cheryl Milton is requesting the last office notes for patient to be faxed to [phone]. Callback required yes/no and why:  No. Only if questions. Best contact number(s):  [phone]      Details to clarify the request:  First request was made on 9/24/20.       Marlene Plascencia

## 2020-10-16 NOTE — PROGRESS NOTES
Chief Complaint Patient presents with  Medication Refill 1. Have you been to the ER, urgent care clinic since your last visit? Hospitalized since your last visit? Yes [facility] 2. Have you seen or consulted any other health care providers outside of the [address] [place] [address] since your last visit? Include any pap smears or colon screening. No 
 
Reviewed record in preparation for visit and have necessary documentation Pt did not bring medication to office visit for review 
opportunity was given for questions Goals that were addressed and/or need to be completed during or after this appointment include Health Maintenance Due Topic Date Due  Shingrix Vaccine Age 50> (2 of 2) 07/18/2019  Flu Vaccine (1) 09/01/2020  GLAUCOMA SCREENING Q2Y  09/25/2020  Eye Exam Retinal or Dilated  09/25/2020

## 2020-10-16 NOTE — PROGRESS NOTES
Lindsey Lassiter is a 79 y.o. male evaluated via Telephone on 10/16/20. Patient Identity confirmed by . Consent: 
He and/or health care decision maker is aware that that he may receive a bill for this telephone service, depending on his insurance coverage, and has provided verbal consent to proceed: Yes Physician Location: Office Patient Location: Home Nurse Assisting with Encounter: Nurys Haro LPN Chief Complaint Patient presents with  Medication Refill Information gathered from patient and/or health care decision maker. HPI:  
COPD Follow Up: 
Stable at this time, no symptoms. Uses C-pap. Not oxygen dependent. Diabetes Follow up: Overall the patient feels well with good energy level. Current Medications:  
Key Antihyperglycemic Medications   
    
  
 glipiZIDE (GLUCOTROL) 5 mg tablet (Taking) TAKE 2 TABLETS BY MOUTH 20 MINUTES BEFORE BREAKFAST AND 2 TABLETS 20 MINUTES BEFORE SUPPER DAILY  
 metFORMIN (GLUCOPHAGE) 500 mg tablet (Taking) Take 1 Tab by mouth three (3) times daily (with meals). empagliflozin (JARDIANCE) 10 mg tablet (Taking) Take one tab daily. STOP LOVASTATIN Neha Staggers Insulin dependence: NO 
 Frequency of home glucose testing: Daily Blood Sugar range at home: 130 Last eye exam: In past 12 months. Last foot exam: This year. Polyuria, polyphagia or polydipsia: NO Retinopathy: NO 
 Neuropathy SX: YES Low blood sugar symptoms: NO 
 Dietary compliance: compliant most of the time Medication compliance: compliant most of the time Wt Readings from Last 3 Encounters:  
10/04/20 248 lb (112.5 kg) 20 258 lb (117 kg) 20 250 lb (113.4 kg) Chronic Back pain:  Patient on Hydrocodone and Gabapentin. Some days pain is controlled but others it is not. Noting also knee pain as well. Chronic Kidney Disease: 
Patient reports overall doing well, patient denies any current complaints at this time.  
- Etiology: Diabetes/HTN  
 - Symptoms: None 
- CKD Stage: III Review of Systems Constitutional: Negative for chills and fever. HENT: Negative for congestion. Respiratory: Negative for cough. Cardiovascular: Negative for chest pain. Limited Exam: 
Due to this being a TeleHealth evaluation, many elements of the physical examination are unable to be assessed. Constitutional: Appears well-developed and well-nourished in no apparent distress Mental status: Alert and awake, Oriented to person/place/time, Able to follow commands Psychiatric: Normal affect, normal judgment/insight. No hallucinations Current Outpatient Medications on File Prior to Visit Medication Sig Dispense Refill  fluticasone propionate (FLONASE) 50 mcg/actuation nasal spray USE ONE SPRAY(S) IN EACH NOSTRIL ONCE DAILY 3 Bottle 3  
 albuterol (PROVENTIL HFA, VENTOLIN HFA, PROAIR HFA) 90 mcg/actuation inhaler INHALE TWO PUFFS BY MOUTH EVERY 6 HOURS AS NEEDED FOR WHEEZING FOR  UP  TO  ONE  YEAR 3 Inhaler 3  
 HYDROcodone-acetaminophen (NORCO)  mg tablet Take 1 Tab by mouth every six (6) hours as needed for Pain for up to 30 days. 120 Tab 0  
 hydrALAZINE (APRESOLINE) 50 mg tablet TAKE 1 TABLET BY MOUTH 4 TIMES DAILY FOR  HYPERTENSION 360 Tab 1  
 glucose blood VI test strips (Infinity Test strips) strip Use to test twice daily as directed 200 Strip 3  
 gabapentin (NEURONTIN) 300 mg capsule Take 1 Cap by mouth Before breakfast, lunch, dinner and at bedtime.  Max Daily Amount: 1,200 mg. 120 Cap 2  
 baclofen (LIORESAL) 10 mg tablet TAKE 1 TABLET BY MOUTH THREE TIMES DAILY (MUSCLE  RELAXER) 270 Tab 0  
 glipiZIDE (GLUCOTROL) 5 mg tablet TAKE 2 TABLETS BY MOUTH 20 MINUTES BEFORE BREAKFAST AND 2 TABLETS 20 MINUTES BEFORE SUPPER DAILY 360 Tab 0  
 omeprazole (PRILOSEC) 20 mg capsule Take 1 capsule by mouth once daily 90 Cap 1  
 tamsulosin (FLOMAX) 0.4 mg capsule Take 1 capsule by mouth once daily 90 Cap 2  
  allopurinoL (ZYLOPRIM) 300 mg tablet Take 1 tablet by mouth once daily 90 Tab 2  
 Pradaxa 150 mg capsule Take 1 capsule by mouth twice daily 180 Cap 1  
 Colcrys 0.6 mg tablet TAKE 1 TABLET BY MOUTH ONCE DAILY TO  PREVENT  GOUT 90 Tab 1  
 metFORMIN (GLUCOPHAGE) 500 mg tablet Take 1 Tab by mouth three (3) times daily (with meals). 270 Tab 2  
 labetalol (NORMODYNE) 200 mg tablet TAKE 1/2 (ONE-HALF) TABLET BY MOUTH TWICE DAILY 90 Tab 3  
 empagliflozin (JARDIANCE) 10 mg tablet Take one tab daily. STOP LOVASTATIN 90 Tab 4  
 montelukast (SINGULAIR) 10 mg tablet TAKE 1 TABLET BY MOUTH ONCE DAILY 90 Tab 3  
 melatonin 10 mg tab Take  by mouth.  amLODIPine (NORVASC) 10 mg tablet TAKE ONE TABLET BY MOUTH ONCE DAILY 90 Tab 3  
 rosuvastatin (CRESTOR) 20 mg tablet Take 1 Tab by mouth nightly. Indications: high cholesterol 90 Tab 1  potassium chloride (K-DUR) 10 mEq tablet Take 2 Tabs by mouth two (2) times a day. Indications: low amount of potassium in the blood 360 Tab 3  cloNIDine HCl (CATAPRES) 0.3 mg tablet Take 1 Tab by mouth two (2) times a day. 180 Tab 2  Blood-Glucose Meter (TRUE METRIX GLUCOSE METER) [facility] AS DIRECTED 1 Each 0  
 lancets misc Test 2 times daily Dx Code E11.65 100 Each 11  
 bumetanide (BUMEX) 2 mg tablet TAKE ONE TABLET BY MOUTH TWICE DAILY 180 Tab 1  
 ferrous sulfate (IRON) 325 mg (65 mg iron) tablet Take 1 Tab by mouth two (2) times daily (after meals). Indications: Iron Deficiency Anemia 100 Tab 3  
 lancets 30 gauge misc  ACCU-CHEK SHAUNA CONTROL SOLN soln  docusate sodium 100 mg tab Take 1 Tab by mouth two (2) times a day.  magnesium oxide (MAG-OX) 400 mg tablet Take 1 Tab by mouth daily. 90 Tab 3  
 aspirin 81 mg chewable tablet Take 81 mg by mouth daily.     
 [DISCONTINUED] b complex-vitamin c-folic acid 5mg (Folbee Plus) tab tablet Take 1 tablet by mouth once daily 90 Tab 1  
  [DISCONTINUED] ergocalciferol (ERGOCALCIFEROL) 1,250 mcg (50,000 unit) capsule Take 1 Cap by mouth every seven (7) days. 12 Cap 1 No current facility-administered medications on file prior to visit. Allergies Allergen Reactions  Indocin [Indomethacin Sodium] Unknown (comments)  Lisinopril Angioedema Dxed in hospital.  
 Losartan Other (comments) Face and throat swelling. Patient Active Problem List  
 Diagnosis Date Noted  Iron deficiency anemia secondary to inadequate dietary iron intake 11/02/2019  BMI 32.0-32.9,adult 06/05/2019  Type 2 diabetes mellitus with nephropathy ([address] 01/10/2018  History of colon polyps 05/02/2016  Idiopathic gout 05/02/2016  Essential hypertension with goal blood pressure less than 130/85 05/02/2016  CKD (chronic kidney disease) 11/23/2015  COPD (chronic obstructive pulmonary disease) ([facility] 04/27/2015  Diabetes with neurologic complications ([facility] 50/21/6946  Chronic radicular pain of lower back 07/21/2014  Neuropathy 07/16/2013  DDD (degenerative disc disease), lumbar 07/16/2013  Prostate CA ([address] 07/16/2013  Tubulovillous adenoma polyp of colon 07/16/2013  Edema 01/12/2012  Persistent atrial fibrillation (HCC)  Unspecified arthropathy, ankle and foot 07/14/2011  Arthritis 07/11/2011  Gout, unspecified  PVD (peripheral vascular disease) ([address]  Hypertension  Hypercholesterolemia  Leg pain  Chronic pain 05/06/2010 Health Maintenance Due Topic Date Due  Shingrix Vaccine Age 50> (2 of 2) 07/18/2019  Flu Vaccine (1) 09/01/2020  GLAUCOMA SCREENING Q2Y  09/25/2020  Eye Exam Retinal or Dilated  09/25/2020 Assessment/Plan: 
Details of this discussion including any medical advice provided: Overall doing well, no significant complaints. FOllow up scheduled and will get labs then. ICD-10-CM ICD-9-CM 1. Type 2 diabetes mellitus with nephropathy (HCC)  E11.21 250.40   
  583.81   
2. Essential hypertension  I10 401.9 3. Centrilobular emphysema (HCC)  J43.2 492.8 4. Vitamin D deficiency  E55.9 268.9 ergocalciferol (ERGOCALCIFEROL) 1,250 mcg (50,000 unit) capsule Total Time: minutes: 11-20 minutes was spent on telemedicine encounter discussing above problems and plans. Patient Problem list, medications, and Allergies were reviewed during this encounter. Pursuant to the emergency declaration under the [address] [place] [address], [facility] waiver authority and the [employer] Act, this Telephone Visit was conducted, with patient's consent, to reduce the patient's risk of exposure to COVID-19 and provide continuity of care for an established patient. I affirm this is a Patient Initiated Episode with an Established Patient who has not had a related appointment within my department in the past 7 days or scheduled within the next 24 hours. Discussed diagnoses in detail with patient. Medication risks/benefits/side effects discussed with patient. All of the patient's questions were addressed. The patient understands and agrees with our plan of care. The patient knows to call back if they are unsure of or forget any changes we discussed today or if the symptoms change. Note: not billable if this call serves to triage the patient into an appointment for the relevant concern MD NISHANT Jiang & SHWETA SHRESTHA [facility] 10/16/20

## 2020-10-29 NOTE — PROGRESS NOTES
Noah Oneal is a 79 y.o. male evaluated via Telephone on 10/29/20. Patient Identity confirmed by . Consent: 
He and/or health care decision maker is aware that that he may receive a bill for this telephone service, depending on his insurance coverage, and has provided verbal consent to proceed: Yes Physician Location: Office Patient Location: Home Nurse Assisting with Encounter: Yosi Denny LPN Chief Complaint Patient presents with  Knee Pain  
  went to hospital last week, was given brace but cannot fasten the bottom, cortisone shot?  Medication Refill Information gathered from patient and/or health care decision maker. HPI:  
Hypertension Follow up: 
Currently Taking Key CAD CHF Meds   
    
  
 hydrALAZINE (APRESOLINE) 50 mg tablet (Taking) TAKE 1 TABLET BY MOUTH 4 TIMES DAILY FOR  HYPERTENSION  
 labetalol (NORMODYNE) 200 mg tablet (Taking) TAKE 1/2 (ONE-HALF) TABLET BY MOUTH TWICE DAILY  
 amLODIPine (NORVASC) 10 mg tablet (Taking) TAKE ONE TABLET BY MOUTH ONCE DAILY  
 cloNIDine HCl (CATAPRES) 0.3 mg tablet (Taking) Take 1 Tab by mouth two (2) times a day. bumetanide (BUMEX) 2 mg tablet (Taking) TAKE ONE TABLET BY MOUTH TWICE DAILY  
 aspirin 81 mg chewable tablet (Taking) Take 81 mg by mouth daily. The patient reports:  taking medications as instructed, no medication side effects noted, home BP monitoring in range of 178-727'B systolic over 30-75'K diastolic, no TIA's, no chest pain on exertion, no dyspnea on exertion, no swelling of ankles. BP Readings from Last 3 Encounters:  
10/04/20 (!) 165/107  
20 156/71  
20 135/59 COPD: Stable, needs refill on Singulair. No issues at this time. Joint Pain: No significant issue. Does not know of any required documentation. Has materials that he needs. Review of Systems Constitutional: Negative for chills and fever. HENT: Negative for congestion. Respiratory: Negative for cough. Cardiovascular: Negative for chest pain. Limited Exam: 
Due to this being a TeleHealth evaluation, many elements of the physical examination are unable to be assessed. Constitutional: Appears well-developed and well-nourished in no apparent distress Mental status: Alert and awake, Oriented to person/place/time, Able to follow commands Psychiatric: Normal affect, normal judgment/insight. No hallucinations Current Outpatient Medications on File Prior to Visit Medication Sig Dispense Refill  glipiZIDE (GLUCOTROL) 5 mg tablet TAKE 2 TABLETS BY MOUTH 20 MINUTES BEFORE BREAKFAST AND SUPPER 360 Tab 0  
 fluticasone propionate (FLONASE) 50 mcg/actuation nasal spray USE ONE SPRAY(S) IN EACH NOSTRIL ONCE DAILY 3 Bottle 3  
 albuterol (PROVENTIL HFA, VENTOLIN HFA, PROAIR HFA) 90 mcg/actuation inhaler INHALE TWO PUFFS BY MOUTH EVERY 6 HOURS AS NEEDED FOR WHEEZING FOR  UP  TO  ONE  YEAR 3 Inhaler 3  
 b complex-vitamin c-folic acid 5mg (Folbee Plus) tab tablet Take 1 tablet by mouth once daily 90 Tab 1  
 ergocalciferol (ERGOCALCIFEROL) 1,250 mcg (50,000 unit) capsule Take 1 Cap by mouth every seven (7) days. 12 Cap 1  
 HYDROcodone-acetaminophen (NORCO)  mg tablet Take 1 Tab by mouth every six (6) hours as needed for Pain for up to 30 days. 120 Tab 0  
 hydrALAZINE (APRESOLINE) 50 mg tablet TAKE 1 TABLET BY MOUTH 4 TIMES DAILY FOR  HYPERTENSION 360 Tab 1  
 gabapentin (NEURONTIN) 300 mg capsule Take 1 Cap by mouth Before breakfast, lunch, dinner and at bedtime.  Max Daily Amount: 1,200 mg. 120 Cap 2  
 baclofen (LIORESAL) 10 mg tablet TAKE 1 TABLET BY MOUTH THREE TIMES DAILY (MUSCLE  RELAXER) 270 Tab 0  
 omeprazole (PRILOSEC) 20 mg capsule Take 1 capsule by mouth once daily 90 Cap 1  
 tamsulosin (FLOMAX) 0.4 mg capsule Take 1 capsule by mouth once daily 90 Cap 2  
 allopurinoL (ZYLOPRIM) 300 mg tablet Take 1 tablet by mouth once daily 90 Tab 2  
  Pradaxa 150 mg capsule Take 1 capsule by mouth twice daily 180 Cap 1  
 Colcrys 0.6 mg tablet TAKE 1 TABLET BY MOUTH ONCE DAILY TO  PREVENT  GOUT 90 Tab 1  
 metFORMIN (GLUCOPHAGE) 500 mg tablet Take 1 Tab by mouth three (3) times daily (with meals). 270 Tab 2  
 labetalol (NORMODYNE) 200 mg tablet TAKE 1/2 (ONE-HALF) TABLET BY MOUTH TWICE DAILY 90 Tab 3  
 empagliflozin (JARDIANCE) 10 mg tablet Take one tab daily. STOP LOVASTATIN 90 Tab 4  
 melatonin 10 mg tab Take  by mouth.  amLODIPine (NORVASC) 10 mg tablet TAKE ONE TABLET BY MOUTH ONCE DAILY 90 Tab 3  
 rosuvastatin (CRESTOR) 20 mg tablet Take 1 Tab by mouth nightly. Indications: high cholesterol 90 Tab 1  potassium chloride (K-DUR) 10 mEq tablet Take 2 Tabs by mouth two (2) times a day. Indications: low amount of potassium in the blood 360 Tab 3  cloNIDine HCl (CATAPRES) 0.3 mg tablet Take 1 Tab by mouth two (2) times a day. 180 Tab 2  
 bumetanide (BUMEX) 2 mg tablet TAKE ONE TABLET BY MOUTH TWICE DAILY 180 Tab 1  
 ferrous sulfate (IRON) 325 mg (65 mg iron) tablet Take 1 Tab by mouth two (2) times daily (after meals). Indications: Iron Deficiency Anemia 100 Tab 3  
 docusate sodium 100 mg tab Take 1 Tab by mouth two (2) times a day.  magnesium oxide (MAG-OX) 400 mg tablet Take 1 Tab by mouth daily. 90 Tab 3  
 aspirin 81 mg chewable tablet Take 81 mg by mouth daily.  glucose blood VI test strips (Infinity Test strips) strip Use to test twice daily as directed 200 Strip 3  
 [DISCONTINUED] montelukast (SINGULAIR) 10 mg tablet TAKE 1 TABLET BY MOUTH ONCE DAILY 90 Tab 3  Blood-Glucose Meter (TRUE METRIX GLUCOSE METER) misc AS DIRECTED 1 Each 0  
 lancets misc Test 2 times daily Dx Code E11.65 100 Each 11  
 lancets 30 gauge misc  ACCU-CHEK SHAUNA CONTROL SOLN soln No current facility-administered medications on file prior to visit. Allergies Allergen Reactions  Indocin [Indomethacin Sodium] Unknown (comments)  Lisinopril Angioedema Dxed in hospital.  
 Losartan Other (comments) Face and throat swelling. Patient Active Problem List  
 Diagnosis Date Noted  Iron deficiency anemia secondary to inadequate dietary iron intake 11/02/2019  BMI 32.0-32.9,adult 06/05/2019  Type 2 diabetes mellitus with nephropathy ([facility] 01/10/2018  History of colon polyps 05/02/2016  Idiopathic gout 05/02/2016  Essential hypertension with goal blood pressure less than 130/85 05/02/2016  CKD (chronic kidney disease) 11/23/2015  COPD (chronic obstructive pulmonary disease) ([facility] [facility] 04/27/2015  Diabetes with neurologic complications ([facility] 51/66/8137  Chronic radicular pain of lower back 07/21/2014  Neuropathy 07/16/2013  DDD (degenerative disc disease), lumbar 07/16/2013  Prostate CA ([facility] [facility] 07/16/2013  Tubulovillous adenoma polyp of colon 07/16/2013  Edema 01/12/2012  Persistent atrial fibrillation (HCC)  Unspecified arthropathy, ankle and foot 07/14/2011  Arthritis 07/11/2011  Gout, unspecified  PVD (peripheral vascular disease) ([facility]  Hypertension  Hypercholesterolemia  Leg pain  Chronic pain 05/06/2010 Health Maintenance Due Topic Date Due  GLAUCOMA SCREENING Q2Y  09/25/2020  Eye Exam Retinal or Dilated  09/25/2020  Foot Exam Q1  11/21/2020 Assessment/Plan: 
Details of this discussion including any medical advice provided: Trial of Clonidine patches now, Labs today ICD-10-CM ICD-9-CM 1. Essential hypertension  I10 401.9 cloNIDine (CATAPRES) 0.3 mg/24 hr  
   CBC W/O DIFF  
   METABOLIC PANEL, COMPREHENSIVE 2. Centrilobular emphysema (HCC)  J43.2 492.8 montelukast (SINGULAIR) 10 mg tablet 3. Type 2 diabetes mellitus with nephropathy (HCC)  E11.21 250.40 HEMOGLOBIN A1C WITH EAG  
  583.81   
4.  Hypercholesterolemia  E78.00 272.0 LIPID PANEL  
 5. Iron deficiency anemia secondary to inadequate dietary iron intake  D50.8 280.1 IRON PROFILE FERRITIN Follow-up and Dispositions · Return in about 3 months (around 1/29/2021) for Chronic conditions. Total Time: minutes: 11-20 minutes was spent on telemedicine encounter discussing above problems and plans. Patient Problem list, medications, and Allergies were reviewed during this encounter. Pursuant to the emergency declaration under the [address] [place] [id number] waiver authority and the [employer] Act, this Telephone Visit was conducted, with patient's consent, to reduce the patient's risk of exposure to COVID-19 and provide continuity of care for an established patient. I affirm this is a Patient Initiated Episode with an Established Patient who has not had a related appointment within my department in the past 7 days or scheduled within the next 24 hours. Discussed diagnoses in detail with patient. Medication risks/benefits/side effects discussed with patient. All of the patient's questions were addressed. The patient understands and agrees with our plan of care. The patient knows to call back if they are unsure of or forget any changes we discussed today or if the symptoms change. Note: not billable if this call serves to triage the patient into an appointment for the relevant concern MD NISHANT Andersen & SHWETA SHRESTHA [facility] 10/29/20

## 2020-11-02 NOTE — PROGRESS NOTES
Normal Hgb A1C, Microalbumin mildly elevated, Iron and iron saturation low, Ferittin low end of normal, persistent CKD stage 3, mildly worse than previous, Chol and LDL elevated. CBC with mild anemia that is persistent.

## 2020-11-03 NOTE — TELEPHONE ENCOUNTER
Called and spoke to patient. Advised her Dr. Abran Strickland:  Liseth Sotelo you pass along, overall his labs look good. His Diabetes is very well controlled. We will be keeping an eye on his Kidneys, but they appear stable. He still has an anemia, and his iron levels are low. If he is not taking any iron now, he needs to start a once daily iron supplement, if he is taking one, he needs to add another iron pill daily for now. \"    Pt verbalized understanding.

## 2020-11-17 NOTE — LETTER
Name:Danielle JOVEL [facility] OEE:[birth year] MR #:[id number] [address] [facility] Page 1 of 5 CONTROLLED SUBSTANCE AGREEMENT I may be prescribed medications that are controlled substances as part  of my treatment plan for management of my medical condition(s). The goal of my treatment plan is to maintain and/or improve my health and wellbeing. Because controlled substances have an increased risk of abuse or harm, continual re-evaluation is needed determine if the goals of my treatment plan are being met for my safety and the safety of others. I, Faustino Mosley  am entering into this Controlled Substance Agreement with my provider, __________________________________ at [facility] . I understand that successful treatment requires mutual trust and honesty between me and my provider. I understand that there are state and federal laws and regulations which apply to the medications that my provider may prescribe that must be followed. I understand there are risks and benefits ts of taking the medicines that my provider may prescribe. I understand and agree that following this Agreement is necessary in continuing my provider-patient relationship and success of my treatment plan. As a part of my treatment plan, I agree to the following: COMMUNICATION: 
 
1. I will communicate fully with my provider about my medical condition(s), including the effect on my daily life and how well my medications are helping. I will tell my provider all of the medications that I take for any reason, including medications I receive from another health care provider, and will notify my provider about all issues, problems or concerns, including any side effects, which may be related to my medications. I understand that this information allows my provider to adjust my treatment plan to help manage my medical condition.  I understand that this information will become part of my permanent medical record. 2. I will notify my provider if I have a history of alcohol/drug misuse/addiction or if I have had treatment for alcohol/drug addiction in the past, or if I have a new problem with or concern about alcohol/drug use/addiction, because this increases the likelihood of high risk behaviors and may lead to serious medical conditions. 3. Females Only: I will notify my provider if I am or become pregnant, or if I intend to become pregnant, or if I intend to breastfeed. I understand that communication of these issues with my provider is important, due to possible effects my medication could have on an unborn fetus or breastfeeding child. Initials_____ Name:María Worrell VDK:0[birth year]4 MR #:[id number] [address] [place] [address] Page 2 of 5 MISUSE OF MEDICATIONS / DRUGS: 
 
1. I agree to take all controlled substances as prescribed, and will not misuse or abuse any controlled substances prescribed by my provider. For my safety, I will not increase the amount of medicine I take without first talking with and getting permission from my provider. 2. If I have a medical emergency, another health care provider may prescribe me medication. If I seek emergency treatment, I will notify my provider within seventy-two (72) hours. 3. I understand that my provider may discuss my use and/or possible misuse/abuse of controlled substances and alcohol, as appropriate, with any health care provider involved in my care, pharmacist or legal authority. ILLEGAL DRUGS: 
 
1. I will not use illegal drugs of any kind, including but not limited to marijuana, heroin, cocaine, or any prescription drug which is not prescribed to me. DRUG DIVERSION / PRESCRIPTION FRAUD: 
 
1. I will not share, sell, trade, give away, or otherwise misuse my prescriptions or medications. 2. I will not alter any prescriptions provided to me by my provider. SINGLE PROVIDER: 
 
1. I agree that all controlled substances that I take will be prescribed only by my provider (or his/her covering provider) under this Agreement. This agreement does not prevent me from seeking emergency medical treatment or receiving pain management related to a surgery. PROTECTING MEDICATIONS: 
 
1. I am responsible for keeping my prescriptions and medications in a safe and secure place including safeguarding them from loss or theft. I understand that lost, stolen or damaged/destroyed prescriptions or medications will not be replaced. Initials____ Name:María Velasquez KOX:[birth year] MR #:[id number] [address] [place] [address] Page 3 of 5 PRESCRIPTION RENEWALS/REFILLS: 
 
1. I will follow my controlled substance medication schedule as prescribed by my provider. 2. I understand and agree that I will make any requests for renewals or refills of my prescriptions only at the time of an office visit or during my providers regular office hours subject to the prescription refill requirements of the individual practice. 3. I understand that my provider may not call in prescriptions for controlled substances to my pharmacy. 4. I understand that my provider may adjust or discontinue these medications as deemed appropriate for my medical treatment plan. This Agreement does not guarantee the prescription of controlled medications. 5. I agree that if my medications are adjusted or discontinued, I will properly dispose of any remaining medications. I understand that I will be required to dispose of any remaining controlled medications prior to being provided with any prescriptions for other controlled medications.  
 
6. I understand that the renewal of my prescription depends on my medical condition, my consistent participation, and my adherence with my treatment plan and this Agreement. 7. I understand that if I do not keep an appointment with my provider, I may not receive a renewal or refill for my controlled substance medication. PRESCRIPTION MONITORING / DRUG TESTIN. I understand that my provider may require me to provide urine, saliva or blood for testing at any time. I understand that this testing will be used to monitor for safety and adherence with my treatment plan and this Agreement. 2. I understand that my provider may ask me to provide an observed urine specimen, which means that a nurse or other health care provider may watch me provide urine, and I agree to cooperate if I am asked to provide an observed specimen. 3. I understand that if I do not provide urine, saliva or blood samples within two (2) hours of my providers request, or other timeframe decided by my provider, my treatment plan could be changed, or my prescriptions and medications may be changed or ended. 4. I understand that urine, saliva and blood test results will be a part of my permanent medical record. Initials_____ Name:María Granado DOT: MR #:[id number] [address] [place] [address] Page 4 of 5 
 
 
1. I authorize my provider and my pharmacy to cooperate fully with any local, state, or federal law enforcement agency in the investigation of any possible misuse, sale, or other diversion of my controlled substance prescriptions or medications. RISKS: 
 
1. I understand that my level of consciousness may be affected from the use of controlled substances, and I understand that there are risks, benefits, effects and potential alternatives (including no treatment) to the medications that my provider has prescribed. 2. I understand that I may become drowsy, tired, dizzy, constipated, and sick to my stomach, or have changes in my mood or in my sleep while taking my medications. I have talked with my provider about these possible side effects, risks, benefits, and alternative treatments, and my provider has answered all of my questions. 3. I understand that I should not suddenly stop taking my medications without first speaking with my provider. I understand that if I suddenly stop taking my medications, I may experience nausea, vomiting, sweating,anxiety, sleeplessness, itching or other uncomfortable feelings. 4. I will not take my medications with alcohol of any kind, including beer, wine or liquor. I understand that drinking alcohol with my medications increases the chances of side effects, including breathing problems or even death. 5. I understand that if I have a history of alcoholism or other drug addiction I may be at increased risk of addiction to my medications. Signs of addiction might include craving, compulsive use, and continued use despite harm. Since addiction is a disease, I understand my provider may decide to change my medications and refer me to appropriate treatment services. I understand that this information would become part of my permanent medical record. Initials_____ Name:María Sterling Olaf SVZ:[birth year] MR #:[id number]  [facility] Page 5 of 5  
 
 
6. Females only: Children born to women who regularly take controlled substances are likely to have physical problems and suffer withdrawal symptoms at birth. If I am of child-bearing age, I understand that I should use safe and effective birth control while taking any controlled substances to avoid the impact of medications on an unborn fetus or  child. I agree to notify my provider immediately if I should become pregnant so that my treatment plan can be adjusted. 7. Males only: I understand that chronic use of controlled substances has been associated with low testosterone levels in males which may affect my mood, stamina, sexual desire, and general health. I understand that my provider may order the appropriate laboratory test to determine my testosterone level,and I agree to this testing. ADHERENCE: 
 
1. I understand that if I do not adhere to this Agreement in any way, my provider may change my prescriptions, stop prescribing controlled substances or end our provider-patient relationship. 2. If my provider decides to stop prescribing medication, or decides to end our provider-patient relationship,my provider may require that I taper my medications slowly. If necessary, my provider may also provide a prescription for other medications to treat my withdrawal symptoms. UNDERSTANDING THIS AGREEMENT: 
 
I understand that my provider may adjust or stop my prescriptions for controlled substances based on my medical condition and my treatment plan. I understand that this Agreement does not guarantee that I will be prescribed medications or controlled substances. I understand that controlled substances may be just one part 
of my treatment plan.  
 
My initial on each page and my signature below shows that I have read each page of this Agreement, I have had an opportunity to ask questions, and all of my questions have been answered to my satisfaction by my provider. By signing below, I agree to comply with this Agreement, and I understand that if I do not follow the Agreements listed above, my provider may stop 
 
_________________________________________  Date/Time 11/17/2020 10:07 AM   
             (Patient Signature) 
 
________________________________________    Date/Time 11/17/2020 10:07 AM 
 (Parent or Guardian Signature if <18 yrs) 
 
_________________________________________ Date/Time 11/17/2020 10:07 AM 
 (Provider Signature)

## 2020-11-17 NOTE — PROGRESS NOTES
[facility] History of Present Illness:  
Mai Lloyd is a 79 y.o. male with history of HTN, Diabetes, Chronic Pain, Prostate Cancer, CKD,  
CC: Follow up Pain and HTN History provided by patient and Records HPI: 
Pain Assessment inventory: Pain primarily in the low back, right knee, and burning neuropathy in the feet. Onset of Symptoms: Ongoing for years Description: Gnawing pain in the low back that is chronic, has had 2 separate back surgeries. Has been more irritating over the last 3-4 months. Noting that Right knee is primary issue. Gets stiff and aches severely at times. Denies injection in the knee. Noting also burning neuropathy that is mild that worsens at night but overall decently controlled. Frequency: Daily Progression: is unchanged What makes it better?: exercise Medications tried: Currently on Gabapentin and Norco 
  
 
Hypertension Follow up: 
Currently Taking Amlodipine, Clonidine patch, Hydralazine, Labetalol, bumex. The patient reports:  taking medications as instructed, no medication side effects noted, home BP monitoring in range of 369-196'Z systolic over 13-93'M diastolic, no TIA's, no chest pain on exertion, no dyspnea on exertion, no swelling of ankles. BP Readings from Last 3 Encounters:  
11/17/20 (!) 181/74  
10/04/20 (!) 165/107  
06/08/20 156/71 Health Maintenance Health Maintenance Due Topic Date Due  GLAUCOMA SCREENING Q2Y  09/25/2020  Eye Exam Retinal or Dilated  09/25/2020  Foot Exam Q1  11/21/2020 Past Medical, Family, and Social History:  
 
Current Outpatient Medications on File Prior to Visit Medication Sig Dispense Refill  
 HYDROcodone-acetaminophen (NORCO)  mg tablet Take 1 Tab by mouth every six (6) hours as needed for Pain for up to 30 days. 120 Tab 0  
 montelukast (SINGULAIR) 10 mg tablet Take 1 Tab by mouth daily.  90 Tab 1  
  cloNIDine (CATAPRES) 0.3 mg/24 hr 1 Patch by TransDERmal route every seven (7) days. 13 Patch 1  
 glipiZIDE (GLUCOTROL) 5 mg tablet TAKE 2 TABLETS BY MOUTH 20 MINUTES BEFORE BREAKFAST AND SUPPER 360 Tab 0  
 fluticasone propionate (FLONASE) 50 mcg/actuation nasal spray USE ONE SPRAY(S) IN EACH NOSTRIL ONCE DAILY 3 Bottle 3  
 albuterol (PROVENTIL HFA, VENTOLIN HFA, PROAIR HFA) 90 mcg/actuation inhaler INHALE TWO PUFFS BY MOUTH EVERY 6 HOURS AS NEEDED FOR WHEEZING FOR  UP  TO  ONE  YEAR 3 Inhaler 3  
 b complex-vitamin c-folic acid 5mg (Folbee Plus) tab tablet Take 1 tablet by mouth once daily 90 Tab 1  
 ergocalciferol (ERGOCALCIFEROL) 1,250 mcg (50,000 unit) capsule Take 1 Cap by mouth every seven (7) days. 12 Cap 1  
 hydrALAZINE (APRESOLINE) 50 mg tablet TAKE 1 TABLET BY MOUTH 4 TIMES DAILY FOR  HYPERTENSION 360 Tab 1  
 glucose blood VI test strips (Infinity Test strips) strip Use to test twice daily as directed 200 Strip 3  
 gabapentin (NEURONTIN) 300 mg capsule Take 1 Cap by mouth Before breakfast, lunch, dinner and at bedtime. Max Daily Amount: 1,200 mg. 120 Cap 2  
 baclofen (LIORESAL) 10 mg tablet TAKE 1 TABLET BY MOUTH THREE TIMES DAILY (MUSCLE  RELAXER) 270 Tab 0  
 omeprazole (PRILOSEC) 20 mg capsule Take 1 capsule by mouth once daily 90 Cap 1  
 tamsulosin (FLOMAX) 0.4 mg capsule Take 1 capsule by mouth once daily 90 Cap 2  
 allopurinoL (ZYLOPRIM) 300 mg tablet Take 1 tablet by mouth once daily 90 Tab 2  
 Pradaxa 150 mg capsule Take 1 capsule by mouth twice daily 180 Cap 1  
 Colcrys 0.6 mg tablet TAKE 1 TABLET BY MOUTH ONCE DAILY TO  PREVENT  GOUT 90 Tab 1  
 metFORMIN (GLUCOPHAGE) 500 mg tablet Take 1 Tab by mouth three (3) times daily (with meals). 270 Tab 2  
 labetalol (NORMODYNE) 200 mg tablet TAKE 1/2 (ONE-HALF) TABLET BY MOUTH TWICE DAILY 90 Tab 3  
 empagliflozin (JARDIANCE) 10 mg tablet Take one tab daily. STOP LOVASTATIN 90 Tab 4  melatonin 10 mg tab Take  by mouth.  amLODIPine (NORVASC) 10 mg tablet TAKE ONE TABLET BY MOUTH ONCE DAILY 90 Tab 3  
 rosuvastatin (CRESTOR) 20 mg tablet Take 1 Tab by mouth nightly. Indications: high cholesterol 90 Tab 1  potassium chloride (K-DUR) 10 mEq tablet Take 2 Tabs by mouth two (2) times a day. Indications: low amount of potassium in the blood 360 Tab 3  Blood-Glucose Meter (TRUE METRIX GLUCOSE METER) mis AS DIRECTED 1 Each 0  
 lancets misc Test 2 times daily Dx Code E11.65 100 Each 11  
 bumetanide (BUMEX) 2 mg tablet TAKE ONE TABLET BY MOUTH TWICE DAILY 180 Tab 1  
 ferrous sulfate (IRON) 325 mg (65 mg iron) tablet Take 1 Tab by mouth two (2) times daily (after meals). Indications: Iron Deficiency Anemia 100 Tab 3  
 lancets 30 gauge misc  ACCU-CHEK SHAUNA CONTROL SOLN soln  docusate sodium 100 mg tab Take 1 Tab by mouth two (2) times a day.  magnesium oxide (MAG-OX) 400 mg tablet Take 1 Tab by mouth daily. 90 Tab 3  
 aspirin 81 mg chewable tablet Take 81 mg by mouth daily.  [DISCONTINUED] cloNIDine HCl (CATAPRES) 0.3 mg tablet Take 1 Tab by mouth two (2) times a day. 180 Tab 2 No current facility-administered medications on file prior to visit. Patient Active Problem List  
Diagnosis Code  Chronic pain G89.29  
 Gout, unspecified M10.9  PVD (peripheral vascular disease) ([facility]) I73.9  Hypertension I10  
 Hypercholesterolemia E78.00  Leg pain M79.606  Arthritis M19.90  
 Unspecified arthropathy, ankle and foot M19.079  Persistent atrial fibrillation ([facility]) I48.19  
 Edema R60.9  Neuropathy G62.9  DDD (degenerative disc disease), lumbar M51.36  
 Prostate CA (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 Z20.76  Chronic radicular pain of lower back M54.16, G89.29  
 COPD (chronic obstructive pulmonary disease) ([facility]) J44.9  CKD (chronic kidney disease) N18.9  History of colon polyps Z86.010  
 Idiopathic gout M10.00  Essential hypertension with goal blood pressure less than 130/85 I10  Type 2 diabetes mellitus with nephropathy (HCC) E11.21  
 BMI 32.0-32.9,adult Z68.32  
 Iron deficiency anemia secondary to inadequate dietary iron intake D50.8 Social History Socioeconomic History  Marital status: SINGLE Spouse name: Not on file  Number of children: Not on file  Years of education: Not on file  Highest education level: Not on file Occupational History  Not on file Social Needs  Financial resource strain: Not on file  Food insecurity Worry: Not on file Inability: Not on file  Transportation needs Medical: Not on file Non-medical: Not on file Tobacco Use  Smoking status: Former Smoker Types: Cigarettes Last attempt to quit: 2003 Years since quittin.4  Smokeless tobacco: Never Used Substance and Sexual Activity  Alcohol use: Yes Alcohol/week: 0.0 standard drinks Comment: 2 drinks/month  Drug use: No  
 Sexual activity: Not on file Lifestyle  Physical activity Days per week: Not on file Minutes per session: Not on file  Stress: Not on file Relationships  Social connections Talks on phone: Not on file Gets together: Not on file Attends Denominational service: Not on file Active member of club or organization: Not on file Attends meetings of clubs or organizations: Not on file Relationship status: Not on file  Intimate partner violence Fear of current or ex partner: Not on file Emotionally abused: Not on file Physically abused: Not on file Forced sexual activity: Not on file Other Topics Concern  Not on file Social History Narrative  Not on file Review of Systems Review of Systems Constitutional: Negative for fever and malaise/fatigue. HENT: Negative for congestion. Respiratory: Negative for cough. Cardiovascular: Negative for chest pain. Objective:  
 
Visit Vitals BP (!) 181/74 (BP 1 Location: Right arm, BP Patient Position: Sitting) Pulse 67 Temp 97.5 °F (36.4 °C) (Temporal) Resp 18 Ht 6' 2\" (1.88 m) Wt 248 lb (112.5 kg) SpO2 99% BMI 31.84 kg/m² Physical Exam 
Vitals signs and nursing note reviewed. Constitutional:   
   Appearance: Normal appearance. HENT:  
   Head: Normocephalic and atraumatic. Neck: Musculoskeletal: Normal range of motion and neck supple. Cardiovascular:  
   Rate and Rhythm: Normal rate and regular rhythm. Pulses: Normal pulses. Heart sounds: Normal heart sounds. Pulmonary:  
   Effort: Pulmonary effort is normal.  
   Breath sounds: Normal breath sounds. Abdominal:  
   General: Bowel sounds are normal.  
   Palpations: Abdomen is soft. Neurological:  
   Mental Status: He is alert. Diabetic foot exam:  
 
Left Foot: 
 Visual Exam: Dry Pulse DP: trace Filament test: reduced sensation Vibratory sensation: diminished Right Foot: 
 Visual Exam: Dry Pulse DP: trace Filament test: reduced sensation Vibratory sensation: diminished Pertinent Labs/Studies: 
 
 
Assessment and orders: ICD-10-CM ICD-9-CM 1. Chronic radicular pain of lower back  M54.16 724.4 DRUG SCREEN, URINE  
 G89.29 338.29 COMPLIANCE DRUG SCREEN/PRESCRIPTION MONITORING  
   COMPLIANCE DRUG SCREEN/PRESCRIPTION MONITORING  
   DRUG SCREEN, URINE 2. Essential hypertension with goal blood pressure less than 130/85  I10 401.9 3. Neuropathy  G62.9 355.9 DRUG SCREEN, URINE  
   COMPLIANCE DRUG SCREEN/PRESCRIPTION MONITORING  
   COMPLIANCE DRUG SCREEN/PRESCRIPTION MONITORING  
   DRUG SCREEN, URINE 4. Type 2 diabetes mellitus with nephropathy (HCC)  E11.21 250.40  DIABETES FOOT EXAM  
  583.81 Diagnoses and all orders for this visit: 
 
 1. Chronic radicular pain of lower back:[place] signed with patient, labs today 
-     DRUG SCREEN, URINE; Future -     COMPLIANCE DRUG SCREEN/PRESCRIPTION MONITORING; Future 2. Essential hypertension with goal blood pressure less than 130/85: Above goal, may have sipped medication, CHronically elevated. Will have follow up and adjust medication as able. 3. Neuropathy 
-     DRUG SCREEN, URINE; Future -     COMPLIANCE DRUG SCREEN/PRESCRIPTION MONITORING; Future 4. Type 2 diabetes mellitus with nephropathy (HCC) 
-      DIABETES FOOT EXAM 
 
 
Follow-up and Dispositions · Return in about 4 weeks (around 12/15/2020) for Follow up HTN in office. I have discussed the diagnosis with the patient and the intended plan as seen in the above orders. Social history, medical history, and labs were reviewed. The patient has received an after-visit summary and questions were answered concerning future plans. I have discussed medication side effects and warnings with the patient as well. MD NISHANT Rodriguez & SHWETA SHRESTHA [facility] 11/17/20

## 2020-11-17 NOTE — PROGRESS NOTES
Chief Complaint Patient presents with  Labs Visit Vitals BP (!) 209/78 (BP 1 Location: Right arm, BP Patient Position: Sitting) Pulse 67 Temp 97.5 °F (36.4 °C) (Temporal) Resp 18 Ht 6' 2\" (1.88 m) Wt 248 lb (112.5 kg) SpO2 99% BMI 31.84 kg/m² 1. Have you been to the ER, urgent care clinic since your last visit? Hospitalized since your last visit? No 
 
2. Have you seen or consulted any other health care providers outside of the [address] [place] [address] since your last visit? Include any pap smears or colon screening. No 
 
Reviewed record in preparation for visit and have necessary documentation Pt did not bring medication to office visit for review 
opportunity was given for questions Goals that were addressed and/or need to be completed during or after this appointment include Health Maintenance Due Topic Date Due  GLAUCOMA SCREENING Q2Y  09/25/2020  Eye Exam Retinal or Dilated  09/25/2020  Foot Exam Q1  11/21/2020

## 2020-12-18 NOTE — PROGRESS NOTES
[facility] History of Present Illness:  
Mai Lloyd is a 79 y.o. male with history of HTN, Diabetes, Chronic Pain, Prostate Cancer, CKD,  
CC: Follow up HTN History provided by patient and Records HPI: 
Hypertension Follow up: 
Currently Taking Bumex 2 mg BID, Amlodipine 10 mg, Labetalol 100 mg BID, Hydralazine 50 mg BID, Clonidine Patch 0.3 mg/24 hr. The patient reports:  taking medications as instructed, no medication side effects noted, home BP monitoring in range of 090-460'B systolic over 75'X diastolic, improves with sitting down, no TIA's, no chest pain on exertion, no dyspnea on exertion, no swelling of ankles. Has  Had some work up in the past for HTN. Has afib, and denies any recent changes. BP Readings from Last 3 Encounters:  
12/18/20 (!) 160/83  
11/17/20 (!) 181/74  
10/04/20 (!) 165/107 Health Maintenance Health Maintenance Due Topic Date Due  GLAUCOMA SCREENING Q2Y  09/25/2020  Eye Exam Retinal or Dilated  09/25/2020 Past Medical, Family, and Social History:  
 
Current Outpatient Medications on File Prior to Visit Medication Sig Dispense Refill  Colcrys 0.6 mg tablet TAKE 1 TABLET BY MOUTH ONCE DAILY TO  PREVENT  GOUT 90 Tab 0  
 HYDROcodone-acetaminophen (NORCO)  mg tablet Take 1 Tab by mouth every six (6) hours as needed for Pain for up to 30 days. 120 Tab 0  
 montelukast (SINGULAIR) 10 mg tablet Take 1 Tab by mouth daily. 90 Tab 1  cloNIDine (CATAPRES) 0.3 mg/24 hr 1 Patch by TransDERmal route every seven (7) days.  13 Patch 1  
 glipiZIDE (GLUCOTROL) 5 mg tablet TAKE 2 TABLETS BY MOUTH 20 MINUTES BEFORE BREAKFAST AND SUPPER 360 Tab 0  
 fluticasone propionate (FLONASE) 50 mcg/actuation nasal spray USE ONE SPRAY(S) IN EACH NOSTRIL ONCE DAILY 3 Bottle 3  
  albuterol (PROVENTIL HFA, VENTOLIN HFA, PROAIR HFA) 90 mcg/actuation inhaler INHALE TWO PUFFS BY MOUTH EVERY 6 HOURS AS NEEDED FOR WHEEZING FOR  UP  TO  ONE  YEAR 3 Inhaler 3  
 b complex-vitamin c-folic acid 5mg (Folbee Plus) tab tablet Take 1 tablet by mouth once daily 90 Tab 1  
 ergocalciferol (ERGOCALCIFEROL) 1,250 mcg (50,000 unit) capsule Take 1 Cap by mouth every seven (7) days. 12 Cap 1  
 hydrALAZINE (APRESOLINE) 50 mg tablet TAKE 1 TABLET BY MOUTH 4 TIMES DAILY FOR  HYPERTENSION 360 Tab 1  
 glucose blood VI test strips (Infinity Test strips) strip Use to test twice daily as directed 200 Strip 3  
 gabapentin (NEURONTIN) 300 mg capsule Take 1 Cap by mouth Before breakfast, lunch, dinner and at bedtime. Max Daily Amount: 1,200 mg. 120 Cap 2  
 baclofen (LIORESAL) 10 mg tablet TAKE 1 TABLET BY MOUTH THREE TIMES DAILY (MUSCLE  RELAXER) 270 Tab 0  
 omeprazole (PRILOSEC) 20 mg capsule Take 1 capsule by mouth once daily 90 Cap 1  
 tamsulosin (FLOMAX) 0.4 mg capsule Take 1 capsule by mouth once daily 90 Cap 2  
 allopurinoL (ZYLOPRIM) 300 mg tablet Take 1 tablet by mouth once daily 90 Tab 2  
 Pradaxa 150 mg capsule Take 1 capsule by mouth twice daily 180 Cap 1  
 metFORMIN (GLUCOPHAGE) 500 mg tablet Take 1 Tab by mouth three (3) times daily (with meals). 270 Tab 2  
 labetalol (NORMODYNE) 200 mg tablet TAKE 1/2 (ONE-HALF) TABLET BY MOUTH TWICE DAILY 90 Tab 3  
 empagliflozin (JARDIANCE) 10 mg tablet Take one tab daily. STOP LOVASTATIN 90 Tab 4  
 melatonin 10 mg tab Take  by mouth.  amLODIPine (NORVASC) 10 mg tablet TAKE ONE TABLET BY MOUTH ONCE DAILY 90 Tab 3  
 rosuvastatin (CRESTOR) 20 mg tablet Take 1 Tab by mouth nightly. Indications: high cholesterol 90 Tab 1  potassium chloride (K-DUR) 10 mEq tablet Take 2 Tabs by mouth two (2) times a day. Indications: low amount of potassium in the blood 360 Tab 3  Blood-Glucose Meter (TRUE METRIX GLUCOSE METER) misc AS DIRECTED 1 Each 0  
  lancets misc Test 2 times daily Dx Code E11.65 100 Each 11  
 bumetanide (BUMEX) 2 mg tablet TAKE ONE TABLET BY MOUTH TWICE DAILY 180 Tab 1  
 ferrous sulfate (IRON) 325 mg (65 mg iron) tablet Take 1 Tab by mouth two (2) times daily (after meals). Indications: Iron Deficiency Anemia 100 Tab 3  
 lancets 30 gauge misc  ACCU-CHEK SHAUNA CONTROL SOLN soln  docusate sodium 100 mg tab Take 1 Tab by mouth two (2) times a day.  magnesium oxide (MAG-OX) 400 mg tablet Take 1 Tab by mouth daily. 90 Tab 3  
 aspirin 81 mg chewable tablet Take 81 mg by mouth daily.  [DISCONTINUED] Colcrys 0.6 mg tablet TAKE 1 TABLET BY MOUTH ONCE DAILY TO  PREVENT  GOUT 90 Tab 1 No current facility-administered medications on file prior to visit. Patient Active Problem List  
Diagnosis Code  Chronic pain G89.29  
 Gout, unspecified M10.9  PVD (peripheral vascular disease) ([facility]) I73.9  Hypertension I10  
 Hypercholesterolemia E78.00  Leg pain M79.606  Arthritis M19.90  
 Unspecified arthropathy, ankle and foot M19.079  Persistent atrial fibrillation ([facility]) I48.19  
 Edema R60.9  Neuropathy G62.9  DDD (degenerative disc disease), lumbar M51.36  
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  
 COPD (chronic obstructive pulmonary disease) ([facility]) J44.9  CKD (chronic kidney disease) N18.9  History of colon polyps Z86.010  
 Idiopathic gout M10.00  Essential hypertension with goal blood pressure less than 130/85 I10  Type 2 diabetes mellitus with nephropathy ([facility]) E11.21  
 BMI 32.0-32.9,adult Z68.32  
 Iron deficiency anemia secondary to inadequate dietary iron intake D50.8 Social History Socioeconomic History  Marital status: SINGLE Spouse name: Not on file  Number of children: Not on file  Years of education: Not on file  Highest education level: Not on file Occupational History  Not on file Social Needs  Financial resource strain: Not on file  Food insecurity Worry: Not on file Inability: Not on file  Transportation needs Medical: Not on file Non-medical: Not on file Tobacco Use  Smoking status: Former Smoker Types: Cigarettes Quit date: 2003 Years since quittin.5  Smokeless tobacco: Never Used Substance and Sexual Activity  Alcohol use: Yes Alcohol/week: 0.0 standard drinks Comment: 2 drinks/month  Drug use: No  
 Sexual activity: Not on file Lifestyle  Physical activity Days per week: Not on file Minutes per session: Not on file  Stress: Not on file Relationships  Social connections Talks on phone: Not on file Gets together: Not on file Attends Caodaism service: Not on file Active member of club or organization: Not on file Attends meetings of clubs or organizations: Not on file Relationship status: Not on file  Intimate partner violence Fear of current or ex partner: Not on file Emotionally abused: Not on file Physically abused: Not on file Forced sexual activity: Not on file Other Topics Concern  Not on file Social History Narrative  Not on file Review of Systems Review of Systems Constitutional: Negative for chills and fever. HENT: Negative for congestion. Respiratory: Negative for cough. Cardiovascular: Negative for chest pain and palpitations. Gastrointestinal: Negative for abdominal pain, nausea and vomiting. Objective:  
 
Visit Vitals BP (!) 160/83 (BP 1 Location: Right arm, BP Patient Position: Sitting) Pulse (!) 56 Temp 97.5 °F (36.4 °C) (Oral) Resp 18 Ht 6' 2\" (1.88 m) Wt 254 lb 6.4 oz (115.4 kg) SpO2 100% BMI 32.66 kg/m² Physical Exam 
Vitals signs and nursing note reviewed. Constitutional:   
   Appearance: Normal appearance. HENT:  
   Head: Normocephalic and atraumatic. Neck: Musculoskeletal: Normal range of motion and neck supple. Cardiovascular:  
   Rate and Rhythm: Normal rate. Rhythm irregular. Pulses: Normal pulses. Heart sounds: Normal heart sounds. Pulmonary:  
   Effort: Pulmonary effort is normal.  
   Breath sounds: Normal breath sounds. Abdominal:  
   General: Bowel sounds are normal.  
   Palpations: Abdomen is soft. Musculoskeletal: Normal range of motion. Skin: 
   General: Skin is warm and dry. Neurological:  
   Mental Status: He is alert. Pertinent Labs/Studies: 
 
 
Assessment and orders: ICD-10-CM ICD-9-CM 1. HTN (hypertension), malignant  I10 401.0 2. Persistent atrial fibrillation (HCC)  I48.19 427.31   
3. Arthritis  M19.90 716.90 diclofenac (VOLTAREN) 1 % gel XR KNEE RT 3 V Diagnoses and all orders for this visit: 1. HTN (hypertension), malignant: Persistently eelvated, nearly on maximal therapy now. 2. Persistent atrial fibrillation ([address]): Stable 3. Arthritis: Trial of VOltarren gel, Plan for follow up and knee injection, XR to determine best method of entry 
-     diclofenac (VOLTAREN) 1 % gel; Apply 1 g to affected area four (4) times daily. Apply to right knee -     XR KNEE RT 3 V; Future Follow-up and Dispositions · Return in about 1 week (around 12/25/2020) for Knee injection in office. I have discussed the diagnosis with the patient and the intended plan as seen in the above orders. Social history, medical history, and labs were reviewed. The patient has received an after-visit summary and questions were answered concerning future plans. I have discussed medication side effects and warnings with the patient as well. MD NISHANT Pyle & SHWETA SHRESTHA [facility] 12/18/20

## 2020-12-18 NOTE — PROGRESS NOTES
Chief Complaint Patient presents with  Follow-up Visit Vitals BP (!) 181/73 (BP 1 Location: Right arm, BP Patient Position: Sitting) Pulse (!) 56 Temp 97.5 °F (36.4 °C) (Oral) Resp 18 Ht 6' 2\" (1.88 m) Wt 254 lb 6.4 oz (115.4 kg) SpO2 100% BMI 32.66 kg/m² 1. Have you been to the ER, urgent care clinic since your last visit? Hospitalized since your last visit? No 
 
2. Have you seen or consulted any other health care providers outside of the [address] [place] [address] since your last visit? Include any pap smears or colon screening. No 
 
Reviewed record in preparation for visit and have necessary documentation Pt did not bring medication to office visit for review 
opportunity was given for questions Goals that were addressed and/or need to be completed during or after this appointment include Health Maintenance Due Topic Date Due  GLAUCOMA SCREENING Q2Y  09/25/2020  Eye Exam Retinal or Dilated  09/25/2020

## 2020-12-28 NOTE — PROGRESS NOTES
1. Have you been to the ER, urgent care clinic since your last visit? Hospitalized since your last visit? No 
 
2. Have you seen or consulted any other health care providers outside of the [address] [place] [address] since your last visit? Include any pap smears or colon screening. No 
Reviewed record in preparation for visit and have necessary documentation Pt did bring medication to office visit for review Goals that were addressed and/or need to be completed during or after this appointment include Health Maintenance Due Topic Date Due  Shingrix Vaccine Age 50> (1 of 2) 05/13/1989  MEDICARE YEARLY EXAM  08/17/2018  MICROALBUMIN Q1  10/12/2018 Fall Risk

## 2021-01-01 ENCOUNTER — APPOINTMENT (OUTPATIENT)
Dept: GENERAL RADIOLOGY | Age: 81
DRG: 565 | End: 2021-01-01
Attending: PHYSICIAN ASSISTANT
Payer: MEDICARE

## 2021-01-01 ENCOUNTER — TELEPHONE (OUTPATIENT)
Dept: FAMILY MEDICINE CLINIC | Age: 81
End: 2021-01-01

## 2021-01-01 ENCOUNTER — APPOINTMENT (OUTPATIENT)
Dept: GENERAL RADIOLOGY | Age: 81
DRG: 683 | End: 2021-01-01
Attending: INTERNAL MEDICINE
Payer: MEDICARE

## 2021-01-01 ENCOUNTER — APPOINTMENT (OUTPATIENT)
Dept: CT IMAGING | Age: 81
DRG: 565 | End: 2021-01-01
Attending: PHYSICIAN ASSISTANT
Payer: MEDICARE

## 2021-01-01 ENCOUNTER — VIRTUAL VISIT (OUTPATIENT)
Dept: FAMILY MEDICINE CLINIC | Age: 81
End: 2021-01-01
Payer: MEDICARE

## 2021-01-01 ENCOUNTER — HOSPITAL ENCOUNTER (INPATIENT)
Age: 81
LOS: 8 days | Discharge: NURSING, REHAB OR LONG-TERM CARE | DRG: 683 | End: 2021-01-15
Attending: HOSPITALIST | Admitting: EMERGENCY MEDICINE
Payer: MEDICARE

## 2021-01-01 ENCOUNTER — HOSPITAL ENCOUNTER (INPATIENT)
Age: 81
LOS: 1 days | Discharge: ANOTHER HOSPITAL | DRG: 565 | End: 2021-01-07
Attending: HOSPITALIST | Admitting: HOSPITALIST
Payer: MEDICARE

## 2021-01-01 ENCOUNTER — OFFICE VISIT (OUTPATIENT)
Dept: FAMILY MEDICINE CLINIC | Age: 81
End: 2021-01-01
Payer: MEDICARE

## 2021-01-01 ENCOUNTER — APPOINTMENT (OUTPATIENT)
Dept: NON INVASIVE DIAGNOSTICS | Age: 81
DRG: 565 | End: 2021-01-01
Attending: PHYSICIAN ASSISTANT
Payer: MEDICARE

## 2021-01-01 VITALS
BODY MASS INDEX: 30.02 KG/M2 | RESPIRATION RATE: 23 BRPM | TEMPERATURE: 98.9 F | WEIGHT: 233.91 LBS | SYSTOLIC BLOOD PRESSURE: 152 MMHG | OXYGEN SATURATION: 98 % | DIASTOLIC BLOOD PRESSURE: 98 MMHG | HEIGHT: 74 IN | HEART RATE: 93 BPM

## 2021-01-01 VITALS
HEART RATE: 68 BPM | HEIGHT: 74 IN | OXYGEN SATURATION: 97 % | TEMPERATURE: 97.7 F | RESPIRATION RATE: 18 BRPM | BODY MASS INDEX: 30.95 KG/M2 | WEIGHT: 241.18 LBS | SYSTOLIC BLOOD PRESSURE: 148 MMHG | DIASTOLIC BLOOD PRESSURE: 62 MMHG

## 2021-01-01 VITALS
BODY MASS INDEX: 30.93 KG/M2 | SYSTOLIC BLOOD PRESSURE: 142 MMHG | TEMPERATURE: 97 F | DIASTOLIC BLOOD PRESSURE: 61 MMHG | RESPIRATION RATE: 18 BRPM | WEIGHT: 241 LBS | OXYGEN SATURATION: 100 % | HEIGHT: 74 IN | HEART RATE: 75 BPM

## 2021-01-01 DIAGNOSIS — Z09 HOSPITAL DISCHARGE FOLLOW-UP: ICD-10-CM

## 2021-01-01 DIAGNOSIS — N17.9 AKI (ACUTE KIDNEY INJURY) (HCC): ICD-10-CM

## 2021-01-01 DIAGNOSIS — E11.49 TYPE 2 DIABETES MELLITUS WITH OTHER NEUROLOGIC COMPLICATION, WITHOUT LONG-TERM CURRENT USE OF INSULIN (HCC): ICD-10-CM

## 2021-01-01 DIAGNOSIS — G89.29 CHRONIC PAIN OF RIGHT KNEE: Primary | ICD-10-CM
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
## 2021-01-01 DIAGNOSIS — I48.19 PERSISTENT ATRIAL FIBRILLATION (HCC): ICD-10-CM

## 2021-01-01 DIAGNOSIS — G89.29 CHRONIC LEFT-SIDED LOW BACK PAIN WITHOUT SCIATICA: ICD-10-CM

## 2021-01-01 DIAGNOSIS — M51.36 DDD (DEGENERATIVE DISC DISEASE), LUMBAR: Chronic | ICD-10-CM

## 2021-01-01 DIAGNOSIS — M25.561 CHRONIC PAIN OF RIGHT KNEE: Primary | ICD-10-CM

## 2021-01-01 DIAGNOSIS — T79.6XXA TRAUMATIC RHABDOMYOLYSIS, INITIAL ENCOUNTER (HCC): Primary | ICD-10-CM

## 2021-01-01 DIAGNOSIS — M54.16 CHRONIC RADICULAR PAIN OF LOWER BACK: ICD-10-CM

## 2021-01-01 DIAGNOSIS — G89.29 CHRONIC RADICULAR PAIN OF LOWER BACK: ICD-10-CM

## 2021-01-01 DIAGNOSIS — M1A.39X0 CHRONIC GOUT DUE TO RENAL IMPAIRMENT OF MULTIPLE SITES WITHOUT TOPHUS: ICD-10-CM

## 2021-01-01 DIAGNOSIS — E55.9 VITAMIN D DEFICIENCY: ICD-10-CM

## 2021-01-01 DIAGNOSIS — I10 ESSENTIAL HYPERTENSION: Chronic | ICD-10-CM

## 2021-01-01 DIAGNOSIS — M79.604 RIGHT LEG PAIN: ICD-10-CM

## 2021-01-01 DIAGNOSIS — M17.12 PRIMARY OSTEOARTHRITIS OF LEFT KNEE: ICD-10-CM

## 2021-01-01 DIAGNOSIS — M54.50 CHRONIC LEFT-SIDED LOW BACK PAIN WITHOUT SCIATICA: ICD-10-CM

## 2021-01-01 DIAGNOSIS — N18.9 CHRONIC KIDNEY DISEASE, UNSPECIFIED CKD STAGE: ICD-10-CM

## 2021-01-01 LAB
ALBUMIN SERPL-MCNC: 3.6 G/DL (ref 3.5–5)
ALBUMIN/GLOB SERPL: 1 {RATIO} (ref 1.1–2.2)
ALP SERPL-CCNC: 89 U/L (ref 45–117)
ALT SERPL-CCNC: 33 U/L (ref 12–78)
ANION GAP SERPL CALC-SCNC: 10 MMOL/L (ref 5–15)
ANION GAP SERPL CALC-SCNC: 11 MMOL/L (ref 5–15)
ANION GAP SERPL CALC-SCNC: 7 MMOL/L (ref 5–15)
ANION GAP SERPL CALC-SCNC: 7 MMOL/L (ref 5–15)
ANION GAP SERPL CALC-SCNC: 9 MMOL/L (ref 5–15)
ANION GAP SERPL CALC-SCNC: 9 MMOL/L (ref 5–15)
APPEARANCE UR: CLEAR
AST SERPL W P-5'-P-CCNC: 75 U/L (ref 15–37)
BACTERIA SPEC CULT: NORMAL
BACTERIA URNS QL MICRO: NEGATIVE /HPF
BASOPHILS # BLD: 0 K/UL (ref 0–0.1)
BASOPHILS # BLD: 0 K/UL (ref 0–0.2)
BASOPHILS NFR BLD: 0 % (ref 0–1)
BASOPHILS NFR BLD: 0 % (ref 0–2.5)
BASOPHILS NFR BLD: 1 % (ref 0–2.5)
BILIRUB SERPL-MCNC: 1.2 MG/DL (ref 0.2–1)
BILIRUB UR QL: NEGATIVE
BUN SERPL-MCNC: 14 MG/DL (ref 6–20)
BUN SERPL-MCNC: 15 MG/DL (ref 6–20)
BUN SERPL-MCNC: 16 MG/DL (ref 6–20)
BUN SERPL-MCNC: 19 MG/DL (ref 6–20)
BUN SERPL-MCNC: 19 MG/DL (ref 6–20)
BUN SERPL-MCNC: 21 MG/DL (ref 6–20)
BUN/CREAT SERPL: 12 (ref 12–20)
BUN/CREAT SERPL: 12 (ref 12–20)
BUN/CREAT SERPL: 13 (ref 12–20)
BUN/CREAT SERPL: 14 (ref 12–20)
BUN/CREAT SERPL: 14 (ref 12–20)
BUN/CREAT SERPL: 9 (ref 12–20)
CA-I BLD-MCNC: 8.9 MG/DL (ref 8.5–10.1)
CA-I BLD-MCNC: 8.9 MG/DL (ref 8.5–10.1)
CA-I BLD-MCNC: 9 MG/DL (ref 8.5–10.1)
CA-I BLD-MCNC: 9.1 MG/DL (ref 8.5–10.1)
CA-I BLD-MCNC: 9.1 MG/DL (ref 8.5–10.1)
CA-I BLD-MCNC: 9.7 MG/DL (ref 8.5–10.1)
CHLORIDE SERPL-SCNC: 106 MMOL/L (ref 97–108)
CHLORIDE SERPL-SCNC: 107 MMOL/L (ref 97–108)
CHLORIDE SERPL-SCNC: 108 MMOL/L (ref 97–108)
CHLORIDE SERPL-SCNC: 110 MMOL/L (ref 97–108)
CK SERPL-CCNC: 1575 U/L (ref 39–308)
CK SERPL-CCNC: 2852 U/L (ref 39–308)
CK SERPL-CCNC: 328 U/L (ref 39–308)
CK SERPL-CCNC: 525 U/L (ref 39–308)
CK SERPL-CCNC: 762 U/L (ref 39–308)
CO2 SERPL-SCNC: 25 MMOL/L (ref 21–32)
CO2 SERPL-SCNC: 27 MMOL/L (ref 21–32)
COLOR UR: ABNORMAL
COVID-19 RAPID TEST, COVR: NEGATIVE
CREAT SERPL-MCNC: 1.26 MG/DL (ref 0.7–1.3)
CREAT SERPL-MCNC: 1.31 MG/DL (ref 0.7–1.3)
CREAT SERPL-MCNC: 1.35 MG/DL (ref 0.7–1.3)
CREAT SERPL-MCNC: 1.47 MG/DL (ref 0.7–1.3)
CREAT SERPL-MCNC: 1.49 MG/DL (ref 0.7–1.3)
CREAT SERPL-MCNC: 1.6 MG/DL (ref 0.7–1.3)
DIFFERENTIAL METHOD BLD: ABNORMAL
ECHO AO ROOT DIAM: 3.8 CM
ECHO AV AREA PEAK VELOCITY: 1.5 CM2
ECHO AV AREA/BSA PEAK VELOCITY: 0.6 CM2/M2
ECHO AV PEAK GRADIENT: 13 MMHG
ECHO EST RA PRESSURE: 8 MMHG
ECHO LA TO AORTIC ROOT RATIO: 1.37
ECHO LV E' SEPTAL VELOCITY: 2.92 CM/S
ECHO LV EDV A2C: 121 CM3
ECHO LV EJECTION FRACTION A2C: 38 %
ECHO LV EJECTION FRACTION BIPLANE: 67.6 % (ref 55–100)
ECHO LV ESV A2C: 29.5 CM3
ECHO LV INTERNAL DIMENSION DIASTOLIC: 4.95 CM (ref 4.2–5.9)
ECHO LV INTERNAL DIMENSION SYSTOLIC: 3.09 CM
ECHO LV IVSD: 0.86 CM (ref 0.6–1)
ECHO LV MASS 2D: 188.7 G (ref 88–224)
ECHO LV MASS INDEX 2D: 81.3 G/M2 (ref 49–115)
ECHO LV POSTERIOR WALL DIASTOLIC: 1.22 CM (ref 0.6–1)
ECHO LVOT DIAM: 2 CM
ECHO LVOT PEAK GRADIENT: 3 MMHG
ECHO MV A VELOCITY: 30.2 CM/S
ECHO MV AREA PHT: 3.79 CM2
ECHO MV E DECELERATION TIME (DT): 222 MS
ECHO MV E VELOCITY: 98.1 CM/S
ECHO MV E/A RATIO: 3.25
ECHO MV E/E' SEPTAL: 33.6
ECHO MV PRESSURE HALF TIME (PHT): 58 MS
ECHO PV PEAK INSTANTANEOUS GRADIENT SYSTOLIC: 5 MMHG
ECHO PV REGURGITANT MAX VELOCITY: 113 CM/S
ECHO PV REGURGITANT MAX VELOCITY: 182 CM/S
ECHO PV REGURGITANT MAX VELOCITY: 402 CM/S
ECHO PV REGURGITANT MAX VELOCITY: 87 CM/S
ECHO PVEIN A DURATION: 134 MS
ECHO PVEIN A VELOCITY: 28.9 CM/S
ECHO RIGHT VENTRICULAR SYSTOLIC PRESSURE (RVSP): 49 MMHG
ECHO RV INTERNAL DIMENSION: 3.29 CM
ECHO TV MAX VELOCITY: 319 CM/S
ECHO TV MAX VELOCITY: 319 CM/S
ECHO TV REGURGITANT PEAK GRADIENT: 41 MMHG
EOSINOPHIL # BLD: 0.1 K/UL (ref 0–0.4)
EOSINOPHIL # BLD: 0.2 K/UL (ref 0–0.7)
EOSINOPHIL # BLD: 0.2 K/UL (ref 0–0.7)
EOSINOPHIL # BLD: 0.3 K/UL (ref 0–0.7)
EOSINOPHIL # BLD: 0.3 K/UL (ref 0–0.7)
EOSINOPHIL NFR BLD: 1 % (ref 0–7)
EOSINOPHIL NFR BLD: 4 % (ref 0.9–2.9)
EOSINOPHIL NFR BLD: 4 % (ref 0.9–2.9)
EOSINOPHIL NFR BLD: 7 % (ref 0.9–2.9)
EOSINOPHIL NFR BLD: 8 % (ref 0.9–2.9)
ERYTHROCYTE [DISTWIDTH] IN BLOOD BY AUTOMATED COUNT: 17.4 % (ref 11.5–14.5)
ERYTHROCYTE [DISTWIDTH] IN BLOOD BY AUTOMATED COUNT: 17.7 % (ref 11.5–14.5)
ERYTHROCYTE [DISTWIDTH] IN BLOOD BY AUTOMATED COUNT: 17.9 % (ref 11.5–14.5)
ERYTHROCYTE [DISTWIDTH] IN BLOOD BY AUTOMATED COUNT: 18 % (ref 11.5–14.5)
ERYTHROCYTE [DISTWIDTH] IN BLOOD BY AUTOMATED COUNT: 18 % (ref 11.5–14.5)
GLOBULIN SER CALC-MCNC: 3.5 G/DL (ref 2–4)
GLUCOSE BLD STRIP.AUTO-MCNC: 103 MG/DL (ref 65–100)
GLUCOSE BLD STRIP.AUTO-MCNC: 109 MG/DL (ref 65–100)
GLUCOSE BLD STRIP.AUTO-MCNC: 112 MG/DL (ref 65–100)
GLUCOSE BLD STRIP.AUTO-MCNC: 114 MG/DL (ref 65–100)
GLUCOSE BLD STRIP.AUTO-MCNC: 115 MG/DL (ref 65–100)
GLUCOSE BLD STRIP.AUTO-MCNC: 115 MG/DL (ref 65–100)
GLUCOSE BLD STRIP.AUTO-MCNC: 119 MG/DL (ref 65–100)
GLUCOSE BLD STRIP.AUTO-MCNC: 121 MG/DL (ref 65–100)
GLUCOSE BLD STRIP.AUTO-MCNC: 128 MG/DL (ref 65–100)
GLUCOSE BLD STRIP.AUTO-MCNC: 129 MG/DL (ref 65–100)
GLUCOSE BLD STRIP.AUTO-MCNC: 129 MG/DL (ref 65–100)
GLUCOSE BLD STRIP.AUTO-MCNC: 130 MG/DL (ref 65–100)
GLUCOSE BLD STRIP.AUTO-MCNC: 132 MG/DL (ref 65–100)
GLUCOSE BLD STRIP.AUTO-MCNC: 135 MG/DL (ref 65–100)
GLUCOSE BLD STRIP.AUTO-MCNC: 141 MG/DL (ref 65–100)
GLUCOSE BLD STRIP.AUTO-MCNC: 171 MG/DL (ref 65–100)
GLUCOSE BLD STRIP.AUTO-MCNC: 173 MG/DL (ref 65–100)
GLUCOSE BLD STRIP.AUTO-MCNC: 182 MG/DL (ref 65–100)
GLUCOSE BLD STRIP.AUTO-MCNC: 182 MG/DL (ref 65–100)
GLUCOSE BLD STRIP.AUTO-MCNC: 189 MG/DL (ref 65–100)
GLUCOSE BLD STRIP.AUTO-MCNC: 192 MG/DL (ref 65–100)
GLUCOSE BLD STRIP.AUTO-MCNC: 239 MG/DL (ref 65–100)
GLUCOSE BLD STRIP.AUTO-MCNC: 240 MG/DL (ref 65–100)
GLUCOSE BLD STRIP.AUTO-MCNC: 244 MG/DL (ref 65–100)
GLUCOSE BLD STRIP.AUTO-MCNC: 255 MG/DL (ref 65–100)
GLUCOSE BLD STRIP.AUTO-MCNC: 276 MG/DL (ref 65–100)
GLUCOSE SERPL-MCNC: 111 MG/DL (ref 65–100)
GLUCOSE SERPL-MCNC: 113 MG/DL (ref 65–100)
GLUCOSE SERPL-MCNC: 114 MG/DL (ref 65–100)
GLUCOSE SERPL-MCNC: 114 MG/DL (ref 65–100)
GLUCOSE SERPL-MCNC: 122 MG/DL (ref 65–100)
GLUCOSE SERPL-MCNC: 92 MG/DL (ref 65–100)
GLUCOSE UR STRIP.AUTO-MCNC: >300 MG/DL
HCT VFR BLD AUTO: 32.9 % (ref 41–53)
HCT VFR BLD AUTO: 34.1 % (ref 41–53)
HCT VFR BLD AUTO: 35 % (ref 41–53)
HCT VFR BLD AUTO: 35.9 % (ref 41–53)
HCT VFR BLD AUTO: 38.5 % (ref 36.6–50.3)
HGB BLD-MCNC: 10 G/DL (ref 13.5–17.5)
HGB BLD-MCNC: 10.1 G/DL (ref 13.5–17.5)
HGB BLD-MCNC: 10.6 G/DL (ref 13.5–17.5)
HGB BLD-MCNC: 11 G/DL (ref 13.5–17.5)
HGB BLD-MCNC: 11.3 G/DL (ref 12.1–17)
HGB UR QL STRIP: ABNORMAL
IMM GRANULOCYTES # BLD AUTO: 0 K/UL (ref 0–0.04)
IMM GRANULOCYTES NFR BLD AUTO: 0 % (ref 0–0.5)
KETONES UR QL STRIP.AUTO: 20 MG/DL
LEUKOCYTE ESTERASE UR QL STRIP.AUTO: ABNORMAL
LYMPHOCYTES # BLD: 0.9 K/UL (ref 1–4.8)
LYMPHOCYTES # BLD: 1.1 K/UL (ref 1–4.8)
LYMPHOCYTES # BLD: 1.2 K/UL (ref 0.8–3.5)
LYMPHOCYTES NFR BLD: 15 % (ref 12–49)
LYMPHOCYTES NFR BLD: 17 % (ref 20.5–51.1)
LYMPHOCYTES NFR BLD: 18 % (ref 20.5–51.1)
LYMPHOCYTES NFR BLD: 21 % (ref 20.5–51.1)
LYMPHOCYTES NFR BLD: 23 % (ref 20.5–51.1)
MAGNESIUM SERPL-MCNC: 1.9 MG/DL (ref 1.6–2.4)
MCH RBC QN AUTO: 21.2 PG (ref 31–34)
MCH RBC QN AUTO: 21.3 PG (ref 31–34)
MCH RBC QN AUTO: 21.4 PG (ref 31–34)
MCH RBC QN AUTO: 21.5 PG (ref 26–34)
MCH RBC QN AUTO: 21.6 PG (ref 31–34)
MCHC RBC AUTO-ENTMCNC: 29.4 G/DL (ref 30–36.5)
MCHC RBC AUTO-ENTMCNC: 29.7 G/DL (ref 31–36)
MCHC RBC AUTO-ENTMCNC: 30.3 G/DL (ref 31–36)
MCHC RBC AUTO-ENTMCNC: 30.5 G/DL (ref 31–36)
MCHC RBC AUTO-ENTMCNC: 30.7 G/DL (ref 31–36)
MCV RBC AUTO: 70.2 FL (ref 80–100)
MCV RBC AUTO: 70.3 FL (ref 80–100)
MCV RBC AUTO: 70.5 FL (ref 80–100)
MCV RBC AUTO: 71.3 FL (ref 80–100)
MCV RBC AUTO: 73.2 FL (ref 80–99)
MONOCYTES # BLD: 0.4 K/UL (ref 0.2–2.4)
MONOCYTES # BLD: 0.5 K/UL (ref 0.2–2.4)
MONOCYTES # BLD: 0.6 K/UL (ref 0.2–2.4)
MONOCYTES # BLD: 0.7 K/UL (ref 0.2–2.4)
MONOCYTES # BLD: 1.2 K/UL (ref 0–1)
MONOCYTES NFR BLD: 11 % (ref 1.7–9.3)
MONOCYTES NFR BLD: 11 % (ref 1.7–9.3)
MONOCYTES NFR BLD: 12 % (ref 1.7–9.3)
MONOCYTES NFR BLD: 13 % (ref 1.7–9.3)
MONOCYTES NFR BLD: 15 % (ref 5–13)
MUCOUS THREADS URNS QL MICRO: ABNORMAL /LPF
MV DEC SLOPE: 6170 MM/S2
MV DEC SLOPE: 6170 MM/S2
NEUTS SEG # BLD: 2.2 K/UL (ref 1.8–7.7)
NEUTS SEG # BLD: 2.6 K/UL (ref 1.8–7.7)
NEUTS SEG # BLD: 3.5 K/UL (ref 1.8–7.7)
NEUTS SEG # BLD: 3.8 K/UL (ref 1.8–7.7)
NEUTS SEG # BLD: 5.6 K/UL (ref 1.8–8)
NEUTS SEG NFR BLD: 58 % (ref 42–75)
NEUTS SEG NFR BLD: 61 % (ref 42–75)
NEUTS SEG NFR BLD: 65 % (ref 42–75)
NEUTS SEG NFR BLD: 66 % (ref 42–75)
NEUTS SEG NFR BLD: 69 % (ref 32–75)
NITRITE UR QL STRIP.AUTO: NEGATIVE
NRBC # BLD: 0 K/UL
NRBC BLD-RTO: 0 PER 100 WBC
NRBC BLD-RTO: 10 PER 100 WBC
PERFORMED BY, TECHID: ABNORMAL
PH UR STRIP: 6 [PH] (ref 5–8)
PLATELET # BLD AUTO: 164 K/UL
PLATELET # BLD AUTO: 165 K/UL
PLATELET # BLD AUTO: 165 K/UL
PLATELET # BLD AUTO: 167 K/UL
PLATELET # BLD AUTO: 211 K/UL (ref 150–400)
PMV BLD AUTO: 9 FL (ref 6.5–11.5)
PMV BLD AUTO: 9.3 FL (ref 6.5–11.5)
PMV BLD AUTO: 9.5 FL (ref 6.5–11.5)
PMV BLD AUTO: 9.5 FL (ref 6.5–11.5)
POTASSIUM SERPL-SCNC: 2.8 MMOL/L (ref 3.5–5.1)
POTASSIUM SERPL-SCNC: 3.2 MMOL/L (ref 3.5–5.1)
POTASSIUM SERPL-SCNC: 3.3 MMOL/L (ref 3.5–5.1)
POTASSIUM SERPL-SCNC: 3.5 MMOL/L (ref 3.5–5.1)
POTASSIUM SERPL-SCNC: 3.6 MMOL/L (ref 3.5–5.1)
POTASSIUM SERPL-SCNC: 3.7 MMOL/L (ref 3.5–5.1)
PROT SERPL-MCNC: 7.1 G/DL (ref 6.4–8.2)
PROT UR STRIP-MCNC: >300 MG/DL
RBC # BLD AUTO: 4.69 M/UL (ref 4.5–5.9)
RBC # BLD AUTO: 4.78 M/UL (ref 4.5–5.9)
RBC # BLD AUTO: 4.98 M/UL (ref 4.5–5.9)
RBC # BLD AUTO: 5.08 M/UL (ref 4.5–5.9)
RBC # BLD AUTO: 5.26 M/UL (ref 4.1–5.7)
RBC #/AREA URNS HPF: ABNORMAL /HPF (ref 0–5)
SARS-COV-2, COV2: NORMAL
SODIUM SERPL-SCNC: 140 MMOL/L (ref 136–145)
SODIUM SERPL-SCNC: 142 MMOL/L (ref 136–145)
SODIUM SERPL-SCNC: 142 MMOL/L (ref 136–145)
SODIUM SERPL-SCNC: 143 MMOL/L (ref 136–145)
SODIUM SERPL-SCNC: 143 MMOL/L (ref 136–145)
SODIUM SERPL-SCNC: 146 MMOL/L (ref 136–145)
SP GR UR REFRACTOMETRY: 1.02 (ref 1–1.03)
SPECIAL REQUESTS,SREQ: NORMAL
SPECIMEN SOURCE: ABNORMAL
UA: UC IF INDICATED,UAUC: ABNORMAL
UROBILINOGEN UR QL STRIP.AUTO: 2 EU/DL (ref 0.1–1)
WBC # BLD AUTO: 3.8 K/UL (ref 4.4–11.3)
WBC # BLD AUTO: 4.3 K/UL (ref 4.4–11.3)
WBC # BLD AUTO: 5.2 K/UL (ref 4.4–11.3)
WBC # BLD AUTO: 5.8 K/UL (ref 4.4–11.3)
WBC # BLD AUTO: 8.1 K/UL (ref 4.1–11.1)
WBC URNS QL MICRO: ABNORMAL /HPF (ref 0–4)

## 2021-01-01 PROCEDURE — 74011250636 HC RX REV CODE- 250/636: Performed by: PHYSICIAN ASSISTANT

## 2021-01-01 PROCEDURE — 20610 DRAIN/INJ JOINT/BURSA W/O US: CPT | Performed by: FAMILY MEDICINE

## 2021-01-01 PROCEDURE — 65270000029 HC RM PRIVATE

## 2021-01-01 PROCEDURE — 80053 COMPREHEN METABOLIC PANEL: CPT

## 2021-01-01 PROCEDURE — 82962 GLUCOSE BLOOD TEST: CPT

## 2021-01-01 PROCEDURE — 87086 URINE CULTURE/COLONY COUNT: CPT

## 2021-01-01 PROCEDURE — 85025 COMPLETE CBC W/AUTO DIFF WBC: CPT

## 2021-01-01 PROCEDURE — 99442 PR PHYS/QHP TELEPHONE EVALUATION 11-20 MIN: CPT | Performed by: FAMILY MEDICINE

## 2021-01-01 PROCEDURE — 73560 X-RAY EXAM OF KNEE 1 OR 2: CPT

## 2021-01-01 PROCEDURE — 74011250636 HC RX REV CODE- 250/636: Performed by: EMERGENCY MEDICINE

## 2021-01-01 PROCEDURE — 36415 COLL VENOUS BLD VENIPUNCTURE: CPT

## 2021-01-01 PROCEDURE — 74011250637 HC RX REV CODE- 250/637: Performed by: HOSPITALIST

## 2021-01-01 PROCEDURE — 74011250637 HC RX REV CODE- 250/637: Performed by: EMERGENCY MEDICINE

## 2021-01-01 PROCEDURE — G8432 DEP SCR NOT DOC, RNG: HCPCS | Performed by: FAMILY MEDICINE

## 2021-01-01 PROCEDURE — 74011250637 HC RX REV CODE- 250/637: Performed by: PHYSICIAN ASSISTANT

## 2021-01-01 PROCEDURE — 73552 X-RAY EXAM OF FEMUR 2/>: CPT

## 2021-01-01 PROCEDURE — 74011250636 HC RX REV CODE- 250/636: Performed by: INTERNAL MEDICINE

## 2021-01-01 PROCEDURE — 99214 OFFICE O/P EST MOD 30 MIN: CPT | Performed by: FAMILY MEDICINE

## 2021-01-01 PROCEDURE — 96361 HYDRATE IV INFUSION ADD-ON: CPT

## 2021-01-01 PROCEDURE — 82550 ASSAY OF CK (CPK): CPT

## 2021-01-01 PROCEDURE — 97116 GAIT TRAINING THERAPY: CPT

## 2021-01-01 PROCEDURE — 74011636637 HC RX REV CODE- 636/637: Performed by: HOSPITALIST

## 2021-01-01 PROCEDURE — 97161 PT EVAL LOW COMPLEX 20 MIN: CPT

## 2021-01-01 PROCEDURE — 87635 SARS-COV-2 COVID-19 AMP PRB: CPT

## 2021-01-01 PROCEDURE — 71045 X-RAY EXAM CHEST 1 VIEW: CPT

## 2021-01-01 PROCEDURE — G8417 CALC BMI ABV UP PARAM F/U: HCPCS | Performed by: FAMILY MEDICINE

## 2021-01-01 PROCEDURE — 73502 X-RAY EXAM HIP UNI 2-3 VIEWS: CPT

## 2021-01-01 PROCEDURE — 99284 EMERGENCY DEPT VISIT MOD MDM: CPT

## 2021-01-01 PROCEDURE — G8754 DIAS BP LESS 90: HCPCS | Performed by: FAMILY MEDICINE

## 2021-01-01 PROCEDURE — 74011000258 HC RX REV CODE- 258: Performed by: EMERGENCY MEDICINE

## 2021-01-01 PROCEDURE — 72192 CT PELVIS W/O DYE: CPT

## 2021-01-01 PROCEDURE — G8753 SYS BP > OR = 140: HCPCS | Performed by: FAMILY MEDICINE

## 2021-01-01 PROCEDURE — 97110 THERAPEUTIC EXERCISES: CPT

## 2021-01-01 PROCEDURE — 80048 BASIC METABOLIC PNL TOTAL CA: CPT

## 2021-01-01 PROCEDURE — 81001 URINALYSIS AUTO W/SCOPE: CPT

## 2021-01-01 PROCEDURE — 1100F PTFALLS ASSESS-DOCD GE2>/YR: CPT | Performed by: FAMILY MEDICINE

## 2021-01-01 PROCEDURE — 1111F DSCHRG MED/CURRENT MED MERGE: CPT | Performed by: FAMILY MEDICINE

## 2021-01-01 PROCEDURE — 74011250637 HC RX REV CODE- 250/637: Performed by: INTERNAL MEDICINE

## 2021-01-01 PROCEDURE — G8536 NO DOC ELDER MAL SCRN: HCPCS | Performed by: FAMILY MEDICINE

## 2021-01-01 PROCEDURE — C8929 TTE W OR WO FOL WCON,DOPPLER: HCPCS

## 2021-01-01 PROCEDURE — G8427 DOCREV CUR MEDS BY ELIG CLIN: HCPCS | Performed by: FAMILY MEDICINE

## 2021-01-01 PROCEDURE — 3288F FALL RISK ASSESSMENT DOCD: CPT | Performed by: FAMILY MEDICINE

## 2021-01-01 PROCEDURE — 96360 HYDRATION IV INFUSION INIT: CPT

## 2021-01-01 PROCEDURE — 83735 ASSAY OF MAGNESIUM: CPT

## 2021-01-01 RX ORDER — ATORVASTATIN CALCIUM 40 MG/1
40 TABLET, FILM COATED ORAL EVERY EVENING
COMMUNITY
End: 2021-01-01

## 2021-01-01 RX ORDER — POTASSIUM CHLORIDE 20 MEQ/1
40 TABLET, EXTENDED RELEASE ORAL 2 TIMES DAILY
Status: COMPLETED | OUTPATIENT
Start: 2021-01-01 | End: 2021-01-01

## 2021-01-01 RX ORDER — LANOLIN ALCOHOL/MO/W.PET/CERES
400 CREAM (GRAM) TOPICAL DAILY
Status: DISCONTINUED | OUTPATIENT
Start: 2021-01-01 | End: 2021-01-01 | Stop reason: HOSPADM

## 2021-01-01 RX ORDER — HYDRALAZINE HYDROCHLORIDE 100 MG/1
100 TABLET, FILM COATED ORAL 3 TIMES DAILY
Status: DISCONTINUED | OUTPATIENT
Start: 2021-01-01 | End: 2021-01-01 | Stop reason: HOSPADM

## 2021-01-01 RX ORDER — DABIGATRAN ETEXILATE 150 MG/1
150 CAPSULE ORAL 2 TIMES DAILY
Status: DISCONTINUED | OUTPATIENT
Start: 2021-01-01 | End: 2021-01-01 | Stop reason: HOSPADM

## 2021-01-01 RX ORDER — ALBUTEROL SULFATE 90 UG/1
2 AEROSOL, METERED RESPIRATORY (INHALATION)
Status: DISCONTINUED | OUTPATIENT
Start: 2021-01-01 | End: 2021-01-01 | Stop reason: HOSPADM

## 2021-01-01 RX ORDER — ROSUVASTATIN CALCIUM 10 MG/1
20 TABLET, COATED ORAL
Status: DISCONTINUED | OUTPATIENT
Start: 2021-01-01 | End: 2021-01-01 | Stop reason: HOSPADM

## 2021-01-01 RX ORDER — ROSUVASTATIN CALCIUM 20 MG/1
20 TABLET, COATED ORAL
Qty: 90 TAB | Refills: 1
Start: 2021-01-01

## 2021-01-01 RX ORDER — SODIUM CHLORIDE 0.9 % (FLUSH) 0.9 %
5-40 SYRINGE (ML) INJECTION AS NEEDED
Status: DISCONTINUED | OUTPATIENT
Start: 2021-01-01 | End: 2021-01-01 | Stop reason: HOSPADM

## 2021-01-01 RX ORDER — DABIGATRAN ETEXILATE 150 MG/1
150 CAPSULE ORAL EVERY 12 HOURS
Qty: 180 CAP | Refills: 1 | Status: SHIPPED | OUTPATIENT
Start: 2021-01-01

## 2021-01-01 RX ORDER — HEPARIN SODIUM 5000 [USP'U]/ML
5000 INJECTION, SOLUTION INTRAVENOUS; SUBCUTANEOUS EVERY 12 HOURS
Status: DISCONTINUED | OUTPATIENT
Start: 2021-01-01 | End: 2021-01-01 | Stop reason: HOSPADM

## 2021-01-01 RX ORDER — ACETAMINOPHEN 325 MG/1
650 TABLET ORAL
Status: DISCONTINUED | OUTPATIENT
Start: 2021-01-01 | End: 2021-01-01 | Stop reason: HOSPADM

## 2021-01-01 RX ORDER — TAMSULOSIN HYDROCHLORIDE 0.4 MG/1
0.4 CAPSULE ORAL DAILY
Status: DISCONTINUED | OUTPATIENT
Start: 2021-01-01 | End: 2021-01-01 | Stop reason: HOSPADM

## 2021-01-01 RX ORDER — CLONIDINE 0.2 MG/24H
1 PATCH, EXTENDED RELEASE TRANSDERMAL
Status: DISCONTINUED | OUTPATIENT
Start: 2021-01-01 | End: 2021-01-01 | Stop reason: HOSPADM

## 2021-01-01 RX ORDER — METFORMIN HYDROCHLORIDE 500 MG/1
500 TABLET ORAL
Status: DISCONTINUED | OUTPATIENT
Start: 2021-01-01 | End: 2021-01-01 | Stop reason: HOSPADM

## 2021-01-01 RX ORDER — BACLOFEN 10 MG/1
10 TABLET ORAL 3 TIMES DAILY
Status: DISCONTINUED | OUTPATIENT
Start: 2021-01-01 | End: 2021-01-01 | Stop reason: HOSPADM

## 2021-01-01 RX ORDER — ACETAMINOPHEN 650 MG/1
650 SUPPOSITORY RECTAL
Status: DISCONTINUED | OUTPATIENT
Start: 2021-01-01 | End: 2021-01-01 | Stop reason: HOSPADM

## 2021-01-01 RX ORDER — GUAIFENESIN 100 MG/5ML
81 LIQUID (ML) ORAL DAILY
Status: DISCONTINUED | OUTPATIENT
Start: 2021-01-01 | End: 2021-01-01 | Stop reason: HOSPADM

## 2021-01-01 RX ORDER — LABETALOL 100 MG/1
100 TABLET, FILM COATED ORAL 2 TIMES DAILY
Status: DISCONTINUED | OUTPATIENT
Start: 2021-01-01 | End: 2021-01-01 | Stop reason: HOSPADM

## 2021-01-01 RX ORDER — INSULIN LISPRO 100 [IU]/ML
INJECTION, SOLUTION INTRAVENOUS; SUBCUTANEOUS
Status: DISCONTINUED | OUTPATIENT
Start: 2021-01-01 | End: 2021-01-01 | Stop reason: HOSPADM

## 2021-01-01 RX ORDER — FUROSEMIDE 40 MG/1
40 TABLET ORAL DAILY
Status: DISCONTINUED | OUTPATIENT
Start: 2021-01-01 | End: 2021-01-01 | Stop reason: HOSPADM

## 2021-01-01 RX ORDER — SODIUM CHLORIDE 0.9 % (FLUSH) 0.9 %
5-40 SYRINGE (ML) INJECTION EVERY 8 HOURS
Status: DISCONTINUED | OUTPATIENT
Start: 2021-01-01 | End: 2021-01-01 | Stop reason: HOSPADM

## 2021-01-01 RX ORDER — ALBUTEROL SULFATE 0.83 MG/ML
2.5 SOLUTION RESPIRATORY (INHALATION)
Status: DISCONTINUED | OUTPATIENT
Start: 2021-01-01 | End: 2021-01-01 | Stop reason: RX

## 2021-01-01 RX ORDER — CLONIDINE 0.2 MG/24H
1 PATCH, EXTENDED RELEASE TRANSDERMAL
Qty: 4 PATCH | Refills: 0 | Status: SHIPPED
Start: 2021-01-01

## 2021-01-01 RX ORDER — GABAPENTIN 300 MG/1
300 CAPSULE ORAL
Status: DISCONTINUED | OUTPATIENT
Start: 2021-01-01 | End: 2021-01-01 | Stop reason: HOSPADM

## 2021-01-01 RX ORDER — PANTOPRAZOLE SODIUM 40 MG/1
40 TABLET, DELAYED RELEASE ORAL DAILY
Status: DISCONTINUED | OUTPATIENT
Start: 2021-01-01 | End: 2021-01-01 | Stop reason: HOSPADM

## 2021-01-01 RX ORDER — ALLOPURINOL 300 MG/1
300 TABLET ORAL DAILY
Status: DISCONTINUED | OUTPATIENT
Start: 2021-01-01 | End: 2021-01-01 | Stop reason: HOSPADM

## 2021-01-01 RX ORDER — DEXTROSE 50 % IN WATER (D50W) INTRAVENOUS SYRINGE
25-50 AS NEEDED
Status: DISCONTINUED | OUTPATIENT
Start: 2021-01-01 | End: 2021-01-01 | Stop reason: HOSPADM

## 2021-01-01 RX ORDER — MONTELUKAST SODIUM 10 MG/1
10 TABLET ORAL DAILY
Status: CANCELLED | OUTPATIENT
Start: 2021-01-01

## 2021-01-01 RX ORDER — AMLODIPINE BESYLATE 5 MG/1
10 TABLET ORAL DAILY
Status: DISCONTINUED | OUTPATIENT
Start: 2021-01-01 | End: 2021-01-01 | Stop reason: HOSPADM

## 2021-01-01 RX ORDER — ALBUTEROL SULFATE 90 UG/1
2 AEROSOL, METERED RESPIRATORY (INHALATION)
Status: DISCONTINUED | OUTPATIENT
Start: 2021-01-01 | End: 2021-01-01

## 2021-01-01 RX ORDER — ONDANSETRON 2 MG/ML
4 INJECTION INTRAMUSCULAR; INTRAVENOUS
Status: DISCONTINUED | OUTPATIENT
Start: 2021-01-01 | End: 2021-01-01 | Stop reason: HOSPADM

## 2021-01-01 RX ORDER — ATORVASTATIN CALCIUM 40 MG/1
40 TABLET, FILM COATED ORAL
Status: DISCONTINUED | OUTPATIENT
Start: 2021-01-01 | End: 2021-01-01 | Stop reason: HOSPADM

## 2021-01-01 RX ORDER — CHOLECALCIFEROL (VITAMIN D3) 125 MCG
10 CAPSULE ORAL
Status: DISCONTINUED | OUTPATIENT
Start: 2021-01-01 | End: 2021-01-01 | Stop reason: HOSPADM

## 2021-01-01 RX ORDER — GABAPENTIN 300 MG/1
CAPSULE ORAL
Qty: 120 CAP | Refills: 2 | Status: SHIPPED | OUTPATIENT
Start: 2021-01-01

## 2021-01-01 RX ORDER — HYDRALAZINE HYDROCHLORIDE 25 MG/1
100 TABLET, FILM COATED ORAL 3 TIMES DAILY
Status: DISCONTINUED | OUTPATIENT
Start: 2021-01-01 | End: 2021-01-01 | Stop reason: HOSPADM

## 2021-01-01 RX ORDER — LABETALOL 100 MG/1
200 TABLET, FILM COATED ORAL 2 TIMES DAILY
Status: DISCONTINUED | OUTPATIENT
Start: 2021-01-01 | End: 2021-01-01 | Stop reason: HOSPADM

## 2021-01-01 RX ORDER — BUMETANIDE 1 MG/1
1 TABLET ORAL 2 TIMES DAILY
Status: DISCONTINUED | OUTPATIENT
Start: 2021-01-01 | End: 2021-01-01 | Stop reason: HOSPADM

## 2021-01-01 RX ORDER — HYDRALAZINE HYDROCHLORIDE 100 MG/1
100 TABLET, FILM COATED ORAL 3 TIMES DAILY
Qty: 30 TAB | Refills: 0 | Status: SHIPPED
Start: 2021-01-01

## 2021-01-01 RX ORDER — METFORMIN HYDROCHLORIDE 500 MG/1
500 TABLET ORAL
Qty: 270 TAB | Refills: 1 | Status: SHIPPED | OUTPATIENT
Start: 2021-01-01

## 2021-01-01 RX ORDER — FUROSEMIDE 40 MG/1
40 TABLET ORAL DAILY
Qty: 30 TAB | Refills: 0 | Status: SHIPPED
Start: 2021-01-01 | End: 2021-01-01

## 2021-01-01 RX ORDER — HYDRALAZINE HYDROCHLORIDE 25 MG/1
50 TABLET, FILM COATED ORAL 3 TIMES DAILY
Status: DISCONTINUED | OUTPATIENT
Start: 2021-01-01 | End: 2021-01-01

## 2021-01-01 RX ORDER — DOCUSATE SODIUM 100 MG/1
100 CAPSULE, LIQUID FILLED ORAL 2 TIMES DAILY
Status: DISCONTINUED | OUTPATIENT
Start: 2021-01-01 | End: 2021-01-01 | Stop reason: HOSPADM

## 2021-01-01 RX ORDER — BACLOFEN 10 MG/1
10 TABLET ORAL
Qty: 90 TAB | Refills: 1
Start: 2021-01-01 | End: 2021-01-01

## 2021-01-01 RX ORDER — LABETALOL 200 MG/1
TABLET, FILM COATED ORAL
Qty: 90 TAB | Refills: 1 | Status: SHIPPED | OUTPATIENT
Start: 2021-01-01

## 2021-01-01 RX ORDER — BUMETANIDE 2 MG/1
2 TABLET ORAL DAILY
Qty: 90 TAB | Refills: 1 | Status: SHIPPED | OUTPATIENT
Start: 2021-01-01

## 2021-01-01 RX ORDER — IPRATROPIUM BROMIDE AND ALBUTEROL SULFATE 2.5; .5 MG/3ML; MG/3ML
3 SOLUTION RESPIRATORY (INHALATION)
Status: DISCONTINUED | OUTPATIENT
Start: 2021-01-01 | End: 2021-01-01 | Stop reason: HOSPADM

## 2021-01-01 RX ORDER — POTASSIUM CHLORIDE 20 MEQ/1
20 TABLET, EXTENDED RELEASE ORAL 2 TIMES DAILY
Status: DISCONTINUED | OUTPATIENT
Start: 2021-01-01 | End: 2021-01-01 | Stop reason: HOSPADM

## 2021-01-01 RX ORDER — TRIAMCINOLONE ACETONIDE 40 MG/ML
40 INJECTION, SUSPENSION INTRA-ARTICULAR; INTRAMUSCULAR ONCE
Qty: 1 ML | Refills: 0
Start: 2021-01-01 | End: 2021-01-01

## 2021-01-01 RX ORDER — LABETALOL 100 MG/1
100 TABLET, FILM COATED ORAL 2 TIMES DAILY
Status: DISCONTINUED | OUTPATIENT
Start: 2021-01-01 | End: 2021-01-01

## 2021-01-01 RX ORDER — BUMETANIDE 2 MG/1
2 TABLET ORAL DAILY
Qty: 90 TAB | Refills: 1
Start: 2021-01-01 | End: 2021-01-01 | Stop reason: SDUPTHER

## 2021-01-01 RX ORDER — PANTOPRAZOLE SODIUM 40 MG/1
40 TABLET, DELAYED RELEASE ORAL
Status: DISCONTINUED | OUTPATIENT
Start: 2021-01-01 | End: 2021-01-01 | Stop reason: HOSPADM

## 2021-01-01 RX ORDER — HYDROCODONE BITARTRATE AND ACETAMINOPHEN 10; 325 MG/1; MG/1
1 TABLET ORAL
Qty: 120 TAB | Refills: 0 | Status: SHIPPED | OUTPATIENT
Start: 2021-01-01 | End: 2021-01-01 | Stop reason: SDUPTHER

## 2021-01-01 RX ORDER — COLCHICINE 0.6 MG/1
TABLET, FILM COATED ORAL
Qty: 90 TAB | Refills: 1
Start: 2021-01-01

## 2021-01-01 RX ORDER — ONDANSETRON 4 MG/1
4 TABLET, ORALLY DISINTEGRATING ORAL
Status: DISCONTINUED | OUTPATIENT
Start: 2021-01-01 | End: 2021-01-01 | Stop reason: HOSPADM

## 2021-01-01 RX ORDER — AMLODIPINE BESYLATE 10 MG/1
10 TABLET ORAL DAILY
Status: DISCONTINUED | OUTPATIENT
Start: 2021-01-01 | End: 2021-01-01 | Stop reason: HOSPADM

## 2021-01-01 RX ORDER — HYDROCODONE BITARTRATE AND ACETAMINOPHEN 5; 325 MG/1; MG/1
1 TABLET ORAL
Status: DISCONTINUED | OUTPATIENT
Start: 2021-01-01 | End: 2021-01-01 | Stop reason: HOSPADM

## 2021-01-01 RX ORDER — ASPIRIN 325 MG
50000 TABLET, DELAYED RELEASE (ENTERIC COATED) ORAL
Qty: 13 CAP | Refills: 1 | Status: SHIPPED | OUTPATIENT
Start: 2021-01-01

## 2021-01-01 RX ORDER — DEXTROSE MONOHYDRATE AND SODIUM CHLORIDE 5; .45 G/100ML; G/100ML
125 INJECTION, SOLUTION INTRAVENOUS CONTINUOUS
Status: DISCONTINUED | OUTPATIENT
Start: 2021-01-01 | End: 2021-01-01 | Stop reason: HOSPADM

## 2021-01-01 RX ORDER — BACLOFEN 10 MG/1
TABLET ORAL
Qty: 270 TAB | Refills: 1 | Status: SHIPPED | OUTPATIENT
Start: 2021-01-01

## 2021-01-01 RX ORDER — HYDROCODONE BITARTRATE AND ACETAMINOPHEN 10; 325 MG/1; MG/1
1 TABLET ORAL
Qty: 120 TAB | Refills: 0 | Status: SHIPPED | OUTPATIENT
Start: 2021-01-01 | End: 2021-03-25

## 2021-01-01 RX ORDER — MONTELUKAST SODIUM 10 MG/1
10 TABLET ORAL DAILY
Status: DISCONTINUED | OUTPATIENT
Start: 2021-01-01 | End: 2021-01-01 | Stop reason: HOSPADM

## 2021-01-01 RX ORDER — MAGNESIUM SULFATE 100 %
4 CRYSTALS MISCELLANEOUS AS NEEDED
Status: DISCONTINUED | OUTPATIENT
Start: 2021-01-01 | End: 2021-01-01 | Stop reason: HOSPADM

## 2021-01-01 RX ADMIN — HEPARIN SODIUM 5000 UNITS: 5000 INJECTION INTRAVENOUS; SUBCUTANEOUS at 14:44

## 2021-01-01 RX ADMIN — AMLODIPINE BESYLATE 10 MG: 10 TABLET ORAL at 09:33

## 2021-01-01 RX ADMIN — ASPIRIN 81 MG 81 MG: 81 TABLET ORAL at 09:56

## 2021-01-01 RX ADMIN — MAGNESIUM OXIDE 400 MG: 400 TABLET ORAL at 09:34

## 2021-01-01 RX ADMIN — MONTELUKAST 10 MG: 10 TABLET, FILM COATED ORAL at 09:56

## 2021-01-01 RX ADMIN — LABETALOL HYDROCHLORIDE 100 MG: 100 TABLET, FILM COATED ORAL at 10:37

## 2021-01-01 RX ADMIN — POTASSIUM CHLORIDE 20 MEQ: 1500 TABLET, EXTENDED RELEASE ORAL at 10:37

## 2021-01-01 RX ADMIN — Medication 10 ML: at 21:35

## 2021-01-01 RX ADMIN — HYDRALAZINE HYDROCHLORIDE 50 MG: 25 TABLET, FILM COATED ORAL at 08:23

## 2021-01-01 RX ADMIN — TAMSULOSIN HYDROCHLORIDE 0.4 MG: 0.4 CAPSULE ORAL at 12:15

## 2021-01-01 RX ADMIN — AMLODIPINE BESYLATE 10 MG: 10 TABLET ORAL at 08:22

## 2021-01-01 RX ADMIN — FUROSEMIDE 40 MG: 40 TABLET ORAL at 09:56

## 2021-01-01 RX ADMIN — GABAPENTIN 300 MG: 300 CAPSULE ORAL at 22:10

## 2021-01-01 RX ADMIN — Medication 10 ML: at 06:01

## 2021-01-01 RX ADMIN — HYDRALAZINE HYDROCHLORIDE 100 MG: 100 TABLET, FILM COATED ORAL at 16:53

## 2021-01-01 RX ADMIN — SODIUM CHLORIDE 1000 ML: 9 INJECTION, SOLUTION INTRAVENOUS at 21:37

## 2021-01-01 RX ADMIN — ROSUVASTATIN CALCIUM 20 MG: 10 TABLET, FILM COATED ORAL at 21:19

## 2021-01-01 RX ADMIN — GABAPENTIN 300 MG: 300 CAPSULE ORAL at 08:22

## 2021-01-01 RX ADMIN — PANTOPRAZOLE SODIUM 40 MG: 40 TABLET, DELAYED RELEASE ORAL at 10:38

## 2021-01-01 RX ADMIN — ASPIRIN 81 MG 81 MG: 81 TABLET ORAL at 09:23

## 2021-01-01 RX ADMIN — MAGNESIUM OXIDE 400 MG: 400 TABLET ORAL at 09:01

## 2021-01-01 RX ADMIN — FUROSEMIDE 40 MG: 40 TABLET ORAL at 10:37

## 2021-01-01 RX ADMIN — HEPARIN SODIUM 5000 UNITS: 5000 INJECTION INTRAVENOUS; SUBCUTANEOUS at 02:50

## 2021-01-01 RX ADMIN — AMLODIPINE BESYLATE 10 MG: 10 TABLET ORAL at 10:37

## 2021-01-01 RX ADMIN — POTASSIUM CHLORIDE 40 MEQ: 1500 TABLET, EXTENDED RELEASE ORAL at 08:23

## 2021-01-01 RX ADMIN — POTASSIUM CHLORIDE 40 MEQ: 1500 TABLET, EXTENDED RELEASE ORAL at 12:09

## 2021-01-01 RX ADMIN — GABAPENTIN 300 MG: 300 CAPSULE ORAL at 22:25

## 2021-01-01 RX ADMIN — HYDRALAZINE HYDROCHLORIDE 50 MG: 25 TABLET, FILM COATED ORAL at 21:16

## 2021-01-01 RX ADMIN — Medication 10 ML: at 14:37

## 2021-01-01 RX ADMIN — LABETALOL HYDROCHLORIDE 100 MG: 100 TABLET, FILM COATED ORAL at 17:41

## 2021-01-01 RX ADMIN — HYDRALAZINE HYDROCHLORIDE 50 MG: 25 TABLET, FILM COATED ORAL at 14:53

## 2021-01-01 RX ADMIN — PANTOPRAZOLE SODIUM 40 MG: 40 TABLET, DELAYED RELEASE ORAL at 10:00

## 2021-01-01 RX ADMIN — HEPARIN SODIUM 5000 UNITS: 5000 INJECTION INTRAVENOUS; SUBCUTANEOUS at 02:13

## 2021-01-01 RX ADMIN — TAMSULOSIN HYDROCHLORIDE 0.4 MG: 0.4 CAPSULE ORAL at 09:23

## 2021-01-01 RX ADMIN — HYDRALAZINE HYDROCHLORIDE 100 MG: 100 TABLET, FILM COATED ORAL at 10:46

## 2021-01-01 RX ADMIN — ROSUVASTATIN CALCIUM 20 MG: 10 TABLET, FILM COATED ORAL at 22:25

## 2021-01-01 RX ADMIN — POTASSIUM CHLORIDE 20 MEQ: 1500 TABLET, EXTENDED RELEASE ORAL at 18:00

## 2021-01-01 RX ADMIN — CEFTRIAXONE SODIUM 1 G: 1 INJECTION, POWDER, FOR SOLUTION INTRAMUSCULAR; INTRAVENOUS at 02:08

## 2021-01-01 RX ADMIN — Medication 10 ML: at 06:22

## 2021-01-01 RX ADMIN — HYDRALAZINE HYDROCHLORIDE 50 MG: 25 TABLET, FILM COATED ORAL at 09:33

## 2021-01-01 RX ADMIN — MONTELUKAST 10 MG: 10 TABLET, FILM COATED ORAL at 09:21

## 2021-01-01 RX ADMIN — Medication 10 ML: at 06:00

## 2021-01-01 RX ADMIN — ROSUVASTATIN CALCIUM 20 MG: 10 TABLET, FILM COATED ORAL at 21:55

## 2021-01-01 RX ADMIN — ROSUVASTATIN CALCIUM 20 MG: 10 TABLET, FILM COATED ORAL at 02:15

## 2021-01-01 RX ADMIN — ROSUVASTATIN CALCIUM 20 MG: 10 TABLET, FILM COATED ORAL at 21:42

## 2021-01-01 RX ADMIN — INSULIN LISPRO 2 UNITS: 100 INJECTION, SOLUTION INTRAVENOUS; SUBCUTANEOUS at 12:30

## 2021-01-01 RX ADMIN — Medication 10 ML: at 21:20

## 2021-01-01 RX ADMIN — TAMSULOSIN HYDROCHLORIDE 0.4 MG: 0.4 CAPSULE ORAL at 10:37

## 2021-01-01 RX ADMIN — MONTELUKAST 10 MG: 10 TABLET, FILM COATED ORAL at 10:37

## 2021-01-01 RX ADMIN — POTASSIUM CHLORIDE 20 MEQ: 1500 TABLET, EXTENDED RELEASE ORAL at 09:56

## 2021-01-01 RX ADMIN — MAGNESIUM OXIDE 400 MG: 400 TABLET ORAL at 10:46

## 2021-01-01 RX ADMIN — GABAPENTIN 300 MG: 300 CAPSULE ORAL at 12:40

## 2021-01-01 RX ADMIN — Medication 10 ML: at 05:55

## 2021-01-01 RX ADMIN — Medication 10 ML: at 21:56

## 2021-01-01 RX ADMIN — PANTOPRAZOLE SODIUM 40 MG: 40 TABLET, DELAYED RELEASE ORAL at 10:02

## 2021-01-01 RX ADMIN — GABAPENTIN 300 MG: 300 CAPSULE ORAL at 10:37

## 2021-01-01 RX ADMIN — MAGNESIUM OXIDE 400 MG: 400 TABLET ORAL at 09:56

## 2021-01-01 RX ADMIN — GABAPENTIN 300 MG: 300 CAPSULE ORAL at 21:42

## 2021-01-01 RX ADMIN — MAGNESIUM OXIDE 400 MG: 400 TABLET ORAL at 09:23

## 2021-01-01 RX ADMIN — TAMSULOSIN HYDROCHLORIDE 0.4 MG: 0.4 CAPSULE ORAL at 09:34

## 2021-01-01 RX ADMIN — HEPARIN SODIUM 5000 UNITS: 5000 INJECTION INTRAVENOUS; SUBCUTANEOUS at 14:34

## 2021-01-01 RX ADMIN — ASPIRIN 81 MG 81 MG: 81 TABLET ORAL at 09:33

## 2021-01-01 RX ADMIN — POTASSIUM CHLORIDE 20 MEQ: 1500 TABLET, EXTENDED RELEASE ORAL at 14:34

## 2021-01-01 RX ADMIN — ROSUVASTATIN CALCIUM 20 MG: 10 TABLET, FILM COATED ORAL at 21:34

## 2021-01-01 RX ADMIN — HYDRALAZINE HYDROCHLORIDE 100 MG: 100 TABLET, FILM COATED ORAL at 18:26

## 2021-01-01 RX ADMIN — POTASSIUM CHLORIDE 20 MEQ: 1500 TABLET, EXTENDED RELEASE ORAL at 09:23

## 2021-01-01 RX ADMIN — HEPARIN SODIUM 5000 UNITS: 5000 INJECTION INTRAVENOUS; SUBCUTANEOUS at 01:38

## 2021-01-01 RX ADMIN — POTASSIUM CHLORIDE 20 MEQ: 1500 TABLET, EXTENDED RELEASE ORAL at 18:26

## 2021-01-01 RX ADMIN — Medication 10 ML: at 14:45

## 2021-01-01 RX ADMIN — FUROSEMIDE 40 MG: 40 TABLET ORAL at 14:22

## 2021-01-01 RX ADMIN — Medication 10 ML: at 14:00

## 2021-01-01 RX ADMIN — DOCUSATE SODIUM 100 MG: 100 CAPSULE, LIQUID FILLED ORAL at 12:15

## 2021-01-01 RX ADMIN — PANTOPRAZOLE SODIUM 40 MG: 40 TABLET, DELAYED RELEASE ORAL at 09:33

## 2021-01-01 RX ADMIN — HYDRALAZINE HYDROCHLORIDE 100 MG: 100 TABLET, FILM COATED ORAL at 16:27

## 2021-01-01 RX ADMIN — PANTOPRAZOLE SODIUM 40 MG: 40 TABLET, DELAYED RELEASE ORAL at 08:23

## 2021-01-01 RX ADMIN — Medication 10 ML: at 22:10

## 2021-01-01 RX ADMIN — GABAPENTIN 300 MG: 300 CAPSULE ORAL at 09:56

## 2021-01-01 RX ADMIN — Medication 10 ML: at 13:52

## 2021-01-01 RX ADMIN — MONTELUKAST 10 MG: 10 TABLET, FILM COATED ORAL at 10:46

## 2021-01-01 RX ADMIN — HYDRALAZINE HYDROCHLORIDE 100 MG: 100 TABLET, FILM COATED ORAL at 09:56

## 2021-01-01 RX ADMIN — HEPARIN SODIUM 5000 UNITS: 5000 INJECTION INTRAVENOUS; SUBCUTANEOUS at 13:51

## 2021-01-01 RX ADMIN — INSULIN LISPRO 2 UNITS: 100 INJECTION, SOLUTION INTRAVENOUS; SUBCUTANEOUS at 16:13

## 2021-01-01 RX ADMIN — GABAPENTIN 300 MG: 300 CAPSULE ORAL at 09:34

## 2021-01-01 RX ADMIN — HEPARIN SODIUM 5000 UNITS: 5000 INJECTION INTRAVENOUS; SUBCUTANEOUS at 14:00

## 2021-01-01 RX ADMIN — HEPARIN SODIUM 5000 UNITS: 5000 INJECTION INTRAVENOUS; SUBCUTANEOUS at 02:15

## 2021-01-01 RX ADMIN — HEPARIN SODIUM 5000 UNITS: 5000 INJECTION INTRAVENOUS; SUBCUTANEOUS at 02:31

## 2021-01-01 RX ADMIN — HYDRALAZINE HYDROCHLORIDE 50 MG: 25 TABLET, FILM COATED ORAL at 21:35

## 2021-01-01 RX ADMIN — CEFTRIAXONE SODIUM 1 G: 1 INJECTION, POWDER, FOR SOLUTION INTRAMUSCULAR; INTRAVENOUS at 02:17

## 2021-01-01 RX ADMIN — HEPARIN SODIUM 5000 UNITS: 5000 INJECTION INTRAVENOUS; SUBCUTANEOUS at 14:23

## 2021-01-01 RX ADMIN — GABAPENTIN 300 MG: 300 CAPSULE ORAL at 12:11

## 2021-01-01 RX ADMIN — AMLODIPINE BESYLATE 10 MG: 10 TABLET ORAL at 09:23

## 2021-01-01 RX ADMIN — HEPARIN SODIUM 5000 UNITS: 5000 INJECTION INTRAVENOUS; SUBCUTANEOUS at 02:08

## 2021-01-01 RX ADMIN — AMLODIPINE BESYLATE 10 MG: 10 TABLET ORAL at 09:21

## 2021-01-01 RX ADMIN — MONTELUKAST 10 MG: 10 TABLET, FILM COATED ORAL at 09:33

## 2021-01-01 RX ADMIN — HYDRALAZINE HYDROCHLORIDE 100 MG: 100 TABLET, FILM COATED ORAL at 17:31

## 2021-01-01 RX ADMIN — DABIGATRAN ETEXILATE MESYLATE 150 MG: 150 CAPSULE ORAL at 12:16

## 2021-01-01 RX ADMIN — TAMSULOSIN HYDROCHLORIDE 0.4 MG: 0.4 CAPSULE ORAL at 09:56

## 2021-01-01 RX ADMIN — TAMSULOSIN HYDROCHLORIDE 0.4 MG: 0.4 CAPSULE ORAL at 09:20

## 2021-01-01 RX ADMIN — HYDRALAZINE HYDROCHLORIDE 50 MG: 25 TABLET, FILM COATED ORAL at 21:19

## 2021-01-01 RX ADMIN — FUROSEMIDE 40 MG: 40 TABLET ORAL at 10:46

## 2021-01-01 RX ADMIN — CEFTRIAXONE SODIUM 1 G: 1 INJECTION, POWDER, FOR SOLUTION INTRAMUSCULAR; INTRAVENOUS at 02:15

## 2021-01-01 RX ADMIN — ASPIRIN 81 MG: 81 TABLET, CHEWABLE ORAL at 12:15

## 2021-01-01 RX ADMIN — LABETALOL HYDROCHLORIDE 100 MG: 100 TABLET, FILM COATED ORAL at 18:26

## 2021-01-01 RX ADMIN — POTASSIUM CHLORIDE 40 MEQ: 1500 TABLET, EXTENDED RELEASE ORAL at 17:10

## 2021-01-01 RX ADMIN — GABAPENTIN 300 MG: 300 CAPSULE ORAL at 21:16

## 2021-01-01 RX ADMIN — POTASSIUM CHLORIDE 20 MEQ: 1500 TABLET, EXTENDED RELEASE ORAL at 10:46

## 2021-01-01 RX ADMIN — GABAPENTIN 300 MG: 300 CAPSULE ORAL at 16:27

## 2021-01-01 RX ADMIN — GABAPENTIN 300 MG: 300 CAPSULE ORAL at 21:55

## 2021-01-01 RX ADMIN — AMLODIPINE BESYLATE 10 MG: 10 TABLET ORAL at 10:46

## 2021-01-01 RX ADMIN — POTASSIUM CHLORIDE 20 MEQ: 1500 TABLET, EXTENDED RELEASE ORAL at 17:41

## 2021-01-01 RX ADMIN — HYDRALAZINE HYDROCHLORIDE 100 MG: 100 TABLET, FILM COATED ORAL at 10:41

## 2021-01-01 RX ADMIN — GABAPENTIN 300 MG: 300 CAPSULE ORAL at 21:35

## 2021-01-01 RX ADMIN — ASPIRIN 81 MG 81 MG: 81 TABLET ORAL at 08:22

## 2021-01-01 RX ADMIN — HEPARIN SODIUM 5000 UNITS: 5000 INJECTION INTRAVENOUS; SUBCUTANEOUS at 13:15

## 2021-01-01 RX ADMIN — MONTELUKAST 10 MG: 10 TABLET, FILM COATED ORAL at 09:23

## 2021-01-01 RX ADMIN — GABAPENTIN 300 MG: 300 CAPSULE ORAL at 12:22

## 2021-01-01 RX ADMIN — HYDRALAZINE HYDROCHLORIDE 100 MG: 100 TABLET, FILM COATED ORAL at 22:09

## 2021-01-01 RX ADMIN — GABAPENTIN 300 MG: 300 CAPSULE ORAL at 13:15

## 2021-01-01 RX ADMIN — MAGNESIUM OXIDE 400 MG: 400 TABLET ORAL at 08:22

## 2021-01-01 RX ADMIN — HYDRALAZINE HYDROCHLORIDE 100 MG: 100 TABLET, FILM COATED ORAL at 21:56

## 2021-01-01 RX ADMIN — BACLOFEN 10 MG: 10 TABLET ORAL at 16:14

## 2021-01-01 RX ADMIN — GABAPENTIN 300 MG: 300 CAPSULE ORAL at 13:51

## 2021-01-01 RX ADMIN — HYDRALAZINE HYDROCHLORIDE 100 MG: 100 TABLET, FILM COATED ORAL at 21:42

## 2021-01-01 RX ADMIN — Medication 10 ML: at 21:18

## 2021-01-01 RX ADMIN — MAGNESIUM OXIDE 400 MG: 400 TABLET ORAL at 10:37

## 2021-01-01 RX ADMIN — GABAPENTIN 300 MG: 300 CAPSULE ORAL at 17:18

## 2021-01-01 RX ADMIN — HYDRALAZINE HYDROCHLORIDE 100 MG: 100 TABLET, FILM COATED ORAL at 22:25

## 2021-01-01 RX ADMIN — HEPARIN SODIUM 5000 UNITS: 5000 INJECTION INTRAVENOUS; SUBCUTANEOUS at 14:37

## 2021-01-01 RX ADMIN — TAMSULOSIN HYDROCHLORIDE 0.4 MG: 0.4 CAPSULE ORAL at 08:23

## 2021-01-01 RX ADMIN — Medication 5 ML: at 14:00

## 2021-01-01 RX ADMIN — AMLODIPINE BESYLATE 10 MG: 5 TABLET ORAL at 12:14

## 2021-01-01 RX ADMIN — ROSUVASTATIN CALCIUM 20 MG: 10 TABLET, FILM COATED ORAL at 21:17

## 2021-01-01 RX ADMIN — Medication 10 ML: at 22:25

## 2021-01-01 RX ADMIN — GABAPENTIN 300 MG: 300 CAPSULE ORAL at 12:09

## 2021-01-01 RX ADMIN — PANTOPRAZOLE SODIUM 40 MG: 40 TABLET, DELAYED RELEASE ORAL at 09:20

## 2021-01-01 RX ADMIN — FUROSEMIDE 40 MG: 40 TABLET ORAL at 09:21

## 2021-01-01 RX ADMIN — Medication 10 ML: at 05:35

## 2021-01-01 RX ADMIN — GABAPENTIN 300 MG: 300 CAPSULE ORAL at 09:26

## 2021-01-01 RX ADMIN — GABAPENTIN 300 MG: 300 CAPSULE ORAL at 17:31

## 2021-01-01 RX ADMIN — HYDRALAZINE HYDROCHLORIDE 50 MG: 25 TABLET, FILM COATED ORAL at 16:59

## 2021-01-01 RX ADMIN — HEPARIN SODIUM 5000 UNITS: 5000 INJECTION INTRAVENOUS; SUBCUTANEOUS at 02:18

## 2021-01-01 RX ADMIN — POTASSIUM CHLORIDE 20 MEQ: 1500 TABLET, EXTENDED RELEASE ORAL at 17:18

## 2021-01-01 RX ADMIN — GABAPENTIN 300 MG: 300 CAPSULE ORAL at 10:45

## 2021-01-01 RX ADMIN — Medication 1 TABLET: at 09:00

## 2021-01-01 RX ADMIN — MONTELUKAST 10 MG: 10 TABLET, FILM COATED ORAL at 09:00

## 2021-01-01 RX ADMIN — ASPIRIN 81 MG 81 MG: 81 TABLET ORAL at 10:46

## 2021-01-01 RX ADMIN — ASPIRIN 81 MG 81 MG: 81 TABLET ORAL at 09:01

## 2021-01-01 RX ADMIN — ASPIRIN 81 MG 81 MG: 81 TABLET ORAL at 10:37

## 2021-01-01 RX ADMIN — Medication 10 ML: at 15:44

## 2021-01-01 RX ADMIN — HYDRALAZINE HYDROCHLORIDE 100 MG: 100 TABLET, FILM COATED ORAL at 09:20

## 2021-01-01 RX ADMIN — PANTOPRAZOLE SODIUM 40 MG: 40 TABLET, DELAYED RELEASE ORAL at 10:46

## 2021-01-01 RX ADMIN — HYDRALAZINE HYDROCHLORIDE 50 MG: 25 TABLET, FILM COATED ORAL at 17:09

## 2021-01-01 RX ADMIN — TAMSULOSIN HYDROCHLORIDE 0.4 MG: 0.4 CAPSULE ORAL at 10:45

## 2021-01-01 RX ADMIN — BACLOFEN 10 MG: 10 TABLET ORAL at 12:15

## 2021-01-01 RX ADMIN — LABETALOL HYDROCHLORIDE 100 MG: 100 TABLET, FILM COATED ORAL at 09:57

## 2021-01-01 RX ADMIN — TAMSULOSIN HYDROCHLORIDE 0.4 MG: 0.4 CAPSULE ORAL at 09:00

## 2021-01-01 RX ADMIN — LABETALOL HYDROCHLORIDE 100 MG: 100 TABLET, FILM COATED ORAL at 09:20

## 2021-01-01 RX ADMIN — GABAPENTIN 300 MG: 300 CAPSULE ORAL at 17:09

## 2021-01-01 RX ADMIN — ROSUVASTATIN CALCIUM 20 MG: 10 TABLET, FILM COATED ORAL at 22:09

## 2021-01-01 RX ADMIN — LABETALOL HYDROCHLORIDE 100 MG: 100 TABLET, FILM COATED ORAL at 17:31

## 2021-01-01 RX ADMIN — POTASSIUM CHLORIDE 40 MEQ: 1500 TABLET, EXTENDED RELEASE ORAL at 17:02

## 2021-01-01 RX ADMIN — GABAPENTIN 300 MG: 300 CAPSULE ORAL at 09:23

## 2021-01-01 RX ADMIN — POTASSIUM CHLORIDE 20 MEQ: 1500 TABLET, EXTENDED RELEASE ORAL at 16:27

## 2021-01-01 RX ADMIN — HEPARIN SODIUM 5000 UNITS: 5000 INJECTION INTRAVENOUS; SUBCUTANEOUS at 01:58

## 2021-01-01 RX ADMIN — GABAPENTIN 300 MG: 300 CAPSULE ORAL at 18:28

## 2021-01-01 RX ADMIN — GABAPENTIN 300 MG: 300 CAPSULE ORAL at 16:14

## 2021-01-01 RX ADMIN — Medication 10 ML: at 02:16

## 2021-01-01 RX ADMIN — HYDRALAZINE HYDROCHLORIDE 50 MG: 25 TABLET, FILM COATED ORAL at 09:01

## 2021-01-01 RX ADMIN — AMLODIPINE BESYLATE 10 MG: 10 TABLET ORAL at 09:56

## 2021-01-01 RX ADMIN — HYDRALAZINE HYDROCHLORIDE 100 MG: 25 TABLET, FILM COATED ORAL at 16:13

## 2021-01-01 RX ADMIN — AMLODIPINE BESYLATE 10 MG: 10 TABLET ORAL at 09:01

## 2021-01-01 RX ADMIN — HYDROCODONE BITARTRATE AND ACETAMINOPHEN 1 TABLET: 5; 325 TABLET ORAL at 02:15

## 2021-01-01 RX ADMIN — MAGNESIUM OXIDE 400 MG: 400 TABLET ORAL at 09:21

## 2021-01-01 RX ADMIN — POTASSIUM CHLORIDE 20 MEQ: 1500 TABLET, EXTENDED RELEASE ORAL at 09:20

## 2021-01-01 RX ADMIN — LABETALOL HYDROCHLORIDE 100 MG: 100 TABLET, FILM COATED ORAL at 13:51

## 2021-01-01 RX ADMIN — PANTOPRAZOLE SODIUM 40 MG: 40 TABLET, DELAYED RELEASE ORAL at 12:15

## 2021-01-01 RX ADMIN — ALLOPURINOL 300 MG: 300 TABLET ORAL at 12:16

## 2021-01-01 RX ADMIN — GABAPENTIN 300 MG: 300 CAPSULE ORAL at 14:22

## 2021-01-01 RX ADMIN — Medication 10 ML: at 14:34

## 2021-01-01 RX ADMIN — GABAPENTIN 300 MG: 300 CAPSULE ORAL at 16:59

## 2021-01-01 RX ADMIN — ASPIRIN 81 MG 81 MG: 81 TABLET ORAL at 09:20

## 2021-01-01 RX ADMIN — Medication 10 ML: at 06:25

## 2021-01-01 RX ADMIN — PERFLUTREN 2 ML: 6.52 INJECTION, SUSPENSION INTRAVENOUS at 15:41

## 2021-01-01 RX ADMIN — GABAPENTIN 300 MG: 300 CAPSULE ORAL at 07:44

## 2021-01-01 RX ADMIN — Medication 10 ML: at 06:15

## 2021-01-01 RX ADMIN — GABAPENTIN 300 MG: 300 CAPSULE ORAL at 16:53

## 2021-01-01 RX ADMIN — MONTELUKAST 10 MG: 10 TABLET, FILM COATED ORAL at 09:01

## 2021-01-01 RX ADMIN — HYDRALAZINE HYDROCHLORIDE 100 MG: 100 TABLET, FILM COATED ORAL at 09:23

## 2021-01-01 RX ADMIN — POTASSIUM CHLORIDE 20 MEQ: 1500 TABLET, EXTENDED RELEASE ORAL at 17:31

## 2021-01-01 RX ADMIN — PANTOPRAZOLE SODIUM 40 MG: 40 TABLET, DELAYED RELEASE ORAL at 07:45

## 2021-01-01 RX ADMIN — GABAPENTIN 300 MG: 300 CAPSULE ORAL at 21:19

## 2021-01-01 RX ADMIN — Medication 10 ML: at 21:44

## 2021-01-06 NOTE — Clinical Note
Status[de-identified] INPATIENT [101]   Type of Bed: Medical [8]   Inpatient Hospitalization Certified Necessary for the Following Reasons: 3. Patient receiving treatment that can only be provided in an inpatient setting (further clarification in H&P documentation)   Admitting Diagnosis: Rhabdomyolysis [728.88. ICD-9-CM]   Admitting Physician: Maddison Barker [[id number]]   Attending Physician: Maddison Barker [[id number]]   Estimated Length of Stay: 2 Midnights   Discharge Plan[de-identified] [facility]

## 2021-01-06 NOTE — ED TRIAGE NOTES
EMS dispatched for GLF. Delfino Broaden Unsure how long he was in the floor  Pt states his right knee gave out. Denies LOC.

## 2021-01-07 PROBLEM — M62.82 RHABDOMYOLYSIS: Status: ACTIVE | Noted: 2021-01-01

## 2021-01-07 NOTE — H&P
History and Physical              Subjective :   Chief Complaint : Fall with lethargy    Source of information : Patient not a good historian, from previous medical records and from the ED provider    History of present illness:   79 y.o. male history of CHF, diabetes mellitus, hypertension presents to the emergency room by emergency crew as he was found on the floor. Patient complains of significant pain in the right hip and right knee started after a ground-level fall. As per information patient was on the floor lay down, not sure how long. States he cannot remember what happened when it happened. Complains right lower extremity pain, fell because giving out of the right leg. But he is not sure about loss of consciousness. He is not sure when this happened unable to give information. He complained of pain in the right leg to get up unable to do so. Son called emergency crew when he found out that he is on the floor. He denies any fever, chills, chest pain or trouble breathing. He does have history of difficult to control hypertension, not sure exactly what medications he is taking. I reviewed with the list from the PCP office notes.     Past Medical History:   Diagnosis Date    Acute on chronic diastolic congestive heart failure ([address] 4/27/2015    Arthritis 7/11/2011    [place] 6/7/2010    Cancer (HCC)     prostate    Chronic diastolic heart failure ([address] 8/26/2015    CKD (chronic kidney disease) 11/23/2015    Diabetes ([address]     Gout, unspecified     Hypercholesterolemia     Hypertension     Inguinal lymphadenopathy 2/18/2014    Leg pain     Persistent atrial fibrillation (HCC)     PVD (peripheral vascular disease) ([address]      Past Surgical History:   Procedure Laterality Date    COLONOSCOPY N/A 9/22/2016    COLONOSCOPY performed by Ava Glass MD at [address] [facility]      back and left shoulder x2     Family History   Problem Relation Age of Onset    Cancer Paternal Grandfather         colon    Hypertension Other         son      Social History     Tobacco Use    Smoking status: Former Smoker     Types: Cigarettes     Quit date: 2003     Years since quittin.5    Smokeless tobacco: Never Used   Substance Use Topics    Alcohol use: Not Currently     Alcohol/week: 0.0 standard drinks       Prior to Admission medications    Medication Sig Start Date End Date Taking? Authorizing Provider   diclofenac (VOLTAREN) 1 % gel Apply 1 g to affected area four (4) times daily. Apply to right knee 20   Ruslan Odell MD   Colcrys 0.6 mg tablet TAKE 1 TABLET BY MOUTH ONCE DAILY TO  PREVENT  GOUT 20   Ruslan Odell MD   HYDROcodone-acetaminophen [facility])  mg tablet Take 1 Tab by mouth every six (6) hours as needed for Pain for up to 30 days. 20  Ruslan Odell MD   montelukast (SINGULAIR) 10 mg tablet Take 1 Tab by mouth daily. 10/29/20   Ruslan Odell MD   cloNIDine (CATAPRES) 0.3 mg/24 hr 1 Patch by TransDERmal route every seven (7) days. 10/29/20   Ruslan Odell MD   glipiZIDE (GLUCOTROL) 5 mg tablet TAKE 2 TABLETS BY MOUTH 20 MINUTES BEFORE BREAKFAST AND SUPPER 10/18/20   Art Anand MD   fluticasone propionate (FLONASE) 50 mcg/actuation nasal spray USE ONE SPRAY(S) IN EACH NOSTRIL ONCE DAILY 10/16/20   Ruslan Odell MD   albuterol (PROVENTIL HFA, VENTOLIN HFA, PROAIR HFA) 90 mcg/actuation inhaler INHALE TWO PUFFS BY MOUTH EVERY 6 HOURS AS NEEDED FOR WHEEZING FOR  UP  TO  ONE  YEAR 10/16/20   Ruslan Odell MD   b complex-vitamin c-folic acid 5mg Jeanette Greet Plus) tab tablet Take 1 tablet by mouth once daily 10/16/20   Ruslan Odell MD   ergocalciferol (ERGOCALCIFEROL) 1,250 mcg (50,000 unit) capsule Take 1 Cap by mouth every seven (7) days.  10/16/20   Ruslan Odell MD   hydrALAZINE (APRESOLINE) 50 mg tablet TAKE 1 TABLET BY MOUTH 4 TIMES DAILY FOR  HYPERTENSION 20   Marco A Sifuentes Darvin Puentes MD   glucose blood VI test strips (Infinity Test strips) strip Use to test twice daily as directed 8/4/20   Pawan Hale MD   gabapentin (NEURONTIN) 300 mg capsule Take 1 Cap by mouth Before breakfast, lunch, dinner and at bedtime. Max Daily Amount: 1,200 mg. 7/6/20   Rebeca Heimlich, MD   baclofen (LIORESAL) 10 mg tablet TAKE 1 TABLET BY MOUTH THREE TIMES DAILY (MUSCLE  RELAXER) 6/18/20   Rebeca Heimlich, MD   omeprazole (PRILOSEC) 20 mg capsule Take 1 capsule by mouth once daily 5/12/20   Rebeca Heimlich, MD   tamsulosin [facility]) 0.4 mg capsule Take 1 capsule by mouth once daily 4/21/20   Rebeca Heimlich, MD   allopurinoL (ZYLOPRIM) 300 mg tablet Take 1 tablet by mouth once daily 4/21/20   Rebeca Heimlich, MD   Pradaxa 150 mg capsule Take 1 capsule by mouth twice daily 3/24/20   Rebeca Heimlich, MD   Colcrys 0.6 mg tablet TAKE 1 TABLET BY MOUTH ONCE DAILY TO  PREVENT  GOUT 3/24/20   Rebeca Heimlich, MD   metFORMIN (GLUCOPHAGE) 500 mg tablet Take 1 Tab by mouth three (3) times daily (with meals). 1/29/20   Rebeca Heimlich, MD   labetalol (NORMODYNE) 200 mg tablet TAKE 1/2 (ONE-HALF) TABLET BY MOUTH TWICE DAILY 1/6/20   Rebeca Heimlich, MD   empagliflozin (JARDIANCE) 10 mg tablet Take one tab daily. STOP LOVASTATIN 11/21/19   Pawan Hale MD   melatonin 10 mg tab Take  by mouth. Provider, Historical   amLODIPine (NORVASC) 10 mg tablet TAKE ONE TABLET BY MOUTH ONCE DAILY 9/16/19   Rebeca Heimlich, MD   rosuvastatin (CRESTOR) 20 mg tablet Take 1 Tab by mouth nightly. Indications: high cholesterol 9/16/19   Rebeca Heimlich, MD   potassium chloride (K-DUR) 10 mEq tablet Take 2 Tabs by mouth two (2) times a day.  Indications: low amount of potassium in the blood 9/16/19   Rebeca Heimlich, MD   lancets misc Test 2 times daily Dx Code E11.65 7/18/19   Pawan Hale MD   bumetanide (BUMEX) 2 mg tablet TAKE ONE TABLET BY MOUTH TWICE DAILY 6/5/19   Rebeca Heimlich, MD   ferrous sulfate (IRON) 325 mg (65 mg iron) tablet Take 1 Tab by mouth two (2) times daily (after meals). Indications: Iron Deficiency Anemia 7/2/18   Peace Cevallos MD   lancets 30 gauge misc  5/18/17   Provider, Historical   docusate sodium 100 mg tab Take 1 Tab by mouth two (2) times a day. 5/12/17   Peace Cevallos MD   magnesium oxide (MAG-OX) 400 mg tablet Take 1 Tab by mouth daily. 5/12/17   Peace Cevallos MD   aspirin 81 mg chewable tablet Take 81 mg by mouth daily. Provider, Historical     Allergies   Allergen Reactions    Indocin [Indomethacin Sodium] Unknown (comments)    Lisinopril Angioedema     Dxed in hospital.    Losartan Other (comments)     Face and throat swelling. Review of Systems: Unable to give reliable information on review of systems Other than in history of present illness. Vitals:     Patient Vitals for the past 12 hrs:   Temp Pulse Resp BP SpO2   01/07/21 0222 -- (!) 53 19 (!) 208/80 94 %   01/06/21 1814 97.9 °F (36.6 °C) 67 16 (!) 198/89 100 %       Physical Exam:   General : Morbidly obese, no acute distress noted. HEENT : PERRLA, dry oral mucosa, atraumatic normocephalic, Normal ear and nose. Neck : Supple, no JVD, no masses noted, no carotid bruit. Lungs : Breath sounds with moderate air entry bilaterally, no wheezes or rales, no accessory muscle use. CVS : Rhythm rate regular, S1+, S2+, no murmur or gallop. Abdomen : Soft, nontender, obese abdomen. , bowel sounds active. Extremities : 2+ edema noted,  pedal pulses not palpable. Musculoskeletal : Did not examine range of motion, complains of pain in the right lower extremity. Skin : Dry, warm, no pathological rash. Lymphatic : No cervical lymphadenopathy. Neurological : Awake, alert, he is oriented to place and person. Unable to answer fine detail questions.   Psychiatric : Mood and affect unable to evaluate        Data Review:   Recent Results (from the past 24 hour(s))   CBC WITH AUTOMATED DIFF    Collection Time: 01/06/21  7:45 PM   Result Value Ref Range    WBC 8.1 4.1 - 11.1 K/uL    RBC 5.26 4.10 - 5.70 M/uL    HGB 11.3 (L) 12.1 - 17.0 g/dL    HCT 38.5 36.6 - 50.3 %    MCV 73.2 (L) 80.0 - 99.0 FL    MCH 21.5 (L) 26.0 - 34.0 PG    MCHC 29.4 (L) 30.0 - 36.5 g/dL    RDW 17.4 (H) 11.5 - 14.5 %    PLATELET 501 061 - 743 K/uL    NEUTROPHILS 69 32 - 75 %    LYMPHOCYTES 15 12 - 49 %    MONOCYTES 15 (H) 5 - 13 %    EOSINOPHILS 1 0 - 7 %    BASOPHILS 0 0 - 1 %    IMMATURE GRANULOCYTES 0 0.0 - 0.5 %    ABS. NEUTROPHILS 5.6 1.8 - 8.0 K/UL    ABS. LYMPHOCYTES 1.2 0.8 - 3.5 K/UL    ABS. MONOCYTES 1.2 (H) 0.0 - 1.0 K/UL    ABS. EOSINOPHILS 0.1 0.0 - 0.4 K/UL    ABS. BASOPHILS 0.0 0.0 - 0.1 K/UL    ABS. IMM. GRANS. 0.0 0.00 - 0.04 K/UL    DF AUTOMATED     METABOLIC PANEL, COMPREHENSIVE    Collection Time: 01/06/21  7:45 PM   Result Value Ref Range    Sodium 146 (H) 136 - 145 mmol/L    Potassium 3.7 3.5 - 5.1 mmol/L    Chloride 110 (H) 97 - 108 mmol/L    CO2 27 21 - 32 mmol/L    Anion gap 9 5 - 15 mmol/L    Glucose 92 65 - 100 mg/dL    BUN 14 6 - 20 mg/dL    Creatinine 1.60 (H) 0.70 - 1.30 mg/dL    BUN/Creatinine ratio 9 (L) 12 - 20      GFR est AA 51 (L) >60 ml/min/1.73m2    GFR est non-AA 42 (L) >60 ml/min/1.73m2    Calcium 9.7 8.5 - 10.1 mg/dL    Bilirubin, total 1.2 (H) 0.2 - 1.0 mg/dL    AST (SGOT) 75 (H) 15 - 37 U/L    ALT (SGPT) 33 12 - 78 U/L    Alk.  phosphatase 89 45 - 117 U/L    Protein, total 7.1 6.4 - 8.2 g/dL    Albumin 3.6 3.5 - 5.0 g/dL    Globulin 3.5 2.0 - 4.0 g/dL    A-G Ratio 1.0 (L) 1.1 - 2.2     CK    Collection Time: 01/06/21  7:45 PM   Result Value Ref Range    CK 2,852 (H) 39 - 308 U/L   URINALYSIS W/ REFLEX CULTURE    Collection Time: 01/06/21 10:40 PM    Specimen: Urine   Result Value Ref Range    Color Yellow/Straw      Appearance Clear Clear      Specific gravity 1.024 1.003 - 1.030      pH (UA) 6.0 5.0 - 8.0      Protein >300 (A) Negative mg/dL    Glucose >300 (A) Negative mg/dL    Ketone 20 (A) Negative mg/dL Bilirubin Negative Negative      Blood Large (A) Negative      Urobilinogen 2.0 (H) 0.1 - 1.0 EU/dL    Nitrites Negative Negative      Leukocyte Esterase Trace (A) Negative      UA:UC IF INDICATED Urine Culture Ordered (A) Culture not indicated by UA result      WBC 20-50 0 - 4 /hpf    RBC 5-10 0 - 5 /hpf    Bacteria Negative Negative /hpf    Mucus Trace /lpf       Radiologic Studies :   CT Results  (Last 48 hours)               01/06/21 2033  CT PELV WO CONT Final result    Narrative:  CT dose reduction was achieved through use of a standardized protocol tailored   for this examination and automatic exposure control for dose modulation. Protocol study shows no fracture or bone destruction. Mild symmetric DJD. No   significant soft tissue hematoma. No muscular enlargement or asymmetry. Mild   subcutaneous fat contusion over the right greater trochanter. Advanced   degenerative changes in the postoperative spine           CXR Results  (Last 48 hours)               01/06/21 1932  XR CHEST SNGL V Final result    Impression:  Impression:   Enlarged cardiac silhouette suspicious for pericardial effusion. Patient benefit   from echocardiogram.       Narrative:  XR CHEST SNGL V       Comparison: Chest radiograph dated August 12, 2015       Findings: The lungs are adequately inflated without focal consolidation. Cardiac   silhouette enlarged suspicious for pericardial effusion. Negative for cardiac   decompensation. The osseous structures are intact. Assessment and Plan :     Rhabdomyolysis: Elevated CPK. Nontraumatic. Ordered IV fluids and will follow up with the CK levels. Accelerated hypertension: As per his primary care physician he has difficult to control blood pressure. We will continue home medications and I will order an amlodipine dose now. We will follow-up with hypertension control and adjust medications during his hospital stay    Diabetes mellitus type 2:  We will continue home medications, I will hold glipizide due to fear of hypoglycemia, hold Metformin due to renal function status. We will restart Metformin after 24 hours. Arthritis right hip and right knee: He is on multiple pain medications but due to his current situation I am holding NSAIDs. If he continues to have problem we will consult orthopedics for evaluation    Paroxysmal atrial fibrillation: On anticoagulation with Pradaxa, we will continue    History of peripheral vascular disease, details not available    Admitted to medical floor, full CODE STATUS, I did not discuss advanced medical directives with the patient as he seems not understanding fine details at this time. We will reevaluate this later when he is more stable. Home medications reviewed with his PCP office notes. CC : Yasmeen Cisneros MD  Signed By: Susan Miranda MD     January 7, 2021      This dictation was done by dragon, computer voice recognition software. Often unanticipated grammatical, syntax, [place] phones and other interpretive errors are inadvertently transcribed. Please excuse errors that have escaped final proofreading.

## 2021-01-07 NOTE — PROGRESS NOTES
Hospitalist Progress Note    Subjective:   Daily Progress Note: 1/7/2021 10:54 AM    Hospital Course: Patient is a 53-year-old male with a history of CHF, type 2 diabetes, hypertension that presented to the emergency room on 1/6/2021 for being found on the floor by his son. Patient cannot recall the events leading to it or how long he was on the ground for. He did complain of right leg pain and was unable to get up. He denied any fevers, chills, chest pain, trouble breathing. CT of the pelvis showed no fracture or bone destruction but did show mild symmetric DJD. X-ray of the femur was negative for fracture or dislocation. X-ray of the hip was negative for fracture or dislocation with age-appropriate degenerative change of the hips laterally. X-ray of the right knee negative for fracture dislocation. Chest x-ray showed enlarged cardiac silhouette with findings suspicious for possible pericardial effusion. CK elevated 2052. Started on IV fluids. Renal function slightly elevated with a BUN of 14 with a serum creatinine 1.60. Electrolytes are stable. PT and OT consulted      Subjective: Patient denies any hip pain or leg pain at the moment. However he says he has not walked yet this morning. Denies any chest pain or shortness of breath.     Current Facility-Administered Medications   Medication Dose Route Frequency    sodium chloride (NS) flush 5-40 mL  5-40 mL IntraVENous Q8H    sodium chloride (NS) flush 5-40 mL  5-40 mL IntraVENous PRN    acetaminophen (TYLENOL) tablet 650 mg  650 mg Oral Q6H PRN    Or    acetaminophen (TYLENOL) suppository 650 mg  650 mg Rectal Q6H PRN    ondansetron (ZOFRAN ODT) tablet 4 mg  4 mg Oral Q6H PRN    Or    ondansetron (ZOFRAN) injection 4 mg  4 mg IntraVENous Q6H PRN    dextrose 5 % - 0.45% NaCl infusion  125 mL/hr IntraVENous CONTINUOUS    insulin lispro (HUMALOG) injection   SubCUTAneous AC&HS    glucose chewable tablet 16 g  4 Tab Oral PRN    dextrose (D50W) injection syrg 12.5-25 g  25-50 mL IntraVENous PRN    glucagon (GLUCAGEN) injection 1 mg  1 mg IntraMUSCular PRN    albuterol (PROVENTIL HFA, VENTOLIN HFA, PROAIR HFA) inhaler 2 Puff  2 Puff Inhalation Q6H PRN    allopurinoL (ZYLOPRIM) tablet 300 mg  300 mg Oral DAILY    amLODIPine (NORVASC) tablet 10 mg  10 mg Oral DAILY    aspirin chewable tablet 81 mg  81 mg Oral DAILY    b complex-vitamin c-folic acid 5mg (FOLBEE PLUS) tablet 1 Tab  1 Tab Oral DAILY    baclofen (LIORESAL) tablet 10 mg  10 mg Oral TID    [START ON 1/8/2021] bumetanide (BUMEX) tablet 1 mg  1 mg Oral BID    docusate sodium (COLACE) capsule 100 mg  100 mg Oral BID    gabapentin (NEURONTIN) capsule 300 mg  300 mg Oral AC&HS    melatonin tablet 10 mg  10 mg Oral QHS    [START ON 1/8/2021] metFORMIN (GLUCOPHAGE) tablet 500 mg  500 mg Oral TID WITH MEALS    pantoprazole (PROTONIX) tablet 40 mg  40 mg Oral DAILY    dabigatran etexilate (PRADAXA) capsule 150 mg  150 mg Oral BID    atorvastatin (LIPITOR) tablet 40 mg  40 mg Oral QHS    tamsulosin (FLOMAX) capsule 0.4 mg  0.4 mg Oral DAILY    hydrALAZINE (APRESOLINE) tablet 100 mg  100 mg Oral TID    labetaloL (NORMODYNE) tablet 200 mg  200 mg Oral BID     Current Outpatient Medications   Medication Sig    diclofenac (VOLTAREN) 1 % gel Apply 1 g to affected area four (4) times daily. Apply to right knee    Colcrys 0.6 mg tablet TAKE 1 TABLET BY MOUTH ONCE DAILY TO  PREVENT  GOUT    HYDROcodone-acetaminophen (NORCO)  mg tablet Take 1 Tab by mouth every six (6) hours as needed for Pain for up to 30 days.  montelukast (SINGULAIR) 10 mg tablet Take 1 Tab by mouth daily.  cloNIDine (CATAPRES) 0.3 mg/24 hr 1 Patch by TransDERmal route every seven (7) days.     glipiZIDE (GLUCOTROL) 5 mg tablet TAKE 2 TABLETS BY MOUTH 20 MINUTES BEFORE BREAKFAST AND SUPPER    fluticasone propionate (FLONASE) 50 mcg/actuation nasal spray USE ONE SPRAY(S) IN EACH NOSTRIL ONCE DAILY    albuterol (PROVENTIL HFA, VENTOLIN HFA, PROAIR HFA) 90 mcg/actuation inhaler INHALE TWO PUFFS BY MOUTH EVERY 6 HOURS AS NEEDED FOR WHEEZING FOR  UP  TO  ONE  YEAR    b complex-vitamin c-folic acid 5mg (Folbee Plus) tab tablet Take 1 tablet by mouth once daily    ergocalciferol (ERGOCALCIFEROL) 1,250 mcg (50,000 unit) capsule Take 1 Cap by mouth every seven (7) days.  hydrALAZINE (APRESOLINE) 50 mg tablet TAKE 1 TABLET BY MOUTH 4 TIMES DAILY FOR  HYPERTENSION    glucose blood VI test strips (Infinity Test strips) strip Use to test twice daily as directed    gabapentin (NEURONTIN) 300 mg capsule Take 1 Cap by mouth Before breakfast, lunch, dinner and at bedtime. Max Daily Amount: 1,200 mg.    baclofen (LIORESAL) 10 mg tablet TAKE 1 TABLET BY MOUTH THREE TIMES DAILY (MUSCLE  RELAXER)    omeprazole (PRILOSEC) 20 mg capsule Take 1 capsule by mouth once daily    tamsulosin (FLOMAX) 0.4 mg capsule Take 1 capsule by mouth once daily    allopurinoL (ZYLOPRIM) 300 mg tablet Take 1 tablet by mouth once daily    Pradaxa 150 mg capsule Take 1 capsule by mouth twice daily    metFORMIN (GLUCOPHAGE) 500 mg tablet Take 1 Tab by mouth three (3) times daily (with meals).  labetalol (NORMODYNE) 200 mg tablet TAKE 1/2 (ONE-HALF) TABLET BY MOUTH TWICE DAILY    empagliflozin (JARDIANCE) 10 mg tablet Take one tab daily. STOP LOVASTATIN    melatonin 10 mg tab Take  by mouth.  amLODIPine (NORVASC) 10 mg tablet TAKE ONE TABLET BY MOUTH ONCE DAILY    rosuvastatin (CRESTOR) 20 mg tablet Take 1 Tab by mouth nightly. Indications: high cholesterol    potassium chloride (K-DUR) 10 mEq tablet Take 2 Tabs by mouth two (2) times a day. Indications: low amount of potassium in the blood    lancets misc Test 2 times daily Dx Code E11.65    bumetanide (BUMEX) 2 mg tablet TAKE ONE TABLET BY MOUTH TWICE DAILY    ferrous sulfate (IRON) 325 mg (65 mg iron) tablet Take 1 Tab by mouth two (2) times daily (after meals).  Indications: Iron Deficiency Anemia    lancets 30 gauge misc     docusate sodium 100 mg tab Take 1 Tab by mouth two (2) times a day.  magnesium oxide (MAG-OX) 400 mg tablet Take 1 Tab by mouth daily.  aspirin 81 mg chewable tablet Take 81 mg by mouth daily. Review of Systems  Constitutional: No fevers, No chills, No sweats, No fatigue, ++Weakness  Eyes: No redness  Ears, nose, mouth, throat, and face: No nasal congestion, No sore throat, No voice change  Respiratory: No Shortness of Breath, No cough, No wheezing  Cardiovascular: No chest pain, No palpitations, No extremity edema  Gastrointestinal: No nausea, No vomiting, No diarrhea, No abdominal pain  Genitourinary: No frequency, No dysuria, No hematuria  Integument/breast: No skin lesion(s)   Neurological: No Confusion, No headaches, No dizziness      Objective:     Visit Vitals  BP (!) 217/99   Pulse (!) 57   Temp 98.1 °F (36.7 °C)   Resp 20   Ht 6' 2\" (1.88 m)   Wt 106.1 kg (234 lb)   SpO2 97%   BMI 30.04 kg/m²           Temp (24hrs), Av °F (36.7 °C), Min:97.9 °F (36.6 °C), Max:98.1 °F (36.7 °C)      No intake/output data recorded. No intake/output data recorded. PHYSICAL EXAM:  Constitutional: No acute distress  Skin: Extremities and face reveal no rashes. HEENT: Sclerae anicteric. Extra-occular muscles are intact. No oral ulcers. The neck is supple and no masses. Cardiovascular: Regular rate and rhythm. Respiratory:  Clear breath sounds bilaterally with no wheezes, rales, or rhonchi. GI: Abdomen nondistended, soft, and nontender. Normal active bowel sounds. Musculoskeletal: No pitting edema of the lower legs. Able to move all ext  Neurological:  Patient is alert and oriented.  Cranial nerves II-XII grossly intact  Psychiatric: Mood appears appropriate       Data Review    Recent Results (from the past 24 hour(s))   CBC WITH AUTOMATED DIFF    Collection Time: 21  7:45 PM   Result Value Ref Range    WBC 8.1 4.1 - 11.1 K/uL    RBC 5.26 4.10 - 5.70 M/uL    HGB 11.3 (L) 12.1 - 17.0 g/dL    HCT 38.5 36.6 - 50.3 %    MCV 73.2 (L) 80.0 - 99.0 FL    MCH 21.5 (L) 26.0 - 34.0 PG    MCHC 29.4 (L) 30.0 - 36.5 g/dL    RDW 17.4 (H) 11.5 - 14.5 %    PLATELET 073 139 - 259 K/uL    NEUTROPHILS 69 32 - 75 %    LYMPHOCYTES 15 12 - 49 %    MONOCYTES 15 (H) 5 - 13 %    EOSINOPHILS 1 0 - 7 %    BASOPHILS 0 0 - 1 %    IMMATURE GRANULOCYTES 0 0.0 - 0.5 %    ABS. NEUTROPHILS 5.6 1.8 - 8.0 K/UL    ABS. LYMPHOCYTES 1.2 0.8 - 3.5 K/UL    ABS. MONOCYTES 1.2 (H) 0.0 - 1.0 K/UL    ABS. EOSINOPHILS 0.1 0.0 - 0.4 K/UL    ABS. BASOPHILS 0.0 0.0 - 0.1 K/UL    ABS. IMM. GRANS. 0.0 0.00 - 0.04 K/UL    DF AUTOMATED     METABOLIC PANEL, COMPREHENSIVE    Collection Time: 01/06/21  7:45 PM   Result Value Ref Range    Sodium 146 (H) 136 - 145 mmol/L    Potassium 3.7 3.5 - 5.1 mmol/L    Chloride 110 (H) 97 - 108 mmol/L    CO2 27 21 - 32 mmol/L    Anion gap 9 5 - 15 mmol/L    Glucose 92 65 - 100 mg/dL    BUN 14 6 - 20 mg/dL    Creatinine 1.60 (H) 0.70 - 1.30 mg/dL    BUN/Creatinine ratio 9 (L) 12 - 20      GFR est AA 51 (L) >60 ml/min/1.73m2    GFR est non-AA 42 (L) >60 ml/min/1.73m2    Calcium 9.7 8.5 - 10.1 mg/dL    Bilirubin, total 1.2 (H) 0.2 - 1.0 mg/dL    AST (SGOT) 75 (H) 15 - 37 U/L    ALT (SGPT) 33 12 - 78 U/L    Alk.  phosphatase 89 45 - 117 U/L    Protein, total 7.1 6.4 - 8.2 g/dL    Albumin 3.6 3.5 - 5.0 g/dL    Globulin 3.5 2.0 - 4.0 g/dL    A-G Ratio 1.0 (L) 1.1 - 2.2     CK    Collection Time: 01/06/21  7:45 PM   Result Value Ref Range    CK 2,852 (H) 39 - 308 U/L   URINALYSIS W/ REFLEX CULTURE    Collection Time: 01/06/21 10:40 PM    Specimen: Urine   Result Value Ref Range    Color Yellow/Straw      Appearance Clear Clear      Specific gravity 1.024 1.003 - 1.030      pH (UA) 6.0 5.0 - 8.0      Protein >300 (A) Negative mg/dL    Glucose >300 (A) Negative mg/dL    Ketone 20 (A) Negative mg/dL    Bilirubin Negative Negative      Blood Large (A) Negative      Urobilinogen 2.0 (H) 0.1 - 1.0 EU/dL    Nitrites Negative Negative      Leukocyte Esterase Trace (A) Negative      UA:UC IF INDICATED Urine Culture Ordered (A) Culture not indicated by UA result      WBC 20-50 0 - 4 /hpf    RBC 5-10 0 - 5 /hpf    Bacteria Negative Negative /hpf    Mucus Trace /lpf   GLUCOSE, POC    Collection Time: 01/07/21  7:38 AM   Result Value Ref Range    Glucose (POC) 103 (H) 65 - 100 mg/dL    Performed by [id number]        Radiology review: XR of femur, knee, CT pelvix, chest of chest    Assessment:   1. Mild rhabdomyolysis secondary to fall  2. Accelerated hypertension  3. Diabetes  4. Degenerative joint disease  5. Paroxysmal atrial fibrillation  6. History of CHF with findings concerning for possible pericardial effusion on x-ray  7. History of peripheral vascular disease    Plan:    1. CK is greater than 2000. On IV fluids. Renal function slightly elevated. Blood work pending for today. 2.  Blood pressure is elevated. I have increase patient's hydralazine 200 mg 3 times daily and labetalol to 200 mg twice daily, continue with Bumex. .  Continue with Norvasc. No ACE or ARB at this time due to renal function. 3.  Blood glucose levels are stable. Continue with Metformin. Continue with insulin sliding scale. 4.  Patient is on Pradaxa hemoglobin is stable. 5.  Chest x-ray show signs concerning for pericardial effusion. We will get a 2D echo. Patient is on aspirin and statin  6. CBC BMP, CK in the morning  7. PT OT     CODE STATUS Full     DVT prophylaxis: Hold due to history of falls  Ulcer prophylaxis: Protonix    Care Plan discussed with: Patient/Family, Nurse and     Total time spent with patient: 34 minutes.

## 2021-01-07 NOTE — TELEPHONE ENCOUNTER
Pt was admitted to [facility]. Son found him in the floor. Will run test on him.

## 2021-01-07 NOTE — ED NOTES
Pt on stretcher on abdi in station 3 due to no available rooms at this time. No cardiac monitor available at this time. Will continue to monitor Pt with [employer].

## 2021-01-07 NOTE — PROGRESS NOTES
1/7/21. CM spoke with pt & son Vipul Roe @ [phone] ( via phone), whom both gave permission to have pt transferred to [facility]. Nsg informed & aware to call son upon discharge from ED.

## 2021-01-07 NOTE — PROGRESS NOTES
1/7/20. Carmine Supervisor ( Mikaela  # [phone]) at [facility] informed of request to transfer & that NP notified.

## 2021-01-07 NOTE — ED PROVIDER NOTES
EMERGENCY DEPARTMENT HISTORY AND PHYSICAL EXAM      Date: 1/6/2021  Patient Name: Bryn Pillai    History of Presenting Illness     Chief Complaint   Patient presents with    Fall       History Provided By: Patient    HPI: Bryn Pillai, 79 y.o. male with a past medical history significant for CHF, DM, PAD, CKD, HTN, hyperlipidemia who presents to the ED with cc of sudden onset, gradually worsening, constant right hip and right knee pain which started earlier today status post ground-level fall. Patient reports he was getting up when his right knee gave out on him and he fell onto the ground. No head trauma or LOC. No prodrome symptoms. Patient reports he laid on the ground for unknown amount of time and EMS helped him. Patient is a very poor historian. Currently without any other complaints aside from pain over right lower extremity. Patient denies fever, chills, chest pain, shortness of breath, nausea, vomiting, diarrhea. Lives at home with his children. There are no other complaints, changes, or physical findings at this time. PCP: Sterling Cavazos MD    No current facility-administered medications on file prior to encounter. Current Outpatient Medications on File Prior to Encounter   Medication Sig Dispense Refill    diclofenac (VOLTAREN) 1 % gel Apply 1 g to affected area four (4) times daily. Apply to right knee 150 g 1    Colcrys 0.6 mg tablet TAKE 1 TABLET BY MOUTH ONCE DAILY TO  PREVENT  GOUT 90 Tab 0    HYDROcodone-acetaminophen (NORCO)  mg tablet Take 1 Tab by mouth every six (6) hours as needed for Pain for up to 30 days. 120 Tab 0    montelukast (SINGULAIR) 10 mg tablet Take 1 Tab by mouth daily. 90 Tab 1    cloNIDine (CATAPRES) 0.3 mg/24 hr 1 Patch by TransDERmal route every seven (7) days.  13 Patch 1    glipiZIDE (GLUCOTROL) 5 mg tablet TAKE 2 TABLETS BY MOUTH 20 MINUTES BEFORE BREAKFAST AND SUPPER 360 Tab 0    fluticasone propionate (FLONASE) 50 mcg/actuation nasal spray USE ONE SPRAY(S) IN EACH NOSTRIL ONCE DAILY 3 Bottle 3    albuterol (PROVENTIL HFA, VENTOLIN HFA, PROAIR HFA) 90 mcg/actuation inhaler INHALE TWO PUFFS BY MOUTH EVERY 6 HOURS AS NEEDED FOR WHEEZING FOR  UP  TO  ONE  YEAR 3 Inhaler 3    b complex-vitamin c-folic acid 5mg (Folbee Plus) tab tablet Take 1 tablet by mouth once daily 90 Tab 1    ergocalciferol (ERGOCALCIFEROL) 1,250 mcg (50,000 unit) capsule Take 1 Cap by mouth every seven (7) days. 12 Cap 1    hydrALAZINE (APRESOLINE) 50 mg tablet TAKE 1 TABLET BY MOUTH 4 TIMES DAILY FOR  HYPERTENSION 360 Tab 1    glucose blood VI test strips (Infinity Test strips) strip Use to test twice daily as directed 200 Strip 3    gabapentin (NEURONTIN) 300 mg capsule Take 1 Cap by mouth Before breakfast, lunch, dinner and at bedtime. Max Daily Amount: 1,200 mg. 120 Cap 2    baclofen (LIORESAL) 10 mg tablet TAKE 1 TABLET BY MOUTH THREE TIMES DAILY (MUSCLE  RELAXER) 270 Tab 0    omeprazole (PRILOSEC) 20 mg capsule Take 1 capsule by mouth once daily 90 Cap 1    tamsulosin (FLOMAX) 0.4 mg capsule Take 1 capsule by mouth once daily 90 Cap 2    allopurinoL (ZYLOPRIM) 300 mg tablet Take 1 tablet by mouth once daily 90 Tab 2    Pradaxa 150 mg capsule Take 1 capsule by mouth twice daily 180 Cap 1    [DISCONTINUED] Colcrys 0.6 mg tablet TAKE 1 TABLET BY MOUTH ONCE DAILY TO  PREVENT  GOUT 90 Tab 1    metFORMIN (GLUCOPHAGE) 500 mg tablet Take 1 Tab by mouth three (3) times daily (with meals). 270 Tab 2    labetalol (NORMODYNE) 200 mg tablet TAKE 1/2 (ONE-HALF) TABLET BY MOUTH TWICE DAILY 90 Tab 3    empagliflozin (JARDIANCE) 10 mg tablet Take one tab daily. STOP LOVASTATIN 90 Tab 4    melatonin 10 mg tab Take  by mouth.  amLODIPine (NORVASC) 10 mg tablet TAKE ONE TABLET BY MOUTH ONCE DAILY 90 Tab 3    rosuvastatin (CRESTOR) 20 mg tablet Take 1 Tab by mouth nightly.  Indications: high cholesterol 90 Tab 1    potassium chloride (K-DUR) 10 mEq tablet Take 2 Tabs by mouth two (2) times a day. Indications: low amount of potassium in the blood 360 Tab 3    Blood-Glucose Meter (TRUE METRIX GLUCOSE METER) misc AS DIRECTED 1 Each 0    lancets misc Test 2 times daily Dx Code E11.65 100 Each 11    bumetanide (BUMEX) 2 mg tablet TAKE ONE TABLET BY MOUTH TWICE DAILY 180 Tab 1    ferrous sulfate (IRON) 325 mg (65 mg iron) tablet Take 1 Tab by mouth two (2) times daily (after meals). Indications: Iron Deficiency Anemia 100 Tab 3    lancets 30 gauge misc       ACCU-CHEK SHAUNA CONTROL SOLN soln       docusate sodium 100 mg tab Take 1 Tab by mouth two (2) times a day.  magnesium oxide (MAG-OX) 400 mg tablet Take 1 Tab by mouth daily. 90 Tab 3    aspirin 81 mg chewable tablet Take 81 mg by mouth daily.          Past History     Past Medical History:  Past Medical History:   Diagnosis Date    Acute on chronic diastolic congestive heart failure ([facility] [address] 2015    Arthritis 2011    Blackhead 2010    Cancer (HCC)     prostate    Chronic diastolic heart failure ([facility] [address] 2015    CKD (chronic kidney disease) 2015    Diabetes ([facility] [address]     Gout, unspecified     Hypercholesterolemia     Hypertension     Inguinal lymphadenopathy 2014    Leg pain     Persistent atrial fibrillation (HCC)     PVD (peripheral vascular disease) ([facility] [address]        Past Surgical History:  Past Surgical History:   Procedure Laterality Date    COLONOSCOPY N/A 2016    COLONOS[facility] Maxime Vaca [facility] at [facility] HX ORTHOPAEDIC      back and left shoulder x2       Family History:  Family History   Problem Relation Age of Onset    Cancer Paternal Grandfather         colon    Hypertension Other         son       Social History:  Social History     Tobacco Use    Smoking status: Former Smoker     Types: Cigarettes     Quit date: 2003     Years since quittin.5    Smokeless tobacco: Never Used   Substance Use Topics    Alcohol use: Not Currently     Alcohol/week: 0.0 standard drinks    Drug use: Never       Allergies: Allergies   Allergen Reactions    Indocin [Indomethacin Sodium] Unknown (comments)    Lisinopril Angioedema     Dxed in hospital.    Losartan Other (comments)     Face and throat swelling. Review of Systems     Review of Systems   Constitutional: Negative for chills, fatigue and fever. HENT: Negative. Respiratory: Negative for cough, chest tightness, shortness of breath and wheezing. Cardiovascular: Negative for chest pain and palpitations. Gastrointestinal: Negative for abdominal pain, diarrhea, nausea and vomiting. Genitourinary: Negative for frequency and urgency. Musculoskeletal: Positive for arthralgias (R hip, R knee). Negative for back pain, neck pain and neck stiffness. Skin: Negative for rash. Neurological: Negative for dizziness, weakness, light-headedness and headaches. Psychiatric/Behavioral: Negative. All other systems reviewed and are negative. Physical Exam     Physical Exam  Vitals signs and nursing note reviewed. Constitutional:       General: He is not in acute distress. Appearance: Normal appearance. He is well-developed. He is not ill-appearing, toxic-appearing or diaphoretic. Comments: Elderly AA male, smells of urine   HENT:      Head: Normocephalic and atraumatic. Nose: Nose normal. No congestion or rhinorrhea. Mouth/Throat:      Mouth: Mucous membranes are moist.      Pharynx: Oropharynx is clear. No oropharyngeal exudate or posterior oropharyngeal erythema. Eyes:      General: No scleral icterus. Conjunctiva/sclera: Conjunctivae normal.      Pupils: Pupils are equal, round, and reactive to light. Neck:      Musculoskeletal: Normal range of motion and neck supple. No neck rigidity or muscular tenderness. Cardiovascular:      Rate and Rhythm: Normal rate and regular rhythm.       Pulses:           Radial pulses are 2+ on the right side and 2+ on the left side. Dorsalis pedis pulses are 1+ on the right side and 1+ on the left side. Heart sounds: No murmur. No friction rub. No gallop. Pulmonary:      Effort: Pulmonary effort is normal. No tachypnea, accessory muscle usage, respiratory distress or retractions. Breath sounds: Normal breath sounds. No stridor. No decreased breath sounds, wheezing, rhonchi or rales. Chest:      Chest wall: No tenderness. Abdominal:      General: Bowel sounds are normal. There is no distension. Palpations: Abdomen is soft. There is no mass. Tenderness: There is no abdominal tenderness. There is no right CVA tenderness, left CVA tenderness, guarding or rebound. Musculoskeletal:         General: No deformity. Right lower leg: No edema. Left lower leg: No edema. Comments: Tenderness to R hip and R knee, decreased ROM secondary to pain, 1+ DP pulses bilaterally   Skin:     General: Skin is warm and dry. Capillary Refill: Capillary refill takes less than 2 seconds. Coloration: Skin is not jaundiced or pale. Findings: No bruising, erythema or rash. Neurological:      General: No focal deficit present. Mental Status: He is alert and oriented to person, place, and time. Mental status is at baseline. Sensory: Sensation is intact. Motor: Motor function is intact. Psychiatric:         Mood and Affect: Mood normal.         Behavior: Behavior normal. Behavior is cooperative. Thought Content:  Thought content normal.         Judgment: Judgment normal.         Lab and Diagnostic Study Results     Labs -     Recent Results (from the past 12 hour(s))   CBC WITH AUTOMATED DIFF    Collection Time: 01/06/21  7:45 PM   Result Value Ref Range    WBC 8.1 4.1 - 11.1 K/uL    RBC 5.26 4.10 - 5.70 M/uL    HGB 11.3 (L) 12.1 - 17.0 g/dL    HCT 38.5 36.6 - 50.3 %    MCV 73.2 (L) 80.0 - 99.0 FL    MCH 21.5 (L) 26.0 - 34.0 PG    MCHC 29.4 (L) 30.0 - 36.5 g/dL    RDW 17. 4 (H) 11.5 - 14.5 %    PLATELET 483 315 - 636 K/uL    NEUTROPHILS 69 32 - 75 %    LYMPHOCYTES 15 12 - 49 %    MONOCYTES 15 (H) 5 - 13 %    EOSINOPHILS 1 0 - 7 %    BASOPHILS 0 0 - 1 %    IMMATURE GRANULOCYTES 0 0.0 - 0.5 %    ABS. NEUTROPHILS 5.6 1.8 - 8.0 K/UL    ABS. LYMPHOCYTES 1.2 0.8 - 3.5 K/UL    ABS. MONOCYTES 1.2 (H) 0.0 - 1.0 K/UL    ABS. EOSINOPHILS 0.1 0.0 - 0.4 K/UL    ABS. BASOPHILS 0.0 0.0 - 0.1 K/UL    ABS. IMM. GRANS. 0.0 0.00 - 0.04 K/UL    DF AUTOMATED     METABOLIC PANEL, COMPREHENSIVE    Collection Time: 01/06/21  7:45 PM   Result Value Ref Range    Sodium 146 (H) 136 - 145 mmol/L    Potassium 3.7 3.5 - 5.1 mmol/L    Chloride 110 (H) 97 - 108 mmol/L    CO2 27 21 - 32 mmol/L    Anion gap 9 5 - 15 mmol/L    Glucose 92 65 - 100 mg/dL    BUN 14 6 - 20 mg/dL    Creatinine 1.60 (H) 0.70 - 1.30 mg/dL    BUN/Creatinine ratio 9 (L) 12 - 20      GFR est AA 51 (L) >60 ml/min/1.73m2    GFR est non-AA 42 (L) >60 ml/min/1.73m2    Calcium 9.7 8.5 - 10.1 mg/dL    Bilirubin, total 1.2 (H) 0.2 - 1.0 mg/dL    AST (SGOT) 75 (H) 15 - 37 U/L    ALT (SGPT) 33 12 - 78 U/L    Alk.  phosphatase 89 45 - 117 U/L    Protein, total 7.1 6.4 - 8.2 g/dL    Albumin 3.6 3.5 - 5.0 g/dL    Globulin 3.5 2.0 - 4.0 g/dL    A-G Ratio 1.0 (L) 1.1 - 2.2     CK    Collection Time: 01/06/21  7:45 PM   Result Value Ref Range    CK 2,852 (H) 39 - 308 U/L   URINALYSIS W/ REFLEX CULTURE    Collection Time: 01/06/21 10:40 PM    Specimen: Urine   Result Value Ref Range    Color Yellow/Straw      Appearance Clear Clear      Specific gravity 1.024 1.003 - 1.030      pH (UA) 6.0 5.0 - 8.0      Protein >300 (A) Negative mg/dL    Glucose >300 (A) Negative mg/dL    Ketone 20 (A) Negative mg/dL    Bilirubin Negative Negative      Blood Large (A) Negative      Urobilinogen 2.0 (H) 0.1 - 1.0 EU/dL    Nitrites Negative Negative      Leukocyte Esterase Trace (A) Negative      UA:UC IF INDICATED Urine Culture Ordered (A) Culture not indicated by UA result WBC 20-50 0 - 4 /hpf    RBC 5-10 0 - 5 /hpf    Bacteria Negative Negative /hpf    Mucus Trace /lpf       Radiologic Studies -   CT Results  (Last 48 hours)               01/06/21 2033  CT PELV WO CONT Final result    Narrative:  CT dose reduction was achieved through use of a standardized protocol tailored   for this examination and automatic exposure control for dose modulation. Protocol study shows no fracture or bone destruction. Mild symmetric DJD. No   significant soft tissue hematoma. No muscular enlargement or asymmetry. Mild   subcutaneous fat contusion over the right greater trochanter. Advanced   degenerative changes in the postoperative spine           CXR Results  (Last 48 hours)               01/06/21 1932  XR CHEST SNGL V Final result    Impression:  Impression:   Enlarged cardiac silhouette suspicious for pericardial effusion. Patient benefit   from echocardiogram.       Narrative:  XR CHEST SNGL V       Comparison: Chest radiograph dated August 12, 2015       Findings: The lungs are adequately inflated without focal consolidation. Cardiac   silhouette enlarged suspicious for pericardial effusion. Negative for cardiac   decompensation. The osseous structures are intact. Medical Decision Making   - I am the first provider for this patient. - I reviewed the vital signs, available nursing notes, past medical history, past surgical history, family history and social history. - Initial assessment performed. The patients presenting problems have been discussed, and they are in agreement with the care plan formulated and outlined with them. I have encouraged them to ask questions as they arise throughout their visit. Vital Signs-Reviewed the patient's vital signs.   Patient Vitals for the past 12 hrs:   Temp Pulse Resp BP SpO2   01/07/21 0222 -- (!) 53 19 (!) 208/80 94 %   01/06/21 1814 97.9 °F (36.6 °C) 67 16 (!) 198/89 100 %       Records Reviewed: Nursing Notes and Old Medical Records    The patient presents with fall with a differential diagnosis of fracture, contusion, sprain, rhabdo, electrolyte abnormality      ED Course:     ED Course as of Jan 07 0340   Wed Jan 06, 2021   2187 CONSULT NOTE:  Consultant: Dr. Romina Villegas  Specialty: Hospitalist  Discussed pt's history, disposition, and available diagnostic and imaging results. Reviewed care plans. Patient will be admitted to the hospital for further management. ERAN Acosta      [NO]      ED Course User Index  [NO] ERAN Ornelas       Provider Notes (Medical Decision Making):     MDM  Number of Diagnoses or Management Options  Chronic kidney disease, unspecified CKD stage  Fall, initial encounter  Right leg pain  Traumatic rhabdomyolysis, initial encounter [facility])  Diagnosis management comments:     79year old male with fall. RLE and pelvic imaging was negative for acute fracture. Lab work revealing elevated CK, elevated creatinine which is slightly elevated compared to baseline. UA borderline, positive leukocyte esterase and negative bacteria. Will admit to the hospital for observation/gentle hydration. Amount and/or Complexity of Data Reviewed  Clinical lab tests: ordered and reviewed  Tests in the radiology section of CPT®: ordered and reviewed  Discussion of test results with the performing providers: yes  Review and summarize past medical records: yes  Discuss the patient with other providers: yes    Patient Progress  Patient progress: stable         Disposition   Disposition: Admitted to hospital by Dr. Romina Villegas    Admitted      Diagnosis     Clinical Impression:   1. Traumatic rhabdomyolysis, initial encounter ([facility] [address]    2. Fall, initial encounter    3. Right leg pain    4. Chronic kidney disease, unspecified CKD stage        Attestations:    ERAN Acosta    Please note that this dictation was completed with Weave, the computer voice recognition software.   Quite often unanticipated grammatical, syntax, homophones, and other interpretive errors are inadvertently transcribed by the computer software. Please disregard these errors. Please excuse any errors that have escaped final proofreading. Thank you.

## 2021-01-08 PROBLEM — N17.9 AKI (ACUTE KIDNEY INJURY) (HCC): Status: ACTIVE | Noted: 2021-01-01

## 2021-01-08 NOTE — ED NOTES
Report previously called to Dandre Ter[facility] at [facility]. [facility]. All belongings went with patient. Patient going to room 214 on acute care [address]. Family member given new room number and phone number to unit.

## 2021-01-08 NOTE — PROGRESS NOTES
Problem: Falls - Risk of  Goal: *Absence of Falls  Description: Document Tika Valdez Fall Risk and appropriate interventions in the flowsheet.   Outcome: Progressing Towards Goal  Note: Fall Risk Interventions:                                Problem: Patient Education: Go to Patient Education Activity  Goal: Patient/Family Education  Outcome: Progressing Towards Goal

## 2021-01-08 NOTE — ROUTINE PROCESS
Bedside shift change report given to Je Cordero LPN (oncoming nurse) by Adriel Marroquin LPN (offgoing nurse). Report included the following information SBAR.

## 2021-01-08 NOTE — PROGRESS NOTES
Progress Note  Date:2021       Room:[facility]  Patient Mayito Winslow     Date of Birth:     Age:80 y.o. Subjective    Subjective:  Symptoms:  Improved. He reports weakness. No shortness of breath. Diet:  Adequate intake. No nausea or vomiting. Activity level: Impaired due to weakness. Pain:  He complains of pain that is moderate. Pain is partially controlled. He states his back is hurting, maybe from the ambulance ride over from [facility].  He otherwise feels ok. Review of Systems   Constitutional: Negative for activity change and appetite change. HENT: Negative. Respiratory: Negative for shortness of breath. Cardiovascular: Negative for leg swelling. Gastrointestinal: Negative for abdominal pain, nausea and vomiting. Endocrine: Negative for polydipsia and polyphagia. Genitourinary: Negative for difficulty urinating and flank pain. Musculoskeletal: Positive for back pain and gait problem. Neurological: Positive for weakness. Negative for dizziness. Psychiatric/Behavioral: Negative. Objective         Vitals Last 24 Hours:  TEMPERATURE:  Temp  Av.3 °F (36.8 °C)  Min: 97.8 °F (36.6 °C)  Max: 98.9 °F (37.2 °C)  RESPIRATIONS RANGE: Resp  Av.7  Min: 15  Max: 23  PULSE OXIMETRY RANGE: SpO2  Av.2 %  Min: 94 %  Max: 99 %  PULSE RANGE: Pulse  Av.8  Min: 53  Max: 93  BLOOD PRESSURE RANGE: Systolic (47TPC), YYB:359 , Min:152 , LQS:468   ; Diastolic (06QFG), GPR:04, Min:68, Max:102    I/O (24Hr): No intake or output data in the 24 hours ending 21 0048  Objective:  General Appearance: In no acute distress and uncomfortable. Vital signs: (most recent): Blood pressure (!) 177/86, pulse 83, temperature 97.8 °F (36.6 °C), resp. rate 20, SpO2 97 %. Vital signs are normal.    HEENT: Normal HEENT exam.    Lungs:  Normal effort. He is not in respiratory distress. Heart: Normal rate.   Regular rhythm. Abdomen: Abdomen is soft and non-distended. Bowel sounds are normal.   There is no abdominal tenderness. Extremities: Normal range of motion. There is no dependent edema. Pulses: Distal pulses are intact. Neurological: Patient is alert and oriented to person, place and time. Patient has normal muscle tone. Pupils:  Pupils are equal, round, and reactive to light. Skin:  Warm and dry. No ulceration. Labs/Imaging/Diagnostics    Labs:  CBC:  Recent Labs     01/06/21 1945   WBC 8.1   RBC 5.26   HGB 11.3*   HCT 38.5   MCV 73.2*   RDW 17.4*        CHEMISTRIES:  Recent Labs     01/06/21 1945   *   K 3.7   *   CO2 27   BUN 14   CA 9.7   PT/INR:No results for input(s): INR, INREXT in the last 72 hours. No lab exists for component: PROTIME  APTT:No results for input(s): APTT in the last 72 hours. LIVER PROFILE:  Recent Labs     01/06/21 1945   AST 75*   ALT 33     Lab Results   Component Value Date/Time    ALT (SGPT) 33 01/06/2021 07:45 PM    AST (SGOT) 75 (H) 01/06/2021 07:45 PM    Alk. phosphatase 89 01/06/2021 07:45 PM    Bilirubin, total 1.2 (H) 01/06/2021 07:45 PM       Imaging Last 24 Hours:  No results found. Assessment//Plan   Active Problems:    MOE (acute kidney injury) ([facility] [address] (1/8/2021)      Assessment:    Condition: In stable condition. (Patient admitted yesterday morning at [facility] with MOE and rhabdmyolysis after fall and inability to get up. He has fluids running and his blood sugars have been monitored. There are no signs of sepsis. Plan is to continue hydration and recheck labs. Will need  as he lives alone and may need assistance or placement). Plan:   Regular diet. Increase analgesics.        Held oral hyypoglycemics and pradaxa pending am labs  Noted urine appeared infected, ordered ceftriaxone  Electronically signed by Seven Minor MD on 1/8/2021 at 12:48 AM

## 2021-01-08 NOTE — H&P
Hospitalist Progress Note         Jose Elias Almeida MD          Daily Progress Note: 1/8/2021      Subjective: The patient is seen for follow  up. 23-year-old male with a history of CHF, type 2 diabetes, hypertension that presented to the emergency room on 1/6/2021 for being found on the floor by his son. Patient cannot recall the events leading to it or how long he was on the ground for. He did complain of right leg pain and was unable to get up. He denied any fevers, chills, chest pain, trouble breathing. CT of the pelvis showed no fracture or bone destruction but did show mild symmetric DJD. X-ray of the femur was negative for fracture or dislocation. X-ray of the hip was negative for fracture or dislocation with age-appropriate degenerative change of the hips laterally. X-ray of the right knee negative for fracture dislocation. Chest x-ray showed enlarged cardiac silhouette with findings suspicious for possible pericardial effusion. CK initially elevated 2052 and now 1500. Started on IV fluids. Renal function slightly elevated with a BUN of 14 with a serum creatinine 1.60. Electrolytes are stable. 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              BMI 32.0-32.9,adult (Chronic) ICD-10-CM: O38.96  ICD-9-CM: V85.32  6/5/2019 - Present        Type 2 diabetes mellitus with nephropathy ([facility] ICD-10-CM: E11.21  ICD-9-CM: 250.40, 583.81  1/10/2018 - Present        History of colon polyps ICD-10-CM: Z86.010  ICD-9-CM: V12.72  5/2/2016 - Present        Idiopathic gout ICD-10-CM: M10.00  ICD-9-CM: 274.9  5/2/2016 - Present        Essential hypertension with goal blood pressure less than 130/85 (Chronic) ICD-10-CM: I10  ICD-9-CM: 401.9  5/2/2016 - Present        CKD (chronic kidney disease) (Chronic) ICD-10-CM: N18.9  ICD-9-CM: 585.9  11/23/2015 - Present        COPD (chronic obstructive pulmonary disease) (HCC) (Chronic) ICD-10-CM: J44.9  ICD-9-CM: 496  4/27/2015 - Present        Diabetes with neurologic complications (HCC) (Chronic) ICD-10-CM: E11.49  ICD-9-CM: 250.60  7/21/2014 - Present    Overview Signed 5/26/2015  7:19 PM by MD Dr. Teresita Ahumada             Chronic radicular pain of lower back ICD-10-CM: M54.16, G89.29  ICD-9-CM: 724.4, 338.29  7/21/2014 - Present    Overview Signed 7/21/2014 10:48 AM by Mikayla Shoemaker MD     Severe DDD, lumbar plus diabetic neuropathy. On hydrocodone chronically. Pain even with no movent,walks with a cane. Neuropathy (Chronic) ICD-10-CM: G62.9  ICD-9-CM: 355.9  7/16/2013 - Present        DDD (degenerative disc disease), lumbar (Chronic) ICD-10-CM: M51.36  ICD-9-CM: 722.52  7/16/2013 - Present        Prostate CA (HCC) (Chronic) ICD-10-CM: C61  ICD-9-CM: 185  7/16/2013 - Present        Tubulovillous adenoma polyp of colon (Chronic) ICD-10-CM: D12.6  ICD-9-CM: 211.3  7/16/2013 - Present    Overview Signed 7/16/2013  8:44 AM by Mikayla Shoemaker MD     6/12. Dr. Clydene Holter. @ polyps removed.  Due 2016             Edema (Chronic) ICD-10-CM: R60.9  ICD-9-CM: 782.3  1/12/2012 - Present        Persistent atrial fibrillation (HCC) ICD-10-CM: I48.19  ICD-9-CM: 427.31  Unknown - Present        Unspecified arthropathy, ankle and foot ICD-10-CM: M19.079  ICD-9-CM: 716.97  7/14/2011 - Present        Arthritis ICD-10-CM: M19.90  ICD-9-CM: 716.90  7/11/2011 - Present        Gout, unspecified (Chronic) ICD-10-CM: M10.9  ICD-9-CM: 274.9  Unknown - Present        PVD (peripheral vascular disease) (HCC) (Chronic) ICD-10-CM: I73.9  ICD-9-CM: 443.9  Unknown - Present        Hypertension (Chronic) ICD-10-CM: I10  ICD-9-CM: 401.9  Unknown - Present    Overview Signed 7/16/2013  8:37 AM by Demetra Crowley MD     Sees Dr. Marjorie Wade for HTN and Kidney. Hypercholesterolemia (Chronic) ICD-10-CM: E78.00  ICD-9-CM: 272.0  Unknown - Present        Leg pain (Chronic) ICD-10-CM: M79.606  ICD-9-CM: 729.5  Unknown - Present        Chronic pain (Chronic) ICD-10-CM: G62.29  ICD-9-CM: 338.29  5/6/2010 - Present    Overview Signed 5/8/2013 10:30 AM by Demetra Crowley MD     Has pins in back and pain and numbness in right leg. Dr. Viridiana Garg at [facility]. Has recommended additional surgery and patient has declined.              RESOLVED: Chronic diastolic heart failure (HCC) ICD-10-CM: I50.32  ICD-9-CM: 428.32  8/26/2015 - 9/6/2017        RESOLVED: Left upper lobe pneumonia ICD-10-CM: J18.9  ICD-9-CM: 486  4/27/2015 - 4/16/2018              Medications reviewed  Current Facility-Administered Medications   Medication Dose Route Frequency    sodium chloride (NS) flush 5-40 mL  5-40 mL IntraVENous Q8H    sodium chloride (NS) flush 5-40 mL  5-40 mL IntraVENous PRN    heparin (porcine) injection 5,000 Units  5,000 Units SubCUTAneous Q12H    acetaminophen (TYLENOL) tablet 650 mg  650 mg Oral Q4H PRN    HYDROcodone-acetaminophen (NORCO) 5-325 mg per tablet 1 Tab  1 Tab Oral Q4H PRN    amLODIPine (NORVASC) tablet 10 mg  10 mg Oral DAILY    aspirin chewable tablet 81 mg  81 mg Oral DAILY    cloNIDine (CATAPRES) 0.2 mg/24 hr patch 1 Patch  1 Patch TransDERmal Q7D    gabapentin (NEURONTIN) capsule 300 mg  300 mg Oral AC&HS    hydrALAZINE (APRESOLINE) tablet 50 mg  50 mg Oral TID    magnesium oxide (MAG-OX) tablet 400 mg  400 mg Oral DAILY    montelukast (SINGULAIR) tablet 10 mg  10 mg Oral DAILY    pantoprazole (PROTONIX) tablet 40 mg  40 mg Oral ACB    rosuvastatin (CRESTOR) tablet 20 mg  20 mg Oral QHS    tamsulosin (FLOMAX) capsule 0.4 mg  0.4 mg Oral DAILY    cefTRIAXone (ROCEPHIN) 1 g in 0.9% sodium chloride (MBP/ADV) 50 mL MBP  1 g IntraVENous Q24H    albuterol-ipratropium (DUO-NEB) 2.5 MG-0.5 MG/3 ML  3 mL Nebulization Q6H PRN    potassium chloride (K-DUR, KLOR-CON) SR tablet 40 mEq  40 mEq Oral BID       Review of Systems:   A comprehensive review of systems was negative except for that written in the HPI. Objective:   Physical Exam:     Visit Vitals  BP (!) 173/78   Pulse 63   Temp 98.7 °F (37.1 °C)   Resp 20   Wt 109.8 kg (242 lb 2 oz)   SpO2 98%   BMI 31.07 kg/m²           Temp (24hrs), Av.5 °F (36.9 °C), Min:97.8 °F (36.6 °C), Max:98.9 °F (37.2 °C)    No intake/output data recorded. No intake/output data recorded. General:  Alert, cooperative, no distress, appears stated age. Lungs:   Clear to auscultation bilaterally. Chest wall:  No tenderness or deformity. Heart:  Regular rate and rhythm, S1, S2 normal, no murmur, click, rub or gallop. Abdomen:   Soft, non-tender. Bowel sounds normal. No masses,  No organomegaly. Extremities: Extremities normal, atraumatic, no cyanosis or edema. Pulses: 2+ and symmetric all extremities. Skin: Skin color, texture, turgor normal. No rashes or lesions   Neurologic: CNII-XII intact. No gross sensory or motor deficits     Data Review:       Recent Days:  Recent Labs     21  0717 21   WBC 5.8 8.1   HGB 11.0* 11.3*   HCT 35.9* 38.5    211     Recent Labs     21  0717 21    146*   K 2.8* 3.7    110*   CO2 27 27   * 92   BUN 19 14   CREA 1.49* 1.60*   CA 9.1 9.7   ALB  --  3.6   TBILI  --  1.2*   ALT  --  33     No results for input(s): PH, PCO2, PO2, HCO3, FIO2 in the last 72 hours.     24 Hour Results:  Recent Results (from the past 24 hour(s))   GLUCOSE, POC    Collection Time: 21 12:10 PM   Result Value Ref Range    Glucose (POC) 171 (H) 65 - 100 mg/dL    Performed by Verner Cords    GLUCOSE, POC    Collection Time: 01/07/21  4:05 PM   Result Value Ref Range    Glucose (POC) 182 (H) 65 - 100 mg/dL    Performed by Yann Hebert    ECHO ADULT COMPLETE    Collection Time: 01/07/21  4:11 PM   Result Value Ref Range    Pulmonic Regurgitant End Max Velocity 182.00 cm/s    AoV PG 13.00 mmHg    Aortic Valve Area by Continuity of Peak Velocity 1.50 cm2    Ao Root D 3.80 cm    IVSd 0.86 0.6 - 1.0 cm    LVIDd 4.95 4.2 - 5.9 cm    LVIDs 3.09 cm    LVOT d 2.00 cm    Pulmonic Regurgitant End Max Velocity 87.00 cm/s    LVOT Peak Gradient 3.00 mmHg    LVPWd 1.22 (A) 0.6 - 1.0 cm    LV E' Septal Velocity 2.92 cm/s    LV ED Vol A2C 121.00 cm3    BP EF 67.6 55 - 100 %    LV ES Vol A2C 29.50 cm3    E/E' septal 33.60     LV Ejection Fraction MOD 2C 38 %    Left Atrium to Aortic Root Ratio 1.37     Pulmonic Regurgitant End Max Velocity 402.00 cm/s    Mitral Valve Deceleration [place] 6,170.00 mm/s2    Mitral Valve Deceleration [place] 6,170.00 mm/s2    Mitral Valve E Wave Deceleration Time 222.00 ms    Mitral Valve Pressure Half-time 58.00 ms    MV A Lev 30.20 cm/s    MV E Lev 98.10 cm/s    MVA (PHT) 3.79 cm2    MV E/A 3.25     Pulmonic Regurgitant End Max Velocity 113.00 cm/s    Pulmonic Valve Systolic Peak Instantaneous Gradient 5.00 mmHg    P Vein A Dur 134.00 ms    Pulmonary Vein \"A\" Wave Velocity 28.90 cm/s    Est. RA Pressure 8.00 mmHg    RVIDd 3.29 cm    RVSP 49.00 mmHg    Tricuspid Valve Max Velocity 319.00 cm/s    Tricuspid Valve Max Velocity 319.00 cm/s    Triscuspid Valve Regurgitation Peak Gradient 41.00 mmHg    LV Mass .7 88 - 224 g    LV Mass AL Index 81.3 49 - 115 g/m2    CORNELIUS/BSA Pk Lev 0.6 cm2/m2   CK    Collection Time: 01/08/21  6:00 AM   Result Value Ref Range    CK 1,575 (H) 39 - 027 U/L   METABOLIC PANEL, BASIC    Collection Time: 01/08/21  7:17 AM   Result Value Ref Range    Sodium 143 136 - 145 mmol/L    Potassium 2.8 (L) 3.5 - 5.1 mmol/L    Chloride 106 97 - 108 mmol/L CO2 27 21 - 32 mmol/L    Anion gap 10 5 - 15 mmol/L    Glucose 114 (H) 65 - 100 mg/dL    BUN 19 6 - 20 mg/dL    Creatinine 1.49 (H) 0.70 - 1.30 mg/dL    BUN/Creatinine ratio 13 12 - 20      GFR est AA 55 (L) >60 ml/min/1.73m2    GFR est non-AA 45 (L) >60 ml/min/1.73m2    Calcium 9.1 8.5 - 10.1 mg/dL   CBC WITH AUTOMATED DIFF    Collection Time: 01/08/21  7:17 AM   Result Value Ref Range    WBC 5.8 4.4 - 11.3 K/uL    RBC 5.08 4.50 - 5.90 M/uL    HGB 11.0 (L) 13.5 - 17.5 g/dL    HCT 35.9 (L) 41 - 53 %    MCV 70.5 (L) 80 - 100 FL    MCH 21.6 (L) 31 - 34 PG    MCHC 30.7 (L) 31.0 - 36.0 g/dL    RDW 18.0 (H) 11.5 - 14.5 %    PLATELET 481 K/uL    MPV 9.5 6.5 - 11.5 FL    NRBC 0.0  WBC    ABSOLUTE NRBC 0.00 K/uL    NEUTROPHILS 65 42 - 75 %    LYMPHOCYTES 18 (L) 20.5 - 51.1 %    MONOCYTES 13 (H) 1.7 - 9.3 %    EOSINOPHILS 4 (H) 0.9 - 2.9 %    BASOPHILS 0 0.0 - 2.5 %    ABS. NEUTROPHILS 3.8 1.8 - 7.7 K/UL    ABS. LYMPHOCYTES 1.1 1.0 - 4.8 K/UL    ABS. MONOCYTES 0.7 0.2 - 2.4 K/UL    ABS. EOSINOPHILS 0.2 0.0 - 0.7 K/UL    ABS. BASOPHILS 0.0 0.0 - 0.2 K/UL           Assessment/     1. Mild rhabdomyolysis secondary to fall  2. Accelerated hypertension  3. Diabetes  4. Degenerative joint disease  5. Paroxysmal atrial fibrillation  6. History of CHF with findings concerning for possible pericardial effusion on x-ray. EF normal  7. History of peripheral vascular disease    Plan:  Continue supportive care  PT OT evaluation  Monitor daily CPK levels  Consult case management for possible placement versus home health with PT OT  Overall clinically stable      Care Plan discussed with: Patient/Family    Total time spent with patient: 30 minutes.     Audra Johnson MD

## 2021-01-08 NOTE — ROUTINE PROCESS
@0005 to room 214 from [facility] via stretcher by Avis Fisher. Alert and oriented. MA intact LAC. C/o back and right leg pain. Oriented to room. Bed alarm applied. Instructed not to get out of bed alone. Call light within easy reach.

## 2021-01-09 NOTE — ROUTINE PROCESS
Assisted to chair. Pt weak and c/o rt knee sore, swelling noted. Dr. Gildardo Draper been in to see pt. Monitor on. No resp distress. MA intact. Brief on. Ate well.

## 2021-01-09 NOTE — H&P
Hospitalist Progress Note         Jose Elias Almeida MD          Daily Progress Note: 1/9/2021      Subjective: The patient is seen for follow  up. 50-year-old male with a history of CHF, type 2 diabetes, hypertension that presented to the emergency room on 1/6/2021 for being found on the floor by his son. Patient cannot recall the events leading to it or how long he was on the ground for. He did complain of right leg pain and was unable to get up. He denied any fevers, chills, chest pain, trouble breathing. CT of the pelvis showed no fracture or bone destruction but did show mild symmetric DJD. X-ray of the femur was negative for fracture or dislocation. X-ray of the hip was negative for fracture or dislocation with age-appropriate degenerative change of the hips laterally. X-ray of the right knee negative for fracture dislocation. Chest x-ray showed enlarged cardiac silhouette with findings suspicious for possible pericardial effusion. CK initially elevated 2052 and now 1500. Started on IV fluids. Renal function slightly elevated with a BUN of 14 with a serum creatinine 1.60. Electrolytes are stable. PT and OT consulted  Patient seen in follow-up. Notes improvement in pain but still had difficulties with ambulation. CPK levels trending down. No fevers overnight. Agreeable to inpatient rehab. 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              BMI 32.0-32.9,adult (Chronic) ICD-10-CM: V26.59  ICD-9-CM: V85.32  6/5/2019 - Present        Type 2 diabetes mellitus with nephropathy ([facility] ICD-10-CM: E11.21  ICD-9-CM: 250.40, 583.81  1/10/2018 - Present        History of colon polyps ICD-10-CM: Z86.010  ICD-9-CM: V12.72  5/2/2016 - Present        Idiopathic gout ICD-10-CM: M10.00  ICD-9-CM: 274.9  5/2/2016 - Present        Essential hypertension with goal blood pressure less than 130/85 (Chronic) ICD-10-CM: I10  ICD-9-CM: 401.9  5/2/2016 - Present        CKD (chronic kidney disease) (Chronic) ICD-10-CM: N18.9  ICD-9-CM: 585.9  11/23/2015 - Present        COPD (chronic obstructive pulmonary disease) (HCC) (Chronic) ICD-10-CM: J44.9  ICD-9-CM: 496  4/27/2015 - Present        Diabetes with neurologic complications (HCC) (Chronic) ICD-10-CM: E11.49  ICD-9-CM: 250.60  7/21/2014 - Present    Overview Signed 5/26/2015  7:19 PM by MD Dr. Jeramy Dawson             Chronic radicular pain of lower back ICD-10-CM: M54.16, G89.29  ICD-9-CM: 724.4, 338.29  7/21/2014 - Present    Overview Signed 7/21/2014 10:48 AM by Dwayne Carpenter MD     Severe DDD, lumbar plus diabetic neuropathy. On hydrocodone chronically. Pain even with no movent,walks with a cane. Neuropathy (Chronic) ICD-10-CM: G62.9  ICD-9-CM: 355.9  7/16/2013 - Present        DDD (degenerative disc disease), lumbar (Chronic) ICD-10-CM: M51.36  ICD-9-CM: 722.52  7/16/2013 - Present        Prostate CA (HCC) (Chronic) ICD-10-CM: C61  ICD-9-CM: 185  7/16/2013 - Present        Tubulovillous adenoma polyp of colon (Chronic) ICD-10-CM: D12.6  ICD-9-CM: 211.3  7/16/2013 - Present    Overview Signed 7/16/2013  8:44 AM by Dwayne Carpenter MD     6/12. Dr. Tex Gifford. @ polyps removed.  Due 2016             Edema (Chronic) ICD-10-CM: R60.9  ICD-9-CM: 782.3  1/12/2012 - Present        Persistent atrial fibrillation (HCC) ICD-10-CM: I48.19  ICD-9-CM: 427.31  Unknown - Present        Unspecified arthropathy, ankle and foot ICD-10-CM: M19.079  ICD-9-CM: 716.97  7/14/2011 - Present        Arthritis ICD-10-CM: M19.90  ICD-9-CM: 716.90  7/11/2011 - Present        Gout, unspecified (Chronic) ICD-10-CM: M10.9  ICD-9-CM: 274.9  Unknown - Present        PVD (peripheral vascular disease) (HCC) (Chronic) ICD-10-CM: I73.9  ICD-9-CM: 443.9  Unknown - Present        Hypertension (Chronic) ICD-10-CM: I10  ICD-9-CM: 401.9  Unknown - Present    Overview Signed 7/16/2013  8:37 AM by Nicci Carrasco MD     Sees Dr. Cole Gutierres for HTN and Kidney. Hypercholesterolemia (Chronic) ICD-10-CM: E78.00  ICD-9-CM: 272.0  Unknown - Present        Leg pain (Chronic) ICD-10-CM: M79.606  ICD-9-CM: 729.5  Unknown - Present        Chronic pain (Chronic) ICD-10-CM: P71.74  ICD-9-CM: 338.29  5/6/2010 - Present    Overview Signed 5/8/2013 10:30 AM by Nicci Carrasco MD     Has pins in back and pain and numbness in right leg. Dr. Colletta Matar at [facility]. Has recommended additional surgery and patient has declined.              RESOLVED: Chronic diastolic heart failure (HCC) ICD-10-CM: I50.32  ICD-9-CM: 428.32  8/26/2015 - 9/6/2017        RESOLVED: Left upper lobe pneumonia ICD-10-CM: J18.9  ICD-9-CM: 486  4/27/2015 - 4/16/2018              Medications reviewed  Current Facility-Administered Medications   Medication Dose Route Frequency    potassium chloride (K-DUR, KLOR-CON) SR tablet 40 mEq  40 mEq Oral BID    sodium chloride (NS) flush 5-40 mL  5-40 mL IntraVENous Q8H    sodium chloride (NS) flush 5-40 mL  5-40 mL IntraVENous PRN    heparin (porcine) injection 5,000 Units  5,000 Units SubCUTAneous Q12H    acetaminophen (TYLENOL) tablet 650 mg  650 mg Oral Q4H PRN    HYDROcodone-acetaminophen (NORCO) 5-325 mg per tablet 1 Tab  1 Tab Oral Q4H PRN    amLODIPine (NORVASC) tablet 10 mg  10 mg Oral DAILY    aspirin chewable tablet 81 mg  81 mg Oral DAILY    cloNIDine (CATAPRES) 0.2 mg/24 hr patch 1 Patch  1 Patch TransDERmal Q7D    gabapentin (NEURONTIN) capsule 300 mg  300 mg Oral AC&HS    hydrALAZINE (APRESOLINE) tablet 50 mg  50 mg Oral TID    magnesium oxide (MAG-OX) tablet 400 mg  400 mg Oral DAILY    montelukast (SINGULAIR) tablet 10 mg  10 mg Oral DAILY    pantoprazole (PROTONIX) tablet 40 mg  40 mg Oral ACB    rosuvastatin (CRESTOR) tablet 20 mg  20 mg Oral QHS    tamsulosin (FLOMAX) capsule 0.4 mg  0.4 mg Oral DAILY    cefTRIAXone (ROCEPHIN) 1 g in 0.9% sodium chloride (MBP/ADV) 50 mL MBP  1 g IntraVENous Q24H    albuterol-ipratropium (DUO-NEB) 2.5 MG-0.5 MG/3 ML  3 mL Nebulization Q6H PRN       Review of Systems:   A comprehensive review of systems was negative except for that written in the HPI. Objective:   Physical Exam:     Visit Vitals  BP (!) 175/74   Pulse 74   Temp 97.2 °F (36.2 °C)   Resp 22   Wt 109.8 kg (242 lb 1 oz)   SpO2 96%   BMI 31.07 kg/m²      O2 Device: Room air    Temp (24hrs), Av.1 °F (36.7 °C), Min:97.2 °F (36.2 °C), Max:99.1 °F (37.3 °C)    No intake/output data recorded.  1901 -  0700  In: 50 [I.V.:50]  Out: 300 [Urine:300]    General:  Alert, cooperative, no distress, appears stated age. Lungs:   Clear to auscultation bilaterally. Chest wall:  No tenderness or deformity. Heart:  Regular rate and rhythm, S1, S2 normal, no murmur, click, rub or gallop. Abdomen:   Soft, non-tender. Bowel sounds normal. No masses,  No organomegaly. Extremities: Extremities normal, atraumatic, no cyanosis or edema. Pulses: 2+ and symmetric all extremities. Skin: Skin color, texture, turgor normal. No rashes or lesions   Neurologic: CNII-XII intact.  No gross sensory or motor deficits     Data Review:       Recent Days:  Recent Labs     21  0445 21  0717 21  1945   WBC 5.2 5.8 8.1   HGB 10.6* 11.0* 11.3*   HCT 35.0* 35.9* 38.5    165 211     Recent Labs     21  0445 21  0717 21    143 146*   K 3.2* 2.8* 3.7    106 110*   CO2 25 27 27   * 114* 92   BUN 21* 19 14   CREA 1.47* 1.49* 1.60*   CA 8.9 9.1 9.7   ALB  --   --  3.6   TBILI  --   --  1.2*   ALT  --   --  33     No results for input(s): PH, PCO2, PO2, HCO3, FIO2 in the last 72 hours. 24 Hour Results:  Recent Results (from the past 24 hour(s))   SARS-COV-2    Collection Time: 01/08/21  5:20 PM   Result Value Ref Range    SARS-CoV-2 Please find results under separate order     CBC WITH AUTOMATED DIFF    Collection Time: 01/09/21  4:45 AM   Result Value Ref Range    WBC 5.2 4.4 - 11.3 K/uL    RBC 4.98 4.50 - 5.90 M/uL    HGB 10.6 (L) 13.5 - 17.5 g/dL    HCT 35.0 (L) 41 - 53 %    MCV 70.3 (L) 80 - 100 FL    MCH 21.3 (L) 31 - 34 PG    MCHC 30.3 (L) 31.0 - 36.0 g/dL    RDW 17.9 (H) 11.5 - 14.5 %    PLATELET 445 K/uL    MPV 9.5 6.5 - 11.5 FL    NRBC 0.0  WBC    ABSOLUTE NRBC 0.00 K/uL    NEUTROPHILS 66 42 - 75 %    LYMPHOCYTES 17 (L) 20.5 - 51.1 %    MONOCYTES 12 (H) 1.7 - 9.3 %    EOSINOPHILS 4 (H) 0.9 - 2.9 %    BASOPHILS 1 0.0 - 2.5 %    ABS. NEUTROPHILS 3.5 1.8 - 7.7 K/UL    ABS. LYMPHOCYTES 0.9 (L) 1.0 - 4.8 K/UL    ABS. MONOCYTES 0.6 0.2 - 2.4 K/UL    ABS. EOSINOPHILS 0.2 0.0 - 0.7 K/UL    ABS. BASOPHILS 0.0 0.0 - 0.2 K/UL   METABOLIC PANEL, BASIC    Collection Time: 01/09/21  4:45 AM   Result Value Ref Range    Sodium 142 136 - 145 mmol/L    Potassium 3.2 (L) 3.5 - 5.1 mmol/L    Chloride 106 97 - 108 mmol/L    CO2 25 21 - 32 mmol/L    Anion gap 11 5 - 15 mmol/L    Glucose 122 (H) 65 - 100 mg/dL    BUN 21 (H) 6 - 20 mg/dL    Creatinine 1.47 (H) 0.70 - 1.30 mg/dL    BUN/Creatinine ratio 14 12 - 20      GFR est AA 56 (L) >60 ml/min/1.73m2    GFR est non-AA 46 (L) >60 ml/min/1.73m2    Calcium 8.9 8.5 - 10.1 mg/dL   CK    Collection Time: 01/09/21  4:45 AM   Result Value Ref Range     (H) 39 - 308 U/L   GLUCOSE, POC    Collection Time: 01/09/21  6:52 AM   Result Value Ref Range    Glucose (POC) 244 (H) 65 - 100 mg/dL    Performed by Eunice Garcia            Assessment/     1.   Mild rhabdomyolysis secondary to fall        -Hydrated with IV NS        -Encourage oral fluid intake        -Monitor closely for fluid overload    2. Accelerated hypertension. Poorly controlled        - On amlodipine, clonidine patch and hydralazine        -Increase hydralazine to 100mg TID    3. Diabetes        -Fairly controlled        -Continue sliding scale insulin    4. Degenerative joint disease        -Continue PTOT and pain control        -Will benefit from inpatient rehab    5. Paroxysmal atrial fibrillation        -Rate controlled. In sinus rhythm     6. History of CHF with findings concerning for possible pericardial effusion on x-ray. - EF normal at 67% on 01/07/21. No signs of decompensation     7. History of peripheral vascular disease. Stable    8. Ambulatory and gait disturbance secondary to fall    Plan:  Continue supportive care  PT OT evaluation  Monitor daily CPK levels  Consult case management for possible placement. Clinical updates provided to vitaliy Loving at . Patient agreeable to inpatient rehab  Overall clinically stable      Care Plan discussed with: Patient/Family    Total time spent with patient: 30 minutes.     Tenisha Duarte MD

## 2021-01-09 NOTE — ROUTINE PROCESS
Tolerated sitting up. Assisted back to bed with 2 -staff assist. C/o soreness. Bruises noted to rt hip and rt knee.

## 2021-01-10 NOTE — ROUTINE PROCESS
Bedside shift change report given to Ana Lilia Matamoros LPN (oncoming nurse) by Jay Arreaga LPN (offgoing nurse). Report included the following information SBAR.

## 2021-01-10 NOTE — PROGRESS NOTES
Problem: Falls - Risk of  Goal: *Absence of Falls  Description: Document Leafy Daugherty Fall Risk and appropriate interventions in the flowsheet. Outcome: Progressing Towards Goal  Note: Fall Risk Interventions:  Mobility Interventions: Patient to call before getting OOB         Medication Interventions: Patient to call before getting OOB    Elimination Interventions: Call light in reach    History of Falls Interventions: Consult care management for discharge planning, Door open when patient unattended, Room close to nurse's station         Problem: Patient Education: Go to Patient Education Activity  Goal: Patient/Family Education  Outcome: Progressing Towards Goal     Problem: Pressure Injury - Risk of  Goal: *Prevention of pressure injury  Description: Document Jos Scale and appropriate interventions in the flowsheet.   Outcome: Progressing Towards Goal  Note: Pressure Injury Interventions:  Sensory Interventions: Keep linens dry and wrinkle-free    Moisture Interventions: Absorbent underpads    Activity Interventions: PT/OT evaluation    Mobility Interventions: PT/OT evaluation    Nutrition Interventions: Offer support with meals,snacks and hydration    Friction and Shear Interventions: Apply protective barrier, creams and emollients, Minimize layers                Problem: Patient Education: Go to Patient Education Activity  Goal: Patient/Family Education  Outcome: Progressing Towards Goal

## 2021-01-10 NOTE — H&P
Hospitalist Progress Note         Danie Bermudez MD          Daily Progress Note: 1/10/2021      Subjective: The patient is seen for follow  up. 78-year-old male with a history of CHF, type 2 diabetes, hypertension that presented to the emergency room on 1/6/2021 for being found on the floor by his son. Patient cannot recall the events leading to it or how long he was on the ground for. He did complain of right leg pain and was unable to get up. He denied any fevers, chills, chest pain, trouble breathing. CT of the pelvis showed no fracture or bone destruction but did show mild symmetric DJD. X-ray of the femur was negative for fracture or dislocation. X-ray of the hip was negative for fracture or dislocation with age-appropriate degenerative change of the hips laterally. X-ray of the right knee negative for fracture dislocation. Chest x-ray showed enlarged cardiac silhouette with findings suspicious for possible pericardial effusion. CK initially elevated 2052 and now 1500. Started on IV fluids. Renal function slightly elevated with a BUN of 14 with a serum creatinine 1.60. Electrolytes are stable. PT and OT consulted    Patient seen in follow-up. Is awake alert and oriented improved weakness was out of bed to chair on 1/9. Rhabdomyolysis improving MOE also improving      Problem List:  Problem List as of 1/10/2021 Date Reviewed: 12/18/2020          Codes Class Noted - Resolved    MOE (acute kidney injury) ([facility] ICD-10-CM: N17.9  ICD-9-CM: 584.9  1/8/2021 - Present        Rhabdomyolysis ICD-10-CM: I82.24  ICD-9-CM: 728.88  1/7/2021 - Present        Iron deficiency anemia secondary to inadequate dietary iron intake (Chronic) ICD-10-CM: D50.8  ICD-9-CM: 280.1  11/2/2019 - Present    Overview Signed 11/2/2019  8:19 PM by Chyna Macedo MD     Cannot absorb iron.              BMI 32.0-32.9,adult (Chronic) ICD-10-CM: C19.68  ICD-9-CM: V85.32  6/5/2019 - Present Type 2 diabetes mellitus with nephropathy (HCC) ICD-10-CM: E11.21  ICD-9-CM: 250.40, 583.81  1/10/2018 - Present        History of colon polyps ICD-10-CM: Z86.010  ICD-9-CM: V12.72  5/2/2016 - Present        Idiopathic gout ICD-10-CM: M10.00  ICD-9-CM: 274.9  5/2/2016 - Present        Essential hypertension with goal blood pressure less than 130/85 (Chronic) ICD-10-CM: I10  ICD-9-CM: 401.9  5/2/2016 - Present        CKD (chronic kidney disease) (Chronic) ICD-10-CM: N18.9  ICD-9-CM: 585.9  11/23/2015 - Present        COPD (chronic obstructive pulmonary disease) (HCC) (Chronic) ICD-10-CM: J44.9  ICD-9-CM: 496  4/27/2015 - Present        Diabetes with neurologic complications (HCC) (Chronic) ICD-10-CM: E11.49  ICD-9-CM: 250.60  7/21/2014 - Present    Overview Signed 5/26/2015  7:19 PM by MD Dr. Aydee Stephenson             Chronic radicular pain of lower back ICD-10-CM: M54.16, G89.29  ICD-9-CM: 724.4, 338.29  7/21/2014 - Present    Overview Signed 7/21/2014 10:48 AM by Kimberly Aguilar MD     Severe DDD, lumbar plus diabetic neuropathy. On hydrocodone chronically. Pain even with no movent,walks with a cane. Neuropathy (Chronic) ICD-10-CM: G62.9  ICD-9-CM: 355.9  7/16/2013 - Present        DDD (degenerative disc disease), lumbar (Chronic) ICD-10-CM: M51.36  ICD-9-CM: 722.52  7/16/2013 - Present        Prostate CA (HCC) (Chronic) ICD-10-CM: C61  ICD-9-CM: 185  7/16/2013 - Present        Tubulovillous adenoma polyp of colon (Chronic) ICD-10-CM: D12.6  ICD-9-CM: 211.3  7/16/2013 - Present    Overview Signed 7/16/2013  8:44 AM by Kimberly Aguilar MD     6/12. Dr. Melvin Santos. @ polyps removed.  Due 2016             Edema (Chronic) ICD-10-CM: R60.9  ICD-9-CM: 782.3  1/12/2012 - Present        Persistent atrial fibrillation (HCC) ICD-10-CM: I48.19  ICD-9-CM: 427.31  Unknown - Present        Unspecified arthropathy, ankle and foot ICD-10-CM: M19.079  ICD-9-CM: 716.97  7/14/2011 - Present        Arthritis ICD-10-CM: M19.90  ICD-9-CM: 716.90  7/11/2011 - Present        Gout, unspecified (Chronic) ICD-10-CM: M10.9  ICD-9-CM: 274.9  Unknown - Present        PVD (peripheral vascular disease) (HCC) (Chronic) ICD-10-CM: I73.9  ICD-9-CM: 443.9  Unknown - Present        Hypertension (Chronic) ICD-10-CM: I10  ICD-9-CM: 401.9  Unknown - Present    Overview Signed 7/16/2013  8:37 AM by Mak Castillo MD     Sees Dr. Kassi Velazquez for HTN and Kidney. Hypercholesterolemia (Chronic) ICD-10-CM: E78.00  ICD-9-CM: 272.0  Unknown - Present        Leg pain (Chronic) ICD-10-CM: M79.606  ICD-9-CM: 729.5  Unknown - Present        Chronic pain (Chronic) ICD-10-CM: A82.32  ICD-9-CM: 338.29  5/6/2010 - Present    Overview Signed 5/8/2013 10:30 AM by Mak Castillo MD     Has pins in back and pain and numbness in right leg. Dr. Shad Bryant at [facility]. Has recommended additional surgery and patient has declined.              RESOLVED: Chronic diastolic heart failure (HCC) ICD-10-CM: I50.32  ICD-9-CM: 428.32  8/26/2015 - 9/6/2017        RESOLVED: Left upper lobe pneumonia ICD-10-CM: J18.9  ICD-9-CM: 486  4/27/2015 - 4/16/2018              Medications reviewed  Current Facility-Administered Medications   Medication Dose Route Frequency    potassium chloride (K-DUR, KLOR-CON) SR tablet 20 mEq  20 mEq Oral BID    sodium chloride (NS) flush 5-40 mL  5-40 mL IntraVENous Q8H    sodium chloride (NS) flush 5-40 mL  5-40 mL IntraVENous PRN    heparin (porcine) injection 5,000 Units  5,000 Units SubCUTAneous Q12H    acetaminophen (TYLENOL) tablet 650 mg  650 mg Oral Q4H PRN    HYDROcodone-acetaminophen (NORCO) 5-325 mg per tablet 1 Tab  1 Tab Oral Q4H PRN    amLODIPine (NORVASC) tablet 10 mg  10 mg Oral DAILY    aspirin chewable tablet 81 mg  81 mg Oral DAILY    cloNIDine (CATAPRES) 0.2 mg/24 hr patch 1 Patch  1 Patch TransDERmal Q7D    gabapentin (NEURONTIN) capsule 300 mg  300 mg Oral AC&HS    hydrALAZINE (APRESOLINE) tablet 50 mg  50 mg Oral TID    magnesium oxide (MAG-OX) tablet 400 mg  400 mg Oral DAILY    montelukast (SINGULAIR) tablet 10 mg  10 mg Oral DAILY    pantoprazole (PROTONIX) tablet 40 mg  40 mg Oral ACB    rosuvastatin (CRESTOR) tablet 20 mg  20 mg Oral QHS    tamsulosin (FLOMAX) capsule 0.4 mg  0.4 mg Oral DAILY    albuterol-ipratropium (DUO-NEB) 2.5 MG-0.5 MG/3 ML  3 mL Nebulization Q6H PRN       Review of Systems:   A comprehensive review of systems was negative except for that written in the HPI. Objective:   Physical Exam:     Visit Vitals  BP (!) 172/81 (BP Patient Position: At rest)   Pulse 62   Temp 98.1 °F (36.7 °C)   Resp 20   Wt 109.8 kg (242 lb 1 oz)   SpO2 96%   BMI 31.07 kg/m²      O2 Device: Room air    Temp (24hrs), Av.1 °F (36.7 °C), Min:97.4 °F (36.3 °C), Max:98.5 °F (36.9 °C)    No intake/output data recorded.  1901 - 01/10 0700  In: 290 [P.O.:240; I.V.:50]  Out: 750 [Urine:750]    General:  Alert, cooperative, no distress, appears stated age. Lungs:   Clear to auscultation bilaterally. Chest wall:  No tenderness or deformity. Heart:  Regular rate and rhythm, S1, S2 normal, no murmur, click, rub or gallop. Abdomen:   Soft, non-tender. Bowel sounds normal. No masses,  No organomegaly. Extremities: Extremities normal, atraumatic, no cyanosis or edema. Pulses: 2+ and symmetric all extremities. Skin: Skin color, texture, turgor normal. No rashes or lesions   Neurologic: CNII-XII intact.  No gross sensory or motor deficits     Data Review:       Recent Days:  Recent Labs     01/10/21  0520 21  0445 21  0717   WBC 4.3* 5.2 5.8   HGB 10.0* 10.6* 11.0*   HCT 32.9* 35.0* 35.9*    164 165     Recent Labs     01/10/21  0520 21  0445 21  0717    142 143   K 3.3* 3.2* 2.8*    106 106   CO2 27 25 27   * 122* 114*   BUN 19 21* 19   CREA 1.35* 1.47* 1.49*   CA 9.0 8.9 9.1     No results for input(s): PH, PCO2, PO2, HCO3, FIO2 in the last 72 hours. 24 Hour Results:  Recent Results (from the past 24 hour(s))   GLUCOSE, POC    Collection Time: 01/09/21  3:15 PM   Result Value Ref Range    Glucose (POC) 276 (H) 65 - 100 mg/dL    Performed by [address] [place] [id number], POC    Collection Time: 01/09/21  8:51 PM   Result Value Ref Range    Glucose (POC) 129 (H) 65 - 100 mg/dL    Performed by [facility]    CBC WITH AUTOMATED DIFF    Collection Time: 01/10/21  5:20 AM   Result Value Ref Range    WBC 4.3 (L) 4.4 - 11.3 K/uL    RBC 4.69 4.50 - 5.90 M/uL    HGB 10.0 (L) 13.5 - 17.5 g/dL    HCT 32.9 (L) 41 - 53 %    MCV 70.2 (L) 80 - 100 FL    MCH 21.4 (L) 31 - 34 PG    MCHC 30.5 (L) 31.0 - 36.0 g/dL    RDW 18.0 (H) 11.5 - 14.5 %    PLATELET 271 K/uL    MPV 9.0 6.5 - 11.5 FL    NRBC 0.0  WBC    ABSOLUTE NRBC 0.00 K/uL    NEUTROPHILS 61 42 - 75 %    LYMPHOCYTES 21 20.5 - 51.1 %    MONOCYTES 11 (H) 1.7 - 9.3 %    EOSINOPHILS 7 (H) 0.9 - 2.9 %    BASOPHILS 0 0.0 - 2.5 %    ABS. NEUTROPHILS 2.6 1.8 - 7.7 K/UL    ABS. LYMPHOCYTES 0.9 (L) 1.0 - 4.8 K/UL    ABS. MONOCYTES 0.5 0.2 - 2.4 K/UL    ABS. EOSINOPHILS 0.3 0.0 - 0.7 K/UL    ABS. BASOPHILS 0.0 0.0 - 0.2 K/UL   METABOLIC PANEL, BASIC    Collection Time: 01/10/21  5:20 AM   Result Value Ref Range    Sodium 143 136 - 145 mmol/L    Potassium 3.3 (L) 3.5 - 5.1 mmol/L    Chloride 107 97 - 108 mmol/L    CO2 27 21 - 32 mmol/L    Anion gap 9 5 - 15 mmol/L    Glucose 111 (H) 65 - 100 mg/dL    BUN 19 6 - 20 mg/dL    Creatinine 1.35 (H) 0.70 - 1.30 mg/dL    BUN/Creatinine ratio 14 12 - 20      GFR est AA >60 >60 ml/min/1.73m2    GFR est non-AA 51 (L) >60 ml/min/1.73m2    Calcium 9.0 8.5 - 10.1 mg/dL   CK    Collection Time: 01/10/21  5:20 AM   Result Value Ref Range     (H) 39 - 308 U/L           Assessment/     1.   Mild rhabdomyolysis secondary to fall        -Hydrated with IV NS  Initially and cpk improved 1575 to 762 and 525        -Encourage oral fluid intake        -Monitor closely for fluid overload - follow repeat cpk in AM         - PT evalaution in AM,         - COVID screen sent and pending.        - Case management for possible SNF placement     2. Accelerated hypertension. Poorly controlled        - On amlodipine, clonidine patch and hydralazine        -Increase hydralazine to 100mg TID        - continue monitor vitals q4hrs. 3.  Diabetes        -Fairly controlled        -Continue sliding scale insulin    4. Degenerative joint disease        -Continue PTOT and pain control        -Will benefit from inpatient rehab    5. Paroxysmal atrial fibrillation        -Rate controlled. In sinus rhythm     6. History of CHF with findings concerning for possible pericardial effusion on x-ray. - EF normal at 67% on 01/07/21. No signs of decompensation     7. History of peripheral vascular disease. Stable    8. Ambulatory and gait disturbance secondary to fall  9. ? Of UTI        - UA on 1/6 noted trace leukocytes and initally was started on ceftrixone but UC is negative so was disontinued. Total time spent with patient: 30 minutes.     Grabiel Sylvester MD

## 2021-01-10 NOTE — PROGRESS NOTES
Report received from the offgoing shift. The pt is resting in bed with his eyes closed. No signs of distress is noted.

## 2021-01-11 NOTE — PROGRESS NOTES
Physician Progress Note      Estee Krishna  CSN #:                  [id number]  :                       1940  ADMIT DATE:       2021 11:56 PM  [address] [place] DATE:  RESPONDING  PROVIDER #:        Rosy Dancer MD          QUERY TEXT:    Dear Dr. Charlie Fan:    Patient admitted with rhabdomyolysis and MOE, noted to have CKD. If possible, please document in progress notes and discharge summary if you are evaluating and/or treating any of the following: The medical record reflects the following:  Risk Factors: 79 M admitted with rhabdomyolysis and MOE  Clinical Indicators: Documentation of CKD in provider progress notes, GFR range this admission 51->60  Treatment: labs, treatment of admitting conditions    Thank you,  XAVIER GarciaN, RN  Clinical   [phone]  Options provided:  -- CKD Stage 2 GFR 60-90  -- CKD Stage 3a GFR 45-59  -- Other - I will add my own diagnosis  -- Disagree - Not applicable / Not valid  -- Disagree - Clinically unable to determine / Unknown  -- Refer to Clinical Documentation Reviewer    PROVIDER RESPONSE TEXT:    This patient has CKD Stage 3a.     Query created by: Devora Nieves on 2021 7:05 AM      Electronically signed by:  Rosy Dancer MD 2021 7:07 AM

## 2021-01-11 NOTE — PROGRESS NOTES
PHYSICAL THERAPY EVALUATION  Patient: Addison Watson (74 y.o. male)  Date: 1/11/2021  Primary Diagnosis: MOE (acute kidney injury) [facility]) [N17.9]        Precautions: Fall    ASSESSMENT  Based on the objective data described below, the patient presents with decreased functional mobility as a result of a fall. Pt demonstrates increased R knee pain, increased LE weakness, decreased transfer ability, decreased gait quality and endurance, as well as decreased activity tolerance. Upon evaluation, pt is able to perform sit to stand transfer with moderate assistance and is able to ambulate 10 feet with FWW with minimal assistance. Pt would benefit from skilled physical therapy to address weakness and functional mobility deficits. Current Level of Function Impacting Discharge (mobility/balance): decreased transfer ability, decreased gait quality and endurance, decreased activity tolerance. Patient will benefit from skilled therapy intervention to address the above noted impairments. PLAN :  Recommendations and Planned Interventions: bed mobility training, transfer training, gait training, therapeutic exercises, neuromuscular re-education, and therapeutic activities      Frequency/Duration: Patient will be followed by physical therapy:  5 times a week to address goals. Recommendation for discharge: (in order for the patient to meet his/her long term goals)  Therapy 3 hours per day 5-7 days per week    This discharge recommendation:  Has been made in collaboration with the attending provider and/or case management    IF patient discharges home will need the following DME: patient owns DME required for discharge         SUBJECTIVE:   Patient stated my right knee is bothering me.     OBJECTIVE DATA SUMMARY:   HISTORY:    Past Medical History:   Diagnosis Date    Acute on chronic diastolic congestive heart failure ([facility] 4/27/2015    Arthritis 7/11/2011    [place] 6/7/2010    Cancer ([facility]     prostate    Chronic diastolic heart failure ([facility] 8/26/2015    CKD (chronic kidney disease) 11/23/2015    Diabetes ([facility]     Gout, unspecified     Hypercholesterolemia     Hypertension     Inguinal lymphadenopathy 2/18/2014    Leg pain     Persistent atrial fibrillation (HCC)     PVD (peripheral vascular disease) ([facility]      Past Surgical History:   Procedure Laterality Date    COLONOSCOPY N/A 9/22/2016    [facility]      back and left shoulder x2       Personal factors and/or comorbidities impacting plan of care: Pt currently lives alone and has had 2 falls in the past 3 months. Home Situation  Home Environment: Trailer/mobile home  # Steps to Enter: 3  Rails to Enter: Yes  Hand Rails : Bilateral  One/Two Story Residence: One story  Living Alone: Yes  Support Systems: Family member(s)  Patient Expects to be Discharged to[de-identified] Rehabilitation facility  Current DME Used/Available at Home: Robin Said, rollator, Cane, straight    EXAMINATION/PRESENTATION/DECISION MAKING:   Critical Behavior:  Neurologic State: Alert  Orientation Level: Oriented X4    Hearing: Auditory  Auditory Impairment: Hard of hearing, bilateral    Functional Mobility:  Bed Mobility: Not assessed    Transfers:  Sit to Stand: Moderate assistance  Stand to Sit: Moderate assistance  Stand Pivot Transfers: Moderate assistance        Balance:   Sitting: Without support; Intact  Standing: With support; Intact    Ambulation/Gait Training:  Distance (ft): 10 Feet (ft)  Assistive Device: Walker  Ambulation - Level of Assistance: Moderate assistance    Treatment Session:   Pt reports R knee pain of a 10/10. Pt able to perform sit to stand transfer from bedside commode with moderate assistance x2 and is able to maintain static standing balance for after care. Pt is able to perform sit to stand transfer x3 with moderate assistance throughout session. Pt able to ambulate approx.  10' forward and backward with FWW mod A to EOB. Following rest break, pt able to perform sit to stand transfer to chair. Pt demonstrates decreased activity tolerance with activity as noted by SOB and fatigue toward end of session. Functional Measure:     Physical Therapy Evaluation Charge Determination   History Examination Presentation Decision-Making   MEDIUM  Complexity : 1-2 comorbidities / personal factors will impact the outcome/ POC  LOW Complexity : 1-2 Standardized tests and measures addressing body structure, function, activity limitation and / or participation in recreation  LOW Complexity : Stable, uncomplicated        Based on the above components, the patient evaluation is determined to be of the following complexity level: LOW     Pain Rating:  10/10    Activity Tolerance:   Fair, requires rest breaks, and observed SOB with activity    After treatment patient left in no apparent distress:   Sitting in chair and Call bell within reach    COMMUNICATION/EDUCATION:   The patients plan of care was discussed with: Physician. Patient/family have participated as able in goal setting and plan of care. Thank you for this referral.  Miranda Bridges, PT, DPT   Time Calculation: 20 mins        Problem: Mobility Impaired (Adult and Pediatric)  Goal: *Acute Goals and Plan of Care (Insert Text)  Description: FUNCTIONAL STATUS PRIOR TO ADMISSION: Patient was modified independent using a single point cane for functional mobility. HOME SUPPORT PRIOR TO ADMISSION: The patient lived alone with children to provide assistance. Physical Therapy Goals  Initiated 1/11/2021  1. Patient will move from supine to sit and sit to supine , scoot up and down, and roll side to side in bed with independence within 7 day(s). 2.  Patient will transfer from bed to chair and chair to bed with modified independence using the least restrictive device within 7 day(s).   3.  Patient will perform sit to stand with modified independence within 7 day(s). 4.  Patient will ambulate with supervision/set-up for 150 feet with the least restrictive device within 7 day(s). 5.  Patient will ascend/descend 3 stairs with bilateral handrail(s) with minimal assistance/contact guard assist within 7 day(s).      Outcome: Not Met

## 2021-01-11 NOTE — H&P
Hospitalist Progress Note         Elliot Harper MD          Daily Progress Note: 1/11/2021      Subjective: The patient is seen for follow  up. 74-year-old male with a history of CHF, type 2 diabetes, hypertension that presented to the emergency room on 1/6/2021 for being found on the floor by his son. Patient cannot recall the events leading to it or how long he was on the ground for. He did complain of right leg pain and was unable to get up. He denied any fevers, chills, chest pain, trouble breathing. CT of the pelvis showed no fracture or bone destruction but did show mild symmetric DJD. X-ray of the femur was negative for fracture or dislocation. X-ray of the hip was negative for fracture or dislocation with age-appropriate degenerative change of the hips laterally. X-ray of the right knee negative for fracture dislocation. Chest x-ray showed enlarged cardiac silhouette with findings suspicious for possible pericardial effusion. CK initially elevated 2052 and now 1500. Started on IV fluids. Renal function slightly elevated with a BUN of 14 with a serum creatinine 1.60. Electrolytes are stable. PT and OT consulted    Patient seen in follow-up. Is awake alert and oriented still says he struggles with getting out of bed and feels weak. Is having pain in right knee , afebrile., COVID test is pending. PT evaluation pending     Problem List:  Problem List as of 1/11/2021 Date Reviewed: 12/18/2020          Codes Class Noted - Resolved    MOE (acute kidney injury) ([facility] ICD-10-CM: N17.9  ICD-9-CM: 584.9  1/8/2021 - Present        Rhabdomyolysis ICD-10-CM: E75.87  ICD-9-CM: 728.88  1/7/2021 - Present        Iron deficiency anemia secondary to inadequate dietary iron intake (Chronic) ICD-10-CM: D50.8  ICD-9-CM: 280.1  11/2/2019 - Present    Overview Signed 11/2/2019  8:19 PM by Gera Murry MD     Cannot absorb iron.              BMI 32.0-32.9,adult (Chronic) ICD-10-CM: V61.26  ICD-9-CM: V85.32  6/5/2019 - Present        Type 2 diabetes mellitus with nephropathy ([facility] ICD-10-CM: E11.21  ICD-9-CM: 250.40, 583.81  1/10/2018 - Present        History of colon polyps ICD-10-CM: Z86.010  ICD-9-CM: V12.72  5/2/2016 - Present        Idiopathic gout ICD-10-CM: M10.00  ICD-9-CM: 274.9  5/2/2016 - Present        Essential hypertension with goal blood pressure less than 130/85 (Chronic) ICD-10-CM: I10  ICD-9-CM: 401.9  5/2/2016 - Present        CKD (chronic kidney disease) (Chronic) ICD-10-CM: N18.9  ICD-9-CM: 585.9  11/23/2015 - Present        COPD (chronic obstructive pulmonary disease) (HCC) (Chronic) ICD-10-CM: J44.9  ICD-9-CM: 496  4/27/2015 - Present        Diabetes with neurologic complications (HCC) (Chronic) ICD-10-CM: E11.49  ICD-9-CM: 250.60  7/21/2014 - Present    Overview Signed 5/26/2015  7:19 PM by MD Dr. Alix Ramirez             Chronic radicular pain of lower back ICD-10-CM: M54.16, G89.29  ICD-9-CM: 724.4, 338.29  7/21/2014 - Present    Overview Signed 7/21/2014 10:48 AM by Meera Chung MD     Severe DDD, lumbar plus diabetic neuropathy. On hydrocodone chronically. Pain even with no movent,walks with a cane. Neuropathy (Chronic) ICD-10-CM: G62.9  ICD-9-CM: 355.9  7/16/2013 - Present        DDD (degenerative disc disease), lumbar (Chronic) ICD-10-CM: M51.36  ICD-9-CM: 722.52  7/16/2013 - Present        Prostate CA (HCC) (Chronic) ICD-10-CM: C61  ICD-9-CM: 185  7/16/2013 - Present        Tubulovillous adenoma polyp of colon (Chronic) ICD-10-CM: D12.6  ICD-9-CM: 211.3  7/16/2013 - Present    Overview Signed 7/16/2013  8:44 AM by Meera Chung MD     6/12. Dr. Wilbur Luque. @ polyps removed.  Due 2016             Edema (Chronic) ICD-10-CM: R60.9  ICD-9-CM: 782.3  1/12/2012 - Present        Persistent atrial fibrillation (HCC) ICD-10-CM: I48.19  ICD-9-CM: 427.31  Unknown - Present        Unspecified arthropathy, ankle and foot ICD-10-CM: V01.288  ICD-9-CM: 716.97  7/14/2011 - Present        Arthritis ICD-10-CM: M19.90  ICD-9-CM: 716.90  7/11/2011 - Present        Gout, unspecified (Chronic) ICD-10-CM: M10.9  ICD-9-CM: 274.9  Unknown - Present        PVD (peripheral vascular disease) (HCC) (Chronic) ICD-10-CM: I73.9  ICD-9-CM: 443.9  Unknown - Present        Hypertension (Chronic) ICD-10-CM: I10  ICD-9-CM: 401.9  Unknown - Present    Overview Signed 7/16/2013  8:37 AM by Renteta Mosley MD     Sees Dr. Alfred Mojica for HTN and Kidney. Hypercholesterolemia (Chronic) ICD-10-CM: E78.00  ICD-9-CM: 272.0  Unknown - Present        Leg pain (Chronic) ICD-10-CM: M79.606  ICD-9-CM: 729.5  Unknown - Present        Chronic pain (Chronic) ICD-10-CM: J08.51  ICD-9-CM: 338.29  5/6/2010 - Present    Overview Signed 5/8/2013 10:30 AM by Renetta Mosley MD     Has pins in back and pain and numbness in right leg. Dr. Cynthia Scott at [facility]. Has recommended additional surgery and patient has declined.              RESOLVED: Chronic diastolic heart failure (HCC) ICD-10-CM: I50.32  ICD-9-CM: 428.32  8/26/2015 - 9/6/2017        RESOLVED: Left upper lobe pneumonia ICD-10-CM: J18.9  ICD-9-CM: 486  4/27/2015 - 4/16/2018              Medications reviewed  Current Facility-Administered Medications   Medication Dose Route Frequency    hydrALAZINE (APRESOLINE) tablet 100 mg  100 mg Oral TID    potassium chloride (K-DUR, KLOR-CON) SR tablet 20 mEq  20 mEq Oral BID    sodium chloride (NS) flush 5-40 mL  5-40 mL IntraVENous Q8H    sodium chloride (NS) flush 5-40 mL  5-40 mL IntraVENous PRN    heparin (porcine) injection 5,000 Units  5,000 Units SubCUTAneous Q12H    acetaminophen (TYLENOL) tablet 650 mg  650 mg Oral Q4H PRN    HYDROcodone-acetaminophen (NORCO) 5-325 mg per tablet 1 Tab  1 Tab Oral Q4H PRN    amLODIPine (NORVASC) tablet 10 mg  10 mg Oral DAILY    aspirin chewable tablet 81 mg  81 mg Oral DAILY    cloNIDine (CATAPRES) 0.2 mg/24 hr patch 1 Patch  1 Patch TransDERmal Q7D    gabapentin (NEURONTIN) capsule 300 mg  300 mg Oral AC&HS    magnesium oxide (MAG-OX) tablet 400 mg  400 mg Oral DAILY    montelukast (SINGULAIR) tablet 10 mg  10 mg Oral DAILY    pantoprazole (PROTONIX) tablet 40 mg  40 mg Oral ACB    rosuvastatin (CRESTOR) tablet 20 mg  20 mg Oral QHS    tamsulosin (FLOMAX) capsule 0.4 mg  0.4 mg Oral DAILY    albuterol-ipratropium (DUO-NEB) 2.5 MG-0.5 MG/3 ML  3 mL Nebulization Q6H PRN       Review of Systems:   A comprehensive review of systems was negative except for that written in the HPI. Objective:   Physical Exam:     Visit Vitals  BP (!) 179/79   Pulse 68   Temp 98.2 °F (36.8 °C)   Resp 20   Wt 109.8 kg (242 lb 1 oz)   SpO2 97%   BMI 31.07 kg/m²      O2 Device: Room air    Temp (24hrs), Av.2 °F (36.8 °C), Min:97.6 °F (36.4 °C), Max:98.5 °F (36.9 °C)    No intake/output data recorded. No intake/output data recorded. General:  Alert, cooperative, no distress, appears stated age. Lungs:   Clear to auscultation bilaterally. Chest wall:  No tenderness or deformity. Heart:  Regular rate and rhythm, S1, S2 normal, no murmur, click, rub or gallop. Abdomen:   Soft, non-tender. Bowel sounds normal. No masses,  No organomegaly. Extremities: Extremities normal, atraumatic, no cyanosis or edema. Pulses: 2+ and symmetric all extremities. Skin: Skin color, texture, turgor normal. No rashes or lesions   Neurologic: CNII-XII intact.  No gross sensory or motor deficits     Data Review:       Recent Days:  Recent Labs     21  0531 01/10/21  0520 21  0445   WBC 3.8* 4.3* 5.2   HGB 10.1* 10.0* 10.6*   HCT 34.1* 32.9* 35.0*    167 164     Recent Labs     21  0531 01/10/21  0530 01/10/21  0520 21  0445     --  143 142   K 3.5  --  3.3* 3.2*     --  107 106   CO2 27  --  27 25   *  --  111* 122*   BUN 16  --  19 21*   CREA 1.31*  --  1.35* 1.47*   CA 8.9  --  9.0 8.9   MG  --  1.9  --   --      No results for input(s): PH, PCO2, PO2, HCO3, FIO2 in the last 72 hours. 24 Hour Results:  Recent Results (from the past 24 hour(s))   GLUCOSE, POC    Collection Time: 01/10/21  2:37 PM   Result Value Ref Range    Glucose (POC) 129 (H) 65 - 100 mg/dL    Performed by [address], POC    Collection Time: 01/10/21  4:21 PM   Result Value Ref Range    Glucose (POC) 128 (H) 65 - 100 mg/dL    Performed by [address], POC    Collection Time: 01/10/21  9:25 PM   Result Value Ref Range    Glucose (POC) 141 (H) 65 - 100 mg/dL    Performed by [address] [place] [address], BASIC    Collection Time: 01/11/21  5:31 AM   Result Value Ref Range    Sodium 142 136 - 145 mmol/L    Potassium 3.5 3.5 - 5.1 mmol/L    Chloride 108 97 - 108 mmol/L    CO2 27 21 - 32 mmol/L    Anion gap 7 5 - 15 mmol/L    Glucose 114 (H) 65 - 100 mg/dL    BUN 16 6 - 20 mg/dL    Creatinine 1.31 (H) 0.70 - 1.30 mg/dL    BUN/Creatinine ratio 12 12 - 20      GFR est AA >60 >60 ml/min/1.73m2    GFR est non-AA 53 (L) >60 ml/min/1.73m2    Calcium 8.9 8.5 - 10.1 mg/dL   CBC WITH AUTOMATED DIFF    Collection Time: 01/11/21  5:31 AM   Result Value Ref Range    WBC 3.8 (L) 4.4 - 11.3 K/uL    RBC 4.78 4.50 - 5.90 M/uL    HGB 10.1 (L) 13.5 - 17.5 g/dL    HCT 34.1 (L) 41 - 53 %    MCV 71.3 (L) 80 - 100 FL    MCH 21.2 (L) 31 - 34 PG    MCHC 29.7 (L) 31.0 - 36.0 g/dL    RDW 17.7 (H) 11.5 - 14.5 %    PLATELET 037 K/uL    MPV 9.3 6.5 - 11.5 FL    NRBC 10.0  WBC    ABSOLUTE NRBC 0.00 K/uL    NEUTROPHILS 58 42 - 75 %    LYMPHOCYTES 23 20.5 - 51.1 %    MONOCYTES 11 (H) 1.7 - 9.3 %    EOSINOPHILS 8 (H) 0.9 - 2.9 %    BASOPHILS 0 0.0 - 2.5 %    ABS. NEUTROPHILS 2.2 1.8 - 7.7 K/UL    ABS. LYMPHOCYTES 0.9 (L) 1.0 - 4.8 K/UL    ABS. MONOCYTES 0.4 0.2 - 2.4 K/UL    ABS. EOSINOPHILS 0.3 0.0 - 0.7 K/UL    ABS.  BASOPHILS 0.0 0.0 - 0.2 K/UL   CK    Collection Time: 01/11/21  5:31 AM   Result Value Ref Range     (H) 39 - 308 U/L   GLUCOSE, POC Collection Time: 01/11/21  7:14 AM   Result Value Ref Range    Glucose (POC) 115 (H) 65 - 100 mg/dL    Performed by Mitchell Jimenes            Assessment/     1. Mild rhabdomyolysis secondary to fall        -Hydrated with IV NS  Initially and cpk improved 1575 to 762 and 525 to 328        -Encourage oral fluid intake        -Monitor closely for fluid overload         - PT evalaution in AM,         - COVID screen sent and pending.        - Case management for possible SNF placement     2. Accelerated hypertension. Poorly controlled        - On amlodipine, clonidine patch and hydralazine 100mg tid        - patient meds need reconcilation as it notes hx of bumex and labetalol dosing.         -continue monitor vitals q4hrs. -  1/11: still elevated bp and will give 40m oral lasix and have meds reconciled. 3.  Diabetes        -Fairly controlled        -Continue sliding scale insulin        - reconcile home meds to reconfirm glipizide and metformin dosing. Stable glucose this AM on 1/11 at 115.    4.  Degenerative joint disease        -Continue PTOT and pain control        -Will benefit from inpatient rehab        - restart prn pain meds. As was noted to be on high dose of norco 10/325mg    But will start at lower dose and titrate up     5. Paroxysmal atrial fibrillation        -Rate controlled. In sinus rhythm     6. History of CHF with findings concerning for possible pericardial effusion on x-ray. - EF normal at 67% on 01/07/21. No signs of decompensation        - reconcile home meds as noted to be on bumex and will start 1/11 lasix 40mg oral daily and adjust depending on chronic home meds      7. History of peripheral vascular disease. Stable    8. Ambulatory and gait disturbance secondary to fall  9. ? Of UTI        - UA on 1/6 noted trace leukocytes and initally was started on ceftrixone but UC is negative so was disontinued. Total time spent with patient: 30 minutes.     Willian PARMAR MD [facility]

## 2021-01-11 NOTE — ROUTINE PROCESS
Patient had incontinent bowel movement on assessment of his back. Patient cleaned and brief changed. Patient tolerated well.

## 2021-01-11 NOTE — ROUTINE PROCESS
Patient seen and assessed, denies any pain. Alert to self and place, mask provided upon request. Patient takes all pills whole with no problems. Right hip skin tear noted, foam dressing in place.

## 2021-01-12 NOTE — TELEPHONE ENCOUNTER
LIZA Hall is now at [facility]. He stayed in the ER at [facility]. He was moved because they had no beds.

## 2021-01-12 NOTE — PROGRESS NOTES
PHYSICAL THERAPY TREATMENT  Patient: Charmaine Almeida (88 y.o. male)  Date: 1/12/2021  Diagnosis: MOE (acute kidney injury) ([facility] [N17.9] <principal problem not specified>       Precautions: Fall  Chart, physical therapy assessment, plan of care and goals were reviewed. ASSESSMENT  Patient continues with skilled PT services and is progressing towards goals. Pt is able to perform supine to sit, sit to stand and stand pivot transfers with minimal assistance and support. Pt does tend to guard or modify WS to reduce pressure on L knee note when asked about or cued to correct pt notes he is concerned knee will giving way (but does note no pain). Pt also ambulates with forward flex posture and notes inability to correct posture due to previous spinal sx that make him unable to correct. Note this posture and core weakness also affect his corrective ability with dynamic sitting when he leans too far posteriorly during EOB sitting activities. Current Level of Function Impacting Discharge (mobility/balance): Pt continues to have some gait and  transfers inconsistency needing assistance for balance. Pt ambulation distance is still less than needed for home independence and fall prevention. Other factors to consider for discharge: Pt to return home with family and will need to be modified independent with AD for all functional mobility and fall safety. PLAN :  Patient continues to benefit from skilled intervention to address the above impairments. Continue treatment per established plan of care. to address goals.     Recommendation for discharge: (in order for the patient to meet his/her long term goals)  Therapy 3 hours per day 5-7 days per week    This discharge recommendation:  Has been made in collaboration with the attending provider and/or case management    IF patient discharges home will need the following DME: patient owns DME required for discharge       SUBJECTIVE:   Patient stated [facility] wishes he knew why his knee is hurting at times but mostly why it keeps making him fall.     OBJECTIVE DATA SUMMARY:   Critical Behavior:  Neurologic State: Alert, Appropriate for age  Orientation Level: Oriented X4        Functional Mobility Training:  Bed Mobility:                    Transfers:  Sit to Stand: Moderate assistance  Stand to Sit: Minimum assistance                             Balance:  Sitting - Static: Good (unsupported)  Sitting - Dynamic: Fair (occasional)  Standing: With support  Ambulation/Gait Training:  Distance (ft): 20 Feet (ft)  Assistive Device: Walker  Ambulation - Level of Assistance: Minimal assistance                                               Stairs:              Treatment Session:   Pt did report some soreness in knee with active movement heel slides in bed and LAQ sitting EOB Note soreness in both knees on occasion with ex. Soreness did not prevent pt from amb with FWW note cues to increase WS to L knee and directed to use UE to reduce pressure if WB caused pain. Note pt did have to slide R foot out in order to achieve knee flex for stand to sit into bed side chair. Pain Ratin/10    Activity Tolerance:   Good  Please refer to the flowsheet for vital signs taken during this treatment. After treatment patient left in no apparent distress:   Sitting in chair    COMMUNICATION/COLLABORATION:   The patients plan of care was discussed with: Physical therapist.     Sarahy Mccormack PTA   Time Calculation: 21 mins         Problem: Mobility Impaired (Adult and Pediatric)  Goal: *Acute Goals and Plan of Care (Insert Text)  Description: FUNCTIONAL STATUS PRIOR TO ADMISSION: Patient was modified independent using a single point cane for functional mobility. HOME SUPPORT PRIOR TO ADMISSION: The patient lived alone with children to provide assistance. Physical Therapy Goals  Initiated 2021  1.   Patient will move from supine to sit and sit to supine , scoot up and down, and roll side to side in bed with independence within 7 day(s). 2.  Patient will transfer from bed to chair and chair to bed with modified independence using the least restrictive device within 7 day(s). 3.  Patient will perform sit to stand with modified independence within 7 day(s). 4.  Patient will ambulate with supervision/set-up for 150 feet with the least restrictive device within 7 day(s). 5.  Patient will ascend/descend 3 stairs with bilateral handrail(s) with minimal assistance/contact guard assist within 7 day(s).      Outcome: Progressing Towards Goal

## 2021-01-12 NOTE — PROGRESS NOTES
Additional session in pm for more walking and transfers. Pt able to ambulation  with FWW and increased distance. Pt still slightly guarded with WS during ambulation, however did not seem to be as sore or note any complaints with standing. Pt also able to perform sit to stand with min to mod A from lower surface and only needing supervision with all bed transfers  and mobility at a raised level. Problem: Mobility Impaired (Adult and Pediatric)  Goal: *Acute Goals and Plan of Care (Insert Text)  Description: FUNCTIONAL STATUS PRIOR TO ADMISSION: Patient was modified independent using a single point cane for functional mobility. HOME SUPPORT PRIOR TO ADMISSION: The patient lived alone with children to provide assistance. Physical Therapy Goals  Initiated 1/11/2021  1. Patient will move from supine to sit and sit to supine , scoot up and down, and roll side to side in bed with independence within 7 day(s). Progressing   2. Patient will transfer from bed to chair and chair to bed with modified independence using the least restrictive device within 7 day(s). Progressing   3. Patient will perform sit to stand with modified independence within 7 day(s). Progressing   4. Patient will ambulate with supervision/set-up for 150 feet with the least restrictive device within 7 day(s). Progressing   5. Patient will ascend/descend 3 stairs with bilateral handrail(s) with minimal assistance/contact guard assist within 7 day(s).  Not attemtped     1/12/2021 1442 by Ede Benoit, PTA  Outcome: Progressing Towards Goal  1/12/2021 1406 by Ede Benoit, PTA  Outcome: Progressing Towards Goal

## 2021-01-12 NOTE — ROUTINE PROCESS
Bedside shift change report given to Duong Cardenas (oncoming nurse) by Dayo Razo (offgoing nurse). Report included the following information [facility].

## 2021-01-13 NOTE — ROUTINE PROCESS
Bedside shift change report given to El Lopez (oncoming nurse) by Mesha Epstein (offgoing nurse). Report included the following information K[facility]ex.

## 2021-01-13 NOTE — PROGRESS NOTES
Hospitalist Progress Note         Carol Vieyra MD          Daily Progress Note: 1/12/2021      Subjective: The patient is seen for follow  up. 79-year-old male with a history of CHF, type 2 diabetes, hypertension that presented to the emergency room on 1/6/2021 for being found on the floor by his son. Patient cannot recall the events leading to it or how long he was on the ground for. He did complain of right leg pain and was unable to get up. He denied any fevers, chills, chest pain, trouble breathing. CT of the pelvis showed no fracture or bone destruction but did show mild symmetric DJD. X-ray of the femur was negative for fracture or dislocation. X-ray of the hip was negative for fracture or dislocation with age-appropriate degenerative change of the hips laterally. X-ray of the right knee negative for fracture dislocation. Chest x-ray showed enlarged cardiac silhouette with findings suspicious for possible pericardial effusion. CK initially elevated 2052 and now 1500. Started on IV fluids. Renal function slightly elevated with a BUN of 14 with a serum creatinine 1.60. Electrolytes are stable. PT and OT consulted    Patient seen in follow-up. Is awake alert and oriented overall doing well. Awaiting placement in SNF. COVID screen pending     Problem List:  Problem List as of 1/12/2021 Date Reviewed: 12/18/2020          Codes Class Noted - Resolved    MOE (acute kidney injury) ([facility] ICD-10-CM: N17.9  ICD-9-CM: 584.9  1/8/2021 - Present        Rhabdomyolysis ICD-10-CM: B83.31  ICD-9-CM: 728.88  1/7/2021 - Present        Iron deficiency anemia secondary to inadequate dietary iron intake (Chronic) ICD-10-CM: D50.8  ICD-9-CM: 280.1  11/2/2019 - Present    Overview Signed 11/2/2019  8:19 PM by Nicci Carrasco MD     Cannot absorb iron.              BMI 32.0-32.9,adult (Chronic) ICD-10-CM: F40.07  ICD-9-CM: V85.32  6/5/2019 - Present        Type 2 [facility] mellitus [facility] ICD-10-CM: E11.21  ICD-9-CM: 250.40, 583.81  1/10/2018 - Present        History of colon polyps ICD-10-CM: Z86.010  ICD-9-CM: V12.72  5/2/2016 - Present        Idiopathic gout ICD-10-CM: M10.00  ICD-9-CM: 274.9  5/2/2016 - Present        Essential hypertension with goal blood pressure less than 130/85 (Chronic) ICD-10-CM: I10  ICD-9-CM: 401.9  5/2/2016 - Present        CKD (chronic kidney disease) (Chronic) ICD-10-CM: N18.9  ICD-9-CM: 585.9  11/23/2015 - Present        COPD (chronic obstructive pulmonary disease) (HCC) (Chronic) ICD-10-CM: J44.9  ICD-9-CM: 496  4/27/2015 - Present        Diabetes with neurologic complications (HCC) (Chronic) ICD-10-CM: E11.49  ICD-9-CM: 250.60  7/21/2014 - Present    Overview Signed 5/26/2015  7:19 PM by MD Dr. Mahi Mckeon             Chronic radicular pain of lower back ICD-10-CM: M54.16, G89.29  ICD-9-CM: 724.4, 338.29  7/21/2014 - Present    Overview Signed 7/21/2014 10:48 AM by Nicolas Walden MD     Severe DDD, lumbar plus diabetic neuropathy. On hydrocodone chronically. Pain even with no movent,walks with a cane. Neuropathy (Chronic) ICD-10-CM: G62.9  ICD-9-CM: 355.9  7/16/2013 - Present        DDD (degenerative disc disease), lumbar (Chronic) ICD-10-CM: M51.36  ICD-9-CM: 722.52  7/16/2013 - Present        Prostate CA (HCC) (Chronic) ICD-10-CM: C61  ICD-9-CM: 185  7/16/2013 - Present        Tubulovillous adenoma polyp of colon (Chronic) ICD-10-CM: D12.6  ICD-9-CM: 211.3  7/16/2013 - Present    Overview Signed 7/16/2013  8:44 AM by Nicolas Walden MD     6/12. Dr. Ryan Alvarado. @ polyps removed.  Due 2016             Edema (Chronic) ICD-10-CM: R60.9  ICD-9-CM: 782.3  1/12/2012 - Present        Persistent atrial fibrillation (HCC) ICD-10-CM: I48.19  ICD-9-CM: 427.31  Unknown - Present        Unspecified arthropathy, ankle and foot ICD-10-CM: M19.079  ICD-9-CM: 716.97  7/14/2011 - Present        Arthritis ICD-10-CM: M19.90  ICD-9-CM: 716.90  7/11/2011 - Present        Gout, unspecified (Chronic) ICD-10-CM: M10.9  ICD-9-CM: 274.9  Unknown - Present        PVD (peripheral vascular disease) (HCC) (Chronic) ICD-10-CM: I73.9  ICD-9-CM: 443.9  Unknown - Present        Hypertension (Chronic) ICD-10-CM: I10  ICD-9-CM: 401.9  Unknown - Present    Overview Signed 7/16/2013  8:37 AM by Gera Murry MD     Sees Dr. Essence Dickinson for HTN and Kidney. Hypercholesterolemia (Chronic) ICD-10-CM: E78.00  ICD-9-CM: 272.0  Unknown - Present        Leg pain (Chronic) ICD-10-CM: M79.606  ICD-9-CM: 729.5  Unknown - Present        Chronic pain (Chronic) ICD-10-CM: F75.60  ICD-9-CM: 338.29  5/6/2010 - Present    Overview Signed 5/8/2013 10:30 AM by Gera Murry MD     Has pins in back and pain and numbness in right leg. Dr. Leatha Stevenson at [facility]. Has recommended additional surgery and patient has declined.              RESOLVED: Chronic diastolic heart failure (HCC) ICD-10-CM: I50.32  ICD-9-CM: 428.32  8/26/2015 - 9/6/2017        RESOLVED: Left upper lobe pneumonia ICD-10-CM: J18.9  ICD-9-CM: 486  4/27/2015 - 4/16/2018              Medications reviewed  Current Facility-Administered Medications   Medication Dose Route Frequency    labetaloL (NORMODYNE) tablet 100 mg  100 mg Oral BID    hydrALAZINE (APRESOLINE) tablet 100 mg  100 mg Oral TID    furosemide (LASIX) tablet 40 mg  40 mg Oral DAILY    potassium chloride (K-DUR, KLOR-CON) SR tablet 20 mEq  20 mEq Oral BID    sodium chloride (NS) flush 5-40 mL  5-40 mL IntraVENous Q8H    sodium chloride (NS) flush 5-40 mL  5-40 mL IntraVENous PRN    heparin (porcine) injection 5,000 Units  5,000 Units SubCUTAneous Q12H    acetaminophen (TYLENOL) tablet 650 mg  650 mg Oral Q4H PRN    HYDROcodone-acetaminophen (NORCO) 5-325 mg per tablet 1 Tab  1 Tab Oral Q4H PRN    amLODIPine (NORVASC) tablet 10 mg  10 mg Oral DAILY    aspirin chewable tablet 81 mg  81 mg Oral DAILY    cloNIDine (CATAPRES) 0.2 mg/24 hr patch 1 Patch  1 Patch TransDERmal Q7D    gabapentin (NEURONTIN) capsule 300 mg  300 mg Oral AC&HS    magnesium oxide (MAG-OX) tablet 400 mg  400 mg Oral DAILY    montelukast (SINGULAIR) tablet 10 mg  10 mg Oral DAILY    pantoprazole (PROTONIX) tablet 40 mg  40 mg Oral ACB    rosuvastatin (CRESTOR) tablet 20 mg  20 mg Oral QHS    tamsulosin (FLOMAX) capsule 0.4 mg  0.4 mg Oral DAILY    albuterol-ipratropium (DUO-NEB) 2.5 MG-0.5 MG/3 ML  3 mL Nebulization Q6H PRN       Review of Systems:   A comprehensive review of systems was negative except for that written in the HPI. Objective:   Physical Exam:     Visit Vitals  BP (!) 135/57   Pulse (!) 57   Temp 99 °F (37.2 °C)   Resp 20   Wt 109.8 kg (242 lb 1 oz)   SpO2 98%   BMI 31.07 kg/m²      O2 Device: Room air    Temp (24hrs), Av.4 °F (36.9 °C), Min:97.7 °F (36.5 °C), Max:99 °F (37.2 °C)    No intake/output data recorded.  0701 -  1900  In: -   Out: 3600 [Urine:3600]    General:  Alert, cooperative, no distress, appears stated age. Lungs:   Clear to auscultation bilaterally. Chest wall:  No tenderness or deformity. Heart:  Regular rate and rhythm, S1, S2 normal, no murmur, click, rub or gallop. Abdomen:   Soft, non-tender. Bowel sounds normal. No masses,  No organomegaly. Extremities: Extremities normal, atraumatic, no cyanosis or edema. Pulses: 2+ and symmetric all extremities. Skin: Skin color, texture, turgor normal. No rashes or lesions   Neurologic: CNII-XII intact.  No gross sensory or motor deficits     Data Review:       Recent Days:  Recent Labs     21  0531 01/10/21  0520   WBC 3.8* 4.3*   HGB 10.1* 10.0*   HCT 34.1* 32.9*    167     Recent Labs     21  0628 21  0531 01/10/21  0530 01/10/21  0520    142  --  143   K 3.6 3.5  --  3.3*    108  --  107   CO2 27 27  --  27   * 114*  --  111*   BUN 15 16  --  19   CREA 1.26 1.31*  --  1.35*   CA 9.1 8.9  --  9.0   MG  --   --  1.9  -- No results for input(s): PH, PCO2, PO2, HCO3, FIO2 in the last 72 hours. 24 Hour Results:  Recent Results (from the past 24 hour(s))   METABOLIC PANEL, BASIC    Collection Time: 01/12/21  6:28 AM   Result Value Ref Range    Sodium 140 136 - 145 mmol/L    Potassium 3.6 3.5 - 5.1 mmol/L    Chloride 106 97 - 108 mmol/L    CO2 27 21 - 32 mmol/L    Anion gap 7 5 - 15 mmol/L    Glucose 113 (H) 65 - 100 mg/dL    BUN 15 6 - 20 mg/dL    Creatinine 1.26 0.70 - 1.30 mg/dL    BUN/Creatinine ratio 12 12 - 20      GFR est AA >60 >60 ml/min/1.73m2    GFR est non-AA 55 (L) >60 ml/min/1.73m2    Calcium 9.1 8.5 - 10.1 mg/dL   GLUCOSE, POC    Collection Time: 01/12/21  8:31 AM   Result Value Ref Range    Glucose (POC) 109 (H) 65 - 100 mg/dL    Performed by [employer]    GLUCOSE, POC    Collection Time: 01/12/21 11:51 AM   Result Value Ref Range    Glucose (POC) 114 (H) 65 - 100 mg/dL    Performed by [employer]    GLUCOSE, POC    Collection Time: 01/12/21  4:13 PM   Result Value Ref Range    Glucose (POC) 115 (H) 65 - 100 mg/dL    Performed by [employer] [address], POC    Collection Time: 01/12/21  9:12 PM   Result Value Ref Range    Glucose (POC) 173 (H) 65 - 100 mg/dL    Performed by Yasmine Edmond            Assessment/     1. Mild rhabdomyolysis secondary to fall        -Hydrated with IV NS  Initially and cpk improved 1575 to 762 and 525 to 328        -Encourage oral fluid intake        -Monitor closely for fluid overload         - PT evalaution in AM,         - COVID screen sent and pending.        - Case management for possible SNF placement     2. Accelerated hypertension. Poorly controlled        - On amlodipine, clonidine patch and hydralazine 100mg tid        - patient meds need reconcilation as it notes hx of bumex and labetalol dosing.         -continue monitor vitals q4hrs. -  1/11: still elevated bp and will give 40m oral lasix and have meds reconciled.         - 1/12: Patient states that he takes several blood pressure medications so will restart labetalol 100 mg oral twice daily continue to monitor closely    3. Diabetes        -Fairly controlled        -Continue sliding scale insulin        - reconcile home meds to reconfirm glipizide and metformin dosing. Stable glucose this AM on 1/11 at 115.        - stable     4. Degenerative joint disease        -Continue PTOT and pain control        -Will benefit from inpatient rehab        - restart prn pain meds. As was noted to be on high dose of norco 10/325mg    But will start at lower dose and titrate up     5. Paroxysmal atrial fibrillation        -Rate controlled. In sinus rhythm     6. History of CHF with findings concerning for possible pericardial effusion on x-ray. - EF normal at 67% on 01/07/21. No signs of decompensation        - reconcile home meds as noted to be on bumex and will start 1/11 lasix 40mg oral daily and adjust depending on chronic home meds      7. History of peripheral vascular disease. Stable    8. Ambulatory and gait disturbance secondary to fall    9. ? Of UTI        - UA on 1/6 noted trace leukocytes and initally was started on ceftrixone but UC is negative so was disontinued. Total time spent with patient: 30 minutes.     Mena Oleary MD

## 2021-01-13 NOTE — PROGRESS NOTES
Per Jarrett Kwan at [id number] [facility] and rehab, waiting on auth from [facility] for skilled nursing.

## 2021-01-13 NOTE — PROGRESS NOTES
Hospitalist Progress Note         Nikita Dietz MD          Daily Progress Note: 1/13/2021      Subjective: The patient is seen for follow  up. 54-year-old male with a history of CHF, type 2 diabetes, hypertension that presented to the emergency room on 1/6/2021 for being found on the floor by his son. Patient cannot recall the events leading to it or how long he was on the ground for. He did complain of right leg pain and was unable to get up. He denied any fevers, chills, chest pain, trouble breathing. CT of the pelvis showed no fracture or bone destruction but did show mild symmetric DJD. X-ray of the femur was negative for fracture or dislocation. X-ray of the hip was negative for fracture or dislocation with age-appropriate degenerative change of the hips laterally. X-ray of the right knee negative for fracture dislocation. Chest x-ray showed enlarged cardiac silhouette with findings suspicious for possible pericardial effusion. CK initially elevated 2052 and now 1500. Started on IV fluids. Renal function slightly elevated with a BUN of 14 with a serum creatinine 1.60. Electrolytes are stable. PT and OT consulted    Patient seen in follow-up. Is awake alert and oriented overall doing well. Awaiting placement in SNF. COVID screen negative and awaiting confirmation for SNF placement in [place]    Problem List:  Problem List as of 1/13/2021 Date Reviewed: 12/18/2020          Codes Class Noted - Resolved    [facility] ICD-10-CM: N17.9  ICD-9-CM: 584.9  1/8/2021 - Present        Rhabdomyolysis ICD-10-CM: S19.16  ICD-9-CM: 728.88  1/7/2021 - Present        Iron deficiency anemia secondary to inadequate dietary iron intake (Chronic) ICD-10-CM: D50.8  ICD-9-CM: 280.1  11/2/2019 - Present    Overview Signed 11/2/2019  8:19 PM by Mehreen Sellers MD     Cannot absorb iron.              BMI 32.0-32.9,adult (Chronic) ICD-10-CM: D74.27  ICD-9-CM: V85.32  6/5/2019 - Present        [facility] mellitus [facility] nephropathy [facility] ICD-10-CM: E11.21  ICD-9-CM: 250.40, 583.81  1/10/2018 - Present        History of colon polyps ICD-10-CM: Z86.010  ICD-9-CM: V12.72  5/2/2016 - Present        Idiopathic gout ICD-10-CM: M10.00  ICD-9-CM: 274.9  5/2/2016 - Present        Essential hypertension with goal blood pressure less than 130/85 (Chronic) ICD-10-CM: I10  ICD-9-CM: 401.9  5/2/2016 - Present        CKD (chronic kidney disease) (Chronic) ICD-10-CM: N18.9  ICD-9-CM: 585.9  11/23/2015 - Present        COPD (chronic obstructive pulmonary disease) (HCC) (Chronic) ICD-10-CM: J44.9  ICD-9-CM: 496  4/27/2015 - Present        Diabetes with neurologic complications (HCC) (Chronic) ICD-10-CM: E11.49  ICD-9-CM: 250.60  7/21/2014 - Present    Overview Signed 5/26/2015  7:19 PM by MD Dr. Billie Huber             Chronic radicular pain of lower back ICD-10-CM: M54.16, G89.29  ICD-9-CM: 724.4, 338.29  7/21/2014 - Present    Overview Signed 7/21/2014 10:48 AM by Jared Clarke MD     Severe DDD, lumbar plus diabetic neuropathy. On hydrocodone chronically. Pain even with no movent,walks with a cane. Neuropathy (Chronic) ICD-10-CM: G62.9  ICD-9-CM: 355.9  7/16/2013 - Present        DDD (degenerative disc disease), lumbar (Chronic) ICD-10-CM: M51.36  ICD-9-CM: 722.52  7/16/2013 - Present        Prostate CA (HCC) (Chronic) ICD-10-CM: C61  ICD-9-CM: 185  7/16/2013 - Present        Tubulovillous adenoma polyp of colon (Chronic) ICD-10-CM: D12.6  ICD-9-CM: 211.3  7/16/2013 - Present    Overview Signed 7/16/2013  8:44 AM by Jared Clarke MD     6/12. Dr. Yari Ram. @ polyps removed.  Due 2016             Edema (Chronic) ICD-10-CM: R60.9  ICD-9-CM: 782.3  1/12/2012 - Present        Persistent atrial fibrillation (HCC) ICD-10-CM: I48.19  ICD-9-CM: 427.31  Unknown - Present        Unspecified arthropathy, ankle and foot ICD-10-CM: M19.079  ICD-9-CM: 716.97 7/14/2011 - Present        Arthritis ICD-10-CM: M19.90  ICD-9-CM: 716.90  7/11/2011 - Present        Gout, unspecified (Chronic) ICD-10-CM: M10.9  ICD-9-CM: 274.9  Unknown - Present        PVD (peripheral vascular disease) (HCC) (Chronic) ICD-10-CM: I73.9  ICD-9-CM: 443.9  Unknown - Present        Hypertension (Chronic) ICD-10-CM: I10  ICD-9-CM: 401.9  Unknown - Present    Overview Signed 7/16/2013  8:37 AM by Jolynn Webb MD     Sees Dr. Judy Orosco for HTN and Kidney. Hypercholesterolemia (Chronic) ICD-10-CM: E78.00  ICD-9-CM: 272.0  Unknown - Present        Leg pain (Chronic) ICD-10-CM: M79.606  ICD-9-CM: 729.5  Unknown - Present        Chronic pain (Chronic) ICD-10-CM: J60.73  ICD-9-CM: 338.29  5/6/2010 - Present    Overview Signed 5/8/2013 10:30 AM by Jolynn Webb MD     Has pins in back and pain and numbness in right leg. Dr. Carolyn Epstein at [facility] [place]. Has recommended additional surgery and patient has declined.              RESOLVED: Chronic diastolic heart failure (HCC) ICD-10-CM: I50.32  ICD-9-CM: 428.32  8/26/2015 - 9/6/2017        RESOLVED: Left upper lobe pneumonia ICD-10-CM: J18.9  ICD-9-CM: 486  4/27/2015 - 4/16/2018              Medications reviewed  Current Facility-Administered Medications   Medication Dose Route Frequency    labetaloL (NORMODYNE) tablet 100 mg  100 mg Oral BID    hydrALAZINE (APRESOLINE) tablet 100 mg  100 mg Oral TID    furosemide (LASIX) tablet 40 mg  40 mg Oral DAILY    potassium chloride (K-DUR, KLOR-CON) SR tablet 20 mEq  20 mEq Oral BID    sodium chloride (NS) flush 5-40 mL  5-40 mL IntraVENous Q8H    sodium chloride (NS) flush 5-40 mL  5-40 mL IntraVENous PRN    heparin (porcine) injection 5,000 Units  5,000 Units SubCUTAneous Q12H    acetaminophen (TYLENOL) tablet 650 mg  650 mg Oral Q4H PRN    HYDROcodone-acetaminophen (NORCO) 5-325 mg per tablet 1 Tab  1 Tab Oral Q4H PRN    amLODIPine (NORVASC) tablet 10 mg  10 mg Oral DAILY    aspirin chewable tablet 81 mg  81 mg Oral DAILY    cloNIDine (CATAPRES) 0.2 mg/24 hr patch 1 Patch  1 Patch TransDERmal Q7D    gabapentin (NEURONTIN) capsule 300 mg  300 mg Oral AC&HS    magnesium oxide (MAG-OX) tablet 400 mg  400 mg Oral DAILY    montelukast (SINGULAIR) tablet 10 mg  10 mg Oral DAILY    pantoprazole (PROTONIX) tablet 40 mg  40 mg Oral ACB    rosuvastatin (CRESTOR) tablet 20 mg  20 mg Oral QHS    tamsulosin (FLOMAX) capsule 0.4 mg  0.4 mg Oral DAILY    albuterol-ipratropium (DUO-NEB) 2.5 MG-0.5 MG/3 ML  3 mL Nebulization Q6H PRN       Review of Systems:   A comprehensive review of systems was negative except for that written in the HPI. Objective:   Physical Exam:     Visit Vitals  BP (!) 127/50   Pulse 68   Temp 98.3 °F (36.8 °C)   Resp 20   Wt 109.8 kg (242 lb 1 oz)   SpO2 92%   BMI 31.07 kg/m²      O2 Device: Room air    Temp (24hrs), Av.3 °F (36.8 °C), Min:97.2 °F (36.2 °C), Max:99 °F (37.2 °C)    701 -  190  In: -   Out: 450 [Urine:450]   1901 -  0700  In: -   Out: 2600 [Urine:2600]    General:  Alert, cooperative, no distress, appears stated age. Lungs:   Clear to auscultation bilaterally. Chest wall:  No tenderness or deformity. Heart:  Regular rate and rhythm, S1, S2 normal, no murmur, click, rub or gallop. Abdomen:   Soft, non-tender. Bowel sounds normal. No masses,  No organomegaly. Extremities: Extremities normal, atraumatic, no cyanosis or edema. Pulses: 2+ and symmetric all extremities. Skin: Skin color, texture, turgor normal. No rashes or lesions   Neurologic: CNII-XII intact. No gross sensory or motor deficits     Data Review:       Recent Days:  Recent Labs     21  0531   WBC 3.8*   HGB 10.1*   HCT 34.1*        Recent Labs     21  0628 21  0531    142   K 3.6 3.5    108   CO2 27 27   * 114*   BUN 15 16   CREA 1.26 1.31*   CA 9.1 8.9     No results for input(s): PH, PCO2, PO2, HCO3, FIO2 in the last 72 hours.     24 Hour Results:  Recent Results (from the past 24 hour(s))   GLUCOSE, POC    Collection Time: 01/12/21  4:13 PM   Result Value Ref Range    Glucose (POC) 115 (H) 65 - 100 mg/dL    Performed by Danelle Ivan, POC    Collection Time: 01/12/21  9:12 PM   Result Value Ref Range    Glucose (POC) 173 (H) 65 - 100 mg/dL    Performed by Niecy Perez, POC    Collection Time: 01/13/21  7:34 AM   Result Value Ref Range    Glucose (POC) 119 (H) 65 - 100 mg/dL    Performed by SLOAN LITTLE    GLUCOSE, POC    Collection Time: 01/13/21 11:13 AM   Result Value Ref Range    Glucose (POC) 135 (H) 65 - 100 mg/dL    Performed by JAKOB FOURNIER            Assessment/     1. Mild rhabdomyolysis secondary to fall        -Hydrated with IV NS  Initially and cpk improved 1575 to 762 and 525 to 328        -Encourage oral fluid intake        -Monitor closely for fluid overload         - PT evalaution in AM,         - COVID screen sent and pending.        - Case management for possible SNF placement     2. Accelerated hypertension. Poorly controlled        - On amlodipine, clonidine patch and hydralazine 100mg tid        - patient meds need reconcilation as it notes hx of bumex and labetalol dosing.         -continue monitor vitals q4hrs. -  1/11: still elevated bp and will give 40m oral lasix and have meds reconciled. - 1/12: Patient states that he takes several blood pressure medications so will restart labetalol 100 mg oral twice daily continue to monitor closely    3. Diabetes        -Fairly controlled        -Continue sliding scale insulin        - reconcile home meds to reconfirm glipizide and metformin dosing. Stable glucose this AM on 1/11 at 115.        - stable     4. Degenerative joint disease        -Continue PTOT and pain control        -Will benefit from inpatient rehab        - restart prn pain meds.  As was noted to be on high dose of norco 10/325mg    But will start at lower dose and titrate up 5.  Paroxysmal atrial fibrillation        -Rate controlled. In sinus rhythm     6. History of CHF with findings concerning for possible pericardial effusion on x-ray. - EF normal at 67% on 01/07/21. No signs of decompensation        - reconcile home meds as noted to be on bumex and will start 1/11 lasix 40mg oral daily and adjust depending on chronic home meds      7. History of peripheral vascular disease. Stable    8. Ambulatory and gait disturbance secondary to fall    9. ? Of UTI        - UA on 1/6 noted trace leukocytes and initally was started on ceftrixone but UC is negative so was disontinued. Total time spent with patient: 30 minutes.     Opal Albrecht MD

## 2021-01-13 NOTE — PROGRESS NOTES
Pt. Seen again in afternoon for additional amb. w/ FWW and work on safety with direction change and WB on UE if any feeling of giving way occurs in LE during amb or change of direction. Pt able to continue to increase distance as strength and overall disposition improves. Returned to bed and allowed to rest.   Note informed LPN that he is capable of amb to/from restroom with walker and staff assistance. Problem: Mobility Impaired (Adult and Pediatric)  Goal: *Acute Goals and Plan of Care (Insert Text)  Description: FUNCTIONAL STATUS PRIOR TO ADMISSION: Patient was modified independent using a single point cane for functional mobility. HOME SUPPORT PRIOR TO ADMISSION: The patient lived alone with children to provide assistance. Physical Therapy Goals  Initiated 1/11/2021  1. Patient will move from supine to sit and sit to supine , scoot up and down, and roll side to side in bed with independence within 7 day(s). 2.  Patient will transfer from bed to chair and chair to bed with modified independence using the least restrictive device within 7 day(s). 3.  Patient will perform sit to stand with modified independence within 7 day(s). 4.  Patient will ambulate with supervision/set-up for 150 feet with the least restrictive device within 7 day(s). 5.  Patient will ascend/descend 3 stairs with bilateral handrail(s) with minimal assistance/contact guard assist within 7 day(s).      1/13/2021 1710 by Raymond Gomez PTA  Outcome: Progressing Towards Goal

## 2021-01-13 NOTE — PROGRESS NOTES
PHYSICAL THERAPY TREATMENT  Patient: Missy Courser (88 y.o. male)  Date: 1/13/2021  Diagnosis: MOE (acute kidney injury) ([facility] [N17.9] <principal problem not specified>       Precautions: Fall  Chart, physical therapy assessment, plan of care and goals were reviewed. ASSESSMENT  Patient continues with skilled PT services and is progressing towards goals. Functional mobility and safety with standing and amb. .     Current Level of Function Impacting Discharge (mobility/balance): some balance limitations and activity tolerance     Other factors to consider for discharge: needing to be able to amb longer distance for safety          PLAN :  Patient continues to benefit from skilled intervention to address the above impairments. Continue treatment per established plan of care. to address goals. Recommendation for discharge: (in order for the patient to meet his/her long term goals)  Therapy up to 5 days/week in SNF setting    This discharge recommendation:  Has been made in collaboration with the attending provider and/or case management    IF patient discharges home will need the following DME: patient owns DME required for discharge       SUBJECTIVE:   Patient stated Sarina Ellis is feeling better knees are still sore but getting up and walking and sitting up in chair have been helping.     OBJECTIVE DATA SUMMARY:   Critical Behavior:  Neurologic State: Alert, Appropriate for age  Orientation Level: Oriented X4        Functional Mobility Training:  Bed Mobility:  Rolling: Modified independent  Supine to Sit: Modified independent  Sit to Supine: Modified independent  Scooting: Independent        Transfers:  Sit to Stand: Contact guard assistance  Stand to Sit: Contact guard assistance        Bed to Chair: Contact guard assistance                    Balance:  Sitting: Intact; Without support  Sitting - Static: Good (unsupported)  Sitting - Dynamic: Good (unsupported)  Standing: Intact; With support  Ambulation/Gait Training:  Distance (ft): 75 Feet (ft)  Assistive Device: Walker  Ambulation - Level of Assistance: Contact guard assistance                                            Stairs:              Treatment Session:   Pt worked on seated balance , seated and standing LE ROM ex as well as standing balance with UE support, Pt also walked in abdi a short distance with FWW and CGA. Showing not pain or giving way during the process. Pain Ratin/10    Activity Tolerance:   Good  Please refer to the flowsheet for vital signs taken during this treatment. After treatment patient left in no apparent distress:   Sitting in chair    COMMUNICATION/COLLABORATION:   The patients plan of care was discussed with: Physical Therapist,  in room discussed with Patient and pt son.      [place], SONAM   Time Calculation: 21 mins

## 2021-01-14 NOTE — PROGRESS NOTES
Progress Note    Patient: Zarina Rodriguez MRN: [id number]  SSN: [id number]    YOB: 1940  Age: 79 y.o. Sex: male      Admit Date: 1/7/2021    LOS: 7 days     Subjective:     Patient presents with s/p fall and rhabdomyolysis. No acute complaint. Waiting for placement to SNF. Objective:     Vitals:    01/14/21 0001 01/14/21 0415 01/14/21 0647 01/14/21 1105   BP: (!) 119/56 (!) 149/54 (!) 156/70 (!) 147/57   Pulse: 63 61 69 65   Resp: 20 20 20 20   Temp: 97.6 °F (36.4 °C) 98 °F (36.7 °C) 97.8 °F (36.6 °C) 97.7 °F (36.5 °C)   SpO2: 98% 97% 98% 97%   Weight:            Intake and Output:  Current Shift: No intake/output data recorded. Last three shifts: 01/12 1901 - 01/14 0700  In: -   Out: 450 [Urine:450]    Physical Exam:   GENERAL: alert, cooperative, no distress, appears stated age  THROAT & NECK: normal and no erythema or exudates noted. LUNG: clear to auscultation bilaterally  HEART: regular rate and rhythm, S1, S2 normal, no murmur, click, rub or gallop  ABDOMEN: soft, non-tender. Bowel sounds normal. No masses,  no organomegaly  EXTREMITIES:  extremities normal, atraumatic, no cyanosis or edema  SKIN: no rash or abnormalities  NEUROLOGIC: AOx3. Gait normal. Reflexes and motor strength normal and symmetric. Cranial nerves 2-12 and sensation grossly intact. PSYCHIATRIC: non focal    Lab/Data Review: All lab results for the last 24 hours reviewed.      Recent Results (from the past 24 hour(s))   GLUCOSE, POC    Collection Time: 01/13/21  4:10 PM   Result Value Ref Range    Glucose (POC) 189 (H) 65 - 100 mg/dL    Performed by [id number], POC    Collection Time: 01/13/21  9:04 PM   Result Value Ref Range    Glucose (POC) 255 (H) 65 - 100 mg/dL    Performed by [address] [place] [address], POC    Collection Time: 01/14/21  6:55 AM   Result Value Ref Range    Glucose (POC) 112 (H) 65 - 100 mg/dL    Performed by Juancarlos Grider POC    Collection Time: 01/14/21 11:10 AM Result Value Ref Range    Glucose (POC) 182 (H) 65 - 100 mg/dL    Performed by Keira ESTRELLA         Imaging:    No results found. Assessment and Plan:     Rhabdomyolysis  -Mild rhabdomyolysis due to fall  -Improving CK level    Fall  -S/p mechanical fall  -PT evaluated the patient and patient will need rehab  -Awaiting placement to skilled nursing facility    Hypertension  -Elevated blood pressure on admission but improving  -Continue amlodipine, clonidine, Lasix, hydralazine, labetalol    Diabetes  -Blood sugars stable  -On glipizide and Jardiance at home, continue to hold    Atrial fibrillation  -Paroxysmal atrial fibrillation  -Currently controlled  -Not on anticoagulation at home and currently may be a contraindication due to fall risk    Congestive heart failure  -Echo with normal EF  -Currently compensated  -Continue Lasix    Peripheral vascular disease  -History of peripheral vascular disease, currently stable  -Continue aspirin    BPH  -On Flomax    Dyslipidemia  -On statin    Anticipated disposition is likely 24 hours pending progress.     Signed By: Alisa Short MD     January 14, 2021

## 2021-01-14 NOTE — PROGRESS NOTES
Spiritual Care Assessment/Progress Note  [facility]      NAME: Monique Goel      MRN: [id number]  AGE: 79 y.o.  SEX: male  Mu-ism Affiliation: Uatsdin   Language: English     1/14/2021     Total Time (in minutes): 5     Spiritual Assessment begun in [facility] [address] through conversation with:         [x]Patient        [] Family    [] Friend(s)        Reason for Consult: Initial/Spiritual assessment, patient floor     Spiritual beliefs: (Please include comment if needed)     [x] Identifies with a natasha tradition:         [] Supported by a natasha community:            [] Claims no spiritual orientation:           [] Seeking spiritual identity:                [] Adheres to an individual form of spirituality:           [] Not able to assess:                           Identified resources for coping:      [] Prayer                               [] Music                  [] Guided Imagery     [] Family/friends                 [] Pet visits     [] Devotional reading                         [x] Unknown     [] Other:                                              Interventions offered during this visit: (See comments for more details)    Patient Interventions: Affirmation of emotions/emotional suffering, Affirmation of natasha, Coping skills reviewed/reinforced, Initial/Spiritual assessment, patient floor, Iconic (affirming the presence of God/Higher Power), Prayer (assurance of)           Plan of Care:     [] Support spiritual and/or cultural needs    [] Support AMD and/or advance care planning process      [] Support grieving process   [] Coordinate Rites and/or Rituals    [] Coordination with community clergy   [] No spiritual needs identified at this time   [] Detailed Plan of Care below (See Comments)  [] Make referral to Music Therapy  [] Make referral to Pet Therapy     [] Make referral to Addiction services  [] Make referral to [facility]  [] Make referral to Spiritual Care Partner  [] No future visits requested        [x] Follow up upon further referrals     Comments:  Visited patient in  acute care [place] [address] unit for initial assessment during rounds. Only the patient was present during the visit. Patient stated he is doing well. He stated his family is also doing well and he has a good relationship with God but did not elaborate further. I listened empathically and reflectively while facilitating storytelling. I assured of my prayer for him and advised of  availability should he need further support. Chaplains will follow up if further referrals are requested.     BARRETT VillavicencioDiv.

## 2021-01-15 NOTE — PROGRESS NOTES
Received phone call back from [facility] at [phone], nurse given report to very rude and impatient with my report to her via phone, pt arrived safely via transport per nurse at snf

## 2021-01-15 NOTE — PROGRESS NOTES
Problem: Falls - Risk of  Goal: *Absence of Falls  Description: Document Leafy Daugherty Fall Risk and appropriate interventions in the flowsheet. Outcome: Resolved/Met  Note: Fall Risk Interventions:  Mobility Interventions: Patient to call before getting OOB, Strengthening exercises (ROM-active/passive)         Medication Interventions: Patient to call before getting OOB, Teach patient to arise slowly    Elimination Interventions: Call light in reach, Patient to call for help with toileting needs, Toileting schedule/hourly rounds, Urinal in reach    History of Falls Interventions: Consult care management for discharge planning         Problem: Patient Education: Go to Patient Education Activity  Goal: Patient/Family Education  Outcome: Resolved/Met     Problem: Pressure Injury - Risk of  Goal: *Prevention of pressure injury  Description: Document Jos Scale and appropriate interventions in the flowsheet.   Outcome: Resolved/Met  Note: Pressure Injury Interventions:  Sensory Interventions: Keep linens dry and wrinkle-free, Minimize linen layers    Moisture Interventions: Absorbent underpads, Check for incontinence Q2 hours and as needed, Maintain skin hydration (lotion/cream), Minimize layers    Activity Interventions: Increase time out of bed    Mobility Interventions: HOB 30 degrees or less    Nutrition Interventions: Offer support with meals,snacks and hydration    Friction and Shear Interventions: Apply protective barrier, creams and emollients, Minimize layers                Problem: Patient Education: Go to Patient Education Activity  Goal: Patient/Family Education  Outcome: Resolved/Met     Problem: Patient Education: Go to Patient Education Activity  Goal: Patient/Family Education  Outcome: Resolved/Met     Problem: Risk for Spread of Infection  Goal: Prevent transmission of infectious organism to others  Description: Prevent the transmission of infectious organisms to other patients, staff members, and visitors.   Outcome: Resolved/Met     Problem: Patient Education:  Go to Education Activity  Goal: Patient/Family Education  Outcome: Resolved/Met

## 2021-01-15 NOTE — DISCHARGE SUMMARY
Hospitalist Discharge Summary     Patient ID:    Rukhsana Villarreal  [id number]  79 y.o.  1940    Admit date: 1/7/2021    Discharge date : 1/15/2021    Chronic Diagnoses:    Problem List as of 1/15/2021 Date Reviewed: 12/18/2020          Codes Class Noted - Resolved    MOE (acute kidney injury) ([facility] ICD-10-CM: N17.9  ICD-9-CM: 584.9  1/8/2021 - Present        Rhabdomyolysis ICD-10-CM: M62.82  ICD-9-CM: 728.88  1/7/2021 - Present        Iron deficiency anemia secondary to inadequate dietary iron intake (Chronic) ICD-10-CM: D50.8  ICD-9-CM: 280.1  11/2/2019 - Present    Overview Signed 11/2/2019  8:19 PM by Parker Smith MD     Cannot absorb iron. BMI 32.0-32.9,adult (Chronic) ICD-10-CM: L24.24  ICD-9-CM: V85.32  6/5/2019 - Present        Type 2 diabetes mellitus with nephropathy ([facility] ICD-10-CM: E11.21  ICD-9-CM: 250.40, 583.81  1/10/2018 - Present        History of colon polyps ICD-10-CM: Z86.010  ICD-9-CM: V12.72  5/2/2016 - Present        Idiopathic gout ICD-10-CM: M10.00  ICD-9-CM: 274.9  5/2/2016 - Present        Essential hypertension with goal blood pressure less than 130/85 (Chronic) ICD-10-CM: I10  ICD-9-CM: 401.9  5/2/2016 - Present        CKD (chronic kidney disease) (Chronic) ICD-10-CM: N18.9  ICD-9-CM: 585.9  11/23/2015 - Present        COPD (chronic obstructive pulmonary disease) (HCC) (Chronic) ICD-10-CM: J44.9  ICD-9-CM: 496  4/27/2015 - Present        Diabetes with neurologic complications (HCC) (Chronic) ICD-10-CM: E11.49  ICD-9-CM: 250.60  7/21/2014 - Present    Overview Signed 5/26/2015  7:19 PM by MD Dr. Amos Mosqueda             Chronic radicular pain of lower back ICD-10-CM: M54.16, G89.29  ICD-9-CM: 724.4, 338.29  7/21/2014 - Present    Overview Signed 7/21/2014 10:48 AM by Parker Smith MD     Severe DDD, lumbar plus diabetic neuropathy. On hydrocodone chronically. Pain even with no movent,walks with a cane.              Neuropathy (Chronic) ICD-10-CM: G62.9  ICD-9-CM: 355.9  7/16/2013 - Present        DDD (degenerative disc disease), lumbar (Chronic) ICD-10-CM: M51.36  ICD-9-CM: 722.52  7/16/2013 - Present        Prostate CA (HCC) (Chronic) ICD-10-CM: C61  ICD-9-CM: 185  7/16/2013 - Present        Tubulovillous adenoma polyp of colon (Chronic) ICD-10-CM: D12.6  ICD-9-CM: 211.3  7/16/2013 - Present    Overview Signed 7/16/2013  8:44 AM by Leoncio Workman MD     6/12. Dr. Bray British. @ polyps removed. Due 2016             Edema (Chronic) ICD-10-CM: R60.9  ICD-9-CM: 782.3  1/12/2012 - Present        Persistent atrial fibrillation (HCC) ICD-10-CM: I48.19  ICD-9-CM: 427.31  Unknown - Present        Unspecified arthropathy, ankle and foot ICD-10-CM: M19.079  ICD-9-CM: 716.97  7/14/2011 - Present        Arthritis ICD-10-CM: M19.90  ICD-9-CM: 716.90  7/11/2011 - Present        Gout, unspecified (Chronic) ICD-10-CM: M10.9  ICD-9-CM: 274.9  Unknown - Present        PVD (peripheral vascular disease) (HCC) (Chronic) ICD-10-CM: I73.9  ICD-9-CM: 443.9  Unknown - Present        Hypertension (Chronic) ICD-10-CM: I10  ICD-9-CM: 401.9  Unknown - Present    Overview Signed 7/16/2013  8:37 AM by Leoncio Workman MD     Sees Dr. Hamlet Jason for HTN and Kidney. Hypercholesterolemia (Chronic) ICD-10-CM: E78.00  ICD-9-CM: 272.0  Unknown - Present        Leg pain (Chronic) ICD-10-CM: M79.606  ICD-9-CM: 729.5  Unknown - Present        Chronic pain (Chronic) ICD-10-CM: F09.73  ICD-9-CM: 338.29  5/6/2010 - Present    Overview Signed 5/8/2013 10:30 AM by Leoncio Workman MD     Has pins in back and pain and numbness in right leg. Dr. Daniel Lackey at [facility]. Has recommended additional surgery and patient has declined. RESOLVED: Chronic diastolic heart failure ([facility] ICD-10-CM: I50.32  ICD-9-CM: 428.32  8/26/2015 - 9/6/2017        RESOLVED: Left upper lobe pneumonia ICD-10-CM: J18.9  ICD-9-CM: 486  4/27/2015 - 4/16/2018              Final Diagnoses:   1.   Mild rhabdomyolysis secondary to fall  2. Accelerated hypertension  3. Diabetes  4. Degenerative joint disease  5. Paroxysmal atrial fibrillation  6. History of CHF with findings concerning for possible pericardial effusion on x-ray  7. History of peripheral vascular disease    Reason for Hospitalization: [facility] Course: Patient is a 54-year-old male with a history of CHF, type 2 diabetes, hypertension that presented to the emergency room on 1/6/2021 for being found on the floor by his son. Patient cannot recall the events leading to it or how long he was on the ground for. He did complain of right leg pain and was unable to get up. He denied any fevers, chills, chest pain, trouble breathing. CT of the pelvis showed no fracture or bone destruction but did show mild symmetric DJD. X-ray of the femur was negative for fracture or dislocation. X-ray of the hip was negative for fracture or dislocation with age-appropriate degenerative change of the hips laterally. X-ray of the right knee negative for fracture dislocation. Chest x-ray showed enlarged cardiac silhouette with findings suspicious for possible pericardial effusion. CK elevated 2052. Started on IV fluids. Renal function slightly elevated with a BUN of 14 with a serum creatinine 1.60. Electrolytes are stable. PT and OT consulted. Patient was then transferred to [facility] for further care due to bed availability. Discharge Medications:   Current Discharge Medication List      START taking these medications    Details   furosemide (LASIX) 40 mg tablet Take 1 Tab by mouth daily. Qty: 30 Tab, Refills: 0         CONTINUE these medications which have CHANGED    Details   cloNIDine (CATAPRES) 0.2 mg/24 hr ptwk 1 Patch by TransDERmal route every seven (7) days. Qty: 4 Patch, Refills: 0      hydrALAZINE (APRESOLINE) 100 mg tablet Take 1 Tab by mouth three (3) times daily.   Qty: 30 Tab, Refills: 0         CONTINUE these medications which have NOT CHANGED    Details   montelukast (SINGULAIR) 10 mg tablet Take 1 Tab by mouth daily. Qty: 90 Tab, Refills: 1    Associated Diagnoses: Centrilobular emphysema (HCC)      glipiZIDE (GLUCOTROL) 5 mg tablet TAKE 2 TABLETS BY MOUTH 20 MINUTES BEFORE BREAKFAST AND SUPPER  Qty: 360 Tab, Refills: 0    Associated Diagnoses: Type 2 diabetes mellitus with diabetic polyneuropathy, without long-term current use of insulin (HCC)      fluticasone propionate (FLONASE) 50 mcg/actuation nasal spray USE ONE SPRAY(S) IN EACH NOSTRIL ONCE DAILY  Qty: 3 Bottle, Refills: 3      gabapentin (NEURONTIN) 300 mg capsule Take 1 Cap by mouth Before breakfast, lunch, dinner and at bedtime. Max Daily Amount: 1,200 mg. Qty: 120 Cap, Refills: 2    Associated Diagnoses: Chronic radicular pain of lower back      omeprazole (PRILOSEC) 20 mg capsule Take 1 capsule by mouth once daily  Qty: 90 Cap, Refills: 1    Associated Diagnoses: Gastroesophageal reflux disease without esophagitis      tamsulosin (FLOMAX) 0.4 mg capsule Take 1 capsule by mouth once daily  Qty: 90 Cap, Refills: 2      labetalol (NORMODYNE) 200 mg tablet TAKE 1/2 (ONE-HALF) TABLET BY MOUTH TWICE DAILY  Qty: 90 Tab, Refills: 3    Associated Diagnoses: Persistent atrial fibrillation ([address] Essential hypertension      amLODIPine (NORVASC) 10 mg tablet TAKE ONE TABLET BY MOUTH ONCE DAILY  Qty: 90 Tab, Refills: 3      rosuvastatin (CRESTOR) 20 mg tablet Take 1 Tab by mouth nightly. Indications: high cholesterol  Qty: 90 Tab, Refills: 1      potassium chloride (K-DUR) 10 mEq tablet Take 2 Tabs by mouth two (2) times a day. Indications: low amount of potassium in the blood  Qty: 360 Tab, Refills: 3    Associated Diagnoses: Hypokalemia      ferrous sulfate (IRON) 325 mg (65 mg iron) tablet Take 1 Tab by mouth two (2) times daily (after meals).  Indications: Iron Deficiency Anemia  Qty: 100 Tab, Refills: 3    Associated Diagnoses: Iron deficiency      magnesium oxide (MAG-OX) 400 mg tablet Take 1 Tab by mouth daily. Qty: 90 Tab, Refills: 3    Associated Diagnoses: Hypokalemia      aspirin 81 mg chewable tablet Take 81 mg by mouth daily. STOP taking these medications       Colcrys 0.6 mg tablet Comments:   Reason for Stopping:         HYDROcodone-acetaminophen (NORCO)  mg tablet Comments:   Reason for Stopping:         albuterol (PROVENTIL HFA, VENTOLIN HFA, PROAIR HFA) 90 mcg/actuation inhaler Comments:   Reason for Stopping:         b complex-vitamin c-folic acid 5mg (Folbee Plus) tab tablet Comments:   Reason for Stopping:         glucose blood VI test strips (Infinity Test strips) strip Comments:   Reason for Stopping:         baclofen (LIORESAL) 10 mg tablet Comments:   Reason for Stopping:         allopurinoL (ZYLOPRIM) 300 mg tablet Comments:   Reason for Stopping:         Pradaxa 150 mg capsule Comments:   Reason for Stopping:         metFORMIN (GLUCOPHAGE) 500 mg tablet Comments:   Reason for Stopping:         melatonin 10 mg tab Comments:   Reason for Stopping:         lancets misc Comments:   Reason for Stopping:         bumetanide (BUMEX) 2 mg tablet Comments:   Reason for Stopping:         lancets 30 gauge misc Comments:   Reason for Stopping:         diclofenac (VOLTAREN) 1 % gel Comments:   Reason for Stopping:         cloNIDine (CATAPRES) 0.3 mg/24 hr Comments:   Reason for Stopping:         ergocalciferol (ERGOCALCIFEROL) 1,250 mcg (50,000 unit) capsule Comments:   Reason for Stopping:         empagliflozin (JARDIANCE) 10 mg tablet Comments:   Reason for Stopping:         docusate sodium 100 mg tab Comments:   Reason for Stopping: Follow up Care:    1. Sonia To MD in 1-2 weeks.       Follow-up Information     Follow up With Specialties Details Why Contact Info    Sonia To MD Family Medicine On 1/13/2021 @ 9:20 virtual  [address] [facility]  [phone]              Patient Follow Up Instructions: Activity: Activity as tolerated  Diet:  Diabetic Diet    Condition at Discharge:  Stable  __________________________________________________________________    Disposition Transfer  [address]  ____________________________________________________________________    Code Status: Full Code  ___________________________________________________________________    Discharge Exam:  Patient seen and examined by me on discharge day. Pertinent Findings:  Gen:    Not in distress  Chest: Clear lungs  CVS:   Regular rhythm. No edema  Abd:  Soft, not distended, not tender  Neuro:  Alert    CONSULTATIONS: None    Significant Diagnostic Studies:   1/8/2021: BUN 19 mg/dL (Ref range: 6 - 20 mg/dL); Calcium 9.1 mg/dL (Ref range: 8.5 - 10.1 mg/dL); CO2 27 mmol/L (Ref range: 21 - 32 mmol/L); Creatinine 1.49 mg/dL* (Ref range: 0.70 - 1.30 mg/dL); Glucose 114 mg/dL* (Ref range: 65 - 100 mg/dL); HCT 35.9 %* (Ref range: 41 - 53 %); HGB 11.0 g/dL* (Ref range: 13.5 - 17.5 g/dL); Potassium 2.8 mmol/L* (Ref range: 3.5 - 5.1 mmol/L); Sodium 143 mmol/L (Ref range: 136 - 145 mmol/L)  No results for input(s): WBC, HGB, HCT, PLT, HGBEXT, HCTEXT, PLTEXT in the last 72 hours. No results for input(s): NA, K, CL, CO2, BUN, CREA, GLU, CA, MG, PHOS, URICA in the last 72 hours. No results for input(s): ALT, AP, TBIL, TBILI, TP, ALB, GLOB, GGT, AML, LPSE in the last 72 hours. No lab exists for component: SGOT, GPT, AMYP, HLPSE  No results for input(s): INR, PTP, APTT, INREXT in the last 72 hours. No results for input(s): FE, TIBC, PSAT, FERR in the last 72 hours. No results for input(s): PH, PCO2, PO2 in the last 72 hours. No results for input(s): CPK, CKMB in the last 72 hours.     No lab exists for component: TROPONINI  Lab Results   Component Value Date/Time    Glucose (POC) 239 (H) 01/14/2021 09:02 PM    Glucose (POC) 192 (H) 01/14/2021 03:28 PM    Glucose (POC) 182 (H) 01/14/2021 11:10 AM    Glucose (POC) 112 (H) 01/14/2021 06:55 AM    Glucose (POC) 255 (H) 01/13/2021 09:04 PM         Total Time: >30 min     Signed:  Cassius Butterfield PA-C  1/15/2021  12:55 PM

## 2021-01-15 NOTE — PROGRESS NOTES
Report received from the offgoing nurse. He is resting in bed with no distress noted at this time. He is being assisted as needed.

## 2021-01-15 NOTE — DISCHARGE SUMMARY
Discharge Summary       PATIENT ID: Addison Watson  MRN: [id number]   YOB: 1940    DATE OF ADMISSION: 1/7/2021 11:56 PM    DATE OF DISCHARGE: 01/15/2021   PRIMARY CARE PROVIDER: Effie Opitz, MD     ATTENDING PHYSICIAN: Kailyn Villarreal  DISCHARGING PROVIDER: Sheba Acosta MD    To contact this individual call [phone] and ask the  to page. If unavailable ask to be transferred the Adult Hospitalist Department. CONSULTATIONS: None    PROCEDURES/SURGERIES: * No surgery found *    ADMITTING DIAGNOSES & HOSPITAL COURSE:     HPI  The patient is seen for follow  up. 69-year-old male with a history of CHF, type 2 diabetes, hypertension that presented to the emergency room on 1/6/2021 for being found on the floor by his son. Ana Bond cannot recall the events leading to it or how long he was on the ground for. Phani Palmer did complain of right leg pain and was unable to get up. Phani Palmer denied any fevers, chills, chest pain, trouble breathing.  CT of the pelvis showed no fracture or bone destruction but did show mild symmetric DJD.  X-ray of the femur was negative for fracture or dislocation.  X-ray of the hip was negative for fracture or dislocation with age-appropriate degenerative change of the hips laterally.  X-ray of the right knee negative for fracture dislocation.  Chest x-ray showed enlarged cardiac silhouette with findings suspicious for possible pericardial effusion.  CK initially elevated 2052 and now 1500.  Started on IV fluids.  Renal function slightly elevated with a BUN of 14 with a serum creatinine 1.60.  Electrolytes are stable.  PT and OT consulted. Hospital Course  Patient presents with mechanical fall and rhabdomyolysis. Patient's rhabdomyolysis is improved. Restarted on diuretics that patient takes for CHF. Physical therapy evaluated the patient and recommended rehab. Patient has atrial fibrillation and takes Pradaxa at home.   Given his fall risk it is best to keep him off anticoagulation in the interim and may reconsider resuming once he is more stable and less fall risk. DISCHARGE DIAGNOSES / PLAN:      1. Rhabdomyolysis  2. Mechanical fall  3. Hypertension  4. Diabetes  5. Atrial fibrillation  6. CHF  7. Peripheral vascular disease  8. BPH  9. Dyslipidemia     ADDITIONAL CARE RECOMMENDATIONS:     None    PENDING TEST RESULTS:   At the time of discharge the following test results are still pending: None    FOLLOW UP APPOINTMENTS:    Follow-up Information     Follow up With Specialties Details Why Contact Info    Berlin Cuevas MD Family Medicine On 1/13/2021 @ 9:20 virtual  [address]   [phone]               DIET: Diabetic Diet  Oral Nutritional Supplements: No Oral Supplement prescribed    ACTIVITY: PT/OT Eval and Treat    WOUND CARE: None    EQUIPMENT needed: None      DISCHARGE MEDICATIONS:  Current Discharge Medication List      START taking these medications    Details   furosemide (LASIX) 40 mg tablet Take 1 Tab by mouth daily. Qty: 30 Tab, Refills: 0         CONTINUE these medications which have CHANGED    Details   cloNIDine (CATAPRES) 0.2 mg/24 hr ptwk 1 Patch by TransDERmal route every seven (7) days. Qty: 4 Patch, Refills: 0      hydrALAZINE (APRESOLINE) 100 mg tablet Take 1 Tab by mouth three (3) times daily. Qty: 30 Tab, Refills: 0         CONTINUE these medications which have NOT CHANGED    Details   montelukast (SINGULAIR) 10 mg tablet Take 1 Tab by mouth daily.   Qty: 90 Tab, Refills: 1    Associated Diagnoses: Centrilobular emphysema (HCC)      glipiZIDE (GLUCOTROL) 5 mg tablet TAKE 2 TABLETS BY MOUTH 20 MINUTES BEFORE BREAKFAST AND SUPPER  Qty: 360 Tab, Refills: 0    Associated Diagnoses: Type 2 diabetes mellitus with diabetic polyneuropathy, without long-term current use of insulin (HCC)      fluticasone propionate (FLONASE) 50 mcg/actuation nasal spray USE ONE SPRAY(S) IN EACH NOSTRIL ONCE DAILY  Qty: 3 Bottle, Refills: 3      gabapentin (NEURONTIN) 300 mg capsule Take 1 Cap by mouth Before breakfast, lunch, dinner and at bedtime. Max Daily Amount: 1,200 mg. Qty: 120 Cap, Refills: 2    Associated Diagnoses: Chronic radicular pain of lower back      omeprazole (PRILOSEC) 20 mg capsule Take 1 capsule by mouth once daily  Qty: 90 Cap, Refills: 1    Associated Diagnoses: Gastroesophageal reflux disease without esophagitis      tamsulosin (FLOMAX) 0.4 mg capsule Take 1 capsule by mouth once daily  Qty: 90 Cap, Refills: 2      labetalol (NORMODYNE) 200 mg tablet TAKE 1/2 (ONE-HALF) TABLET BY MOUTH TWICE DAILY  Qty: 90 Tab, Refills: 3    Associated Diagnoses: Persistent atrial fibrillation ([address] Essential hypertension      amLODIPine (NORVASC) 10 mg tablet TAKE ONE TABLET BY MOUTH ONCE DAILY  Qty: 90 Tab, Refills: 3      rosuvastatin (CRESTOR) 20 mg tablet Take 1 Tab by mouth nightly. Indications: high cholesterol  Qty: 90 Tab, Refills: 1      potassium chloride (K-DUR) 10 mEq tablet Take 2 Tabs by mouth two (2) times a day. Indications: low amount of potassium in the blood  Qty: 360 Tab, Refills: 3    Associated Diagnoses: Hypokalemia      ferrous sulfate (IRON) 325 mg (65 mg iron) tablet Take 1 Tab by mouth two (2) times daily (after meals). Indications: Iron Deficiency Anemia  Qty: 100 Tab, Refills: 3    Associated Diagnoses: Iron deficiency      magnesium oxide (MAG-OX) 400 mg tablet Take 1 Tab by mouth daily. Qty: 90 Tab, Refills: 3    Associated Diagnoses: Hypokalemia      aspirin 81 mg chewable tablet Take 81 mg by mouth daily.          STOP taking these medications       Colcrys 0.6 mg tablet Comments:   Reason for Stopping:         HYDROcodone-acetaminophen (NORCO)  mg tablet Comments:   Reason for Stopping:         albuterol (PROVENTIL HFA, VENTOLIN HFA, PROAIR HFA) 90 mcg/actuation inhaler Comments:   Reason for Stopping:         b complex-vitamin c-folic acid 5mg (Folbee Plus) tab tablet Comments:   Reason for Stopping:         glucose blood VI test strips (Infinity Test strips) strip Comments:   Reason for Stopping:         baclofen (LIORESAL) 10 mg tablet Comments:   Reason for Stopping:         allopurinoL (ZYLOPRIM) 300 mg tablet Comments:   Reason for Stopping:         Pradaxa 150 mg capsule Comments:   Reason for Stopping:         metFORMIN (GLUCOPHAGE) 500 mg tablet Comments:   Reason for Stopping:         melatonin 10 mg tab Comments:   Reason for Stopping:         lancets misc Comments:   Reason for Stopping:         bumetanide (BUMEX) 2 mg tablet Comments:   Reason for Stopping:         lancets 30 gauge misc Comments:   Reason for Stopping:         diclofenac (VOLTAREN) 1 % gel Comments:   Reason for Stopping:         cloNIDine (CATAPRES) 0.3 mg/24 hr Comments:   Reason for Stopping:         ergocalciferol (ERGOCALCIFEROL) 1,250 mcg (50,000 unit) capsule Comments:   Reason for Stopping:         empagliflozin (JARDIANCE) 10 mg tablet Comments:   Reason for Stopping:         docusate sodium 100 mg tab Comments:   Reason for Stopping:                 NOTIFY YOUR PHYSICIAN FOR ANY OF THE FOLLOWING:   Fever over 101 degrees for 24 hours. Chest pain, shortness of breath, fever, chills, nausea, vomiting, diarrhea, change in mentation, falling, weakness, bleeding. Severe pain or pain not relieved by medications. Or, any other signs or symptoms that you may have questions about. DISPOSITION:    Home With:   OT  PT  HH  RN       Long term SNF/Inpatient Rehab    Independent/assisted living    Hospice    Other:       PATIENT CONDITION AT DISCHARGE:     Functional status    Poor     Deconditioned     Independent      Cognition     Lucid     Forgetful     Dementia      Catheters/lines (plus indication)    Torres     PICC     PEG     None      Code status     Full code     DNR      PHYSICAL EXAMINATION AT DISCHARGE:  General:          Alert, cooperative, no distress, appears stated age.      HEENT:           Atraumatic, anicteric sclerae, pink conjunctivae                          No oral ulcers, mucosa moist, throat clear, dentition fair  Neck:               Supple, symmetrical  Lungs:             Clear to auscultation bilaterally. No Wheezing or Rhonchi. No rales. Chest wall:      No tenderness  No Accessory muscle use. Heart:              Regular  rhythm,  No  murmur   No edema  Abdomen:        Soft, non-tender. Not distended. Bowel sounds normal  Extremities:     No cyanosis. No clubbing,                            Skin turgor normal, Capillary refill normal  Skin:                Not pale. Not Jaundiced  No rashes   Psych:             Not anxious or agitated. 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              BMI 32.0-32.9,adult (Chronic) ICD-10-CM: O74.88  ICD-9-CM: V85.32  6/5/2019 - Present        Type 2 diabetes mellitus with nephropathy ([facility] ICD-10-CM: E11.21  ICD-9-CM: 250.40, 583.81  1/10/2018 - Present        History of colon polyps ICD-10-CM: Z86.010  ICD-9-CM: V12.72  5/2/2016 - Present        Idiopathic gout ICD-10-CM: M10.00  ICD-9-CM: 274.9  5/2/2016 - Present        Essential hypertension with goal blood pressure less than 130/85 (Chronic) ICD-10-CM: I10  ICD-9-CM: 401.9  5/2/2016 - Present        CKD (chronic kidney disease) (Chronic) ICD-10-CM: N18.9  ICD-9-CM: 585.9  11/23/2015 - Present        COPD (chronic obstructive pulmonary disease) ([facility] (Chronic) ICD-10-CM: J44.9  ICD-9-CM: 164  4/27/2015 - Present        Diabetes with neurologic complications ([facility] (Chronic) ICD-10-CM: E11.49  ICD-9-CM: 250.60  7/21/2014 - Present    Overview Signed 5/26/2015  7:19 PM by MD Dr. Teresita Ahumada             Chronic radicular pain of lower back ICD-10-CM: M54.16, G89.29  ICD-9-CM: 724.4, 338.29  7/21/2014 - Present    Overview Signed 7/21/2014 10:48 AM by Mikayla Shoemaker MD     Severe DDD, lumbar plus diabetic neuropathy. On hydrocodone chronically. Pain even with no movent,walks with a cane. Neuropathy (Chronic) ICD-10-CM: G62.9  ICD-9-CM: 355.9  7/16/2013 - Present        DDD (degenerative disc disease), lumbar (Chronic) ICD-10-CM: M51.36  ICD-9-CM: 722.52  7/16/2013 - Present        Prostate CA (HCC) (Chronic) ICD-10-CM: C61  ICD-9-CM: 185  7/16/2013 - Present        Tubulovillous adenoma polyp of colon (Chronic) ICD-10-CM: D12.6  ICD-9-CM: 211.3  7/16/2013 - Present    Overview Signed 7/16/2013  8:44 AM by Mikayla Shoemaker MD     6/12. Dr. Clydene Holter. @ polyps removed. Due 2016             Edema (Chronic) ICD-10-CM: R60.9  ICD-9-CM: 782.3  1/12/2012 - Present        Persistent atrial fibrillation (HCC) ICD-10-CM: I48.19  ICD-9-CM: 427.31  Unknown - Present        Unspecified arthropathy, ankle and foot ICD-10-CM: M19.079  ICD-9-CM: 716.97  7/14/2011 - Present        Arthritis ICD-10-CM: M19.90  ICD-9-CM: 716.90  7/11/2011 - Present        Gout, unspecified (Chronic) ICD-10-CM: M10.9  ICD-9-CM: 274.9  Unknown - Present        PVD (peripheral vascular disease) (HCC) (Chronic) ICD-10-CM: I73.9  ICD-9-CM: 443.9  Unknown - Present        Hypertension (Chronic) ICD-10-CM: I10  ICD-9-CM: 401.9  Unknown - Present    Overview Signed 7/16/2013  8:37 AM by Mikayla Shoemaker MD     Sees Dr. Waylon Jordan for HTN and Kidney.              Hypercholesterolemia (Chronic) ICD-10-CM: E78.00  ICD-9-CM: 272.0  Unknown - Present        Leg pain (Chronic) ICD-10-CM: M79.606  ICD-9-CM: 729.5  Unknown - Present        Chronic pain (Chronic) ICD-10-CM: X25.69  ICD-9-CM: 338.29  5/6/2010 - Present    Overview Signed 5/8/2013 10:30 AM by Dwayne Carpenter MD     Has pins in back and pain and numbness in right leg. Dr. Isael Casiano at [facility]. Has recommended additional surgery and patient has declined.              RESOLVED: Chronic diastolic heart failure ([facility] ICD-10-CM: I50.32  ICD-9-CM: 428.32  8/26/2015 - 9/6/2017        RESOLVED: Left upper lobe pneumonia ICD-10-CM: J18.9  ICD-9-CM: 486  4/27/2015 - 4/16/2018              Greater than 60 minutes were spent with the patient on counseling and coordination of care    Signed:   Greg Browne MD  1/15/2021  10:37 AM

## 2021-01-15 NOTE — DISCHARGE INSTRUCTIONS
Patient Education        Acute Kidney Injury: Care Instructions  Your Care Instructions     Acute kidney injury (MOE) is a sudden decrease in kidney function. This can happen over a period of hours, days or, in some cases, weeks. MOE used to be called acute renal failure, but kidney failure doesn't always happen with MOE. Common causes of MOE are dehydration, blood loss, and medicines. When MOE happens, the kidneys have trouble removing waste and excess fluids from the body. The waste and fluids build up and become harmful. Kidney function may return to normal if the cause of MOE is treated quickly. Your chance of a full recovery depends on what caused the problem, how quickly the cause was treated, and what other medical problems you have. A machine may be used to help your kidneys remove waste and fluids for a short period of time. This is called dialysis. Follow-up care is a key part of your treatment and safety. Be sure to make and go to all appointments, and call your doctor if you are having problems. It's also a good idea to know your test results and keep a list of the medicines you take. How can you care for yourself at home? · Talk to your doctor about how much fluid you should drink. · Eat a balanced diet. Talk to your doctor or a dietitian about what type of diet may be best for you. You may need to limit sodium, potassium, and phosphorus. · If you need dialysis, follow the instructions and schedule for dialysis that your doctor gives you. · Do not smoke. Smoking can make your condition worse. If you need help quitting, talk to your doctor about stop-smoking programs and medicines. These can increase your chances of quitting for good. · Do not drink alcohol. · Review all of your medicines with your doctor.  Do not take any medicines, including nonsteroidal anti-inflammatory drugs (NSAIDs) such as ibuprofen (Advil, Motrin) or naproxen (Aleve), unless your doctor says it is safe for you to do so.  · Make sure that anyone treating you for any health problem knows that you have had MOE. When should you call for help? Call 911 anytime you think you may need emergency care. For example, call if:    · You passed out (lost consciousness). Call your doctor now or seek immediate medical care if:    · You have new or worse nausea and vomiting.     · You have much less urine than normal, or you have no urine.     · You are feeling confused or cannot think clearly.     · You have new or more blood in your urine.     · You have new swelling.     · You are dizzy or lightheaded, or feel like you may faint. Watch closely for changes in your health, and be sure to contact your doctor if:    · You do not get better as expected. Where can you learn more? Go to http://www.gray.com/  Enter N745 in the search box to learn more about \"Acute Kidney Injury: Care Instructions. \"  Current as of: April 15, 2020               Content Version: 12.6  © 2006-2020 [employer]. Care instructions adapted under license by [employer] (which disclaims liability or warranty for this information). If you have questions about a medical condition or this instruction, always ask your healthcare professional. [address] any warranty or liability for your use of this information.

## 2021-01-15 NOTE — PROGRESS NOTES
Nutrition Assessment     Type and Reason for Visit: RD nutrition re-screen/LOS    Nutrition Recommendations/Plan:   Continue w/ Diabetic Diet     Will continue to follow as a LOS     Nutrition Assessment:  79 yrs old male s/p fall and rhabdomyolysis. Waiting for placement to SNF. Noted to have a excellent-good appetite of >75% x 7 days. Nutrition related lab values as the following: glucose (113), hbg (10.1), HCT (34.1). Meds: amlopidine, heparin, mg (400mg), potassium chloride x2/day. Malnutrition Assessment:  Malnutrition Status: No malnutrition     Estimated Daily Nutrient Needs:  Energy (kcal):  2,800kcals (1,869. 07BMR x 1.5)  Protein (g):  109g (1.0g/kg)       Fluid (ml/day):  2,800ml    Nutrition Related Findings:  No Malnutrition    Current Nutrition Therapies:  DIET DIABETIC CONSISTENT CARB Regular    Anthropometric Measures:  · Height:  6' 2.02\" (188 cm)  · Current Body Wt:  109.4 kg (241 lb 2.9 oz)  · BMI: 31    Nutrition Diagnosis:   No nutrition diagnosis at this time r    Nutrition Intervention:  Food and/or Nutrient Delivery: Continue current diet  Nutrition Education and Counseling: No recommendations at this time  Coordination of Nutrition Care: Continue to monitor while inpatient, Interdisciplinary rounds    Goals:  >75% EENs x 7 days, Wt.  Stability +/- kg x 7 days       Nutrition Monitoring and Evaluation:   Behavioral-Environmental Outcomes: None identified  Food/Nutrient Intake Outcomes: Food and nutrient intake  Physical Signs/Symptoms Outcomes: Weight, Biochemical data    Discharge Planning:    Continue current diet

## 2021-01-25 NOTE — PROGRESS NOTES
Vic Peters is a 79 y.o. male evaluated via  Telephone on 21. Patient Identity confirmed by . Consent: 
He and/or health care decision maker is aware that that he may receive a bill for this telephone service, depending on his insurance coverage, and has provided verbal consent to proceed: Yes Physician Location: Office Patient Location: Home Nurse Assisting with Encounter: Pedro Christine LPN Chief Complaint Patient presents with  
[facility] Follow Up [address] gathered from patient and/or health care decision maker. Hospital: [facility], then rehab at [address] [place] [address] ([address]) Dates of admission: 21-1/15/21 - Rehab 1/15/21-21 Discharge diagnoses: Rhabdomylosis, MOE State of health at discharge: Stable Surgical or invasive procedures done: None Amount and/or Complexity of Data Reviewed:  
Clinical lab tests: Reviewed or ordered Tests in the radiology section: reviewed or ordered Discussion of test results with the patient: yes Obtain previous medical records or obtain history from someone other than the patient: Yes Obtain history from someone other than the patient: Yes Review and address past medical records: Yes Discuss the patient with another provider: no Independant visualization of image, tracing, or specimen: no 
 
Risk of Significant Complications, Morbidity, and/or Mortality:  
Presenting problems: High  
Diagnostic procedures: Moderate Management options: Moderate Transition of Care time spent:  
Total time providing care and documentation: 40-60 minutes Progress at discharge:  
Stable on Discharge HPI:  
Encounter Diagnoses Name Primary?  Traumatic rhabdomyolysis, initial encounter ([facility] Yes  MOE (acute kidney injury) ([facility]  Primary osteoarthritis of left knee [facility] discharge follow-up  DDD (degenerative disc disease), lumbar  Chronic left-sided low back pain without sciatica Patient reports that his right knee gave out on him, fell down and was on the floor for 3 hours. Patient in ER found to have MOE and Rhabdomyolysis. Patient treated with fluid resuscitation and improved quickly. Persistent weakness, discharged to Rehab where did very well and discharged after 1 week. Patient reports his about back to normal, but has severe right knee pains, burning and up to 10/10 pain. Chronic issue with known history of OA. Patient is on Chronic pain medications, but patient does not remember doing knee injections in the past.  Patient is interested in trying knee injection. Also needed refill on pain medication. No known medication changes during admission. Review of Systems Constitutional: Negative for chills and fever. Cardiovascular: Negative for chest pain and palpitations. Gastrointestinal: Negative for abdominal pain, nausea and vomiting. Musculoskeletal: Positive for joint pain. Limited Exam: 
Due to this being a TeleHealth evaluation, many elements of the physical examination are unable to be assessed. Constitutional: Appears well-developed and well-nourished in no apparent distress Mental status: Alert and awake, Oriented to person/place/time, Able to follow commands Psychiatric: Normal affect, normal judgment/insight. No hallucinations Current Outpatient Medications:  
  HYDROcodone-acetaminophen (NORCO)  mg tablet, Take 1 Tab by mouth every six (6) hours as needed for Pain for up to 30 days. , Disp: 120 Tab, Rfl: 0 
  cloNIDine (CATAPRES) 0.2 mg/24 hr ptwk, 1 Patch by TransDERmal route every seven (7) days. , Disp: 4 Patch, Rfl: 0 
  hydrALAZINE (APRESOLINE) 100 mg tablet, Take 1 Tab by mouth three (3) times daily. , Disp: 30 Tab, Rfl: 0 
  furosemide (LASIX) 40 mg tablet, Take 1 Tab by mouth daily. , Disp: 30 Tab, Rfl: 0 
   montelukast (SINGULAIR) 10 mg tablet, Take 1 Tab by mouth daily. , Disp: 90 Tab, Rfl: 1 
  glipiZIDE (GLUCOTROL) 5 mg tablet, TAKE 2 TABLETS BY MOUTH 20 MINUTES BEFORE BREAKFAST AND SUPPER, Disp: 360 Tab, Rfl: 0 
  fluticasone propionate (FLONASE) 50 mcg/actuation nasal spray, USE ONE SPRAY(S) IN EACH NOSTRIL ONCE DAILY, Disp: 3 Bottle, Rfl: 3 
  gabapentin (NEURONTIN) 300 mg capsule, Take 1 Cap by mouth Before breakfast, lunch, dinner and at bedtime. Max Daily Amount: 1,200 mg., Disp: 120 Cap, Rfl: 2 
  omeprazole (PRILOSEC) 20 mg capsule, Take 1 capsule by mouth once daily, Disp: 90 Cap, Rfl: 1 
  tamsulosin (FLOMAX) 0.4 mg capsule, Take 1 capsule by mouth once daily, Disp: 90 Cap, Rfl: 2 
  labetalol (NORMODYNE) 200 mg tablet, TAKE 1/2 (ONE-HALF) TABLET BY MOUTH TWICE DAILY, Disp: 90 Tab, Rfl: 3 
  amLODIPine (NORVASC) 10 mg tablet, TAKE ONE TABLET BY MOUTH ONCE DAILY, Disp: 90 Tab, Rfl: 3 
  rosuvastatin (CRESTOR) 20 mg tablet, Take 1 Tab by mouth nightly. Indications: high cholesterol, Disp: 90 Tab, Rfl: 1 
  potassium chloride (K-DUR) 10 mEq tablet, Take 2 Tabs by mouth two (2) times a day. Indications: low amount of potassium in the blood, Disp: 360 Tab, Rfl: 3 
  ferrous sulfate (IRON) 325 mg (65 mg iron) tablet, Take 1 Tab by mouth two (2) times daily (after meals). Indications: Iron Deficiency Anemia, Disp: 100 Tab, Rfl: 3 
  magnesium oxide (MAG-OX) 400 mg tablet, Take 1 Tab by mouth daily. , Disp: 90 Tab, Rfl: 3 
  aspirin 81 mg chewable tablet, Take 81 mg by mouth daily. , Disp: , Rfl:   
 
Allergies Allergen Reactions  Indocin [Indomethacin Sodium] Unknown (comments)  Lisinopril Angioedema Dxed in hospital.  
 Losartan Other (comments) Face and throat swelling. Patient Active Problem List  
 Diagnosis Date Noted  MOE (acute kidney injury) ([facility] 01/08/2021  Rhabdomyolysis 01/07/2021  Iron deficiency anemia secondary to inadequate dietary iron intake 2019  BMI 32.0-32.9,adult 2019  Type 2 diabetes mellitus with nephropathy ([facility] 01/10/2018  History of colon polyps 2016  Idiopathic gout 2016  Essential hypertension with goal blood pressure less than 130/85 2016  CKD (chronic kidney disease) 2015  COPD (chronic obstructive pulmonary disease) ([facility] 2015  Diabetes with neurologic complications ([facility]   Chronic radicular pain of lower back 2014  Neuropathy 2013  DDD (degenerative disc disease), lumbar 2013  Prostate CA ([facility] 2013  Tubulovillous adenoma polyp of colon 2013  Edema 2012  Persistent atrial fibrillation (HCC)  Unspecified arthropathy, ankle and foot 2011  Arthritis 2011  Gout, unspecified  PVD (peripheral vascular disease) ([facility]  Hypertension  Hypercholesterolemia  Leg pain  Chronic pain 2010 Past Surgical History:  
Procedure Laterality Date  COLONOSCOPY N/A 2016 COLONOSCOPY performed by Katelynn Smiley MD at [facility] 89    
 back and left shoulder x2 Social History Socioeconomic History  Marital status: SINGLE Spouse name: Not on file  Number of children: Not on file  Years of education: Not on file  Highest education level: Not on file Tobacco Use  Smoking status: Former Smoker Types: Cigarettes Quit date: 2003 Years since quittin.6  Smokeless tobacco: Never Used Substance and Sexual Activity  Alcohol use: Not Currently Alcohol/week: 0.0 standard drinks  Drug use: Never Social History Narrative Lives alone at home Health Maintenance Due Topic Date Due  
 COVID-19 Vaccine (1 of 2) 1956  GLAUCOMA SCREENING Q2Y  2020  Eye Exam Retinal or Dilated  2020 Assessment/Plan: Details of this discussion including any medical advice provided: Patient to come in for Steroid injection knee, repeat labs and review Paperwork from admission. Overall doing well though, fall related to OA. ICD-10-CM ICD-9-CM 1. Traumatic rhabdomyolysis, initial encounter ([facility]  T79. 6XXA 958.6 2. MOE (acute kidney injury) ([facility]  N17.9 584.9 3. Primary osteoarthritis of left knee  M17.12 715.16   
4. Hospital discharge follow-up  Z09 V67.59   
5. DDD (degenerative disc disease), lumbar  M51.36 722.52 HYDROcodone-acetaminophen (NORCO)  mg tablet 6. Chronic left-sided low back pain without sciatica  M54.5 724.2 HYDROcodone-acetaminophen (NORCO)  mg tablet G89.29 338.29 Follow-up and Dispositions · Return in about 3 days (around 1/28/2021) for In office Follow up for knee injection. No future appointments. Total Time: minutes: 11-20 minutes was spent on telemedicine encounter discussing above problems and plans. Patient Problem list, medications, and Allergies were reviewed during this encounter. Pursuant to the emergency declaration under the [facility] waiver authority and the [employer] Act, this Telephone Visit was conducted, with patient's consent, to reduce the patient's risk of exposure to COVID-19 and provide continuity of care for an established patient. I affirm this is a Patient Initiated Episode with an Established Patient who has not had a related appointment within my department in the past 7 days or scheduled within the next 24 hours. Discussed diagnoses in detail with patient. Medication risks/benefits/side effects discussed with patient. All of the patient's questions were addressed. The patient understands and agrees with our plan of care. The patient knows to call back if they are unsure of or forget any changes we discussed today or if the symptoms change. Note: not billable if this call serves to triage the patient into an appointment for the relevant concern MD NISHANT Ron & SHWETA SHRESTHA [facility] 01/25/21

## 2021-01-25 NOTE — PROGRESS NOTES
Chief Complaint Patient presents with  
[facility] Follow Up [facility]  
 
1. Have you been to the ER, urgent care clinic since your last visit? Hospitalized since your last visit? Yes [facility] 
 
2. Have you seen or consulted any other health care providers outside of the [address] [place] [address] since your last visit? Include any pap smears or colon screening. No 
 
Reviewed record in preparation for visit and have necessary documentation Pt did not bring medication to office visit for review 
opportunity was given for questions Goals that were addressed and/or need to be completed during or after this appointment include Health Maintenance Due Topic Date Due  
 COVID-19 Vaccine (1 of 2) 05/13/1956  GLAUCOMA SCREENING Q2Y  09/25/2020  Eye Exam Retinal or Dilated  09/25/2020

## 2021-01-28 NOTE — PROGRESS NOTES
[facility] History of Present Illness:  
Delayne Epley is a 79 y.o. male with history of HTN, Diabetes, Chronic Pain, Prostate Cancer, CKD,  
CC: Knee pain, medication review History provided by patient and Records HPI: 
Knee Pain:  Reports pain of the Right  front side of the knee. Pain secondary to unknown that occurred during unknown. Overall symptoms are are worsening. Location: Right  patellar Duration: Pain/Injury started several years ago. Pain lasts Waxes and wanes. Quality: aching in characteristic. Severity: pain is excruciating , incapacitating and unbearable(9-10 pain scale) Onset: insidious Radiation: None Other Symptoms: Noting locking. Denies redness, Swelling. Patient does not endorse weakness/tingling sensation. Modifying Factors: Pain is worsened by standing, walking. Pain is improved by rest.  Previous remedies tried include Norco 
Previous Injuries/Surgeries: None Previous Imaging: None Reviewed Medication: Patient is on Crestor, not Lipitor. Patient is currently taking Jardiance and Metformin 500 mg TID for Diabetes. Not taking Lasix, but is taking Bumex 2 mg. Also Taking Colcrys daily. Also currently taking Pradaxa BID now as well. Health Maintenance Health Maintenance Due Topic Date Due  
 COVID-19 Vaccine (1 of 2) 05/13/1956  GLAUCOMA SCREENING Q2Y  09/25/2020  Eye Exam Retinal or Dilated  09/25/2020 Past Medical, Family, and Social History:  
 
Current Outpatient Medications on File Prior to Visit Medication Sig Dispense Refill  
 HYDROcodone-acetaminophen (NORCO)  mg tablet Take 1 Tab by mouth every six (6) hours as needed for Pain for up to 30 days. 120 Tab 0  cloNIDine (CATAPRES) 0.2 mg/24 hr ptwk 1 Patch by TransDERmal route every seven (7) days. 4 Patch 0  
 hydrALAZINE (APRESOLINE) 100 mg tablet Take 1 Tab by mouth three (3) times daily.  30 Tab 0  
  montelukast (SINGULAIR) 10 mg tablet Take 1 Tab by mouth daily. 90 Tab 1  
 glipiZIDE (GLUCOTROL) 5 mg tablet TAKE 2 TABLETS BY MOUTH 20 MINUTES BEFORE BREAKFAST AND SUPPER 360 Tab 0  
 fluticasone propionate (FLONASE) 50 mcg/actuation nasal spray USE ONE SPRAY(S) IN EACH NOSTRIL ONCE DAILY 3 Bottle 3  
 gabapentin (NEURONTIN) 300 mg capsule Take 1 Cap by mouth Before breakfast, lunch, dinner and at bedtime. Max Daily Amount: 1,200 mg. 120 Cap 2  
 omeprazole (PRILOSEC) 20 mg capsule Take 1 capsule by mouth once daily 90 Cap 1  
 tamsulosin (FLOMAX) 0.4 mg capsule Take 1 capsule by mouth once daily 90 Cap 2  
 labetalol (NORMODYNE) 200 mg tablet TAKE 1/2 (ONE-HALF) TABLET BY MOUTH TWICE DAILY (Patient taking differently: Take 100 mg by mouth two (2) times a day. TAKE 1/2 (ONE-HALF) TABLET BY MOUTH TWICE DAILY) 90 Tab 3  
 amLODIPine (NORVASC) 10 mg tablet TAKE ONE TABLET BY MOUTH ONCE DAILY 90 Tab 3  potassium chloride (K-DUR) 10 mEq tablet Take 2 Tabs by mouth two (2) times a day. Indications: low amount of potassium in the blood 360 Tab 3  
 ferrous sulfate (IRON) 325 mg (65 mg iron) tablet Take 1 Tab by mouth two (2) times daily (after meals). Indications: Iron Deficiency Anemia 100 Tab 3  
 magnesium oxide (MAG-OX) 400 mg tablet Take 1 Tab by mouth daily. 90 Tab 3  
 aspirin 81 mg chewable tablet Take 81 mg by mouth daily.  [DISCONTINUED] atorvastatin (LIPITOR) 40 mg tablet Take 40 mg by mouth every evening.  [DISCONTINUED] furosemide (LASIX) 40 mg tablet Take 1 Tab by mouth daily. 30 Tab 0  [DISCONTINUED] Colcrys 0.6 mg tablet TAKE 1 TABLET BY MOUTH ONCE DAILY TO  PREVENT  GOUT 90 Tab 1  [DISCONTINUED] rosuvastatin (CRESTOR) 20 mg tablet Take 1 Tab by mouth nightly. Indications: high cholesterol 90 Tab 1 No current facility-administered medications on file prior to visit. Patient Active Problem List  
Diagnosis Code  Chronic pain G89.29  
 Gout, unspecified M10.9  PVD (peripheral vascular disease) ([facility]) I73.9  Hypertension I10  
 Hypercholesterolemia E78.00  Leg pain M79.606  Arthritis M19.90  
 Unspecified arthropathy, ankle and foot M19.079  Persistent atrial fibrillation ([facility]) I48.19  
 Edema R60.9  Neuropathy G62.9  DDD (degenerative disc disease), lumbar M51.36  
 Prostate CA ([facility] C61  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  [facility] complications [facility] A28.99  Chronic radicular pain of lower back M54.16, G89.29  
 COPD (chronic obstructive pulmonary disease) ([facility]) J44.9  CKD (chronic kidney disease) N18.9  History of colon polyps Z86.010  
 Idiopathic gout M10.00  Essential hypertension with goal blood pressure less than 130/85 I10  Type 2 diabetes mellitus with nephropathy ([facility]) E11.21  
 BMI 32.0-32.9,adult Z68.32  
 Iron deficiency anemia secondary to inadequate dietary iron intake D50.8  Rhabdomyolysis M62.82  
 MOE (acute kidney injury) ([facility] N17.9 Social History Socioeconomic History  Marital status: SINGLE Spouse name: Not on file  Number of children: Not on file  Years of education: Not on file  Highest education level: Not on file Occupational History  Not on file Social Needs  Financial resource strain: Not on file  Food insecurity Worry: Not on file Inability: Not on file  Transportation needs Medical: Not on file Non-medical: Not on file Tobacco Use  Smoking status: Former Smoker Types: Cigarettes Quit date: 2003 Years since quittin.6  Smokeless tobacco: Never Used Substance and Sexual Activity  Alcohol use: Not Currently Alcohol/week: 0.0 standard drinks  Drug use: Never  Sexual activity: Not on file Lifestyle  Physical activity Days per week: Not on file Minutes per session: Not on file  Stress: Not on file Relationships  Social connections Talks on phone: Not on file Gets together: Not on file Attends Orthodox service: Not on file Active member of club or organization: Not on file Attends meetings of clubs or organizations: Not on file Relationship status: Not on file  Intimate partner violence Fear of current or ex partner: Not on file Emotionally abused: Not on file Physically abused: Not on file Forced sexual activity: Not on file Other Topics Concern  Not on file Social History Narrative Lives alone at home Review of Systems Review of Systems Constitutional: Negative for chills and fever. HENT: Negative for congestion. Cardiovascular: Negative for chest pain and palpitations. Gastrointestinal: Negative for abdominal pain, nausea and vomiting. Musculoskeletal: Positive for joint pain. Objective:  
 
Visit Vitals BP (!) 142/61 (BP 1 Location: Left arm, BP Patient Position: Sitting) Pulse 75 Temp 97 °F (36.1 °C) (Temporal) Resp 18 Ht 6' 2\" (1.88 m) Wt 241 lb (109.3 kg) SpO2 100% BMI 30.94 kg/m² Physical Exam 
Vitals signs and nursing note reviewed. Constitutional:   
   Appearance: Normal appearance. HENT:  
   Head: Normocephalic and atraumatic. Neck: Musculoskeletal: Normal range of motion and neck supple. Cardiovascular:  
   Rate and Rhythm: Normal rate and regular rhythm. Pulses: Normal pulses. Heart sounds: Normal heart sounds. Pulmonary:  
   Effort: Pulmonary effort is normal.  
   Breath sounds: Normal breath sounds. Abdominal:  
   General: Abdomen is flat. Bowel sounds are normal.  
   Palpations: Abdomen is soft. Musculoskeletal:  
   Comments: Right Knee with crepitus, tender to palpation, no significant swelling Neurological:  
   Mental Status: He is alert. Pertinent Labs/Studies: 
 
 
Assessment and orders: ICD-10-CM ICD-9-CM 1. Chronic pain of right knee  M25.561 719.46 TRIAMCINOLONE ACETONIDE INJ  
 G89.29 338.29 triamcinolone acetonide (Kenalog) 40 mg/mL injection PA DRAIN/INJECT LARGE JOINT/BURSA 2. Chronic gout due to renal impairment of multiple sites without tophus  M1A. 39X0 274.02 Colcrys 0.6 mg tablet 3. Vitamin D deficiency  E55.9 268.9 cholecalciferol (VITAMIN D3) (50,000 UNITS /1250 MCG) capsule 4. Type 2 diabetes mellitus with other neurologic complication, without long-term current use of insulin ([facility]  E11.49 250.60 5. Persistent atrial fibrillation ([facility])  I48.19 427.31 Diagnoses and all orders for this visit: 
 
1. Chronic pain of right knee: See procedure note -     TRIAMCINOLONE ACETONIDE INJ 
-     triamcinolone acetonide (Kenalog) 40 mg/mL injection; 1 mL by Intra artICUlar route once for 1 dose. 2. Chronic gout due to renal impairment of multiple sites without tophus: Refill -     Colcrys 0.6 mg tablet; TAKE 1 TABLET BY MOUTH ONCE DAILY TO  PREVENT  GOUT 3. Vitamin D deficiency: Refills 
-     cholecalciferol (VITAMIN D3) (50,000 UNITS /1250 MCG) capsule; Take 1 Cap by mouth every seven (7) days. Other orders 
-     rosuvastatin (CRESTOR) 20 mg tablet; Take 1 Tab by mouth nightly. Indications: high cholesterol -     bumetanide (BUMEX) 2 mg tablet; Take 1 Tab by mouth daily. -     baclofen (LIORESAL) 10 mg tablet; Take 1 Tab by mouth nightly as needed for Muscle Spasm(s). -     empagliflozin (Jardiance) 10 mg tablet; Take 1 Tab by mouth daily. -     metFORMIN (GLUCOPHAGE) 500 mg tablet; Take 1 Tab by mouth three (3) times daily (with meals). -     dabigatran etexilate (Pradaxa) 150 mg capsule; Take 1 Cap by mouth every twelve (12) hours. Follow-up and Dispositions · Return in about 3 months (around 4/28/2021). I have discussed the diagnosis with the patient and the intended plan as seen in the above orders. Social history, medical history, and labs were reviewed. The patient has received an after-visit summary and questions were answered concerning future plans. I have discussed medication side effects and warnings with the patient as well. MD NISHANT Cagle & SHWETA SHRESTHA [facility] 01/28/21

## 2021-01-28 NOTE — PROGRESS NOTES
Chief Complaint Patient presents with  Knee Pain  
  right Visit Vitals BP (!) 142/61 (BP 1 Location: Left arm, BP Patient Position: Sitting) Pulse 75 Temp 97 °F (36.1 °C) (Temporal) Resp 18 Ht 6' 2\" (1.88 m) Wt 241 lb (109.3 kg) SpO2 100% BMI 30.94 kg/m² 1. Have you been to the ER, urgent care clinic since your last visit? Hospitalized since your last visit? No 
 
2. Have you seen or consulted any other health care providers outside of the [address] [place] [address] since your last visit? Include any pap smears or colon screening. No 
 
Reviewed record in preparation for visit and have necessary documentation Pt did not bring medication to office visit for review 
opportunity was given for questions Goals that were addressed and/or need to be completed during or after this appointment include Health Maintenance Due Topic Date Due  
 COVID-19 Vaccine (1 of 2) 05/13/1956  GLAUCOMA SCREENING Q2Y  09/25/2020  Eye Exam Retinal or Dilated  09/25/2020

## 2021-01-28 NOTE — PROGRESS NOTES
Clinic Procedure Note: 
 
Procedure performed: Steroid injection of the Right Knee Indications: Symptom relief from osteoarthritis Date of procedure: 01/28/21 Chart reviewed for the following: 
            Blas Domínguez MD, have reviewed the History, Physical and updated the Allergic reactions for Jean Pierre Cai TIME OUT performed immediately prior to start of procedure: 
            I, Jenni Schafer MD, have performed the following reviews on [address] prior to the start of the procedure, see attached form in media. Procedure: After consent was obtained, using sterile technique the Right Knee was prepped using Chlorprep. Local anesthetic used: None. Kenalog 40 mg was mixed with 1% lidocaine 1 ml and  0.5% marcaine 1 ml and injected into the Right Knee joint via the Lateral approach and the needle withdrawn. The procedure was well tolerated. The patient is asked to continue to rest the joint for a few more days before resuming regular activities. It may be more painful for the first 1-2 days. Watch for fever, or increased swelling or persistent pain in the joint. Call or return to clinic prn if such symptoms occur or there is failure to improve as anticipated. Procedure performed by: Jenni Schafer MD 
Provider assisted by: Jhony Fernandez Patient assisted by: self and Son How tolerated by patient: tolerated the procedure well with no complications Comments: none Jenni Schafer MD 
Resident NISHANT COLE & SHWETA SHRESTHA [facility]

## 2021-02-23 NOTE — TELEPHONE ENCOUNTER
----- Message from Ermias Gonzalez sent at 2/23/2021  8:35 AM EST -----  Regarding: Dr. Juliet Vasquez  Medication Refill    Caller (if not patient): Svetlana Aguilar      Relationship of caller (if not patient): daughter      Best contact number(s): [phone]      Name of medication and dosage if known: Hydrocodone - dosage unknown      Is patient out of this medication (yes/no): Yes      Pharmacy name: [address] listed in chart? (yes/no): Yes  Pharmacy phone number: [phone]      Details to clarify the request: Pt would like a call when the refill is called in.        Ermias Gonzalez

## 2021-02-25 NOTE — TELEPHONE ENCOUNTER
----- Message from Keyshawn Avila sent at 2021 12:13 PM EST -----  Regarding: Dr. Aly [phone]  General Message/Vendor Calls    Caller's first and last name: Trinh Malcolm/ Daughter      Reason for call: Pharmacy informed pt that the \"Labetalol\" prescribed is contradictory to the pt's asthma and a different medication is needed. Please assist.      Callback required yes/no and why: Yes.       Best contact number(s): [phone]      Details to clarify the request: [address] [address]   Phone:  [phone]  Fax:  [phone]       Keyshawn Avila

## 2021-02-25 NOTE — TELEPHONE ENCOUNTER
Called patient and discussed medication. Patient with COPD, tolerating Labetalol and has helped with controlling blood pressure. Planning to continue Labetalol. Also reviewed recent paperwork, does not need orthotics.     MD NISHANT New & SHWETA SHRESTHA [facility]  02/25/21

## 2021-03-02 NOTE — TELEPHONE ENCOUNTER
Pt states that the pharmacy won't give him his fluid pill because it interacts with his asthma. Pt would like to know how can the pharmacy make that decision. He states that he can now tell the difference since not taking the fluid pill. He reports not using the restroom as much.

## 2021-04-07 ENCOUNTER — TELEPHONE (OUTPATIENT)
Dept: FAMILY MEDICINE CLINIC | Age: 81
End: 2021-04-07

## 2021-04-07 NOTE — TELEPHONE ENCOUNTER
----- Message from Amy Polanco sent at 2021 12:55 PM EDT -----  Regarding: Dr. Ellen Snell  General Message/Vendor Calls    Caller's first and last name: Marcos/Diza  home      Reason for call: Needs death certificate signed      Callback required yes/no and why: yes      Best contact number(s): [phone]      Details to clarify the request: n/a      Amy Polanco

## 2021-04-08 NOTE — TELEPHONE ENCOUNTER
Death Certificate on EDRS completed.     Mammie Ormond, MD PRISCILLA CHAN & SHWETA SHRESTHA [facility]  04/08/21

## 2021-04-08 NOTE — TELEPHONE ENCOUNTER
----- Message from Shane Dye sent at 4/7/2021  5:49 PM EDT -----  Regarding: DR Cameron/telephone  Patient return call    Caller's first and last name and relationship (if not the patient):  Maris tesfaye with fields       Best contact number(s):[phone]      Whose call is being returned: Dr Jackie Agustin,       Details to clarify the request:regarding pts death certificate attempted to submit  Dr Jackie Agustin name to [facility] and his name was not coming up       Shane Dye

## 2021-04-08 NOTE — TELEPHONE ENCOUNTER
Spoke with Damian Yang, informed per Dr. Kana Mason:  Katlyn Howard verbalized understanding and appreciative.

## 2022-04-06 NOTE — PROGRESS NOTES
Chief Complaint   Patient presents with    Blood Pressure Check     Patient brought in BP machine from home. Our machine read: 159/57  Patients machine read: 168/93  Advised patient to purchase a new BP cuff.
His blood pressure is still elevated today. I want him to increase his hydralazine to 50 mg 4 times daily. Should this not control his blood pressure he will have to go back and see Dr. Thiago Self. Have him return blood pressure recordings beginning 4 days after he starts the increased dose for total of 2 weeks readings.
Negative/Unknown

## 2022-12-15 NOTE — PATIENT INSTRUCTIONS

## 2025-02-02 NOTE — PROGRESS NOTES
[address] 
[phone] Progress Note Patient: Hermes Frankel MRN: [id number]  SSN: [id number] YOB: 1940  Age: 79 y.o. Sex: male Chief Complaint Patient presents with  Follow Up Chronic Condition Hermes Frankel is a 79 y.o. male evaluated via telephone on 5/18/2020. Nurse involved in C/ Alejo Bynum Location of patient:Home Location of physician:My office Consent: 
He and/or health care decision maker is aware that that he may receive a bill for this telephone service, depending on his insurance coverage, and has provided verbal consent to proceed: Yes Documentation: I communicated with the patient and/or health care decision maker about multiple problems. Details of this discussion including any medical advice provided: See Below. I affirm this is a Patient Initiated Episode with an Established Patient who has not had a related appointment within my department in the past 7 days or scheduled within the next 24 hours. Total Time: minutes: 21-30 minutes Note: not billable if this call serves to triage the patient into an appointment for the relevant concern Partha Yin MD  
__________________________________________________________________________________________ Subjective:  
 
Encounter Diagnoses Name Primary?  Centrilobular emphysema ([facility] [address]): He is currently practicing good social isolation due to [place] virus restrictions. He only goes to the grocery store. SpO2 Readings from Last 3 Encounters:  
02/17/20 99% 11/21/19 100% 11/01/19 98% Oxygen: He currently is not on home oxygen therapy. Symptoms: chronic dyspnea: severity = moderate: course of sx: stable. Patient does not smoke cigarettes. Compliant to medications. Yes  Type 2 diabetes mellitus with other neurologic complication, without [place] visit has been disqualified for  \"Heart beating fast. Difficult breathing Faint, clammy hands\" - advised ED evaluation; recommend ambulance transport .  I have provided the patient with the reason for the disqualification and informed them that they will not be charged for this visit. I have recommended Disposition of Emergency Department .      Laure Navarro NP     long-term current use of insulin ([facility] [address]): This patient is managed under a comprehensive plan of care for Diabetes. Overall the patient feels well with good energy level. Key Antihyperglycemic Medications   
    
  
 metFORMIN (GLUCOPHAGE) 500 mg tablet Take 1 Tab by mouth three (3) times daily (with meals). empagliflozin (JARDIANCE) 10 mg tablet Take one tab daily. STOP LOVASTATIN  
 glipiZIDE (GLUCOTROL) 5 mg tablet Take 1 Tab by mouth two (2) times a day. Pertinent Labs:  
Lab Results Component Value Date/Time Hemoglobin A1c 5.1 02/17/2020 10:49 AM  
 Hemoglobin A1c 5.8 (H) 11/18/2019 10:50 AM  
 Hemoglobin A1c 5.5 09/16/2019 10:31 AM  
 Hemoglobin A1c, External 6.3 10/12/2017 There is no height or weight on file to calculate BMI. Lab Results Component Value Date/Time LDL, calculated 71 02/17/2020 10:49 AM  
     
Lab Results Component Value Date/Time Sodium 143 02/17/2020 10:49 AM  
 Potassium 3.3 (L) 02/17/2020 10:49 AM  
 Chloride 111 (H) 02/17/2020 10:49 AM  
 CO2 26 02/17/2020 10:49 AM  
 Anion gap 6 02/17/2020 10:49 AM  
 Glucose 72 02/17/2020 10:49 AM  
 BUN 9 02/17/2020 10:49 AM  
 Creatinine 1.26 02/17/2020 10:49 AM  
 BUN/Creatinine ratio 7 (L) 02/17/2020 10:49 AM  
 GFR est AA >60 02/17/2020 10:49 AM  
 GFR est non-AA 55 (L) 02/17/2020 10:49 AM  
 Calcium 8.5 02/17/2020 10:49 AM  
 AST (SGOT) 20 02/17/2020 10:49 AM  
 Alk. phosphatase 82 02/17/2020 10:49 AM  
 Protein, total 6.4 02/17/2020 10:49 AM  
 Albumin 3.9 02/17/2020 10:49 AM  
 Globulin 2.5 02/17/2020 10:49 AM  
 A-G Ratio 1.6 02/17/2020 10:49 AM  
 ALT (SGPT) 28 02/17/2020 10:49 AM  
 
Lab Results Component Value Date/Time Microalbumin/Creat ratio (mg/g creat) 355 (H) 11/18/2019 10:50 AM  
 Microalbumin,urine random 9.08 11/18/2019 10:50 AM  
 
 Frequency of home glucose testing:daily Blood Sugar range at home:  
 Last eye exam: In past 12 months. Last foot exam: This year. Polyuria, polyphagia or polydipsia: No 
 Retinopathy: No 
 Neuropathy SX: No  
 Low blood sugar symptoms: No 
 Dietary compliance: Good Medication compliance:Good On ASA: Yes Depression: No 
 CKD:no Wt Readings from Last 3 Encounters:  
20 250 lb (113.4 kg)  
19 242 lb 8 oz (110 kg) 19 251 lb (113.9 kg) Social History Tobacco Use Smoking Status Former Smoker  Types: Cigarettes  Last attempt to quit: 2003  Years since quittin.9 Smokeless Tobacco Never Used There is no height or weight on file to calculate BMI. Diabetic Consultants: All the patient's questions regarding medications, diet and exercise were answered. Goal of A1C of less than 7.5% is our goal.  
Our overall goal is to reduce or eliminate the long term consequences of poorly controlled diabetes.  Type 2 diabetes mellitus with nephropathy ([facility] [address]): This patient is managed under a comprehensive plan of care for Diabetes. Overall the patient feels well with good energy level. Key Antihyperglycemic Medications   
    
  
 metFORMIN (GLUCOPHAGE) 500 mg tablet Take 1 Tab by mouth three (3) times daily (with meals). empagliflozin (JARDIANCE) 10 mg tablet Take one tab daily. STOP LOVASTATIN  
 glipiZIDE (GLUCOTROL) 5 mg tablet Take 1 Tab by mouth two (2) times a day. Pertinent Labs:  
Lab Results Component Value Date/Time Hemoglobin A1c 5.1 2020 10:49 AM  
 Hemoglobin A1c 5.8 (H) 2019 10:50 AM  
 Hemoglobin A1c 5.5 2019 10:31 AM  
 Hemoglobin A1c, External 6.3 10/12/2017 There is no height or weight on file to calculate BMI. Lab Results Component Value Date/Time LDL, calculated 71 2020 10:49 AM  
     
Lab Results Component Value Date/Time  Sodium 143 2020 10:49 AM  
 Potassium 3.3 (L) 2020 10:49 AM  
 Chloride 111 (H) 2020 10:49 AM  
 CO2 26 2020 10:49 AM  
 Anion gap 6 2020 10:49 AM  
 Glucose 72 2020 10:49 AM  
 BUN 9 2020 10:49 AM  
 Creatinine 1.26 2020 10:49 AM  
 BUN/Creatinine ratio 7 (L) 2020 10:49 AM  
 GFR est AA >60 2020 10:49 AM  
 GFR est non-AA 55 (L) 2020 10:49 AM  
 Calcium 8.5 2020 10:49 AM  
 AST (SGOT) 20 2020 10:49 AM  
 Alk. phosphatase 82 2020 10:49 AM  
 Protein, total 6.4 2020 10:49 AM  
 Albumin 3.9 2020 10:49 AM  
 Globulin 2.5 2020 10:49 AM  
 A-G Ratio 1.6 2020 10:49 AM  
 ALT (SGPT) 28 2020 10:49 AM  
 
Lab Results Component Value Date/Time Microalbumin/Creat ratio (mg/g creat) 355 (H) 2019 10:50 AM  
 Microalbumin,urine random 9.08 2019 10:50 AM  
 
 Frequency of home glucose testing:daily Blood Sugar range at home:  
 Last eye exam: In past 12 months. Last foot exam: This year. Polyuria, polyphagia or polydipsia: No 
 Retinopathy: No 
 Neuropathy SX: No  
 Low blood sugar symptoms: No 
 Dietary compliance: Good Medication compliance:Good On ASA: Yes Depression: No 
 CKD:no Wt Readings from Last 3 Encounters:  
20 250 lb (113.4 kg)  
19 242 lb 8 oz (110 kg) 19 251 lb (113.9 kg) Social History Tobacco Use Smoking Status Former Smoker  Types: Cigarettes  Last attempt to quit: 2003  Years since quittin.9 Smokeless Tobacco Never Used There is no height or weight on file to calculate BMI. All the patient's questions regarding medications, diet and exercise were answered. Goal of A1C of less than 7.5% is our goal.  
Our overall goal is to reduce or eliminate the long term consequences of poorly controlled diabetes.  Iron deficiency anemia secondary to inadequate dietary iron intake: No sx. Lab Results Component Value Date/Time HGB 11.0 (L) 2020 10:49 AM  
 
Lab Results Component Value Date/Time  Iron 46 2020 10:49 AM  
 TIBC 292 2020 10:49 AM  
 Iron % saturation 16 (L) 02/17/2020 10:49 AM  
 Ferritin 33 11/01/2019 01:40 PM  
 
   
 Chronic radicular pain of lower back: Severe chronic pain. Narcotic dependent. No change.  Stage 3 chronic kidney disease ([facility] [address]): Last GFR was actually normal  
Lab Results Component Value Date/Time GFR est AA >60 02/17/2020 10:49 AM  
 GFR est non-AA 55 (L) 02/17/2020 10:49 AM  
 Creatinine (POC) 1.4 (H) 01/02/2015 10:08 AM  
 Creatinine 1.26 02/17/2020 10:49 AM  
 BUN 9 02/17/2020 10:49 AM  
 Sodium 143 02/17/2020 10:49 AM  
 Potassium 3.3 (L) 02/17/2020 10:49 AM  
 Chloride 111 (H) 02/17/2020 10:49 AM  
 CO2 26 02/17/2020 10:49 AM  
 
Lab Results Component Value Date/Time WBC 3.7 (L) 02/17/2020 10:49 AM  
 HGB 11.0 (L) 02/17/2020 10:49 AM  
 HCT 38.6 02/17/2020 10:49 AM  
 PLATELET 191 87/46/4228 10:49 AM  
 MCV 82.7 02/17/2020 10:49 AM  
 
Lab Results Component Value Date/Time Vitamin D 25-Hydroxy 21 (L) 10/07/2010 09:27 AM  
 VITAMIN D, 25-HYDROXY 63.3 10/16/2019 10:37 AM  
   
Lab Results Component Value Date/Time Calcium 8.5 02/17/2020 10:49 AM  
 Phosphorus 3.0 10/16/2019 10:37 AM  
 PTH, Intact 73 (H) 10/16/2019 10:37 AM  
 
 
 
   
 
 
 
 
 
Current and past medical information: 
 
Current Medications after this visit[de-identified]    
Current Outpatient Medications Medication Sig  
 HYDROcodone-acetaminophen (NORCO)  mg tablet Take 1 Tab by mouth every six (6) hours as needed for Pain for up to 30 days.  omeprazole (PRILOSEC) 20 mg capsule Take 1 capsule by mouth once daily  tamsulosin (FLOMAX) 0.4 mg capsule Take 1 capsule by mouth once daily  allopurinoL (ZYLOPRIM) 300 mg tablet Take 1 tablet by mouth once daily  Pradaxa 150 mg capsule Take 1 capsule by mouth twice daily  Colcrys 0.6 mg tablet TAKE 1 TABLET BY MOUTH ONCE DAILY TO  PREVENT  GOUT  
 fluticasone propionate (FLONASE) 50 mcg/actuation nasal spray USE ONE SPRAY(S) IN EACH NOSTRIL ONCE DAILY  albuterol (PROVENTIL HFA, VENTOLIN HFA, PROAIR HFA) 90 mcg/actuation inhaler INHALE TWO PUFFS BY MOUTH EVERY 6 HOURS AS NEEDED FOR WHEEZING FOR  UP  TO  ONE  YEAR  
 metFORMIN (GLUCOPHAGE) 500 mg tablet Take 1 Tab by mouth three (3) times daily (with meals).  labetalol (NORMODYNE) 200 mg tablet TAKE 1/2 (ONE-HALF) TABLET BY MOUTH TWICE DAILY  empagliflozin (JARDIANCE) 10 mg tablet Take one tab daily. STOP LOVASTATIN  
 montelukast (SINGULAIR) 10 mg tablet TAKE 1 TABLET BY MOUTH ONCE DAILY  melatonin 10 mg tab Take  by mouth.  amLODIPine (NORVASC) 10 mg tablet TAKE ONE TABLET BY MOUTH ONCE DAILY  b complex-vitamin c-folic acid 5mg (FOLBEE PLUS) tab tablet TAKE 1 TABLET BY MOUTH ONCE DAILY  ergocalciferol (ERGOCALCIFEROL) 50,000 unit capsule Take 1 Cap by mouth every seven (7) days.  gabapentin (NEURONTIN) 300 mg capsule Take 1 Cap by mouth three (3) times daily.  glipiZIDE (GLUCOTROL) 5 mg tablet Take 1 Tab by mouth two (2) times a day.  rosuvastatin (CRESTOR) 20 mg tablet Take 1 Tab by mouth nightly. Indications: high cholesterol  potassium chloride (K-DUR) 10 mEq tablet Take 2 Tabs by mouth two (2) times a day. Indications: low amount of potassium in the blood  cloNIDine HCl (CATAPRES) 0.3 mg tablet Take 1 Tab by mouth two (2) times a day.  Blood-Glucose Meter (TRUE METRIX GLUCOSE METER) misc AS DIRECTED  glucose blood VI test strips (TRUE METRIX GLUCOSE TEST STRIP) strip Test 2 times daily Dx Coed E11.65  
 lancets misc Test 2 times daily Dx Code E11.65  
 hydrALAZINE (APRESOLINE) 50 mg tablet TAKE ONE TABLET BY MOUTH 4 TIMES DAILY FOR  HYPERTENSION  
 bumetanide (BUMEX) 2 mg tablet TAKE ONE TABLET BY MOUTH TWICE DAILY  baclofen (LIORESAL) 10 mg tablet TAKE ONE TABLET BY MOUTH THREE TIMES DAILY (MUSCLE  RELAXER)  ferrous sulfate (IRON) 325 mg (65 mg iron) tablet Take 1 Tab by mouth two (2) times daily (after meals). Indications: Iron Deficiency Anemia  lancets 30 gauge [facility]  ACCU-CHEK SHAUNA CONTROL SOLN soln  docusate sodium 100 mg tab Take 1 Tab by mouth two (2) times a day.  magnesium oxide (MAG-OX) 400 mg tablet Take 1 Tab by mouth daily.  aspirin 81 mg chewable tablet Take 81 mg by mouth daily. No current facility-administered medications for this visit. Patient Active Problem List  
 Diagnosis Date Noted  BMI 32.0-32.9,adult 06/05/2019 Priority: 1 - One  Type 2 diabetes mellitus with nephropathy ([facility] [address] 01/10/2018 Priority: 1 - One  
 History of colon polyps 05/02/2016 Priority: 1 - One  
 Idiopathic gout 05/02/2016 Priority: 1 - One  
 Essential hypertension with goal blood pressure less than 130/85 05/02/2016 Priority: 1 - One  
 CKD (chronic kidney disease) 11/23/2015 Priority: 1 - One  
 COPD (chronic obstructive pulmonary disease) ([facility] [address] 04/27/2015 Priority: 1 - One  
 [facility] neurolog[facility] complications [facility] 89/01/8271 Priority: 1 - One  Chronic radicular pain of lower back 07/21/2014 Priority: 1 - One  
 Neuropathy 07/16/2013 Priority: 1 - One  DDD (degenerative disc disease), lumbar 07/16/2013 Priority: 1 - One  Prostate CA ([facility] [address] 07/16/2013 Priority: 1 - One  
 Edema 01/12/2012 Priority: 1 - One  Persistent atrial fibrillation (HCC) Priority: 1 - One  Arthritis 07/11/2011 Priority: 1 - One  Gout, unspecified Priority: 1 - One  
 Hypercholesterolemia Priority: 1 - One  Leg pain Priority: 1 - One  Chronic pain 05/06/2010 Priority: 1 - One  
 Hypertension Priority: 2 - Two  Tubulovillous adenoma polyp of colon 07/16/2013 Priority: 6 - Six  Unspecified arthropathy, ankle and foot 07/14/2011 Priority: 6 - Six  PVD (peripheral vascular disease) ([facility] [address] Priority: 6 - Six  Iron deficiency anemia secondary to inadequate dietary iron intake 11/02/2019 Past Medical History: Diagnosis Date  Acute on chronic diastolic congestive heart failure ([facility] 2015  Arthritis 2011  [place] 2010  Cancer [facility])   
 prostate  Chronic diastolic heart failure ([facility] 2015  CKD (chronic kidney disease) 2015  Diabetes ([facility]  Gout, unspecified  Hypercholesterolemia  Hypertension  Inguinal lymphadenopathy 2014  Leg pain  Persistent atrial fibrillation  PVD (peripheral vascular disease) ([facility] Allergies Allergen Reactions  Indocin [Indomethacin Sodium] Unknown (comments)  Lisinopril Angioedema Dxed in hospital.  
 Losartan Other (comments) Face and throat swelling. Past Surgical History:  
Procedure Laterality Date  COLONOSCOPY N/A 2016 COLONOSCOPY performed by Ron Smith MD at [facility]    
 back and left shoulder x2 Social History Socioeconomic History  Marital status: SINGLE Spouse name: Not on file  Number of children: Not on file  Years of education: Not on file  Highest education level: Not on file Tobacco Use  Smoking status: Former Smoker Types: Cigarettes Last attempt to quit: 2003 Years since quittin.9  Smokeless tobacco: Never Used Substance and Sexual Activity  Alcohol use: Yes Alcohol/week: 0.0 standard drinks Comment: 2 drinks/month  Drug use: No  
 
 
Review of Systems Constitutional: Negative. Negative for chills, fever, malaise/fatigue and weight loss. HENT: Negative. Negative for hearing loss. Eyes: Negative. Negative for blurred vision and double vision. Respiratory: Positive for shortness of breath. Negative for cough and sputum production. Stable dyspnea on exertion. Cardiovascular: Negative. Negative for chest pain and palpitations. Gastrointestinal: Negative. Negative for abdominal pain, blood in stool, heartburn, nausea and vomiting. Genitourinary: Negative. Negative for dysuria, frequency and urgency. Musculoskeletal: Positive for back pain. Negative for falls, myalgias and neck pain. Skin: Negative. Negative for rash. Neurological: Negative. Negative for dizziness, tingling, tremors, weakness and headaches. Endo/Heme/Allergies: Negative. Psychiatric/Behavioral: Negative. Negative for depression. Objective: There were no vitals filed for this visit. There is no height or weight on file to calculate BMI. Health Maintenance Due Topic Date Due  Medicare Yearly Exam  02/06/2020 Assessment and orders:  
 
Encounter Diagnoses ICD-10-CM ICD-9-CM 1. Centrilobular emphysema (HCC) J43.2 492.8 2. Type 2 diabetes mellitus with other neurologic complication, without long-term current use of insulin (HCC) E11.49 250.60  
3. Type 2 diabetes mellitus with nephropathy (HCC) E11.21 250.40  
  583.81  
4. Iron deficiency anemia secondary to inadequate dietary iron intake D50.8 280.1 5. Chronic radicular pain of lower back M54.16 724.4 G89.29 338.29  
6. Stage 3 chronic kidney disease (HCC) N18.3 585. 3 Diagnoses and all orders for this visit: 1. Centrilobular emphysema (HCC)-stable 2. Type 2 diabetes mellitus with other neurologic complication, without long-term current use of insulin (HCC)-retest.  The last A1c was 5.1 and does not need retesting. 
-     METABOLIC PANEL, COMPREHENSIVE; Future 3. Type 2 diabetes mellitus with nephropathy ([facility] [address] -     METABOLIC PANEL, COMPREHENSIVE; Future 4. Iron deficiency anemia secondary to inadequate dietary iron intake 
-     HEMOGLOBIN; Future -     FERRITIN; Future 
-     IRON PROFILE; Future 5. Chronic radicular pain of lower back 6. Stage 3 chronic kidney disease ([facility] [address] -     METABOLIC PANEL, COMPREHENSIVE; Future Plan of care: 
Discussed diagnoses in detail with patient. Medication risks/benefits/side effects discussed with patient. All of the patient's questions were addressed. The patient understands and agrees with our plan of care. The patient knows to call back if they are unsure of or forget any changes we discussed today or if the symptoms change. The patient received an After-Visit Summary which contains VS, orders, medication list and allergy list. This can be used as a \"mini-medical record\" should they have to seek medical care while out of town. Patient Care Team: 
Demetra Crowley MD as PCP - [facility]) Demetra Crowley MD as PCP - [facility] ERAN Sanchez (Orthopedic Surgery) Karissa Abbasi MD (Otolaryngology) Fernanda Gomez MD (Endocrinology) Hiren Garrido MD as Physician (Internal Medicine) Terri Booker MD (Ophthalmology) Law, Rosa Elena Miller MD (Podiatry) Prema Coleman MD (Cardiology) Terri Booker MD (Ophthalmology) Lucero Ayala MD (Gastroenterology) Keegan Walton MD (Hematology and Oncology) Signed By: Blu Duran MD   
 May 18, 2020 ATTENTION:  
This medical record was transcribed using an electronic medical records/speech recognition system. Although proofread, it may and can contain electronic, spelling and other errors. Corrections may be executed at a later time. Please feel free to contact me for any clarifications as needed.